# Patient Record
Sex: FEMALE | Race: WHITE | NOT HISPANIC OR LATINO | Employment: OTHER | URBAN - NONMETROPOLITAN AREA
[De-identification: names, ages, dates, MRNs, and addresses within clinical notes are randomized per-mention and may not be internally consistent; named-entity substitution may affect disease eponyms.]

---

## 2017-01-03 ENCOUNTER — TELEPHONE (OUTPATIENT)
Dept: FAMILY MEDICINE | Facility: OTHER | Age: 72
End: 2017-01-03

## 2017-01-03 NOTE — TELEPHONE ENCOUNTER
See message below, would we be able to send in prescription for her to give self injections or is it ok for her to wait until she gets back ton continue these?

## 2017-01-03 NOTE — TELEPHONE ENCOUNTER
She would need to be taught how to do this. We can arrange this if she would like. It is ok also to skip them for a couple of months, 2-4 months

## 2017-01-03 NOTE — TELEPHONE ENCOUNTER
Called and she will think about what she would like to do and get back to us about giving self injections or else waiting the few months.

## 2017-01-03 NOTE — TELEPHONE ENCOUNTER
2:55 PM    Reason for Call: Phone Call    Description: Patient receives monthly B12 injections, but patient will be leaving to [place], [place] for a few months and would like to know how she is to continue these injections while she is away.     Was an appointment offered for this call? No    Preferred method for responding to this message: Telephone Call    If we cannot reach you directly, may we leave a detailed response at the number you provided? Yes    Can this message wait until your PCP/provider returns, if available today? Not applicable,     Yelitza Garg

## 2017-01-18 ENCOUNTER — ALLIED HEALTH/NURSE VISIT (OUTPATIENT)
Dept: ALLERGY | Facility: OTHER | Age: 72
End: 2017-01-18
Attending: FAMILY MEDICINE
Payer: MEDICARE

## 2017-01-18 DIAGNOSIS — E53.9 B-COMPLEX DEFICIENCY: Primary | ICD-10-CM

## 2017-01-18 PROCEDURE — 96372 THER/PROPH/DIAG INJ SC/IM: CPT

## 2017-04-25 ENCOUNTER — TELEPHONE (OUTPATIENT)
Dept: FAMILY MEDICINE | Facility: OTHER | Age: 72
End: 2017-04-25

## 2017-04-25 DIAGNOSIS — E53.9 B-COMPLEX DEFICIENCY: Primary | ICD-10-CM

## 2017-04-25 RX ORDER — CYANOCOBALAMIN 1000 UG/ML
INJECTION, SOLUTION INTRAMUSCULAR; SUBCUTANEOUS
Qty: 1 ML | Refills: 3 | OUTPATIENT
Start: 2017-04-25 | End: 2017-09-29

## 2017-04-25 NOTE — TELEPHONE ENCOUNTER
Patient is wondering if she can continue with her B-12 shots now. Patient does have an annual exam scheduled on 05/23/2017. Please call patient and discuss this with her.

## 2017-04-25 NOTE — TELEPHONE ENCOUNTER
B12  Last visit: 11.16.15- Patient is here for monthly B12 shot but has no order.  PCP is Kaylyn Portillo. No follow up appointment is scheduled. Please advise. Thank you

## 2017-04-25 NOTE — TELEPHONE ENCOUNTER
Jose signed for B12 but patient left before the order was signed and did not receive shot.  Told patient that she is due for annual visit.  Please advise if patient can receive shot or if she needs to come in first. Thank you

## 2017-04-26 ENCOUNTER — ALLIED HEALTH/NURSE VISIT (OUTPATIENT)
Dept: ALLERGY | Facility: OTHER | Age: 72
End: 2017-04-26
Attending: PHYSICIAN ASSISTANT
Payer: MEDICARE

## 2017-04-26 DIAGNOSIS — E53.8 VITAMIN B12 DEFICIENCY (NON ANEMIC): Primary | ICD-10-CM

## 2017-04-26 PROCEDURE — 96372 THER/PROPH/DIAG INJ SC/IM: CPT

## 2017-04-26 NOTE — MR AVS SNAPSHOT
After Visit Summary   4/26/2017    Maria Teresa Aburto    MRN: [id number]           Patient Information     Date Of Birth          1945        Visit Information        Provider Department      4/26/2017 2:15 PM HC SHOT ROOM [facility] [place]        Today's Diagnoses     Vitamin B12 deficiency (non anemic)    -  1       Follow-ups after your visit        Your next 10 appointments already scheduled     Apr 26, 2017  2:15 PM CDT   injection with HC SHOT ROOM   [facility] [place] ([facility] [place] [facility])    [address]  [place] [address]   [phone]            May 23, 2017 10:00 AM CDT   (Arrive by 9:45 AM)   PHYSICAL with Kenna Portillo MD   [facility] ([facility] [place] [facility])    [address]  [place] [address]   [phone]              Who to contact     If you have questions or need follow up information about today's clinic visit or your schedule please contact [facility] directly at [phone].  Normal or non-critical lab and imaging results will be communicated to you by Phloronolhart, letter or phone within 4 business days after the clinic has received the results. If you do not hear from us within 7 days, please contact the clinic through [email or web address] or phone. If you have a critical or abnormal lab result, we will notify you by phone as soon as possible.  Submit refill requests through [employer] or call your pharmacy and they will forward the refill request to us. Please allow 3 business days for your refill to be completed.          Additional Information About Your Visit        Phloronolhart Information     [employer] gives you secure access to your electronic health record. If you see a primary care provider, you can also send messages to your care team and make appointments. If you have questions, please call your primary care clinic.  If you do not have a primary care provider, please call [phone] and they will assist you.        Care EveryWhere ID     This  is your Care EveryWhere ID. This could be used by other organizations to access your [place] medical records  [id number]         Blood Pressure from Last 3 Encounters:   11/16/15 122/74   07/30/15 112/60   10/23/14 126/68    Weight from Last 3 Encounters:   11/16/15 122 lb (55.3 kg)   07/30/15 120 lb (54.4 kg)   10/23/14 121 lb (54.9 kg)              We Performed the Following     INJECTION INTRAMUSCULAR OR SUB-Q     VITAMIN B12 INJ /1000MCG        Primary Care Provider Office Phone # Fax #    Kenna Portillo [phone]       [facility] [place] [address]  [place] [address]        Thank you!     Thank you for choosing [facility] [place]  for your care. Our goal is always to provide you with excellent care. Hearing back from our patients is one way we can continue to improve our services. Please take a few minutes to complete the written survey that you may receive in the mail after your visit with us. Thank you!             Your Updated Medication List - Protect others around you: Learn how to safely use, store and throw away your medicines at www.disposemymeds.org.          This list is accurate as of: 4/26/17  1:49 PM.  Always use your most recent med list.                   Brand Name Dispense Instructions for use    ASPIRIN CAPS 81 MG OR     100    one capsule by mouth daily       CALCIUM + D PO          cyanocobalamin 1000 MCG/ML injection    VITAMIN B12    1 mL    Inject 1 mL (1,000 mcg) into the muscle every 30 days       IRON SUPPLEMENT PO      Take 65 mg by mouth       Multi-vitamin Tabs tablet      Take 1 tablet by mouth daily       raloxifene 60 MG tablet    Evista    90 tablet    TAKE ONE TABLET BY MOUTH DA RACHELLE       scopolamine 1.5 MG patch 72 hr    TRANSDERM-SCOP    7 patch    Apply to hairless area behind one ear at least 4 hours before travel.       VITAMIN C PO      Take 500 mg by mouth

## 2017-04-26 NOTE — TELEPHONE ENCOUNTER
She can continue them. We will see her on May 23. I will renew them if needed, usually the shot room will place orders and notify me

## 2017-04-26 NOTE — PROGRESS NOTES
The following medication was given:     Prior to injection verified patient identity using patient's name and date of birth.    MEDICATION: Vitamin B12  1000mcg  ROUTE: IM  SITE: Deltoid - Left  DOSE: 1000 mcg / 1 mL  LOT #: [id number]  :  [employer]  EXPIRATION DATE:  07/2018  NDC#: 4773-9667-56    Patient instructed to remain in clinic for 20 minutes afterwards, and to report any adverse reaction to me immediately.    Amy Wooten RN

## 2017-05-23 ENCOUNTER — OFFICE VISIT (OUTPATIENT)
Dept: FAMILY MEDICINE | Facility: OTHER | Age: 72
End: 2017-05-23
Attending: FAMILY MEDICINE
Payer: COMMERCIAL

## 2017-05-23 VITALS
HEIGHT: 63 IN | DIASTOLIC BLOOD PRESSURE: 72 MMHG | SYSTOLIC BLOOD PRESSURE: 124 MMHG | WEIGHT: 120 LBS | BODY MASS INDEX: 21.26 KG/M2 | HEART RATE: 77 BPM | RESPIRATION RATE: 20 BRPM

## 2017-05-23 DIAGNOSIS — G25.81 RESTLESS LEGS SYNDROME (RLS): ICD-10-CM

## 2017-05-23 DIAGNOSIS — Z00.00 ROUTINE GENERAL MEDICAL EXAMINATION AT A HEALTH CARE FACILITY: Primary | ICD-10-CM

## 2017-05-23 PROCEDURE — 99397 PER PM REEVAL EST PAT 65+ YR: CPT | Performed by: FAMILY MEDICINE

## 2017-05-23 PROCEDURE — 90471 IMMUNIZATION ADMIN: CPT | Performed by: FAMILY MEDICINE

## 2017-05-23 PROCEDURE — 90670 PCV13 VACCINE IM: CPT | Performed by: FAMILY MEDICINE

## 2017-05-23 PROCEDURE — G0009 ADMIN PNEUMOCOCCAL VACCINE: HCPCS | Performed by: FAMILY MEDICINE

## 2017-05-23 ASSESSMENT — ANXIETY QUESTIONNAIRES
3. WORRYING TOO MUCH ABOUT DIFFERENT THINGS: NOT AT ALL
7. FEELING AFRAID AS IF SOMETHING AWFUL MIGHT HAPPEN: NOT AT ALL
6. BECOMING EASILY ANNOYED OR IRRITABLE: NOT AT ALL
IF YOU CHECKED OFF ANY PROBLEMS ON THIS QUESTIONNAIRE, HOW DIFFICULT HAVE THESE PROBLEMS MADE IT FOR YOU TO DO YOUR WORK, TAKE CARE OF THINGS AT HOME, OR GET ALONG WITH OTHER PEOPLE: NOT DIFFICULT AT ALL
5. BEING SO RESTLESS THAT IT IS HARD TO SIT STILL: SEVERAL DAYS
GAD7 TOTAL SCORE: 1
2. NOT BEING ABLE TO STOP OR CONTROL WORRYING: NOT AT ALL
1. FEELING NERVOUS, ANXIOUS, OR ON EDGE: NOT AT ALL

## 2017-05-23 ASSESSMENT — PAIN SCALES - GENERAL: PAINLEVEL: NO PAIN (0)

## 2017-05-23 ASSESSMENT — PATIENT HEALTH QUESTIONNAIRE - PHQ9: 5. POOR APPETITE OR OVEREATING: NOT AT ALL

## 2017-05-23 NOTE — PROGRESS NOTES
SUBJECTIVE:                                                            Maria Teresa Aburto is a 71 year old female who presents for Preventive Visit.      Are you in the first 12 months of your Medicare Part B coverage?  No    Healthy Habits:    Do you get at least three servings of calcium containing foods daily (dairy, green leafy vegetables, etc.)? yes    Amount of exercise or daily activities, outside of work: walking    Problems taking medications regularly No    Medication side effects: No    Have you had an eye exam in the past two years? yes    Do you see a dentist twice per year? yes    Do you have sleep apnea, excessive snoring or daytime drowsiness?no            Chief Complaint   Patient presents with     Physical     mammogram done previously        Reviewed and updated as needed this visit by clinical staff  Tobacco  Allergies  Meds  Med Hx  Surg Hx  Fam Hx  Soc Hx        Reviewed and updated as needed this visit by Provider        Social History   Substance Use Topics     Smoking status: Former Smoker     Types: Cigarettes     Smokeless tobacco: Not on file      Comment: Tried to Quit (YES)     Alcohol use Yes       The patient does not drink >3 drinks per day nor >7 drinks per week.    Today's PHQ-2 Score:   PHQ-2 ( 1999 [employer]) 10/10/2013   Q1: Little interest or pleasure in doing things 0   Q2: Feeling down, depressed or hopeless 0   PHQ-2 Score 0       Do you feel safe in your environment - Yes    Do you have a Health Care Directive?: Yes: Advance Directive has been received and scanned.    Current providers sharing in care for this patient include:   Patient Care Team:  Kenna Portillo MD as PCP - General      Hearing impairment: No    Ability to successfully perform activities of daily living: Yes, no assistance needed     Fall risk:       Home safety:  none identified      The following health maintenance items are reviewed in Epic and correct as of today:  Health Maintenance   Topic Date  Due     PNEUMOCOCCAL (2 of 2 - PCV13) 10/10/2014     INFLUENZA VACCINE (SYSTEM ASSIGNED)  09/01/2017     FALL RISK ASSESSMENT  11/15/2017     RAKAN QUESTIONNAIRE 1 YEAR  05/23/2018     PHQ-9 Q1YR (NO INBASKET)  05/23/2018     MAMMO SCREEN Q2 YR (SYSTEM ASSIGNED)  11/15/2018     COLONOSCOPY Q10 YR INBASKET MESSAGE  08/07/2019     ADVANCE DIRECTIVE PLANNING Q5 YRS (NO INBASKET)  10/23/2019     LIPID SCREEN Q5 YR FEMALE (SYSTEM ASSIGNED)  11/16/2020     TETANUS IMMUNIZATION (SYSTEM ASSIGNED)  10/11/2021     DEXA SCAN SCREENING (SYSTEM ASSIGNED)  Completed         Pneumonia Vaccine: due for PCV13  Mammogram Screening: Patient over age 50, mutual decision to screen reflected in health maintenance.  History of abnormal Pap smear: NO - age 65 - see link Cervical Cytology Screening Guidelines     ROS:  C: NEGATIVE for fever, chills, change in weight  I: NEGATIVE for worrisome rashes, moles or lesions  E: NEGATIVE for vision changes or irritation  E/M: NEGATIVE for ear, mouth and throat problems  R: NEGATIVE for significant cough or SOB  B: NEGATIVE for masses, tenderness or discharge  CV: NEGATIVE for chest pain, palpitations or peripheral edema  GI: NEGATIVE for nausea, abdominal pain, heartburn, or change in bowel habits  : NEGATIVE for frequency, dysuria, or hematuria  M: NEGATIVE for significant arthralgias or myalgia  N: NEGATIVE for weakness, dizziness or paresthesias  E: NEGATIVE for temperature intolerance, skin/hair changes  H: NEGATIVE for bleeding problems  P: NEGATIVE for changes in mood or affect    BP Readings from Last 3 Encounters:   05/23/17 124/72   11/16/15 122/74   07/30/15 112/60    Wt Readings from Last 3 Encounters:   05/23/17 120 lb (54.4 kg)   11/16/15 122 lb (55.3 kg)   07/30/15 120 lb (54.4 kg)                  Patient Active Problem List   Diagnosis     Restless legs syndrome (RLS)     Bowden's esophagus     B-complex deficiency     Disorder of bone and cartilage     Squamous Cell Carcinoma      "GERD (gastroesophageal reflux disease)[530.81]     Actinic keratosis     History of malignant neoplasm of skin     Past Surgical History:   Procedure Laterality Date     Breast Biospy  11/11/2000    LEFT > Fibrocystic Disease > Outcome: Fibroadenoma     COLONOSCOPY  2000     COLONOSCOPY  8-7-2009    Normal, Repeat 2015     DEXA Scan  2009     EGD       EGD  2008     EGD  2003     Knee Replacement  2012     Oophorectomy      Ectopic Pregnancy     TONSILLECTOMY         Social History   Substance Use Topics     Smoking status: Former Smoker     Types: Cigarettes     Smokeless tobacco: Not on file      Comment: Tried to Quit (YES)     Alcohol use Yes     Family History   Problem Relation Age of Onset     Endometrial Cancer Other      Family Hx     OSTEOPOROSIS Other      Family Hx     Parkinsonism Other      Family Hx     Unknown/Adopted No family hx of          Current Outpatient Prescriptions   Medication Sig Dispense Refill     cyanocobalamin (VITAMIN B12) 1000 MCG/ML injection Inject 1 mL (1,000 mcg) into the muscle every 30 days 1 mL 3     Ferrous Sulfate (IRON SUPPLEMENT PO) Take 65 mg by mouth       Ascorbic Acid (VITAMIN C PO) Take 500 mg by mouth       multivitamin, therapeutic with minerals (MULTI-VITAMIN) TABS Take 1 tablet by mouth daily       scopolamine (TRANSDERM-SCOP) 1.5 MG patch 72 hr Apply to hairless area behind one ear at least 4 hours before travel. 7 patch 0     Calcium Carbonate-Vitamin D (CALCIUM + D PO)        ASPIRIN CAPS 81 MG OR one capsule by mouth daily 100 3     Allergies   Allergen Reactions     Nitrofurantoin Other (See Comments) and Nausea     Macrobid  \"Chills and Fevers\"     Nitrofurantoin Macrocrystal Other (See Comments) and Nausea     Macrobid  \"Chills and Fevers\"       OBJECTIVE:                                                            /72  Pulse 77  Resp 20  Ht 5' 3\" (1.6 m)  Wt 120 lb (54.4 kg)  Breastfeeding? No  BMI 21.26 kg/m2 Estimated body mass index is 21.26 " "kg/(m^2) as calculated from the following:    Height as of this encounter: 5' 3\" (1.6 m).    Weight as of this encounter: 120 lb (54.4 kg).  EXAM:   GENERAL APPEARANCE: healthy, alert and no distress  EYES: Eyes grossly normal to inspection, PERRL and conjunctivae and sclerae normal  HENT: ear canals and TM's normal, nose and mouth without ulcers or lesions, oropharynx clear and oral mucous membranes moist  NECK: no adenopathy, no asymmetry, masses, or scars and thyroid normal to palpation  RESP: lungs clear to auscultation - no rales, rhonchi or wheezes  BREAST: normal without masses, tenderness or nipple discharge and no palpable axillary masses or adenopathy  CV: regular rate and rhythm, normal S1 S2, no S3 or S4, no murmur, click or rub, no peripheral edema and peripheral pulses strong  ABDOMEN: soft, nontender, no hepatosplenomegaly, no masses and bowel sounds normal  MS: no musculoskeletal defects are noted and gait is age appropriate without ataxia  SKIN: no suspicious lesions or rashes  NEURO: Normal strength and tone, sensory exam grossly normal, mentation intact and speech normal  PSYCH: mentation appears normal and affect normal/bright    ASSESSMENT / PLAN:                                                            1. Routine general medical examination at a health care facility  Doing well, pneumovax given  - PNEUMOCOCCAL CONJ VACCINE 13 VALENT IM  - ADMIN 1st VACCINE    2. Restless legs syndrome (RLS)  Using iron which causes constipation but helps intermittently. She prefers not to use prescription medications            COUNSELING:  Reviewed preventive health counseling, as reflected in patient instructions       Regular exercise       Healthy diet/nutrition        Estimated body mass index is 21.26 kg/(m^2) as calculated from the following:    Height as of this encounter: 5' 3\" (1.6 m).    Weight as of this encounter: 120 lb (54.4 kg).  Weight management plan noted, stable and monitoring   reports " that she has quit smoking. Her smoking use included Cigarettes. She does not have any smokeless tobacco history on file.      Appropriate preventive services were discussed with this patient, including applicable screening as appropriate for cardiovascular disease, diabetes, osteopenia/osteoporosis, and glaucoma.  As appropriate for age/gender, discussed screening for colorectal cancer, prostate cancer, breast cancer, and cervical cancer. Checklist reviewing preventive services available has been given to the patient.    Reviewed patients plan of care and provided an AVS. The Basic Care Plan (routine screening as documented in Health Maintenance) for Maria Teresa meets the Care Plan requirement. This Care Plan has been established and reviewed with the Patient.    Counseling Resources:  ATP IV Guidelines  Pooled Cohorts Equation Calculator  Breast Cancer Risk Calculator  FRAX Risk Assessment  ICSI Preventive Guidelines  Dietary Guidelines for Americans, 2010  USDA's MyPlate  ASA Prophylaxis  Lung CA Screening    Kenna Portillo MD  [facility]

## 2017-05-23 NOTE — MR AVS SNAPSHOT
After Visit Summary   5/23/2017    Maria Teresa Aburto    MRN: [id number]           Patient Information     Date Of Birth          1945        Visit Information        Provider Department      5/23/2017 10:00 AM Kenna Portillo MD [facility]        Today's Diagnoses     Routine general medical examination at a health care facility    -  1      Care Instructions      Preventive Health Recommendations  Female Ages 65 +    Yearly exam:     See your health care provider every year in order to  o Review health changes.   o Discuss preventive care.    o Review your medicines if your doctor has prescribed any.      You no longer need a yearly Pap test unless you've had an abnormal Pap test in the past 10 years. If you have vaginal symptoms, such as bleeding or discharge, be sure to talk with your provider about a Pap test.      Every 1 to 2 years, have a mammogram.  If you are over 69, talk with your health care provider about whether or not you want to continue having screening mammograms.      Every 10 years, have a colonoscopy. Or, have a yearly FIT test (stool test). These exams will check for colon cancer.       Have a cholesterol test every 5 years, or more often if your doctor advises it.       Have a diabetes test (fasting glucose) every three years. If you are at risk for diabetes, you should have this test more often.       At age 65, have a bone density scan (DEXA) to check for osteoporosis (brittle bone disease).    Shots:    Get a flu shot each year.    Get a tetanus shot every 10 years.    Talk to your doctor about your pneumonia vaccines. There are now two you should receive - Pneumovax (PPSV 23) and Prevnar (PCV 13).    Talk to your doctor about the shingles vaccine.    Talk to your doctor about the hepatitis B vaccine.    Nutrition:     Eat at least 5 servings of fruits and vegetables each day.      Eat whole-grain bread, whole-wheat pasta and brown rice instead of white grains  "and rice.      Talk to your provider about Calcium and Vitamin D.     Lifestyle    Exercise at least 150 minutes a week (30 minutes a day, 5 days a week). This will help you control your weight and prevent disease.      Limit alcohol to one drink per day.      No smoking.       Wear sunscreen to prevent skin cancer.       See your dentist twice a year for an exam and cleaning.      See your eye doctor every 1 to 2 years to screen for conditions such as glaucoma, macular degeneration, cataracts, etc         Follow-ups after your visit        Who to contact     If you have questions or need follow up information about today's clinic visit or your schedule please contact [facility] directly at [phone].  Normal or non-critical lab and imaging results will be communicated to you by MyChart, letter or phone within 4 business days after the clinic has received the results. If you do not hear from us within 7 days, please contact the clinic through [employer] or phone. If you have a critical or abnormal lab result, we will notify you by phone as soon as possible.  Submit refill requests through [employer] or call your pharmacy and they will forward the refill request to us. Please allow 3 business days for your refill to be completed.          Additional Information About Your Visit        MyChart Information     [employer] gives you secure access to your electronic health record. If you see a primary care provider, you can also send messages to your care team and make appointments. If you have questions, please call your primary care clinic.  If you do not have a primary care provider, please call [phone] and they will assist you.        Care EveryWhere ID     This is your Care EveryWhere ID. This could be used by other organizations to access your [place] medical records  [id number]        Your Vitals Were     Pulse Respirations Height Breastfeeding? BMI (Body Mass Index)       77 20 5' 3\" (1.6 m) No " 21.26 kg/m2        Blood Pressure from Last 3 Encounters:   05/23/17 124/72   11/16/15 122/74   07/30/15 112/60    Weight from Last 3 Encounters:   05/23/17 120 lb (54.4 kg)   11/16/15 122 lb (55.3 kg)   07/30/15 120 lb (54.4 kg)              We Performed the Following     ADMIN 1st VACCINE     PNEUMOCOCCAL CONJ VACCINE 13 VALENT IM        Primary Care Provider Office Phone # Fax #    Kenna Portillo [phone]       [facility] [place] [address]  [place] [address]        Thank you!     Thank you for choosing [facility] [place]  for your care. Our goal is always to provide you with excellent care. Hearing back from our patients is one way we can continue to improve our services. Please take a few minutes to complete the written survey that you may receive in the mail after your visit with us. Thank you!             Your Updated Medication List - Protect others around you: Learn how to safely use, store and throw away your medicines at www.disposemymeds.org.          This list is accurate as of: 5/23/17 10:44 AM.  Always use your most recent med list.                   Brand Name Dispense Instructions for use    ASPIRIN CAPS 81 MG OR     100    one capsule by mouth daily       CALCIUM + D PO          cyanocobalamin 1000 MCG/ML injection    VITAMIN B12    1 mL    Inject 1 mL (1,000 mcg) into the muscle every 30 days       IRON SUPPLEMENT PO      Take 65 mg by mouth       Multi-vitamin Tabs tablet      Take 1 tablet by mouth daily       scopolamine 1.5 MG patch 72 hr    TRANSDERM-SCOP    7 patch    Apply to hairless area behind one ear at least 4 hours before travel.       VITAMIN C PO      Take 500 mg by mouth

## 2017-05-23 NOTE — NURSING NOTE
"Chief Complaint   Patient presents with     Physical       Initial /72  Pulse 77  Resp 20  Ht 5' 3\" (1.6 m)  Wt 120 lb (54.4 kg)  Breastfeeding? No  BMI 21.26 kg/m2 Estimated body mass index is 21.26 kg/(m^2) as calculated from the following:    Height as of this encounter: 5' 3\" (1.6 m).    Weight as of this encounter: 120 lb (54.4 kg).  Medication Reconciliation: complete   Kelly Knox      "

## 2017-05-24 ENCOUNTER — TELEPHONE (OUTPATIENT)
Dept: FAMILY MEDICINE | Facility: OTHER | Age: 72
End: 2017-05-24

## 2017-05-24 DIAGNOSIS — Z85.828 HISTORY OF MALIGNANT NEOPLASM OF SKIN: Primary | ICD-10-CM

## 2017-05-24 ASSESSMENT — PATIENT HEALTH QUESTIONNAIRE - PHQ9: SUM OF ALL RESPONSES TO PHQ QUESTIONS 1-9: 1

## 2017-05-24 ASSESSMENT — ANXIETY QUESTIONNAIRES: GAD7 TOTAL SCORE: 1

## 2017-05-24 NOTE — TELEPHONE ENCOUNTER
Reason for call:  REFERRAL   1. Concern: Skin issues  2. Have you seen a provider for this concern? Yes  3. Who? [facility]  4. When? Couple years ago  5. What services are you requesting? Dermatology referral for [facility]  Description: Pt stated she needs to have a skin check done. Hasn't had one in a couple years and usually goes to [facility]. She would like to see Gracia Huerta  Was an appointment offered for this a call? No  Preferred method for responding to this message: Telephone Call  If we cannot reach you directly, may we leave a detailed response at the number you provided? Yes  Can this message wait until your PCP/Provider returns if not available today? YES, pt aware provider out today

## 2017-06-28 ENCOUNTER — ALLIED HEALTH/NURSE VISIT (OUTPATIENT)
Dept: ALLERGY | Facility: OTHER | Age: 72
End: 2017-06-28
Attending: FAMILY MEDICINE
Payer: MEDICARE

## 2017-06-28 DIAGNOSIS — E53.8 VITAMIN B12 DEFICIENCY (NON ANEMIC): Primary | ICD-10-CM

## 2017-06-28 PROCEDURE — 96372 THER/PROPH/DIAG INJ SC/IM: CPT

## 2017-06-28 NOTE — PROGRESS NOTES
The following medication was given:     MEDICATION: Vitamin B12  1000mcg  ROUTE: IM  SITE: Deltoid - Right  DOSE: 1ml  LOT #: [id number]  :  [employer]  EXPIRATION DATE:  02/2018  NDC#: [id number]    Renee Evans LPN

## 2017-06-28 NOTE — NURSING NOTE
Prior to injection verified patient identity using patient's name and date of birth.  Per orders of Dr. TONI Portillo, injection of B-12 given by Renee Evans. Patient instructed to remain in clinic for 20 minutes afterwards, and to report any adverse reaction to me immediately. Patient declined to wait signed out AMA.    Renee Evans LPN

## 2017-06-28 NOTE — MR AVS SNAPSHOT
After Visit Summary   6/28/2017    Maria Teresa Aburto    MRN: [id number]           Patient Information     Date Of Birth          1945        Visit Information        Provider Department      6/28/2017 10:30 AM HC SHOT ROOM [place] Olman Pereira        Today's Diagnoses     Vitamin B12 deficiency (non anemic)    -  1       Follow-ups after your visit        Your next 10 appointments already scheduled     Sep 19, 2017 11:15 AM CDT   (Arrive by 11:00 AM)   New Visit with SHELBY Oseguera MD   [facility] ([facility] [place] ([facility] [place] )    [address]  [place] [id number] [phone]              Who to contact     If you have questions or need follow up information about today's clinic visit or your schedule please contact [facility] directly at [phone].  Normal or non-critical lab and imaging results will be communicated to you by MyChart, letter or phone within 4 business days after the clinic has received the results. If you do not hear from us within 7 days, please contact the clinic through MyChart or phone. If you have a critical or abnormal lab result, we will notify you by phone as soon as possible.  Submit refill requests through [employer] or call your pharmacy and they will forward the refill request to us. Please allow 3 business days for your refill to be completed.          Additional Information About Your Visit        MyChart Information     [employer] gives you secure access to your electronic health record. If you see a primary care provider, you can also send messages to your care team and make appointments. If you have questions, please call your primary care clinic.  If you do not have a primary care provider, please call [phone] and they will assist you.        Care EveryWhere ID     This is your Care EveryWhere ID. This could be used by other organizations to access your [place] medical records  [id number]         Blood  Pressure from Last 3 Encounters:   05/23/17 124/72   11/16/15 122/74   07/30/15 112/60    Weight from Last 3 Encounters:   05/23/17 120 lb (54.4 kg)   11/16/15 122 lb (55.3 kg)   07/30/15 120 lb (54.4 kg)              We Performed the Following     INJECTION INTRAMUSCULAR OR SUB-Q     VITAMIN B12 INJ /1000MCG        Primary Care Provider Office Phone # Fax #    Kenna Portillo [phone]       [facility] [place] [address]  [place] [address]        Equal Access to Services     [facility] : Hadii aad ku hadasho Sotoya, waaxda luqadaha, qaybta kaalmada adehenriyagavin, ernie quiñonez . So [facility] [phone].    ATENCIÓN: Si habla español, tiene a martin disposición servicios gratuitos de asistencia lingüística. Llame al [phone].    We comply with applicable federal civil rights laws and [place] laws. We do not discriminate on the basis of race, color, national origin, age, disability sex, sexual orientation or gender identity.            Thank you!     Thank you for choosing [facility]  for your care. Our goal is always to provide you with excellent care. Hearing back from our patients is one way we can continue to improve our services. Please take a few minutes to complete the written survey that you may receive in the mail after your visit with us. Thank you!             Your Updated Medication List - Protect others around you: Learn how to safely use, store and throw away your medicines at www.disposemymeds.org.          This list is accurate as of: 6/28/17 10:42 AM.  Always use your most recent med list.                   Brand Name Dispense Instructions for use Diagnosis    ASPIRIN CAPS 81 MG OR     100    one capsule by mouth daily        CALCIUM + D PO           cyanocobalamin 1000 MCG/ML injection    VITAMIN B12    1 mL    Inject 1 mL (1,000 mcg) into the muscle every 30 days    B-complex deficiency       IRON SUPPLEMENT PO      Take 65 mg by  mouth        Multi-vitamin Tabs tablet      Take 1 tablet by mouth daily        scopolamine 1.5 MG patch 72 hr    TRANSDERM-SCOP    7 patch    Apply to hairless area behind one ear at least 4 hours before travel.    Motion sickness       VITAMIN C PO      Take 500 mg by mouth

## 2017-07-31 ENCOUNTER — ALLIED HEALTH/NURSE VISIT (OUTPATIENT)
Dept: ALLERGY | Facility: OTHER | Age: 72
End: 2017-07-31
Attending: FAMILY MEDICINE
Payer: MEDICARE

## 2017-07-31 DIAGNOSIS — E53.9 B-COMPLEX DEFICIENCY: Primary | ICD-10-CM

## 2017-07-31 PROCEDURE — 96372 THER/PROPH/DIAG INJ SC/IM: CPT

## 2017-07-31 NOTE — PROGRESS NOTES
Prior to injection verified patient identity using patient's name and date of birth.  Per orders of Dr. TONI Portillo, injection of B-12 given by Sheila Toney. Patient instructed to remain in clinic for 20 minutes afterwards, and to report any adverse reaction to me immediately. Patient declined to wait signed out AMA. Sheila Toney LPN

## 2017-07-31 NOTE — MR AVS SNAPSHOT
After Visit Summary   7/31/2017    Maria Teresa Aburto    MRN: [id number]           Patient Information     Date Of Birth          1945        Visit Information        Provider Department      7/31/2017 9:15 AM HC SHOT ROOM [place] Olman Pereira        Today's Diagnoses     B-complex deficiency    -  1       Follow-ups after your visit        Your next 10 appointments already scheduled     Sep 19, 2017 11:15 AM CDT   (Arrive by 11:00 AM)   New Visit with SHELBY Oseguera MD   [facility] [place] ([facility] [place] )    [address]  [place] [address] [phone]              Future tests that were ordered for you today     Open Standing Orders        Priority Remaining Interval Expires Ordered    VITAMIN B12 100 mcg (standing order count of 12) Today 12/12 7/31/2018 7/31/2017            Who to contact     If you have questions or need follow up information about today's clinic visit or your schedule please contact [facility] directly at [phone].  Normal or non-critical lab and imaging results will be communicated to you by [employer], letter or phone within 4 business days after the clinic has received the results. If you do not hear from us within 7 days, please contact the clinic through [employer] or phone. If you have a critical or abnormal lab result, we will notify you by phone as soon as possible.  Submit refill requests through [employer] or call your pharmacy and they will forward the refill request to us. Please allow 3 business days for your refill to be completed.          Additional Information About Your Visit        [employer] Information     [employer] gives you secure access to your electronic health record. If you see a primary care provider, you can also send messages to your care team and make appointments. If you have questions, please call your primary care clinic.  If you do not have a primary care provider, please call [phone] and  they will assist you.        Care EveryWhere ID     This is your Care EveryWhere ID. This could be used by other organizations to access your [facility] medical records  [id number]         Blood Pressure from Last 3 Encounters:   05/23/17 124/72   11/16/15 122/74   07/30/15 112/60    Weight from Last 3 Encounters:   05/23/17 120 lb (54.4 kg)   11/16/15 122 lb (55.3 kg)   07/30/15 120 lb (54.4 kg)               Primary Care Provider Office Phone # Fax #    Kenna Portillo [phone]       [facility] [place] [address]\[facility]\""[place] [address]        Equal Access to Services     JUDY BRO : Hadii aad ku hadasho Soomaali, waaxda luqadaha, qaybta kaalmada adeegyada, waxay isain haybeatricen fidel quiñonez . So [facility] [phone].    ATENCIÓN: Si habla español, tiene a martin disposición servicios gratuitos de asistencia lingüística. Llame al [phone].    We comply with applicable federal civil rights laws and [place] laws. We do not discriminate on the basis of race, color, national origin, age, disability sex, sexual orientation or gender identity.            Thank you!     Thank you for choosing [facility] [place]  for your care. Our goal is always to provide you with excellent care. Hearing back from our patients is one way we can continue to improve our services. Please take a few minutes to complete the written survey that you may receive in the mail after your visit with us. Thank you!             Your Updated Medication List - Protect others around you: Learn how to safely use, store and throw away your medicines at www.disposemymeds.org.          This list is accurate as of: 7/31/17  9:17 AM.  Always use your most recent med list.                   Brand Name Dispense Instructions for use Diagnosis    ASPIRIN CAPS 81 MG OR     100    one capsule by mouth daily        CALCIUM + D PO           cyanocobalamin 1000 MCG/ML injection    VITAMIN B12    1 mL    Inject 1 mL (1,000 mcg)  into the muscle every 30 days    B-complex deficiency       IRON SUPPLEMENT PO      Take 65 mg by mouth        Multi-vitamin Tabs tablet      Take 1 tablet by mouth daily        scopolamine 1.5 MG patch 72 hr    TRANSDERM-SCOP    7 patch    Apply to hairless area behind one ear at least 4 hours before travel.    Motion sickness       VITAMIN C PO      Take 500 mg by mouth

## 2017-08-02 ENCOUNTER — HOSPITAL ENCOUNTER (EMERGENCY)
Facility: HOSPITAL | Age: 72
Discharge: HOME | End: 2017-08-02
Attending: NURSE PRACTITIONER | Admitting: NURSE PRACTITIONER
Payer: MEDICARE

## 2017-08-02 VITALS
DIASTOLIC BLOOD PRESSURE: 69 MMHG | TEMPERATURE: 98.2 F | OXYGEN SATURATION: 100 % | RESPIRATION RATE: 16 BRPM | SYSTOLIC BLOOD PRESSURE: 135 MMHG

## 2017-08-02 DIAGNOSIS — R21 RASH: ICD-10-CM

## 2017-08-02 DIAGNOSIS — L08.9 LOCAL INFECTION OF SKIN AND SUBCUTANEOUS TISSUE: ICD-10-CM

## 2017-08-02 PROCEDURE — 99213 OFFICE O/P EST LOW 20 MIN: CPT | Performed by: NURSE PRACTITIONER

## 2017-08-02 PROCEDURE — 99213 OFFICE O/P EST LOW 20 MIN: CPT

## 2017-08-02 RX ORDER — DOXYCYCLINE 100 MG/1
100 CAPSULE ORAL 2 TIMES DAILY
Qty: 20 CAPSULE | Refills: 0 | Status: SHIPPED | OUTPATIENT
Start: 2017-08-02 | End: 2017-09-19

## 2017-08-02 ASSESSMENT — ENCOUNTER SYMPTOMS
RESPIRATORY NEGATIVE: 1
EYES NEGATIVE: 1
GASTROINTESTINAL NEGATIVE: 1
FEVER: 0
MUSCULOSKELETAL NEGATIVE: 1
DIARRHEA: 0
EYE REDNESS: 0
COUGH: 0
MYALGIAS: 0
CARDIOVASCULAR NEGATIVE: 1
VOMITING: 0
HEMATOLOGIC/LYMPHATIC NEGATIVE: 1
CONSTITUTIONAL NEGATIVE: 1
ACTIVITY CHANGE: 0

## 2017-08-02 NOTE — ED AVS SNAPSHOT
[facility]    [address]    [place] [address]    Phone:  [phone]                                       Maria Teresa Aburto   MRN: [id number]    Department:  [facility]   Date of Visit:  8/2/2017           Patient Information     Date Of Birth          1945        Your diagnoses for this visit were:     Local infection of skin and subcutaneous tissue     Rash        You were seen by Briana Roberto NP.      Follow-up Information     Follow up with Kenna Portillo MD.    Specialty:  Family Practice    Why:  As needed    Contact information:    [facility] [place]  [address]  [place] [address] [phone]          Follow up with [facility].    Specialty:  EMERGENCY MEDICINE    Why:  As needed, If symptoms worsen    Contact information:    [address]  [place] [address] [phone]    Additional information:    From [place] Area: Take [address]. Turn left at [address]. Turn left at the first stoplight on [address]. At the first stop sign, take a right onto [address]. Take a left into the parking lot and continue through until you reach the North enterance of the building.       From [place]: Take [address]. Take the [address] ramp towards [place]. Turn left onto [address]. Take a slight right onto [address]. Turn left at the first stoplight on [address]. At the first stop sign, take a right onto [address]. Take a left into the parking lot and continue through until you reach the North enterance of the building.       From [place]: Take [address] South. Take a right at [address]. At the first stop sign, take a right onto [address]. Take a left into the parking lot and continue through until you reach the North enterance of the building.       Discharge References/Attachments     BITES, TICK (ENGLISH)      Future Appointments        Provider  Department Dept Phone Center    9/19/2017 11:15 AM H Giovanni Oseguera MD [facility] [phone] [facility]         Review of your medicines      START taking        Dose / Directions Last dose taken    doxycycline 100 MG capsule   Commonly known as:  VIBRAMYCIN   Dose:  100 mg   Quantity:  20 capsule        Take 1 capsule (100 mg) by mouth 2 times daily   Refills:  0          Our records show that you are taking the medicines listed below. If these are incorrect, please call your family doctor or clinic.        Dose / Directions Last dose taken    ASPIRIN CAPS 81 MG OR   Quantity:  100        one capsule by mouth daily   Refills:  3        CALCIUM + D PO        Refills:  0        cyanocobalamin 1000 MCG/ML injection   Commonly known as:  VITAMIN B12   Quantity:  1 mL        Inject 1 mL (1,000 mcg) into the muscle every 30 days   Refills:  3        IRON SUPPLEMENT PO   Dose:  65 mg        Take 65 mg by mouth   Refills:  0        Multi-vitamin Tabs tablet   Dose:  1 tablet        Take 1 tablet by mouth daily   Refills:  0        scopolamine 1.5 MG patch 72 hr   Commonly known as:  TRANSDERM-SCOP   Quantity:  7 patch        Apply to hairless area behind one ear at least 4 hours before travel.   Refills:  0        VITAMIN C PO   Dose:  500 mg        Take 500 mg by mouth   Refills:  0        VITAMIN D (CHOLECALCIFEROL) PO   Dose:  400 Units        Take 400 Units by mouth daily   Refills:  0                Prescriptions were sent or printed at these locations (1 Prescription)                   [id number] - [place] - [address] [address] [address] [facility] [place] [address]    Telephone:  [phone]   Fax:  [phone]   Hours:                  E-Prescribed (1 of 1)         doxycycline (VIBRAMYCIN) 100 MG capsule                Orders Needing Specimen Collection     None      Pending Results     No orders found from 7/31/2017 to 8/3/2017.            Pending Culture Results     No orders  found from 7/31/2017 to 8/3/2017.            Thank you for choosing [facility]       Thank you for choosing [facility] for your care. Our goal is always to provide you with excellent care. Hearing back from our patients is one way we can continue to improve our services. Please take a few minutes to complete the written survey that you may receive in the mail after you visit with us. Thank you!        [employer] Information     [employer] gives you secure access to your electronic health record. If you see a primary care provider, you can also send messages to your care team and make appointments. If you have questions, please call your primary care clinic.  If you do not have a primary care provider, please call [phone] and they will assist you.        Care EveryWhere ID     This is your Care EveryWhere ID. This could be used by other organizations to access your [facility] medical records  [id number]        Equal Access to Services     JUDY BRO : Gerardo Kramer, kady moffett, ernie allen. So [facility] [phone].    ATENCIÓN: Si habla español, tiene a martin disposición servicios gratuitos de asistencia lingüística. Llame al [phone].    We comply with applicable federal civil rights laws and [place] laws. We do not discriminate on the basis of race, color, national origin, age, disability sex, sexual orientation or gender identity.            After Visit Summary       This is your record. Keep this with you and show to your community pharmacist(s) and doctor(s) at your next visit.

## 2017-08-02 NOTE — ED AVS SNAPSHOT
[facility]    [address] [address]    Phone:  [phone]                                       Maria Teresa Aburto   MRN: [id number]    Department:  [facility]   Date of Visit:  8/2/2017           After Visit Summary Signature Page     I have received my discharge instructions, and my questions have been answered. I have discussed any challenges I see with this plan with the nurse or doctor.    ..........................................................................................................................................  Patient/Patient Representative Signature      ..........................................................................................................................................  Patient Representative Print Name and Relationship to Patient    ..................................................               ................................................  Date                                            Time    ..........................................................................................................................................  Reviewed by Signature/Title    ...................................................              ..............................................  Date                                                            Time

## 2017-08-02 NOTE — ED NOTES
"Pt presents today alone for c/o a bulls eye looking bug bite to her left axilla area, that she noticed yesterday for the first time, area does itch and has I white center \"head\" with dark pink surrounding skin and then a lighter ring of pink surrounding that.   "

## 2017-08-02 NOTE — ED PROVIDER NOTES
History     Chief Complaint   Patient presents with     Insect Bite     c/o bug bite     The history is provided by the patient. No  was used.     Maria Teresa Aburto is a 72 year old female who has a skin inflammation  on her left upper arm inside aspect for one day.  She has been out in the woods everyday picking berries.  She noted this rash on her arm starting one day ago. She has not done anything for it other than try not to itch.  She had had no signs of systemic illness    I have reviewed the Medications, Allergies, Past Medical and Surgical History, and Social History in the Epic system.        Review of Systems   Constitutional: Negative.  Negative for activity change and fever.   HENT: Negative.    Eyes: Negative.  Negative for redness.   Respiratory: Negative.  Negative for cough.    Cardiovascular: Negative.    Gastrointestinal: Negative.  Negative for diarrhea and vomiting.   Genitourinary: Negative.    Musculoskeletal: Negative.  Negative for myalgias.   Skin: Positive for rash.   Hematological: Negative.        Physical Exam   BP: 135/69  Heart Rate: 86  Temp: 98.2  F (36.8  C)  Resp: 16  SpO2: 100 %  Physical Exam   Constitutional: She is oriented to person, place, and time. She appears well-developed and well-nourished.   HENT:   Head: Normocephalic and atraumatic.   Eyes: Conjunctivae are normal. Right eye exhibits no discharge. Left eye exhibits no discharge.   Neck: Normal range of motion. Neck supple.   Cardiovascular: Normal rate.    Pulmonary/Chest: Effort normal.   Abdominal: Soft. Bowel sounds are normal.   Musculoskeletal: Normal range of motion.   Lymphadenopathy:     She has no cervical adenopathy.   Neurological: She is alert and oriented to person, place, and time.   Skin: Skin is warm and dry. Rash noted.   Left upper arm inner aspect has about 7x 9 cm erythematous rash that is pink , minimally inflamed, raised just minimally there is a less pink/white ring around  that that and in the center there is 3-4 mm of more reddened area with a tiny pin point pustule in the center.   Nursing note and vitals reviewed.      ED Course     ED Course     Procedures               Labs Ordered and Resulted from Time of ED Arrival Up to the Time of Departure from the ED - No data to display    Assessments & Plan (with Medical Decision Making)     I have reviewed the nursing notes.  I am going to cover her for lymes disease in the event that this is aN EM .  She did not have a known tick bite but this has the appearance of potentially EM.  I could also be a bug bite with a minor superficial; infection starting. The doxycycline will cover for either possibility. She should not take her MVT and calcium at the same time as the doxy.  WMP the rash. Ibuprofen or antihistamine as desired.     Pathophysiology, possible etiology and treatment with potential outcomes, risks, benefits, and alternatives discussed to the best of my ability      Pt verbalizes understanding and agreement with plan.  Follow up for worsening symptoms.  This should slowly improve an if not f/u for new or worsening sx.     I have reviewed the findings, diagnosis, plan and need for follow up with the patient.      Discharge Medication List as of 8/2/2017  4:41 PM      START taking these medications    Details   doxycycline (VIBRAMYCIN) 100 MG capsule Take 1 capsule (100 mg) by mouth 2 times daily, Disp-20 capsule, R-0, E-Prescribe             Final diagnoses:   Local infection of skin and subcutaneous tissue   Rash       8/2/2017   [facility]     Briana Roberto NP  08/02/17 [id number]

## 2017-08-11 ENCOUNTER — TELEPHONE (OUTPATIENT)
Dept: FAMILY MEDICINE | Facility: OTHER | Age: 72
End: 2017-08-11

## 2017-08-11 NOTE — TELEPHONE ENCOUNTER
Called and notified patient that she got a 10 day supply and directions are for 2 times daily so this would be equivalent to the 20 day dosing. Pt verbalized understanding.

## 2017-08-11 NOTE — TELEPHONE ENCOUNTER
9:57 AM    Reason for Call: Phone Call    Description: patient received medication (doxycycline) from urgent care and was told it would be a 20 day dose and she only got a 10 day dose, also she is concerned that it might be lyme's disease but they did not do any tesing    Was an appointment offered for this call? No    Preferred method for responding to this message: Telephone Call    If we cannot reach you directly, may we leave a detailed response at the number you provided? Yes    Can this message wait until your PCP/provider returns, if available today? NO, patient would like a return phone call as soon as possible.  Thank you    Renea Márquez

## 2017-08-30 ENCOUNTER — ALLIED HEALTH/NURSE VISIT (OUTPATIENT)
Dept: ALLERGY | Facility: OTHER | Age: 72
End: 2017-08-30
Attending: FAMILY MEDICINE
Payer: MEDICARE

## 2017-08-30 DIAGNOSIS — E53.9 B-COMPLEX DEFICIENCY: Primary | ICD-10-CM

## 2017-08-30 PROCEDURE — 96372 THER/PROPH/DIAG INJ SC/IM: CPT

## 2017-08-30 NOTE — MR AVS SNAPSHOT
After Visit Summary   8/30/2017    Maria Teresa Aburto    MRN: [id number]           Patient Information     Date Of Birth          1945        Visit Information        Provider Department      8/30/2017 3:45 PM HC SHOT ROOM [facility]        Today's Diagnoses     B-complex deficiency    -  1       Follow-ups after your visit        Your next 10 appointments already scheduled     Sep 19, 2017 11:15 AM CDT   (Arrive by 11:00 AM)   New Visit with SHELBY Oseguera MD   [facility] [place] ([facility] [place] )    [address]  [place] [address] [phone]            Oct 02, 2017 10:00 AM CDT   injection with HC SHOT ROOM   [facility] [place] ([facility] [place] )    [address]  [place] [address]   [phone]              Who to contact     If you have questions or need follow up information about today's clinic visit or your schedule please contact [facility] directly at [phone].  Normal or non-critical lab and imaging results will be communicated to you by [employer], letter or phone within 4 business days after the clinic has received the results. If you do not hear from us within 7 days, please contact the clinic through Abacastt or phone. If you have a critical or abnormal lab result, we will notify you by phone as soon as possible.  Submit refill requests through [employer] or call your pharmacy and they will forward the refill request to us. Please allow 3 business days for your refill to be completed.          Additional Information About Your Visit        [employer] Information     [employer] gives you secure access to your electronic health record. If you see a primary care provider, you can also send messages to your care team and make appointments. If you have questions, please call your primary care clinic.  If you do not have a primary care provider, please call [phone] and they will assist you.        Care  EveryWhere ID     This is your Care EveryWhere ID. This could be used by other organizations to access your [facility] medical records  [id number]         Blood Pressure from Last 3 Encounters:   08/02/17 135/69   05/23/17 124/72   11/16/15 122/74    Weight from Last 3 Encounters:   05/23/17 120 lb (54.4 kg)   11/16/15 122 lb (55.3 kg)   07/30/15 120 lb (54.4 kg)              We Performed the Following     INJECTION INTRAMUSCULAR OR SUB-Q     VITAMIN B12 INJ /1000MCG        Primary Care Provider Office Phone # Fax #    Kenna Portillo [phone]       [facility] [place] [address]  [place] [address]        Equal Access to Services     JUDY BRO : Hadii belinda shukla hadasho Soomaali, waaxda luqadaha, qaybta kaalmada adeegyada, ernie quiñonez . So [facility] [phone].    ATENCIÓN: Si habla español, tiene a martin disposición servicios gratuitos de asistencia lingüística. Llame al [phone].    We comply with applicable federal civil rights laws and [place] laws. We do not discriminate on the basis of race, color, national origin, age, disability sex, sexual orientation or gender identity.            Thank you!     Thank you for choosing [facility] [place]  for your care. Our goal is always to provide you with excellent care. Hearing back from our patients is one way we can continue to improve our services. Please take a few minutes to complete the written survey that you may receive in the mail after your visit with us. Thank you!             Your Updated Medication List - Protect others around you: Learn how to safely use, store and throw away your medicines at www.disposemymeds.org.          This list is accurate as of: 8/30/17  4:01 PM.  Always use your most recent med list.                   Brand Name Dispense Instructions for use Diagnosis    ASPIRIN CAPS 81 MG OR     100    one capsule by mouth daily        CALCIUM + D PO           cyanocobalamin 1000  MCG/ML injection    VITAMIN B12    1 mL    Inject 1 mL (1,000 mcg) into the muscle every 30 days    B-complex deficiency       doxycycline 100 MG capsule    VIBRAMYCIN    20 capsule    Take 1 capsule (100 mg) by mouth 2 times daily        IRON SUPPLEMENT PO      Take 65 mg by mouth        Multi-vitamin Tabs tablet      Take 1 tablet by mouth daily        scopolamine 1.5 MG patch 72 hr    TRANSDERM-SCOP    7 patch    Apply to hairless area behind one ear at least 4 hours before travel.    Motion sickness       VITAMIN C PO      Take 500 mg by mouth        VITAMIN D (CHOLECALCIFEROL) PO      Take 400 Units by mouth daily

## 2017-09-19 ENCOUNTER — OFFICE VISIT (OUTPATIENT)
Dept: DERMATOLOGY | Facility: OTHER | Age: 72
End: 2017-09-19
Attending: DERMATOLOGY
Payer: COMMERCIAL

## 2017-09-19 VITALS
WEIGHT: 119 LBS | DIASTOLIC BLOOD PRESSURE: 64 MMHG | HEART RATE: 84 BPM | HEIGHT: 63 IN | SYSTOLIC BLOOD PRESSURE: 120 MMHG | OXYGEN SATURATION: 98 % | RESPIRATION RATE: 16 BRPM | BODY MASS INDEX: 21.09 KG/M2 | TEMPERATURE: 97.7 F

## 2017-09-19 DIAGNOSIS — Z85.828 HISTORY OF MALIGNANT NEOPLASM OF SKIN: ICD-10-CM

## 2017-09-19 DIAGNOSIS — L57.0 AK (ACTINIC KERATOSIS): Primary | ICD-10-CM

## 2017-09-19 DIAGNOSIS — B00.1 HERPES SIMPLEX LABIALIS: ICD-10-CM

## 2017-09-19 PROCEDURE — 99202 OFFICE O/P NEW SF 15 MIN: CPT | Performed by: DERMATOLOGY

## 2017-09-19 PROCEDURE — 99212 OFFICE O/P EST SF 10 MIN: CPT

## 2017-09-19 RX ORDER — FLUOROURACIL 50 MG/G
CREAM TOPICAL
Qty: 40 G | Refills: 1 | Status: SHIPPED | OUTPATIENT
Start: 2017-09-19 | End: 2018-06-18

## 2017-09-19 RX ORDER — ACYCLOVIR 400 MG/1
TABLET ORAL
Qty: 30 TABLET | Refills: 3 | Status: SHIPPED | OUTPATIENT
Start: 2017-09-19 | End: 2018-06-18

## 2017-09-19 ASSESSMENT — PAIN SCALES - GENERAL: PAINLEVEL: MILD PAIN (3)

## 2017-09-19 NOTE — MR AVS SNAPSHOT
After Visit Summary   9/19/2017    Maria Teresa Aburto    MRN: [id number]           Patient Information     Date Of Birth          1945        Visit Information        Provider Department      9/19/2017 11:15 AM SHELBY Oseguera MD [facility]        Today's Diagnoses     AK (actinic keratosis)    -  1    History of malignant neoplasm of skin        Herpes simplex labialis           Follow-ups after your visit        Your next 10 appointments already scheduled     Oct 02, 2017 10:00 AM CDT   injection with HC SHOT ROOM   [facility] [place] ([facility] [place] )    [address]  [place] [address]   [phone]              Who to contact     If you have questions or need follow up information about today's clinic visit or your schedule please contact [facility] directly at [phone].  Normal or non-critical lab and imaging results will be communicated to you by MyChart, letter or phone within 4 business days after the clinic has received the results. If you do not hear from us within 7 days, please contact the clinic through MyChart or phone. If you have a critical or abnormal lab result, we will notify you by phone as soon as possible.  Submit refill requests through [employer] or call your pharmacy and they will forward the refill request to us. Please allow 3 business days for your refill to be completed.          Additional Information About Your Visit        MyChart Information     [employer] gives you secure access to your electronic health record. If you see a primary care provider, you can also send messages to your care team and make appointments. If you have questions, please call your primary care clinic.  If you do not have a primary care provider, please call [phone] and they will assist you.        Care EveryWhere ID     This is your Care EveryWhere ID. This could be used by other organizations to access your [facility]  "records  [id number]        Your Vitals Were     Pulse Temperature Respirations Height Pulse Oximetry BMI (Body Mass Index)    84 97.7  F (36.5  C) (Tympanic) 16 1.6 m (5' 3\") 98% 21.08 kg/m2       Blood Pressure from Last 3 Encounters:   09/19/17 120/64   08/02/17 135/69   05/23/17 124/72    Weight from Last 3 Encounters:   09/19/17 54 kg (119 lb)   05/23/17 54.4 kg (120 lb)   11/16/15 55.3 kg (122 lb)              Today, you had the following     No orders found for display         Today's Medication Changes          These changes are accurate as of: 9/19/17 12:31 PM.  If you have any questions, ask your nurse or doctor.               Start taking these medicines.        Dose/Directions    acyclovir 400 MG tablet   Commonly known as:  ZOVIRAX   Used for:  Herpes simplex labialis   Started by:  SHELBY Oseguera MD        One tablet tid for 5 days for cold sores - fill when patient calls   Quantity:  30 tablet   Refills:  3       fluorouracil 5 % cream   Commonly known as:  EFUDEX   Used for:  AK (actinic keratosis), Herpes simplex labialis   Started by:  SHELBY Oseguera MD        Apply once a day to lesion on nose - for 2 weeks. Expect severe irritation   Quantity:  40 g   Refills:  1         Stop taking these medicines if you haven't already. Please contact your care team if you have questions.     doxycycline 100 MG capsule   Commonly known as:  VIBRAMYCIN   Stopped by:  SHELBY Oseguera MD                Where to get your medicines      These medications were sent to [id number] - [place], [place] - [address]  [address], [place] [address]     Phone:  [phone]     acyclovir 400 MG tablet    fluorouracil 5 % cream                Primary Care Provider Office Phone # Fax #    Kenna Portillo [phone]       [facility] [place] [address] [place] [address]        Equal Access to Services     FABIAN BRO AH: Hadii kady Chen, " ernie allen ah. So [facility] [phone].    ATENCIÓN: Si chelo hernandez, tiene a martin disposición servicios gratuitos de asistencia lingüística. Manan al [phone].    We comply with applicable federal civil rights laws and [place] laws. We do not discriminate on the basis of race, color, national origin, age, disability sex, sexual orientation or gender identity.            Thank you!     [facility] you [facility]  for your [facility]. Our goal is always to provide you with excellent care. Hearing back from our patients is one way we can continue to improve our services. Please take a few minutes to complete the written survey that you may receive in the mail after your visit with us. Thank you!             Your Updated Medication List - Protect others around you: Learn how to safely use, store and throw away your medicines at www.disposemymeds.org.          This list is accurate as of: 9/19/17 12:31 PM.  Always use your most recent med list.                   Brand Name Dispense Instructions for use Diagnosis    acyclovir 400 MG tablet    ZOVIRAX    30 tablet    One tablet tid for 5 days for cold sores - fill when patient calls    Herpes simplex labialis       ASPIRIN CAPS 81 MG OR     100    one capsule by mouth daily        CALCIUM + D PO           cyanocobalamin 1000 MCG/ML injection    VITAMIN B12    1 mL    Inject 1 mL (1,000 mcg) into the muscle every 30 days    B-complex deficiency       fluorouracil 5 % cream    EFUDEX    40 g    Apply once a day to lesion on nose - for 2 weeks. Expect severe irritation    AK (actinic keratosis), Herpes simplex labialis       IRON SUPPLEMENT PO      Take 65 mg by mouth        Multi-vitamin Tabs tablet      Take 1 tablet by mouth daily        scopolamine 1.5 MG patch 72 hr    TRANSDERM-SCOP    7 patch    Apply to hairless area behind one ear at least 4 hours before travel.    Motion sickness       VITAMIN  C PO      Take 500 mg by mouth        VITAMIN D (CHOLECALCIFEROL) PO      Take 400 Units by mouth daily

## 2017-09-19 NOTE — NURSING NOTE
"Chief Complaint   Patient presents with     Consult     History of skin cancer has seen Dr Lynn for this.        Initial /64  Pulse 84  Temp 97.7  F (36.5  C) (Tympanic)  Resp 16  Ht 1.6 m (5' 3\")  Wt 54 kg (119 lb)  SpO2 98%  BMI 21.08 kg/m2 Estimated body mass index is 21.08 kg/(m^2) as calculated from the following:    Height as of this encounter: 1.6 m (5' 3\").    Weight as of this encounter: 54 kg (119 lb).  Medication Reconciliation: complete   Erin Day      "

## 2017-09-19 NOTE — CONSULTS
DATE OF SERVICE:  09/19/2017       SUBJECTIVE:  Maria Teresa Aburto comes in for a general skin check.  She has a history of having had skin cancers on her lower legs and being treated by Dr. Lynn and other physicians for these lesions.   She had an actinic keratosis treated by Dr. Lynn in 2009 but does not recall exactly where it was.      Comes in today because she has a lesion on her left medial cheek and lateral nasal area  that has been scaling and peeling and never quite healing.  In addition, she gets lesions on her lower lip from time to time when she is sun exposed without the use of sunscreen.  She had no other specific concerns but would like her legs looked at and the facial lesions addressed.      OBJECTIVE:  Shows a healthy lady in no distress.  She is  freckled and was a redhead in her childhood.  On the left cheek and the lower eyelid area is a patch, quite ill defined, pink and slightly scaly and very suggestive of actinic damage.  No tumor is present and the nonhealing nature of this suggests that it is an actinically damaged region.  There is a small crust on the lower lip that she thinks is a cold sore and it would appear that she has recurrent herpes simplex labialis and has not used anything other than Abreva. .  We checked her back and found only freckles.  We checked her legs and saw some hyperpigmentation but no suggestion of active actinic damage or skin cancers there.  She had no other concerns.      ASSESSMENT:  Actinic keratosis cheek , face and herpes simplex recurrent lip, a history of skin cancer.      PLAN:  For the facial lesion , I recommend 5% 5-FU ( Efudex) as the best way to remove it  without leaving any depigmentation or scarring.  Advised that she should apply this product once daily for 10-14 days and expect severe irritation at the site  and be careful not to get it close to her eye.      For the herpes I suggested she have available acyclovir 400 mg t.i.d. x5 days but that  she should start it at the time she senses the herpes  returning.      She was reassured.  We would be to see her again for a general skin check or if she has any difficulties further with the face.  She had 2 other small macules  that were red on her face that are likely very early actinic and I advised that she use the 5FU there during the 10-day treatment course.  I reiterated that this is a very irritating product but it is best choice in my opinion for facial actinic keratoses of her type.      Medications and allergies reviewed.         H JULITO SKELTON MD             D: 2017 11:43   T: 2017 12:11   MT: [place]      Name:     VINICIUS SORIANO   MRN:      [id number]        Account:       [id number]   :      1945           Consult Date:  2017      Document: [id number]       cc: Kenna Portillo MD

## 2017-09-29 DIAGNOSIS — E53.9 B-COMPLEX DEFICIENCY: ICD-10-CM

## 2017-09-29 RX ORDER — CYANOCOBALAMIN 1000 UG/ML
INJECTION, SOLUTION INTRAMUSCULAR; SUBCUTANEOUS
Qty: 1 ML | Refills: 5 | OUTPATIENT
Start: 2017-09-29 | End: 2017-12-05

## 2017-09-29 NOTE — TELEPHONE ENCOUNTER
Last office visit was 5/23/17.  Need a new order for B12 injections.  Patient is scheduled for an injection on Monday, October 2nd.  PCP is Dr. Kaylyn Portillo.  Medication pended.  Thank you.

## 2017-10-06 ENCOUNTER — ALLIED HEALTH/NURSE VISIT (OUTPATIENT)
Dept: ALLERGY | Facility: OTHER | Age: 72
End: 2017-10-06
Attending: FAMILY MEDICINE
Payer: MEDICARE

## 2017-10-06 DIAGNOSIS — E53.9 B-COMPLEX DEFICIENCY: Primary | ICD-10-CM

## 2017-10-06 DIAGNOSIS — Z23 NEED FOR PROPHYLACTIC VACCINATION AND INOCULATION AGAINST INFLUENZA: ICD-10-CM

## 2017-10-06 PROCEDURE — 90662 IIV NO PRSV INCREASED AG IM: CPT

## 2017-10-06 PROCEDURE — G0008 ADMIN INFLUENZA VIRUS VAC: HCPCS

## 2017-10-06 PROCEDURE — 96372 THER/PROPH/DIAG INJ SC/IM: CPT

## 2017-10-06 NOTE — MR AVS SNAPSHOT
After Visit Summary   10/6/2017    Maria Teresa Aburto    MRN: [id number]           Patient Information     Date Of Birth          1945        Visit Information        Provider Department      10/6/2017 9:15 AM HC SHOT ROOM [facility]        Today's Diagnoses     B-complex deficiency    -  1    Need for prophylactic vaccination and inoculation against influenza           Follow-ups after your visit        Who to contact     If you have questions or need follow up information about today's clinic visit or your schedule please contact [facility] [place] directly at [phone].  Normal or non-critical lab and imaging results will be communicated to you by Taren[employer]art, letter or phone within 4 business days after the clinic has received the results. If you do not hear from us within 7 days, please contact the clinic through [employer] or phone. If you have a critical or abnormal lab result, we will notify you by phone as soon as possible.  Submit refill requests through [employer] or call your pharmacy and they will forward the refill request to us. Please allow 3 business days for your refill to be completed.          Additional Information About Your Visit        MyChart Information     [employer] gives you secure access to your electronic health record. If you see a primary care provider, you can also send messages to your care team and make appointments. If you have questions, please call your primary care clinic.  If you do not have a primary care provider, please call [phone] and they will assist you.        Care EveryWhere ID     This is your Care EveryWhere ID. This could be used by other organizations to access your [facility] medical records  [id number]         Blood Pressure from Last 3 Encounters:   09/19/17 120/64   08/02/17 135/69   05/23/17 124/72    Weight from Last 3 Encounters:   09/19/17 119 lb (54 kg)   05/23/17 120 lb (54.4 kg)   11/16/15 122 lb (55.3 kg)              We  Performed the Following     FLU VACCINE, INCREASED ANTIGEN, PRESV FREE, AGE 65+ [69232]     INJECTION INTRAMUSCULAR OR SUB-Q     Vaccine Administration, Initial [21040]     VITAMIN B12 INJ /1000MCG        Primary Care Provider Office Phone # Fax #    Kenna Portillo [phone]       [facility] [place] [address]  [place] [address]        Equal Access to Services     FABIAN BRO : Hadii aad ku hadasho Soomaali, waaxda luqadaha, qaybta kaalmada adeegyada, waxay idiin hayaan adeeg kharash la'aan ah. So [facility] [phone].    ATENCIÓN: Si chelo hernandez, tiene a martin disposición servicios gratuitos de asistencia lingüística. Llame al [phone].    We comply with applicable federal civil rights laws and [place] laws. We do not discriminate on the basis of race, color, national origin, age, disability, sex, sexual orientation, or gender identity.            Thank you!     Thank you for choosing [facility]  for your care. Our goal is always to provide you with excellent care. Hearing back from our patients is one way we can continue to improve our services. Please take a few minutes to complete the written survey that you may receive in the mail after your visit with us. Thank you!             Your Updated Medication List - Protect others around you: Learn how to safely use, store and throw away your medicines at www.disposemymeds.org.          This list is accurate as of: 10/6/17 11:59 PM.  Always use your most recent med list.                   Brand Name Dispense Instructions for use Diagnosis    acyclovir 400 MG tablet    ZOVIRAX    30 tablet    One tablet tid for 5 days for cold sores - fill when patient calls    Herpes simplex labialis       ASPIRIN CAPS 81 MG OR     100    one capsule by mouth daily        CALCIUM + D PO           cyanocobalamin 1000 MCG/ML injection    VITAMIN B12    1 mL    Inject 1 mL (1,000 mcg) into the muscle every 30 days    B-complex deficiency        fluorouracil 5 % cream    EFUDEX    40 g    Apply once a day to lesion on nose - for 2 weeks. Expect severe irritation    AK (actinic keratosis), Herpes simplex labialis       IRON SUPPLEMENT PO      Take 65 mg by mouth        Multi-vitamin Tabs tablet      Take 1 tablet by mouth daily        scopolamine 1.5 MG patch 72 hr    TRANSDERM-SCOP    7 patch    Apply to hairless area behind one ear at least 4 hours before travel.    Motion sickness       VITAMIN C PO      Take 500 mg by mouth        VITAMIN D (CHOLECALCIFEROL) PO      Take 400 Units by mouth daily

## 2017-10-06 NOTE — PROGRESS NOTES
Prior to injection verified patient identity using patient's name and date of birth.  Per orders of Dr. Kenna Portillo, injection of B-12 given by Xin Hubbard. Patient instructed to remain in clinic for 15 minutes afterwards, and to report any adverse reaction to me immediately.    The following medication was given:     MEDICATION: Vitamin B12  1000 mcg  ROUTE: IM  SITE: Deltoid - Left  DOSE: 1000 mcg  LOT #: [id number]  :  [employer]  EXPIRATION DATE:    NDC#: 0804-9596-18   Xin Hubbard  Injectable Influenza Immunization Documentation    1.  Is the person to be vaccinated sick today?   No    2. Does the person to be vaccinated have an allergy to a component   of the vaccine?   No    3. Has the person to be vaccinated ever had a serious reaction   to influenza vaccine in the past?   No    4. Has the person to be vaccinated ever had Guillain-Barré syndrome?   No    Form completed by Xin Hubbard

## 2017-10-13 ENCOUNTER — TRANSFERRED RECORDS (OUTPATIENT)
Dept: HEALTH INFORMATION MANAGEMENT | Facility: CLINIC | Age: 72
End: 2017-10-13

## 2017-10-18 ENCOUNTER — TRANSFERRED RECORDS (OUTPATIENT)
Dept: HEALTH INFORMATION MANAGEMENT | Facility: CLINIC | Age: 72
End: 2017-10-18

## 2017-10-27 ENCOUNTER — HOSPITAL ENCOUNTER (OUTPATIENT)
Dept: PHYSICAL THERAPY | Facility: HOSPITAL | Age: 72
Setting detail: THERAPIES SERIES
End: 2017-10-27
Attending: FAMILY MEDICINE
Payer: MEDICARE

## 2017-10-27 PROCEDURE — G8984 CARRY CURRENT STATUS: HCPCS | Mod: GP,CK | Performed by: PHYSICAL THERAPIST

## 2017-10-27 PROCEDURE — 97161 PT EVAL LOW COMPLEX 20 MIN: CPT | Mod: GP | Performed by: PHYSICAL THERAPIST

## 2017-10-27 PROCEDURE — 97110 THERAPEUTIC EXERCISES: CPT | Mod: GP | Performed by: PHYSICAL THERAPIST

## 2017-10-27 PROCEDURE — 97140 MANUAL THERAPY 1/> REGIONS: CPT | Mod: GP | Performed by: PHYSICAL THERAPIST

## 2017-10-27 PROCEDURE — G8985 CARRY GOAL STATUS: HCPCS | Mod: GP,CI | Performed by: PHYSICAL THERAPIST
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
## 2017-11-02 ENCOUNTER — HOSPITAL ENCOUNTER (OUTPATIENT)
Dept: PHYSICAL THERAPY | Facility: HOSPITAL | Age: 72
Setting detail: THERAPIES SERIES
End: 2017-11-02
Attending: FAMILY MEDICINE
Payer: MEDICARE

## 2017-11-02 PROCEDURE — 97110 THERAPEUTIC EXERCISES: CPT | Mod: GP

## 2017-11-02 PROCEDURE — 40000718 ZZHC STATISTIC PT DEPARTMENT ORTHO VISIT

## 2017-11-02 PROCEDURE — 97140 MANUAL THERAPY 1/> REGIONS: CPT | Mod: GP

## 2017-11-15 ENCOUNTER — HOSPITAL ENCOUNTER (OUTPATIENT)
Dept: PHYSICAL THERAPY | Facility: HOSPITAL | Age: 72
Setting detail: THERAPIES SERIES
End: 2017-11-15
Attending: FAMILY MEDICINE
Payer: MEDICARE

## 2017-11-15 PROCEDURE — 97110 THERAPEUTIC EXERCISES: CPT | Mod: GP

## 2017-11-15 PROCEDURE — 40000718 ZZHC STATISTIC PT DEPARTMENT ORTHO VISIT

## 2017-11-20 ENCOUNTER — HOSPITAL ENCOUNTER (OUTPATIENT)
Dept: PHYSICAL THERAPY | Facility: HOSPITAL | Age: 72
Setting detail: THERAPIES SERIES
End: 2017-11-20
Attending: FAMILY MEDICINE
Payer: MEDICARE

## 2017-11-20 DIAGNOSIS — Z12.39 SCREENING BREAST EXAMINATION: ICD-10-CM

## 2017-11-20 PROCEDURE — 97110 THERAPEUTIC EXERCISES: CPT | Mod: GP

## 2017-11-20 PROCEDURE — G0202 SCR MAMMO BI INCL CAD: HCPCS | Mod: TC

## 2017-11-20 PROCEDURE — 40000718 ZZHC STATISTIC PT DEPARTMENT ORTHO VISIT

## 2017-11-27 ENCOUNTER — HOSPITAL ENCOUNTER (OUTPATIENT)
Dept: PHYSICAL THERAPY | Facility: HOSPITAL | Age: 72
Setting detail: THERAPIES SERIES
End: 2017-11-27
Attending: FAMILY MEDICINE
Payer: MEDICARE

## 2017-11-27 ENCOUNTER — ALLIED HEALTH/NURSE VISIT (OUTPATIENT)
Dept: ALLERGY | Facility: OTHER | Age: 72
End: 2017-11-27
Attending: FAMILY MEDICINE
Payer: MEDICARE

## 2017-11-27 DIAGNOSIS — E53.9 B-COMPLEX DEFICIENCY: ICD-10-CM

## 2017-11-27 PROCEDURE — 40000718 ZZHC STATISTIC PT DEPARTMENT ORTHO VISIT

## 2017-11-27 PROCEDURE — 97110 THERAPEUTIC EXERCISES: CPT | Mod: GP

## 2017-11-27 PROCEDURE — 96372 THER/PROPH/DIAG INJ SC/IM: CPT

## 2017-11-27 NOTE — PROGRESS NOTES
The following medication was given:     MEDICATION: Vitamin B12  1000mcg  ROUTE: IM  SITE: Deltoid - Right  DOSE: 1 mL  LOT #: [id number]  :  [employer] [place]  EXPIRATION DATE:  4/30/2019  NDC#: 9640-0270-49  Patient will return in 30 days for another injection.    Amy Corado

## 2017-11-27 NOTE — MR AVS SNAPSHOT
After Visit Summary   11/27/2017    Maria Teresa Aburto    MRN: [id number]           Patient Information     Date Of Birth          1945        Visit Information        Provider Department      11/27/2017 11:15 AM HC SHOT ROOM [facility]        Today's Diagnoses     B-complex deficiency           Follow-ups after your visit        Your next 10 appointments already scheduled     Nov 27, 2017 11:15 AM CST   injection with HC [facility]OT ROOM   [facility]    [address]  [place] [address]   [phone]            Dec 04, 2017 10:30 AM CST   Treatment with [facility] ([facility] )    [address]  [place] [address]   [phone]            Dec 27, 2017 10:15 AM CST   injection with HC SHOT ROOM   [facility] ([facility] )    [address] [facility] [address]   [phone]              Who to contact     If you have questions or need follow up information about today's clinic visit or your schedule please contact [facility] directly at [phone].  Normal or non-critical lab and imaging results will be communicated to you by WineDemonhart, letter or phone within 4 business days after the clinic has received the results. If you do not hear from us within 7 days, please contact the clinic through WineDemonhart or phone. If you have a critical or abnormal lab result, we will notify you by phone as soon as possible.  Submit refill requests through [employer] or call your pharmacy and they will forward the refill request to us. Please allow 3 business days for your refill to be completed.          Additional Information About Your Visit        MyChart Information     [employer] gives you secure access to your electronic health record. If you see a primary care provider, you can also send messages to your care team and make appointments. If you have questions,  please call your primary care clinic.  If you do not have a primary care provider, please call [phone] and they will assist you.        Care EveryWhere ID     This is your Care EveryWhere ID. This could be used by other organizations to access your [place] medical records  [id number]         Blood Pressure from Last 3 Encounters:   09/19/17 120/64   08/02/17 135/69   05/23/17 124/72    Weight from Last 3 Encounters:   09/19/17 119 lb (54 kg)   05/23/17 120 lb (54.4 kg)   11/16/15 122 lb (55.3 kg)              We Performed the Following     INJECTION INTRAMUSCULAR OR SUB-Q     VITAMIN B12 INJ /1000MCG        Primary Care Provider Office Phone # Fax #    Kenna Portillo [phone]       [facility] [place] [address]  [place] [address]        Equal Access to Services     [facility]: Hadii aad ku hadasho Soomaali, waaxda luqadaha, qaybta kaalmada adeegyada, waxay idiin hayaan adehenri quiñonez . So [facility] [phone].    ATENCIÓN: Si habla español, tiene a martin disposición servicios gratuitos de asistencia lingüística. Manan al [phone].    We comply with applicable federal civil rights laws and [place] laws. We do not discriminate on the basis of race, color, national origin, age, disability, sex, sexual orientation, or gender identity.            Thank you!     Thank you for choosing [facility] [place]  for your care. Our goal is always to provide you with excellent care. Hearing back from our patients is one way we can continue to improve our services. Please take a few minutes to complete the written survey that you may receive in the mail after your visit with us. Thank you!             Your Updated Medication List - Protect others around you: Learn how to safely use, store and throw away your medicines at www.disposemymeds.org.          This list is accurate as of: 11/27/17 11:08 AM.  Always use your most recent med list.                   Brand Name Dispense  Instructions for use Diagnosis    acyclovir 400 MG tablet    ZOVIRAX    30 tablet    One tablet tid for 5 days for cold sores - fill when patient calls    Herpes simplex labialis       ASPIRIN CAPS 81 MG OR     100    one capsule by mouth daily        CALCIUM + D PO           cyanocobalamin 1000 MCG/ML injection    VITAMIN B12    1 mL    Inject 1 mL (1,000 mcg) into the muscle every 30 days    B-complex deficiency       fluorouracil 5 % cream    EFUDEX    40 g    Apply once a day to lesion on nose - for 2 weeks. Expect severe irritation    AK (actinic keratosis), Herpes simplex labialis       IRON SUPPLEMENT PO      Take 65 mg by mouth        Multi-vitamin Tabs tablet      Take 1 tablet by mouth daily        scopolamine 1.5 MG patch 72 hr    TRANSDERM-SCOP    7 patch    Apply to hairless area behind one ear at least 4 hours before travel.    Motion sickness       VITAMIN C PO      Take 500 mg by mouth        VITAMIN D (CHOLECALCIFEROL) PO      Take 400 Units by mouth daily

## 2017-12-04 ENCOUNTER — HOSPITAL ENCOUNTER (OUTPATIENT)
Dept: PHYSICAL THERAPY | Facility: HOSPITAL | Age: 72
Setting detail: THERAPIES SERIES
End: 2017-12-04
Attending: FAMILY MEDICINE
Payer: MEDICARE

## 2017-12-04 PROCEDURE — 97110 THERAPEUTIC EXERCISES: CPT | Mod: GP

## 2017-12-04 PROCEDURE — 40000718 ZZHC STATISTIC PT DEPARTMENT ORTHO VISIT

## 2017-12-05 ENCOUNTER — TELEPHONE (OUTPATIENT)
Dept: FAMILY MEDICINE | Facility: OTHER | Age: 72
End: 2017-12-05

## 2017-12-05 DIAGNOSIS — E53.9 B-COMPLEX DEFICIENCY: ICD-10-CM

## 2017-12-05 RX ORDER — CYANOCOBALAMIN 1000 UG/ML
INJECTION, SOLUTION INTRAMUSCULAR; SUBCUTANEOUS
Qty: 1 ML | Refills: 5 | Status: SHIPPED | OUTPATIENT
Start: 2017-12-05 | End: 2018-12-19

## 2017-12-05 NOTE — TELEPHONE ENCOUNTER
Order is pending for you to sign.  If you could print this so she can pick it up and take it with her.  Thank you.

## 2017-12-05 NOTE — TELEPHONE ENCOUNTER
9:20 AM    Reason for Call: Phone Call    Description: Patient is getting ready to leave for a few months and would like a Rx for her B12 injection.     Was an appointment offered for this call? No  If yes : Appointment type              Date    Preferred method for responding to this message: Telephone Call  What is your phone number ? [phone]    If we cannot reach you directly, may we leave a detailed response at the number you provided? Yes    Can this message wait until your PCP/provider returns, if available today? YES    Vanesa Moses

## 2017-12-18 ENCOUNTER — HOSPITAL ENCOUNTER (OUTPATIENT)
Dept: PHYSICAL THERAPY | Facility: HOSPITAL | Age: 72
Setting detail: THERAPIES SERIES
End: 2017-12-18
Attending: FAMILY MEDICINE
Payer: MEDICARE

## 2017-12-18 PROCEDURE — G8985 CARRY GOAL STATUS: HCPCS | Mod: GP,CI

## 2017-12-18 PROCEDURE — 97110 THERAPEUTIC EXERCISES: CPT | Mod: GP

## 2017-12-18 PROCEDURE — 40000718 ZZHC STATISTIC PT DEPARTMENT ORTHO VISIT

## 2017-12-18 PROCEDURE — G8986 CARRY D/C STATUS: HCPCS | Mod: GP,CI

## 2017-12-18 NOTE — PROGRESS NOTES
12/18/17 1500   Pain scale   1. At its worst? 2   2. When lying on the involved side? 0   3. Reaching for something on a high shelf? 2   4. Touching the back of your neck? 0   5. Pushing with the involved arm? 0   Disability scale   1. Washing your hair? 0   2. Washing your back? 2   3. Putting on an undershirt or pullover sweater? 2   4. Putting on a shirt that buttons down the front? 0   5. Putting on your pants? 0   6. Placing an object on a high shelf? 2   7. Carrying a heavy object of 10 pounds? 0   8. Removing something from your back pocket? 0   Score:    Sum 10   Count 13   SPADI Score 7.69        12/18/17 1500   Pain scale   1. At its worst? 2   2. When lying on the involved side? 0   3. Reaching for something on a high shelf? 2   4. Touching the back of your neck? 0   5. Pushing with the involved arm? 0   Disability scale   1. Washing your hair? 0   2. Washing your back? 2   3. Putting on an undershirt or pullover sweater? 2   4. Putting on a shirt that buttons down the front? 0   5. Putting on your pants? 0   6. Placing an object on a high shelf? 2   7. Carrying a heavy object of 10 pounds? 0   8. Removing something from your back pocket? 0   Score:    Sum 10   Count 13   SPADI Score 7.69

## 2017-12-27 ENCOUNTER — ALLIED HEALTH/NURSE VISIT (OUTPATIENT)
Dept: ALLERGY | Facility: OTHER | Age: 72
End: 2017-12-27
Attending: FAMILY MEDICINE
Payer: MEDICARE

## 2017-12-27 DIAGNOSIS — E53.9 B-COMPLEX DEFICIENCY: Primary | ICD-10-CM

## 2017-12-27 PROCEDURE — 99207 ZZC NO CHARGE NURSE ONLY: CPT

## 2017-12-27 PROCEDURE — 96372 THER/PROPH/DIAG INJ SC/IM: CPT

## 2017-12-27 NOTE — MR AVS SNAPSHOT
After Visit Summary   12/27/2017    Maria Teresa Aburto    MRN: [id number]           Patient Information     Date Of Birth          1945        Visit Information        Provider Department      12/27/2017 10:15 AM HC SHOT ROOM [place] [facility] [place]        Today's Diagnoses     B-complex deficiency    -  1       Follow-ups after your visit        Who to contact     If you have questions or need follow up information about today's clinic visit or your schedule please contact [facility] directly at [phone].  Normal or non-critical lab and imaging results will be communicated to you by MyChart, letter or phone within 4 business days after the clinic has received the results. If you do not hear from us within 7 days, please contact the clinic through FirmPlayhart or phone. If you have a critical or abnormal lab result, we will notify you by phone as soon as possible.  Submit refill requests through [employer] or call your pharmacy and they will forward the refill request to us. Please allow 3 business days for your refill to be completed.          Additional Information About Your Visit        MyChart Information     [employer] gives you secure access to your electronic health record. If you see a primary care provider, you can also send messages to your care team and make appointments. If you have questions, please call your primary care clinic.  If you do not have a primary care provider, please call [phone] and they will assist you.        Care EveryWhere ID     This is your Care EveryWhere ID. This could be used by other organizations to access your [place] medical records  [id number]         Blood Pressure from Last 3 Encounters:   09/19/17 120/64   08/02/17 135/69   05/23/17 124/72    Weight from Last 3 Encounters:   09/19/17 119 lb (54 kg)   05/23/17 120 lb (54.4 kg)   11/16/15 122 lb (55.3 kg)              We Performed the Following     B12 - 1000 MCG     THER/PROPH/DIAG INJ,  SC/IM        Primary Care Provider Office Phone # Fax #    Kenna Portillo [phone]       [facility] [place] [address]  [place] [address]        Equal Access to Services     JUDY BRO : Hadmichelle belinda ku dhirajo Sobalaali, waaxda luqadaha, qaybta kaalmada adeegyada, ernie phelpsn fidel rodriguez laDeborajuliette luong. So [facility] [phone].    ATENCIÓN: Si habla español, tiene a martin disposición servicios gratuitos de asistencia lingüística. Llame al [phone].    We comply with applicable federal civil rights laws and [place] laws. We do not discriminate on the basis of race, color, national origin, age, disability, sex, sexual orientation, or gender identity.            Thank you!     Thank you for choosing [facility]  for your care. Our goal is always to provide you with excellent care. Hearing back from our patients is one way we can continue to improve our services. Please take a few minutes to complete the written survey that you may receive in the mail after your visit with us. Thank you!             Your Updated Medication List - Protect others around you: Learn how to safely use, store and throw away your medicines at www.disposemymeds.org.          This list is accurate as of: 12/27/17 10:21 AM.  Always use your most recent med list.                   Brand Name Dispense Instructions for use Diagnosis    acyclovir 400 MG tablet    ZOVIRAX    30 tablet    One tablet tid for 5 days for cold sores - fill when patient calls    Herpes simplex labialis       ASPIRIN CAPS 81 MG OR     100    one capsule by mouth daily        CALCIUM + D PO           cyanocobalamin 1000 MCG/ML injection    VITAMIN B12    1 mL    Inject 1 mL (1,000 mcg) into the muscle every 30 days    B-complex deficiency       fluorouracil 5 % cream    EFUDEX    40 g    Apply once a day to lesion on nose - for 2 weeks. Expect severe irritation    AK (actinic keratosis), Herpes simplex labialis       IRON  SUPPLEMENT PO      Take 65 mg by mouth        Multi-vitamin Tabs tablet      Take 1 tablet by mouth daily        scopolamine 1.5 MG patch 72 hr    TRANSDERM-SCOP    7 patch    Apply to hairless area behind one ear at least 4 hours before travel.    Motion sickness       VITAMIN C PO      Take 500 mg by mouth        VITAMIN D (CHOLECALCIFEROL) PO      Take 400 Units by mouth daily

## 2017-12-27 NOTE — PROGRESS NOTES
The following medication was given:     MEDICATION: Vitamin B12  1000 mcg  ROUTE: IM  SITE: Deltoid - Left  DOSE: 1000 mcg  LOT #: [id number]  :  [employer] [place]  EXPIRATION DATE:  04-19  NDC#: 0517/0031-25  Patient tolerated well. Advised to return in 30 days.  Xin Hubbard

## 2018-03-09 ENCOUNTER — TELEPHONE (OUTPATIENT)
Dept: FAMILY MEDICINE | Facility: OTHER | Age: 73
End: 2018-03-09

## 2018-03-09 DIAGNOSIS — Z85.828 HISTORY OF SKIN CANCER: Primary | ICD-10-CM

## 2018-03-09 NOTE — TELEPHONE ENCOUNTER
2:51 PM    Reason for Call: Phone Call    Description: Pt called and states that she would like a call back regarding a referral she would like to get for dermatology in [place]?    Was an appointment offered for this call? No  If yes : Appointment type              Date    Preferred method for responding to this message: Telephone Call  What is your phone number ?[phone]    If we cannot reach you directly, may we leave a detailed response at the number you provided? Yes    Can this message wait until your PCP/provider returns, if available today? No     Mila Erickson

## 2018-04-18 ENCOUNTER — ALLIED HEALTH/NURSE VISIT (OUTPATIENT)
Dept: ALLERGY | Facility: OTHER | Age: 73
End: 2018-04-18
Attending: FAMILY MEDICINE
Payer: MEDICARE

## 2018-04-18 DIAGNOSIS — E53.9 B-COMPLEX DEFICIENCY: ICD-10-CM

## 2018-04-18 PROCEDURE — 96372 THER/PROPH/DIAG INJ SC/IM: CPT

## 2018-04-18 NOTE — MR AVS SNAPSHOT
After Visit Summary   4/18/2018    Maria Teresa Aburto    MRN: [id number]           Patient Information     Date Of Birth          1945        Visit Information        Provider Department      4/18/2018 11:30 AM HC SHOT ROOM [facility]        Today's Diagnoses     B-complex deficiency           Follow-ups after your visit        Your next 10 appointments already scheduled     May 18, 2018 11:30 AM CDT   injection with HC SHOT ROOM   [facility] ([facility] )    [place] [address]   [phone]              Who to contact     If you have questions or need follow up information about today's clinic visit or your schedule please contact [facility] directly at [phone].  Normal or non-critical lab and imaging results will be communicated to you by NumberPicturehart, letter or phone within 4 business days after the clinic has received the results. If you do not hear from us within 7 days, please contact the clinic through NumberPicturehart or phone. If you have a critical or abnormal lab result, we will notify you by phone as soon as possible.  Submit refill requests through [employer] or call your pharmacy and they will forward the refill request to us. Please allow 3 business days for your refill to be completed.          Additional Information About Your Visit        MyChart Information     [employer] gives you secure access to your electronic health record. If you see a primary care provider, you can also send messages to your care team and make appointments. If you have questions, please call your primary care clinic.  If you do not have a primary care provider, please call [phone] and they will assist you.        Care EveryWhere ID     This is your Care EveryWhere ID. This could be used by other organizations to access your [place] medical records  [id number]         Blood Pressure from Last 3 Encounters:   09/19/17 120/64   08/02/17  135/69   05/23/17 124/72    Weight from Last 3 Encounters:   09/19/17 119 lb (54 kg)   05/23/17 120 lb (54.4 kg)   11/16/15 122 lb (55.3 kg)              We Performed the Following     INJECTION INTRAMUSCULAR OR SUB-Q     VITAMIN B12 INJ /1000MCG        Primary Care Provider Office Phone # Fax #    Kenna Portillo [phone] [address] [address]        Equal Access to Services     FABIAN BRO : Hadii aad ku hadasho Soomaali, waaxda luqadaha, qaybta kaalmada adeegyada, waxay isain haybeatricen fidel quiñonez . So [facility] [phone].    ATENCIÓN: Si habla español, tiene a martin disposición servicios gratuitos de asistencia lingüística. Ll[facility] [phone].    We comply with applicable federal civil rights laws and [place] laws. We do not discriminate on the basis of race, color, national origin, age, disability, sex, sexual orientation, or gender identity.            Thank you!     Thank you for choosing [facility]  for your care. Our goal is always to provide you with excellent care. Hearing back from our patients is one way we can continue to improve our services. Please take a few minutes to complete the written survey that you may receive in the mail after your visit with us. Thank you!             Your Updated Medication List - Protect others around you: Learn how to safely use, store and throw away your medicines at www.disposemymeds.org.          This list is accurate as of 4/18/18 11:34 AM.  Always use your most recent med list.                   Brand Name Dispense Instructions for use Diagnosis    acyclovir 400 MG tablet    ZOVIRAX    30 tablet    One tablet tid for 5 days for cold sores - fill when patient calls    Herpes simplex labialis       ASPIRIN CAPS 81 MG OR     100    one capsule by mouth daily        CALCIUM + D PO           cyanocobalamin 1000 MCG/ML injection    VITAMIN B12    1 mL    Inject 1 mL (1,000 mcg) into the muscle every 30 days     B-complex deficiency       fluorouracil 5 % cream    EFUDEX    40 g    Apply once a day to lesion on nose - for 2 weeks. Expect severe irritation    AK (actinic keratosis), Herpes simplex labialis       IRON SUPPLEMENT PO      Take 65 mg by mouth        Multi-vitamin Tabs tablet      Take 1 tablet by mouth daily        scopolamine 1.5 MG patch 72 hr    TRANSDERM-SCOP    7 patch    Apply to hairless area behind one ear at least 4 hours before travel.    Motion sickness       VITAMIN C PO      Take 500 mg by mouth        VITAMIN D (CHOLECALCIFEROL) PO      Take 400 Units by mouth daily

## 2018-04-18 NOTE — PROGRESS NOTES
The following medication was given:     MEDICATION: Vitamin B12  1000mcg  ROUTE: IM  SITE: Deltoid - Right  DOSE: 1 mL  LOT #: [id number]  :  [employer]   EXPIRATION DATE:  8/31/2019  NDC#: 4800-2021-44  Patient will return in one month for another injection.    Amy Corado

## 2018-05-17 ENCOUNTER — TELEPHONE (OUTPATIENT)
Dept: FAMILY MEDICINE | Facility: OTHER | Age: 73
End: 2018-05-17

## 2018-05-17 DIAGNOSIS — M25.511 RIGHT SHOULDER PAIN, UNSPECIFIED CHRONICITY: Primary | ICD-10-CM

## 2018-05-17 NOTE — TELEPHONE ENCOUNTER
Called patient back to see what the referral is for so we can order PT, no answer went to voicemail. Left message to call back.

## 2018-05-17 NOTE — TELEPHONE ENCOUNTER
Pt returned call. Tried to reach nurse but unavailable at this time. Please try her again. Thank you!

## 2018-05-17 NOTE — TELEPHONE ENCOUNTER
Called back and states it's her right shoulder causing her issues along with her arm and neck. Willing to do referral to PT here with Qi Ch.

## 2018-05-17 NOTE — TELEPHONE ENCOUNTER
9:28 AM    Reason for Call: Phone Call    Description: Pt requests referral to Qi Ch for PT. Asks that nurse calls back when complete. Thank you!    Was an appointment offered for this call?   If yes : Appointment type              Date    Preferred method for responding to this message: Telephone Call  What is your phone number ? [phone]     If we cannot reach you directly, may we leave a detailed response at the number you provided? Yes    Can this message wait until your PCP/provider returns, if available today? Not applicable, provider in     Minh Sylvester

## 2018-05-18 ENCOUNTER — ALLIED HEALTH/NURSE VISIT (OUTPATIENT)
Dept: ALLERGY | Facility: OTHER | Age: 73
End: 2018-05-18
Attending: FAMILY MEDICINE
Payer: MEDICARE

## 2018-05-18 DIAGNOSIS — E53.9 B-COMPLEX DEFICIENCY: Primary | ICD-10-CM

## 2018-05-18 PROCEDURE — 96372 THER/PROPH/DIAG INJ SC/IM: CPT

## 2018-05-18 NOTE — MR AVS SNAPSHOT
After Visit Summary   5/18/2018    Maria Teresa Aburto    MRN: [id number]           Patient Information     Date Of Birth          1945        Visit Information        Provider Department      5/18/2018 11:30 AM HC SHOT ROOM [facility]        Today's Diagnoses     B-complex deficiency    -  1       Follow-ups after your visit        Your next 10 appointments already scheduled     May 23, 2018  1:00 PM CDT   (Arrive by 12:45 PM)   Evaluation with Melanie Ch PT   [facility] ([facility] )    [address] [address]   [phone]            Jun 18, 2018  9:00 AM CDT   (Arrive by 8:45 AM)   PHYSICAL with Kenna Portillo MD   [facility] ([facility] )    [address] [address]   [phone]            Jun 18, 2018  9:30 AM CDT   injection with HC SHOT ROOM   [facility] ([facility] )    [address] [address]   [phone]              Who to contact     If you have questions or need follow up information about today's clinic visit or your schedule please contact [facility] directly at [phone].  Normal or non-critical lab and imaging results will be communicated to you by MyChart, letter or phone within 4 business days after the clinic has received the results. If you do not hear from us within 7 days, please contact the clinic through MyChart or phone. If you have a critical or abnormal lab result, we will notify you by phone as soon as possible.  Submit refill requests through [employer] or call your pharmacy and they will forward the refill request to us. Please allow 3 business days for your refill to be completed.          Additional Information About Your Visit        MyChart Information     [employer] gives you secure access to your electronic health record. If you see a primary care provider, you can also send messages to your care team  and make appointments. If you have questions, please call your primary care clinic.  If you do not have a primary care provider, please call [phone] and they will assist you.        Care EveryWhere ID     This is your Care EveryWhere ID. This could be used by other organizations to access your [place] medical records  [id number]         Blood Pressure from Last 3 Encounters:   09/19/17 120/64   08/02/17 135/69   05/23/17 124/72    Weight from Last 3 Encounters:   09/19/17 119 lb (54 kg)   05/23/17 120 lb (54.4 kg)   11/16/15 122 lb (55.3 kg)              We Performed the Following     INJECTION INTRAMUSCULAR OR SUB-Q     VITAMIN B12 INJ /1000MCG        Primary Care Provider Office Phone # Fax #    Kenna Portillo [phone] [address] [address]        Equal Access to Services     JUDY BRO : Hadii aad ku hadasho Soomaali, waaxda luqadaha, qaybta kaalmada adeegyada, waxay isain hayjuliette quiñonez . So [facility] [phone].    ATENCIÓN: Si habla español, tiene a martin disposición servicios gratuitos de asistencia lingüística. Manan al [phone].    We comply with applicable federal civil rights laws and [place] laws. We do not discriminate on the basis of race, color, national origin, age, disability, sex, sexual orientation, or gender identity.            Thank you!     Thank you for choosing [facility]  for your care. Our goal is always to provide you with excellent care. Hearing back from our patients is one way we can continue to improve our services. Please take a few minutes to complete the written survey that you may receive in the mail after your visit with us. Thank you!             Your Updated Medication List - Protect others around you: Learn how to safely use, store and throw away your medicines at www.disposemymeds.org.          This list is accurate as of 5/18/18 11:38 AM.  Always use your most recent med list.                    Brand Name Dispense Instructions for use Diagnosis    acyclovir 400 MG tablet    ZOVIRAX    30 tablet    One tablet tid for 5 days for cold sores - fill when patient calls    Herpes simplex labialis       ASPIRIN CAPS 81 MG OR     100    one capsule by mouth daily        CALCIUM + D PO           cyanocobalamin 1000 MCG/ML injection    VITAMIN B12    1 mL    Inject 1 mL (1,000 mcg) into the muscle every 30 days    B-complex deficiency       fluorouracil 5 % cream    EFUDEX    40 g    Apply once a day to lesion on nose - for 2 weeks. Expect severe irritation    AK (actinic keratosis), Herpes simplex labialis       IRON SUPPLEMENT PO      Take 65 mg by mouth        Multi-vitamin Tabs tablet      Take 1 tablet by mouth daily        scopolamine 1.5 MG patch 72 hr    TRANSDERM-SCOP    7 patch    Apply to hairless area behind one ear at least 4 hours before travel.    Motion sickness       VITAMIN C PO      Take 500 mg by mouth        VITAMIN D (CHOLECALCIFEROL) PO      Take 400 Units by mouth daily

## 2018-05-18 NOTE — PROGRESS NOTES
Prior to injection verified patient identity using patient's name and date of birth.    The following medication was given:     MEDICATION: Vitamin B12  1000mcg  ROUTE: IM  SITE: Deltoid - Left  DOSE: 1mL  LOT #: [id number]  :  [employer]  EXPIRATION DATE:  9/19  NDC#: 2394-0838-98  The patient will return in 30 days for next injection.     Cat Martinez

## 2018-05-23 ENCOUNTER — HOSPITAL ENCOUNTER (OUTPATIENT)
Dept: PHYSICAL THERAPY | Facility: HOSPITAL | Age: 73
Setting detail: THERAPIES SERIES
End: 2018-05-23
Attending: FAMILY MEDICINE
Payer: MEDICARE

## 2018-05-23 PROCEDURE — G8984 CARRY CURRENT STATUS: HCPCS | Mod: GP,CJ

## 2018-05-23 PROCEDURE — 40000718 ZZHC STATISTIC PT DEPARTMENT ORTHO VISIT

## 2018-05-23 PROCEDURE — 97140 MANUAL THERAPY 1/> REGIONS: CPT | Mod: GP

## 2018-05-23 PROCEDURE — G8985 CARRY GOAL STATUS: HCPCS | Mod: GP,CI

## 2018-05-23 PROCEDURE — 97162 PT EVAL MOD COMPLEX 30 MIN: CPT | Mod: GP

## 2018-05-23 NOTE — PROGRESS NOTES
05/23/18 1259   General Information   Type of Visit Initial OP Ortho PT Evaluation   Start of Care Date 05/23/18   Referring Physician Kenna Portillo MD   Orders Evaluate and Treat   Date of Order 05/17/18   Insurance Type Medicare   Medical Diagnosis Right shoulder pain, unspecified chronicity   Surgical/Medical history reviewed Yes   Precautions/Limitations no known precautions/limitations   Presentation and Etiology   Pertinent history of current problem (include personal factors and/or comorbidities that impact the POC) C/O right shoulder, arm, and neck difficulty. Onset last summer while moving [place]. After she worked with [employer], she left to go to [place] for the winter. She continued with the exercises and eventually it gave her some pain from the shoulder and up into the neck. She cannot do the things she would like to do. She leaves in June for a salmon fishing trip [place] on the [place]. 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 She also has difficulty gardening (hoeing, shoveling, lifting). The clinic in [place] last year showed she had some mild arthritic changes in the shoulder.    Impairments D. Decreased ROM;F. Decreased strength and endurance;N. Headaches   Functional Limitations perform activities of daily living   How/Where did it occur While lifting   Onset date of current episode/exacerbation 08/19/17   Chronicity Chronic   Pain rating (0-10 point scale) Best (/10);Worst (/10);Other   Best (/10) 0   Worst (/10) 5   Pain rating comment maybe 1-2/10 right now, a dull mild feeling   Pain quality B. Dull;C. Aching   Frequency of pain/symptoms C. With activity   Pain/symptoms exacerbated by C. Lifting;D. Carrying   Pain/symptoms eased by A. Sitting;B. Walking;C. Rest;D. Nothing;I. OTC medication(s)   Current Level of Function   Current Community Support Family/friend caregiver   Patient role/employment history F. Retired   Fall Risk Screen   Fall screen completed by PT   Have  you fallen 2 or more times in the past year? No   Have you fallen and had an injury in the past year? No   Is patient a fall risk? No   Functional Scales   Functional Scales Other   Other Scales  SPADI, score 19.23   Shoulder Objective Findings   Side (if bilateral, select both right and left) Right   Shoulder Special Tests Comments Patient demonstrated full active painfree ROM in overhead flexion, ER and IR   Palpation Right clavicle posteriorly rotated and elevated laterally. Other segments of upper quarter WNL of mobility and alignment. Leg lengths equal, pelvis level, and SI jts equally mobile. Sacral, lumbar, thoracic, cervical, and cranial segments WNL of mobility and alignment.    Planned Therapy Interventions   Planned Therapy Interventions manual therapy;strengthening;stretching   Clinical Impression   Criteria for Skilled Therapeutic Interventions Met yes, treatment indicated   PT Diagnosis Right shoulder pain with activity mediated by mechanical dysfunction at right clavicle   Clinical Presentation Evolving/Changing   Clinical Presentation Rationale clinical judgement   Clinical Decision Making (Complexity) Moderate complexity   Therapy Frequency 2 times/Week   Predicted Duration of Therapy Intervention (days/wks) 4 weeks   Risk & Benefits of therapy have been explained Yes   Patient, Family & other staff in agreement with plan of care Yes   Clinical Impression Comments Findings consistent with mechanical dysfunction at clavicle   Education Assessment   Barriers to Learning No barriers   ORTHO GOALS   PT Ortho Eval Goals 1;2   Ortho Goal 1   Goal Identifier 1 Functional   Goal Description Ability to reach overhead without pain in all settings   Target Date 06/20/18   Ortho Goal 2   Goal Identifier 2 Outcome   Goal Description Resolution of mechanical dysfunction   Target Date 06/20/18   Total Evaluation Time   Total Evaluation Time 15'     Melanie Ch DPT    I certify the need for these services  furnished under this plan of treatment and while under my care. (Physician co-signature of this document indicates review and certification of the therapy plan).

## 2018-05-29 ENCOUNTER — HOSPITAL ENCOUNTER (OUTPATIENT)
Dept: PHYSICAL THERAPY | Facility: HOSPITAL | Age: 73
Setting detail: THERAPIES SERIES
End: 2018-05-29
Attending: FAMILY MEDICINE
Payer: MEDICARE

## 2018-05-29 PROCEDURE — 40000718 ZZHC STATISTIC PT DEPARTMENT ORTHO VISIT

## 2018-05-29 PROCEDURE — 97140 MANUAL THERAPY 1/> REGIONS: CPT | Mod: GP

## 2018-05-31 ENCOUNTER — HOSPITAL ENCOUNTER (OUTPATIENT)
Dept: PHYSICAL THERAPY | Facility: HOSPITAL | Age: 73
Setting detail: THERAPIES SERIES
End: 2018-05-31
Attending: FAMILY MEDICINE
Payer: MEDICARE

## 2018-05-31 PROCEDURE — 40000718 ZZHC STATISTIC PT DEPARTMENT ORTHO VISIT

## 2018-05-31 PROCEDURE — 97140 MANUAL THERAPY 1/> REGIONS: CPT | Mod: GP

## 2018-06-12 ENCOUNTER — HOSPITAL ENCOUNTER (OUTPATIENT)
Dept: PHYSICAL THERAPY | Facility: HOSPITAL | Age: 73
Setting detail: THERAPIES SERIES
End: 2018-06-12
Attending: FAMILY MEDICINE
Payer: MEDICARE

## 2018-06-12 PROCEDURE — 97530 THERAPEUTIC ACTIVITIES: CPT | Mod: GP,59

## 2018-06-12 PROCEDURE — 97140 MANUAL THERAPY 1/> REGIONS: CPT | Mod: GP

## 2018-06-12 PROCEDURE — 40000718 ZZHC STATISTIC PT DEPARTMENT ORTHO VISIT

## 2018-06-14 ENCOUNTER — HOSPITAL ENCOUNTER (OUTPATIENT)
Dept: PHYSICAL THERAPY | Facility: HOSPITAL | Age: 73
Setting detail: THERAPIES SERIES
End: 2018-06-14
Attending: FAMILY MEDICINE
Payer: MEDICARE

## 2018-06-14 PROCEDURE — 97530 THERAPEUTIC ACTIVITIES: CPT | Mod: GP

## 2018-06-14 PROCEDURE — 40000718 ZZHC STATISTIC PT DEPARTMENT ORTHO VISIT

## 2018-06-18 ENCOUNTER — OFFICE VISIT (OUTPATIENT)
Dept: FAMILY MEDICINE | Facility: OTHER | Age: 73
End: 2018-06-18
Attending: FAMILY MEDICINE
Payer: MEDICARE

## 2018-06-18 VITALS
DIASTOLIC BLOOD PRESSURE: 64 MMHG | WEIGHT: 117 LBS | OXYGEN SATURATION: 98 % | HEART RATE: 83 BPM | BODY MASS INDEX: 20.73 KG/M2 | SYSTOLIC BLOOD PRESSURE: 122 MMHG | RESPIRATION RATE: 20 BRPM | HEIGHT: 63 IN

## 2018-06-18 DIAGNOSIS — R09.81 NASAL CONGESTION: ICD-10-CM

## 2018-06-18 DIAGNOSIS — K22.70 BARRETT'S ESOPHAGUS WITHOUT DYSPLASIA: ICD-10-CM

## 2018-06-18 DIAGNOSIS — M62.81 MUSCLE WEAKNESS (GENERALIZED): ICD-10-CM

## 2018-06-18 DIAGNOSIS — M25.50 MULTIPLE JOINT PAIN: ICD-10-CM

## 2018-06-18 DIAGNOSIS — Z00.00 ROUTINE GENERAL MEDICAL EXAMINATION AT A HEALTH CARE FACILITY: Primary | ICD-10-CM

## 2018-06-18 DIAGNOSIS — G25.81 RESTLESS LEG SYNDROME: ICD-10-CM

## 2018-06-18 DIAGNOSIS — E53.9 B-COMPLEX DEFICIENCY: ICD-10-CM

## 2018-06-18 DIAGNOSIS — R19.5 DARK STOOLS: ICD-10-CM

## 2018-06-18 DIAGNOSIS — G47.62 SLEEP RELATED LEG CRAMPS: ICD-10-CM

## 2018-06-18 LAB
ALBUMIN SERPL-MCNC: 4 G/DL (ref 3.4–5)
ALP SERPL-CCNC: 69 U/L (ref 40–150)
ALT SERPL W P-5'-P-CCNC: 31 U/L (ref 0–50)
ANION GAP SERPL CALCULATED.3IONS-SCNC: 6 MMOL/L (ref 3–14)
AST SERPL W P-5'-P-CCNC: 24 U/L (ref 0–45)
BASOPHILS # BLD AUTO: 0.1 10E9/L (ref 0–0.2)
BASOPHILS NFR BLD AUTO: 0.8 %
BILIRUB SERPL-MCNC: 0.4 MG/DL (ref 0.2–1.3)
BUN SERPL-MCNC: 17 MG/DL (ref 7–30)
CALCIUM SERPL-MCNC: 8.6 MG/DL (ref 8.5–10.1)
CHLORIDE SERPL-SCNC: 103 MMOL/L (ref 94–109)
CHOLEST SERPL-MCNC: 205 MG/DL
CO2 SERPL-SCNC: 27 MMOL/L (ref 20–32)
CREAT SERPL-MCNC: 0.74 MG/DL (ref 0.52–1.04)
DIFFERENTIAL METHOD BLD: NORMAL
EOSINOPHIL # BLD AUTO: 0.3 10E9/L (ref 0–0.7)
EOSINOPHIL NFR BLD AUTO: 5.7 %
ERYTHROCYTE [DISTWIDTH] IN BLOOD BY AUTOMATED COUNT: 11.6 % (ref 10–15)
ERYTHROCYTE [SEDIMENTATION RATE] IN BLOOD BY WESTERGREN METHOD: 10 MM/H (ref 0–30)
GFR SERPL CREATININE-BSD FRML MDRD: 77 ML/MIN/1.7M2
GLUCOSE SERPL-MCNC: 88 MG/DL (ref 70–99)
HCT VFR BLD AUTO: 38.7 % (ref 35–47)
HDLC SERPL-MCNC: 65 MG/DL
HGB BLD-MCNC: 14 G/DL (ref 11.7–15.7)
IMM GRANULOCYTES # BLD: 0 10E9/L (ref 0–0.4)
IMM GRANULOCYTES NFR BLD: 0.2 %
LDLC SERPL CALC-MCNC: 121 MG/DL
LYMPHOCYTES # BLD AUTO: 1.2 10E9/L (ref 0.8–5.3)
LYMPHOCYTES NFR BLD AUTO: 20.4 %
MCH RBC QN AUTO: 32.5 PG (ref 26.5–33)
MCHC RBC AUTO-ENTMCNC: 36.2 G/DL (ref 31.5–36.5)
MCV RBC AUTO: 90 FL (ref 78–100)
MONOCYTES # BLD AUTO: 0.7 10E9/L (ref 0–1.3)
MONOCYTES NFR BLD AUTO: 11.5 %
NEUTROPHILS # BLD AUTO: 3.6 10E9/L (ref 1.6–8.3)
NEUTROPHILS NFR BLD AUTO: 61.4 %
NONHDLC SERPL-MCNC: 140 MG/DL
NRBC # BLD AUTO: 0 10*3/UL
NRBC BLD AUTO-RTO: 0 /100
PLATELET # BLD AUTO: 236 10E9/L (ref 150–450)
POTASSIUM SERPL-SCNC: 4 MMOL/L (ref 3.4–5.3)
PROT SERPL-MCNC: 7.5 G/DL (ref 6.8–8.8)
RBC # BLD AUTO: 4.31 10E12/L (ref 3.8–5.2)
SODIUM SERPL-SCNC: 136 MMOL/L (ref 133–144)
TRIGL SERPL-MCNC: 96 MG/DL
TSH SERPL DL<=0.005 MIU/L-ACNC: 1.72 MU/L (ref 0.4–4)
VIT B12 SERPL-MCNC: 606 PG/ML (ref 193–986)
WBC # BLD AUTO: 5.9 10E9/L (ref 4–11)

## 2018-06-18 PROCEDURE — 86431 RHEUMATOID FACTOR QUANT: CPT | Mod: ZL | Performed by: FAMILY MEDICINE

## 2018-06-18 PROCEDURE — 84443 ASSAY THYROID STIM HORMONE: CPT | Mod: ZL | Performed by: FAMILY MEDICINE

## 2018-06-18 PROCEDURE — 99000 SPECIMEN HANDLING OFFICE-LAB: CPT | Performed by: FAMILY MEDICINE

## 2018-06-18 PROCEDURE — 85025 COMPLETE CBC W/AUTO DIFF WBC: CPT | Mod: ZL | Performed by: FAMILY MEDICINE

## 2018-06-18 PROCEDURE — 96372 THER/PROPH/DIAG INJ SC/IM: CPT | Performed by: FAMILY MEDICINE

## 2018-06-18 PROCEDURE — 99397 PER PM REEVAL EST PAT 65+ YR: CPT | Performed by: FAMILY MEDICINE

## 2018-06-18 PROCEDURE — 86618 LYME DISEASE ANTIBODY: CPT | Mod: ZL | Performed by: FAMILY MEDICINE

## 2018-06-18 PROCEDURE — 80053 COMPREHEN METABOLIC PANEL: CPT | Mod: ZL | Performed by: FAMILY MEDICINE

## 2018-06-18 PROCEDURE — 36415 COLL VENOUS BLD VENIPUNCTURE: CPT | Mod: ZL | Performed by: FAMILY MEDICINE

## 2018-06-18 PROCEDURE — 82607 VITAMIN B-12: CPT | Mod: ZL | Performed by: FAMILY MEDICINE

## 2018-06-18 PROCEDURE — 80061 LIPID PANEL: CPT | Mod: ZL | Performed by: FAMILY MEDICINE

## 2018-06-18 PROCEDURE — 86666 EHRLICHIA ANTIBODY: CPT | Mod: ZL | Performed by: FAMILY MEDICINE

## 2018-06-18 PROCEDURE — 85652 RBC SED RATE AUTOMATED: CPT | Mod: ZL | Performed by: FAMILY MEDICINE

## 2018-06-18 ASSESSMENT — PAIN SCALES - GENERAL: PAINLEVEL: NO PAIN (0)

## 2018-06-18 NOTE — MR AVS SNAPSHOT
After Visit Summary   6/18/2018    Maria Teresa Aburto    MRN: [id number]           Patient Information     Date Of Birth          1945        Visit Information        Provider Department      6/18/2018 9:00 AM Kenna Portillo MD [facility]        Today's Diagnoses     Routine general medical examination at a health care facility    -  1    Dark stools        Bowden's esophagus without dysplasia        Multiple joint pain        Nasal congestion        Sleep related leg cramps           Care Instructions      Preventive Health Recommendations  Female Ages 65 +    Yearly exam:     See your health care provider every year in order to  o Review health changes.   o Discuss preventive care.    o Review your medicines if your doctor has prescribed any.      You no longer need a yearly Pap test unless you've had an abnormal Pap test in the past 10 years. If you have vaginal symptoms, such as bleeding or discharge, be sure to talk with your provider about a Pap test.      Every 1 to 2 years, have a mammogram.  If you are over 69, talk with your health care provider about whether or not you want to continue having screening mammograms.      Every 10 years, have a colonoscopy. Or, have a yearly FIT test (stool test). These exams will check for colon cancer.       Have a cholesterol test every 5 years, or more often if your doctor advises it.       Have a diabetes test (fasting glucose) every three years. If you are at risk for diabetes, you should have this test more often.       At age 65, have a bone density scan (DEXA) to check for osteoporosis (brittle bone disease).    Shots:    Get a flu shot each year.    Get a tetanus shot every 10 years.    Talk to your doctor about your pneumonia vaccines. There are now two you should receive - Pneumovax (PPSV 23) and Prevnar (PCV 13).    Talk to your doctor about the shingles vaccine.    Talk to your doctor about the hepatitis B vaccine.    Nutrition:      Eat at least 5 servings of fruits and vegetables each day.      Eat whole-grain bread, whole-wheat pasta and brown rice instead of white grains and rice.      Talk to your provider about Calcium and Vitamin D.     Lifestyle    Exercise at least 150 minutes a week (30 minutes a day, 5 days a week). This will help you control your weight and prevent disease.      Limit alcohol to one drink per day.      No smoking.       Wear sunscreen to prevent skin cancer.       See your dentist twice a year for an exam and cleaning.      See your eye doctor every 1 to 2 years to screen for conditions such as glaucoma, macular degeneration, cataracts, etc           Follow-ups after your visit        Additional Services     GENERAL SURG ADULT REFERRAL       Your provider has referred you to: Dr Interiano for colonoscopy and EGD    Please be aware that coverage of these services is subject to the terms and limitations of your health insurance plan.  Call member services at your health plan with any benefit or coverage questions.      Please bring the following with you to your appointment:    (1) Any X-Rays, CTs or MRIs which have been performed.  Contact the facility where they were done to arrange for  prior to your scheduled appointment.   (2) List of current medications   (3) This referral request   (4) Any documents/labs given to you for this referral                  Your next 10 appointments already scheduled     Jun 19, 2018  9:30 AM CDT   Ortho Treatment with [facility] ([facility] )    [address] [address]   [phone]            Jun 21, 2018  9:00 AM CDT   Ortho Treatment with [facility] ([facility] )    [address] [address]   [phone]              Who to contact     If you have questions or need follow up information about today's clinic visit or your schedule please contact [facility] directly at [phone].  Normal or non-critical lab and imaging results will be communicated to you by MyChart, letter or phone within 4 business days after the clinic has received the results. If you do not hear from us within 7 days, please contact the clinic through [employer] or phone. If you have a critical or abnormal lab result, we will notify you by phone as soon as possible.  Submit refill requests through [employer] or call your pharmacy and they will forward the refill request to us. Please allow 3 business days for your refill to be completed.          Additional Information About Your Visit        [employer] Information     [employer] gives you secure access to your electronic health record. If you see a primary care provider, you can also send messages to your care team and make appointments. If you have questions, please call your primary care clinic.  If you do not have a primary care provider, please call [phone] and they will assist you.        Care EveryWhere ID     This is your Care EveryWhere ID. This could be used by other organizations to access your [facility] medical records  [id number]        Your Vitals Were     Pulse Respirations Height Pulse Oximetry Breastfeeding? BMI (Body Mass Index)    83 20 5' 3\" (1.6 m) 98% No 20.73 kg/m2       Blood Pressure from Last 3 Encounters:   06/18/18 122/64   09/19/17 120/64   08/02/17 135/69    Weight from Last 3 Encounters:   06/18/18 117 lb (53.1 kg)   09/19/17 119 lb (54 kg)   05/23/17 120 lb (54.4 kg)              We Performed the Following     Anaplasma phagocytoph antibody IgG IgM     CBC with platelets and differential     CCP Antibodies IgG IgA ([facility])     Comprehensive metabolic panel     ESR: Erythrocyte sedimentation rate     GENERAL SURG ADULT REFERRAL     Lipid Profile     Lyme Disease Hyun with reflex to WB Serum     Rheumatoid factor     TSH with free T4 reflex     Vitamin B12        Primary Care Provider Office Phone # Fax # "    Kenna Portillo [phone]       [facility] [address]        Equal Access to Services     JUDY BRO : Hadii aad ku hadmikanh Kramer, twylagavin lesliejohnnieha, ankitakriss pantojaanoop juancarloscalin, ernie isain hayaan juancarloshenri rodriguez laDeborajuliette luong. So [facility] [phone].    ATENCIÓN: Si habla español, tiene a martin disposición servicios gratuitos de asistencia lingüística. Llame al [phone].    We comply with applicable federal civil rights laws and [place] laws. We do not discriminate on the basis of race, color, national origin, age, disability, sex, sexual orientation, or gender identity.            Thank you!     Thank you for choosing [facility]  for your care. Our goal is always to provide you with excellent care. Hearing back from our patients is one way we can continue to improve our services. Please take a few minutes to complete the written survey that you may receive in the mail after your visit with us. Thank you!             Your Updated Medication List - Protect others around you: Learn how to safely use, store and throw away your medicines at www.disposemymeds.org.          This list is accurate as of 6/18/18  9:48 AM.  Always use your most recent med list.                   Brand Name Dispense Instructions for use Diagnosis    ASPIRIN CAPS 81 MG OR     100    one capsule by mouth daily        CALCIUM + D PO           cyanocobalamin 1000 MCG/ML injection    VITAMIN B12    1 mL    Inject 1 mL (1,000 mcg) into the muscle every 30 days    B-complex deficiency       Multi-vitamin Tabs tablet      Take 1 tablet by mouth daily

## 2018-06-18 NOTE — PROGRESS NOTES
SUBJECTIVE:   Maria Teresa Aburto is a 72 year old female who presents for Preventive Visit.      Are you in the first 12 months of your Medicare Part B coverage?  No    Healthy Habits:    Do you get at least three servings of calcium containing foods daily (dairy, green leafy vegetables, etc.)? yes    Amount of exercise or daily activities, outside of work: 5 day(s) per week    Problems taking medications regularly No    Medication side effects: No    Have you had an eye exam in the past two years? yes    Do you see a dentist twice per year? yes    Do you have sleep apnea, excessive snoring or daytime drowsiness?no      Ability to successfully perform activities of daily living: Yes, no assistance needed    Home safety:  none identified     Hearing impairment: No    Fall risk:  Fallen 2 or more times in the past year?: No  Any fall with injury in the past year?: No      Chief Complaint   Patient presents with     Physical     mammogram done previously     History of Present Illness     In April she awakened not feeling well, and this has continued with weakness, congestion, joint pain and aching, weakness of the upper extremities. She has had ongoing right shoulder pain with a rotator cuff tear; her surgeon didn't want to do surgery. She just doesn't feel good.      Shoulder Pain     right rotator cuff tear with MRI done, results as above.      restless leg     ongoing for years     Tick Bite     this occurred last year of the left shoulder and she had a rash over the shoulder. She was treated in the ER but she is concerned that she may have Lyme disease                   Reviewed and updated as needed this visit by clinical staff  Tobacco  Allergies  Meds  Med Hx  Surg Hx  Fam Hx  Soc Hx        Reviewed and updated as needed this visit by Provider        Social History   Substance Use Topics     Smoking status: Former Smoker     Types: Cigarettes     Smokeless tobacco: Never Used      Comment: Tried to Quit  (YES)     Alcohol use Yes       If you drink alcohol do you typically have >3 drinks per day or >7 drinks per week? No                        Today's PHQ-2 Score:   PHQ-2 ( 1999 [employer]) 10/10/2013   Q1: Little interest or pleasure in doing things 0   Q2: Feeling down, depressed or hopeless 0   PHQ-2 Score 0       Do you feel safe in your environment - Yes    Do you have a Health Care Directive?: No: Advance care planning was reviewed with patient; patient declined at this time.    Current providers sharing in care for this patient include:   Patient Care Team:  Kenna Portillo MD as PCP - General    The following health maintenance items are reviewed in Epic and correct as of today:  Health Maintenance   Topic Date Due     RAKAN QUESTIONNAIRE 1 YEAR  05/23/2018     PHQ-9 Q1YR  05/23/2018     FALL RISK ASSESSMENT  04/18/2019     COLONOSCOPY Q10 YR  08/07/2019     ADVANCE DIRECTIVE PLANNING Q5 YRS  10/23/2019     MAMMO SCREEN Q2 YR (SYSTEM ASSIGNED)  11/20/2019     LIPID SCREEN Q5 YR FEMALE (SYSTEM ASSIGNED)  11/16/2020     TETANUS IMMUNIZATION (SYSTEM ASSIGNED)  10/11/2021     DEXA SCAN SCREENING (SYSTEM ASSIGNED)  Completed     PNEUMOCOCCAL  Completed     INFLUENZA VACCINE  Completed     BP Readings from Last 3 Encounters:   06/18/18 122/64   09/19/17 120/64   08/02/17 135/69    Wt Readings from Last 3 Encounters:   06/18/18 117 lb (53.1 kg)   09/19/17 119 lb (54 kg)   05/23/17 120 lb (54.4 kg)                  Patient Active Problem List   Diagnosis     Restless legs syndrome (RLS)     Bowden's esophagus     B-complex deficiency     Disorder of bone and cartilage     Squamous Cell Carcinoma     GERD (gastroesophageal reflux disease)[530.81]     Actinic keratosis     History of malignant neoplasm of skin     Atypical nevus     Medial meniscus tear     Past Surgical History:   Procedure Laterality Date     Breast Biospy  11/11/2000    LEFT > Fibrocystic Disease > Outcome: Fibroadenoma     COLONOSCOPY  2000      "COLONOSCOPY  8-7-2009    Normal, Repeat 2015     DEXA Scan  2009     EGD       EGD  2008     EGD  2003     Knee Replacement  2012     Oophorectomy      Ectopic Pregnancy     TONSILLECTOMY         Social History   Substance Use Topics     Smoking status: Former Smoker     Types: Cigarettes     Smokeless tobacco: Never Used      Comment: Tried to Quit (YES)     Alcohol use Yes     Family History   Problem Relation Age of Onset     Endometrial Cancer Other      Family Hx     OSTEOPOROSIS Other      Family Hx     Parkinsonism Other      Family Hx     Unknown/Adopted No family hx of          Current Outpatient Prescriptions   Medication Sig Dispense Refill     ASPIRIN CAPS 81 MG OR one capsule by mouth daily 100 3     Calcium Carbonate-Vitamin D (CALCIUM + D PO)        cyanocobalamin (VITAMIN B12) 1000 MCG/ML injection Inject 1 mL (1,000 mcg) into the muscle every 30 days 1 mL 5     multivitamin, therapeutic with minerals (MULTI-VITAMIN) TABS Take 1 tablet by mouth daily       Allergies   Allergen Reactions     Nitrofurantoin Other (See Comments) and Nausea     Macrobid  \"Chills and Fevers\"     Nitrofurantoin Macrocrystal Other (See Comments) and Nausea     Macrobid  \"Chills and Fevers\"             ROS:  CONSTITUTIONAL: NEGATIVE for fever, chills, change in weight  INTEGUMENTARY/SKIN: NEGATIVE for worrisome rashes, moles or lesions  EYES: NEGATIVE for vision changes or irritation  ENT/MOUTH: NEGATIVE for ear, mouth and throat problems  RESP: NEGATIVE for significant cough or SOB  BREAST: NEGATIVE for masses, tenderness or discharge  CV: NEGATIVE for chest pain, palpitations or peripheral edema  GI: NEGATIVE for nausea, abdominal pain, heartburn, or change in bowel habits  : NEGATIVE for frequency, dysuria, or hematuria  MUSCULOSKELETAL: NEGATIVE for significant arthralgias or myalgia  NEURO: NEGATIVE for weakness, dizziness or paresthesias  ENDOCRINE: NEGATIVE for temperature intolerance, skin/hair changes  HEME: " "NEGATIVE for bleeding problems  PSYCHIATRIC: NEGATIVE for changes in mood or affect    OBJECTIVE:   /64  Pulse 83  Resp 20  Ht 5' 3\" (1.6 m)  Wt 117 lb (53.1 kg)  SpO2 98%  Breastfeeding? No  BMI 20.73 kg/m2 Estimated body mass index is 20.73 kg/(m^2) as calculated from the following:    Height as of this encounter: 5' 3\" (1.6 m).    Weight as of this encounter: 117 lb (53.1 kg).  EXAM:   GENERAL APPEARANCE: healthy, alert and no distress  EYES: Eyes grossly normal to inspection, PERRL and conjunctivae and sclerae normal  HENT: ear canals and TM's normal, nose and mouth without ulcers or lesions, oropharynx clear and oral mucous membranes moist  NECK: no adenopathy, no asymmetry, masses, or scars and thyroid normal to palpation  RESP: lungs clear to auscultation - no rales, rhonchi or wheezes  BREAST: normal without masses, tenderness or nipple discharge and no palpable axillary masses or adenopathy  CV: regular rate and rhythm, normal S1 S2, no S3 or S4, no murmur, click or rub, no peripheral edema and peripheral pulses strong  ABDOMEN: soft, nontender, no hepatosplenomegaly, no masses and bowel sounds normal  MS: no musculoskeletal defects are noted and gait is age appropriate without ataxia  SKIN: no suspicious lesions or rashes  NEURO: Normal strength and tone, sensory exam grossly normal, mentation intact and speech normal  PSYCH: mentation appears normal and affect normal/bright    ASSESSMENT / PLAN:   1. Routine general medical examination at a health care facility  Check fasting labs today  - Lipid Profile    2. Dark stools  Due for colonoscopy, will check this for her  - GENERAL SURG ADULT REFERRAL  - CBC with platelets and differential    3. Bowden's esophagus without dysplasia  Due for EGD  - GENERAL SURG ADULT REFERRAL    4. Muscle weakness (generalized)  Check labs below    5. Multiple joint pain  Rule out Lyme diease, thyroid disease, RA  - Comprehensive metabolic panel  - TSH with free " "T4 reflex  - ESR: Erythrocyte sedimentation rate  - CCP Antibodies IgG IgA ([facility])  - Rheumatoid factor  - Vitamin B12  - Lyme Disease Hyun with reflex to WB Serum  - Anaplasma phagocytoph antibody IgG IgM    6. Nasal congestion  Allergies, advised OTC antihistamines    7. B-complex deficiency  Given today  - INJECTION INTRAMUSCULAR OR SUB-Q  - VITAMIN B12 100 mcg (standing order count of 12)    8. Restless leg syndrome  She declines medications    9. Sleep related leg cramps    - TSH with free T4 reflex      COUNSELING:  Reviewed preventive health counseling, as reflected in patient instructions       Regular exercise       Healthy diet/nutrition        Estimated body mass index is 20.73 kg/(m^2) as calculated from the following:    Height as of this encounter: 5' 3\" (1.6 m).    Weight as of this encounter: 117 lb (53.1 kg).       reports that she has quit smoking. Her smoking use included Cigarettes. She has never used smokeless tobacco.      Appropriate preventive services were discussed with this patient, including applicable screening as appropriate for cardiovascular disease, diabetes, osteopenia/osteoporosis, and glaucoma.  As appropriate for age/gender, discussed screening for colorectal cancer, prostate cancer, breast cancer, and cervical cancer. Checklist reviewing preventive services available has been given to the patient.    Reviewed patients plan of care and provided an AVS. The Complex Care Plan (for patients with higher acuity and needing more deliberate coordination of services) for Maria Teresa meets the Care Plan requirement. This Care Plan has been established and reviewed with the Patient.    Counseling Resources:  ATP IV Guidelines  Pooled Cohorts Equation Calculator  Breast Cancer Risk Calculator  FRAX Risk Assessment  ICSI Preventive Guidelines  Dietary Guidelines for Americans, 2010  USDA's MyPlate  ASA Prophylaxis  Lung CA Screening    Kenna Portillo MD  [facility] [place]  "

## 2018-06-18 NOTE — NURSING NOTE
"Chief Complaint   Patient presents with     Physical       Initial /64  Pulse 83  Resp 20  Ht 5' 3\" (1.6 m)  Wt 117 lb (53.1 kg)  SpO2 98%  Breastfeeding? No  BMI 20.73 kg/m2 Estimated body mass index is 20.73 kg/(m^2) as calculated from the following:    Height as of this encounter: 5' 3\" (1.6 m).    Weight as of this encounter: 117 lb (53.1 kg).  Medication Reconciliation: complete    Kelly Knox LPN    "

## 2018-06-18 NOTE — NURSING NOTE
The following medication was given:     MEDICATION: Vitamin B12  1000mcg  ROUTE: IM  SITE: Deltoid - Left  DOSE: 1000mcg  LOT #: [id number]  :  [employer]  EXPIRATION DATE:  03/19  NDC#: 78582-585-26   Naheed Espinosa LPN

## 2018-06-19 ENCOUNTER — HOSPITAL ENCOUNTER (OUTPATIENT)
Dept: PHYSICAL THERAPY | Facility: HOSPITAL | Age: 73
Setting detail: THERAPIES SERIES
End: 2018-06-19
Attending: FAMILY MEDICINE
Payer: MEDICARE

## 2018-06-19 LAB
B BURGDOR IGG+IGM SER QL: 0.12 (ref 0–0.89)
RHEUMATOID FACT SER NEPH-ACNC: <20 IU/ML (ref 0–20)

## 2018-06-19 PROCEDURE — 97140 MANUAL THERAPY 1/> REGIONS: CPT | Mod: GP

## 2018-06-19 PROCEDURE — 40000718 ZZHC STATISTIC PT DEPARTMENT ORTHO VISIT

## 2018-06-20 LAB
A PHAGOCYTOPH IGG TITR SER IF: NORMAL {TITER}
A PHAGOCYTOPH IGM TITR SER IF: NORMAL {TITER}

## 2018-06-21 ENCOUNTER — HOSPITAL ENCOUNTER (OUTPATIENT)
Dept: PHYSICAL THERAPY | Facility: HOSPITAL | Age: 73
Setting detail: THERAPIES SERIES
End: 2018-06-21
Attending: FAMILY MEDICINE
Payer: MEDICARE

## 2018-06-21 PROCEDURE — 97140 MANUAL THERAPY 1/> REGIONS: CPT | Mod: GP

## 2018-06-21 PROCEDURE — 40000718 ZZHC STATISTIC PT DEPARTMENT ORTHO VISIT

## 2018-07-10 ENCOUNTER — OFFICE VISIT (OUTPATIENT)
Dept: SURGERY | Facility: OTHER | Age: 73
End: 2018-07-10
Attending: FAMILY MEDICINE
Payer: COMMERCIAL

## 2018-07-10 ENCOUNTER — HOSPITAL ENCOUNTER (OUTPATIENT)
Dept: PHYSICAL THERAPY | Facility: HOSPITAL | Age: 73
Setting detail: THERAPIES SERIES
End: 2018-07-10
Attending: FAMILY MEDICINE
Payer: MEDICARE

## 2018-07-10 VITALS
HEART RATE: 76 BPM | BODY MASS INDEX: 20.73 KG/M2 | DIASTOLIC BLOOD PRESSURE: 70 MMHG | OXYGEN SATURATION: 100 % | TEMPERATURE: 97.6 F | HEIGHT: 63 IN | SYSTOLIC BLOOD PRESSURE: 124 MMHG | WEIGHT: 117 LBS

## 2018-07-10 DIAGNOSIS — K62.5 HEMORRHAGE OF RECTUM AND ANUS: ICD-10-CM

## 2018-07-10 DIAGNOSIS — R19.5 DARK STOOLS: ICD-10-CM

## 2018-07-10 DIAGNOSIS — R19.4 CHANGE IN BOWEL HABITS: Primary | ICD-10-CM

## 2018-07-10 DIAGNOSIS — K22.70 BARRETT'S ESOPHAGUS WITHOUT DYSPLASIA: ICD-10-CM

## 2018-07-10 PROCEDURE — G8986 CARRY D/C STATUS: HCPCS | Mod: GP,CI

## 2018-07-10 PROCEDURE — 97110 THERAPEUTIC EXERCISES: CPT | Mod: GP

## 2018-07-10 PROCEDURE — G0463 HOSPITAL OUTPT CLINIC VISIT: HCPCS

## 2018-07-10 PROCEDURE — G0463 HOSPITAL OUTPT CLINIC VISIT: HCPCS | Mod: 25

## 2018-07-10 PROCEDURE — G8984 CARRY CURRENT STATUS: HCPCS | Mod: GP,CI

## 2018-07-10 PROCEDURE — 40000718 ZZHC STATISTIC PT DEPARTMENT ORTHO VISIT

## 2018-07-10 PROCEDURE — 99213 OFFICE O/P EST LOW 20 MIN: CPT | Performed by: SURGERY

## 2018-07-10 PROCEDURE — G8985 CARRY GOAL STATUS: HCPCS | Mod: GP,CI

## 2018-07-10 RX ORDER — SODIUM, POTASSIUM,MAG SULFATES 17.5-3.13G
2 SOLUTION, RECONSTITUTED, ORAL ORAL SEE ADMIN INSTRUCTIONS
Qty: 2 BOTTLE | Refills: 0 | Status: SHIPPED | OUTPATIENT
Start: 2018-07-10 | End: 2019-08-09

## 2018-07-10 ASSESSMENT — PAIN SCALES - GENERAL: PAINLEVEL: NO PAIN (0)

## 2018-07-10 NOTE — PROGRESS NOTES
Surgery Consult Clinic Note      RE: Maria Teresa Aburto  : 1945    Chief Complaint:  Change in bowel habits   Fritz's esophagus    History of Present Illness:  Mrs. Aburto is a very pleasant 73 year old female who I am seeing at the request of Dr. Portillo for evaluation of change in bowel habits and history of fritz's esophagus and for consideration for EGD/colonoscopy.  I saw her in  for screening colonoscopy and recommended that she wait another 4 years given that there is no family history.  She denies family history of colon or rectal cancer, weight loss, abdominal pain.  She has on occasion seen bright red blood on the toilet paper and now complaints of alternating constipation and diarrhea.  She's had a remote history of ectopic pregnancy. She denies heart burn, acid reflux, change in voice, cough.  She specifically denies fever, chills, nausea, vomiting, chest pain, shortness of breath or palpitations.      Medical history:  Past Medical History:   Diagnosis Date     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     Fritz's esophagus 10/11/2011     Constipation 10/09/2012     GERD (gastroesophageal reflux disease)[530.81] 2003     Osteopenia 10/11/2011    dexa scans odd years starting in      Precordial pain 2003    negative stress test, negative pulmonary evaluatio     Restless legs syndrome (RLS) 10/11/2011     Squamous Cell Carcinoma 10/11/2011    left leg x2     Urge incontinence 10/09/2012       Surgical history:  Past Surgical History:   Procedure Laterality Date     Breast Biospy  2000    LEFT > Fibrocystic Disease > Outcome: Fibroadenoma     COLONOSCOPY       COLONOSCOPY  2009    Normal, Repeat      DEXA Scan       EGD       EGD       EGD       Knee Replacement       Oophorectomy      Ectopic Pregnancy     TONSILLECTOMY         Family history:  Family History   Problem Relation Age of Onset     Endometrial Cancer Other      Family Hx     Osteoperosis  "Other      Family Hx     Parkinsonism Other      Family Hx     Unknown/Adopted No family hx of        Medications:  Prior to Admission medications    Medication Sig Start Date End Date Taking? Authorizing Provider   ASPIRIN CAPS 81 MG OR one capsule by mouth daily 6/18/03  Yes    Calcium Carbonate-Vitamin D (CALCIUM + D PO)    Yes Reported, Patient   cyanocobalamin (VITAMIN B12) 1000 MCG/ML injection Inject 1 mL (1,000 mcg) into the muscle every 30 days 12/5/17  Yes Kenna Portillo MD   multivitamin, therapeutic with minerals (MULTI-VITAMIN) TABS Take 1 tablet by mouth daily   Yes Reported, Patient       Allergies:  The patientis allergic to nitrofurantoin and nitrofurantoin macrocrystal.  .  Social history:  Social History   Substance Use Topics     Smoking status: Former Smoker     Types: Cigarettes     Smokeless tobacco: Never Used      Comment: Tried to Quit (YES)     Alcohol use Yes     Marital status: .  Review of Systems:    Constitutional: Negative for fever, chills and weight loss.   HENT: Negative for ear pain, nosebleeds, congestion, sore throat, tinnitus and ear discharge.    Eyes: Negative for blurred vision, double vision, photophobia and pain.   Respiratory: Negative for cough, hemoptysis, shortness of breath, wheezing and stridor.    Cardiovascular: Negative for chest pain, palpitations and orthopnea.   Gastrointestinal: Per HPI  Genitourinary: Negative for urgency, frequency and hematuria.   Musculoskeletal: Negative for myalgias, back pain and joint pain.   Neurological: Negative for tingling, speech change and headaches.   Endo/Heme/Allergies: Does not bruise/bleed easily.   Psychiatric/Behavioral: Negative for depression, suicidal ideas and hallucinations. The patient is not nervous/anxious.    Physical Examination:  /70  Pulse 76  Temp 97.6  F (36.4  C) (Tympanic)  Ht 5' 3\" (1.6 m)  Wt 117 lb (53.1 kg)  SpO2 100%  BMI 20.73 kg/m2  General: AAOx4, NAD, WN/WD, ambulating without " assistance  HEENT:NCAT, EOMI, PERRL Sclerae anicteric; Trachea mideline, no JVD  Chest:   Clear to auscultation bilaterally.  Cardiac: S1S2 , regular rate and rhythm without additional sounds  Abdomen: Soft, ND/NT no rebound, no guarding  Extremities: Cursory exam unremarkable.  Skin: Warm, dry, < 2 sec cap refill  Neuro: CN 2-12 grossly intact, no focal deficit, GCS 15  Psych: happy, calm, asks appropriate questions    # Pain Assessment:  Current Pain Score 8/2/2017   Pain score (0-10) 0   Dhaval pain level was assessed and she currently denies pain.        Assessment/Plan:  #1 BRBPR  #2 Change in bowel habits  #3 History of fritz's esophagus    Thank you for the consult.  Mrs. Aburto and JENNIFER had a long and juliette discussion about colonoscopies.  The indications, risks, benefits, althernatives and technical aspects of whole colon colonoscopy were outlined with risks including, but not limited to, perforation, bleeding and inability to visualize entire colon.  Management of each was reviewed including the risk for life saving surgery and possible admittance to the ICU.  The need of mechanical preparation of the colon was reviewed along with the use of monitored anesthetic care which is needed to ensure proper visualization and safety concerns should biopsy be needed.  The patient's questions were asked and answered.  Scheduled first available date.     The indications, risks, benefits and technical aspects of esophagogastroduodenoscopy were reviewed with her questions asked and answered.  Antral biopsy for histologic examination will be performed and the place of H. pylori in gastritis was discussed.  Preoperative preparation, npo after midnight preceding the date was discussed and a request made to hold aspirin containing agents one week prior to ameliorate antiplatelet effect.  Questions asked and answered - will proceed based on scheduling availability.           [facility]  [address], [address]  [place]    [address]    Referring Provider:  Kenna Portillo MD  [address] [place] [id number]     Primary Care Provider:  Kenna Portillo

## 2018-07-10 NOTE — MR AVS SNAPSHOT
After Visit Summary   7/10/2018    Maria Teresa Aburto    MRN: [id number]           Patient Information     Date Of Birth          1945        Visit Information        Provider Department      7/10/2018 2:30 PM Minh Interiano, DO [facility]        Today's Diagnoses     Change in bowel habits    -  1    Dark stools        Bowden's esophagus without dysplasia        Hemorrhage of rectum and anus          Care Instructions          Thank you for allowing Dr. Interiano and our surgical team to participate in your care.  If you have a scheduling or an appointment question please contact [facility] Unit Coordinator at her direct line [phone].   ALL nursing questions or concerns can be directed to Isabella at: [phone]     You are scheduled for a: colonoscopy/egd  Your procedure date is: 8/1/18    You need a friend or family member available to drive you home AND stay with you for 24 hours after you leave the hospital. You will not be allowed to drive yourself. IF you need to take a taxi or the bus you MUST have a responsible person to ride with you. YOUR PROCEDURE WILL BE CANCELLED IF YOU DO NOT HAVE A RESPONSIBLE ADULT TO DRIVE YOU HOME.       You CANNOT have anything to eat or drink after midnight the night before your surgery, ncluding water and coffee. Your stomach needs to be completely empty. Do NOT chew gum, suck on hard candy, or smoke. You can brush your teeth the morning of surgery.       You need to call our Surgery Education Nurses 1-2 weeks prior to your surgery date at  [phone] or toll free [phone]. Please have you medication and allergy lists ready.      Stop your aspirin or other NSAIDs(Ibuprofen, Motrin, Aleve, Celebrex, Naproxen, etc...) 7 days before your surgery.      Hospital admitting will call you the day before your surgery with your arrival time. If you are scheduled on a Monday admitting will call you the Friday before.      Please call  "your primary care physician if you should become ill within 24 hours of scheduled surgery. (ex.vomiting, diarrhea, fever)    Surgery Education will contact you the day before your procedure between the hours of noon and 5 pm with the time you need to register in admitting at the hospital. Call Isabella with any questions [phone]    Day of surgery hold all medications until you arrive home at which time you may resume them all like normal.                Follow-ups after your visit        Who to contact     If you have questions or need follow up information about today's clinic visit or your schedule please contact [facility] directly at [phone].  Normal or non-critical lab and imaging results will be communicated to you by [employer], letter or phone within 4 business days after the clinic has received the results. If you do not hear from us within 7 days, please contact the clinic through [employer] or phone. If you have a critical or abnormal lab result, we will notify you by phone as soon as possible.  Submit refill requests through [employer] or call your pharmacy and they will forward the refill request to us. Please allow 3 business days for your refill to be completed.          Additional Information About Your Visit        [employer] Information     [employer] gives you secure access to your electronic health record. If you see a primary care provider, you can also send messages to your care team and make appointments. If you have questions, please call your primary care clinic.  If you do not have a primary care provider, please call [phone] and they will assist you.        Care EveryWhere ID     This is your Care EveryWhere ID. This could be used by other organizations to access your [place] medical records  [id number]        Your Vitals Were     Pulse Temperature Height Pulse Oximetry BMI (Body Mass Index)       76 97.6  F (36.4  C) (Tympanic) 5' 3\" (1.6 m) 100% 20.73 kg/m2        Blood " Pressure from Last 3 Encounters:   07/10/18 124/70   06/18/18 122/64   09/19/17 120/64    Weight from Last 3 Encounters:   07/10/18 117 lb (53.1 kg)   06/18/18 117 lb (53.1 kg)   09/19/17 119 lb (54 kg)              Today, you had the following     No orders found for display         Today's Medication Changes          These changes are accurate as of 7/10/18  3:33 PM.  If you have any questions, ask your nurse or doctor.               Start taking these medicines.        Dose/Directions    Na Sulfate-K Sulfate-Mg Sulf solution   Commonly known as:  SUPREP BOWEL PREP   Used for:  Change in bowel habits, Bowden's esophagus without dysplasia        Dose:  2 Bottle   Take 354 mLs (2 Bottles) by mouth See Admin Instructions   Quantity:  2 Bottle   Refills:  0            Where to get your medicines      These medications were sent to [id number] - [place], [place] - [address]  [address] [place] [address]     Phone:  [phone]     Na Sulfate-K Sulfate-Mg Sulf solution                Primary Care Provider Office Phone # Fax #    Kenna Portillo [phone]       [address] [address]        Equal Access to Services     JUDY BRO : Gerardo ladd Sotoya, waaxda luqadaha, qaybta kaalmada adehenriyada, ernie luong. So [facility] [phone].    ATENCIÓN: Si habla español, tiene a martin disposición servicios gratuitos de asistencia lingüística. Llame al [phone].    We comply with applicable federal civil rights laws and [place] laws. We do not discriminate on the basis of race, color, national origin, age, disability, sex, sexual orientation, or gender identity.            Thank you!     Thank you for choosing [facility]  for your care. Our goal is always to provide you with excellent care. Hearing back from our patients is one way we can continue to improve our services. Please take a few minutes to complete the  written survey that you may receive in the mail after your visit with us. Thank you!             Your Updated Medication List - Protect others around you: Learn how to safely use, store and throw away your medicines at www.disposemymeds.org.          This list is accurate as of 7/10/18  3:33 PM.  Always use your most recent med list.                   Brand Name Dispense Instructions for use Diagnosis    ASPIRIN CAPS 81 MG OR     100    one capsule by mouth daily        CALCIUM + D PO           cyanocobalamin 1000 MCG/ML injection    VITAMIN B12    1 mL    Inject 1 mL (1,000 mcg) into the muscle every 30 days    B-complex deficiency       Multi-vitamin Tabs tablet      Take 1 tablet by mouth daily        Na Sulfate-K Sulfate-Mg Sulf solution    SUPREP BOWEL PREP    2 Bottle    Take 354 mLs (2 Bottles) by mouth See Admin Instructions    Change in bowel habits, Bowden's esophagus without dysplasia

## 2018-07-10 NOTE — PATIENT INSTRUCTIONS
Thank you for allowing Dr. Interiano and our surgical team to participate in your care.  If you have a scheduling or an appointment question please contact [facility] Coordinator at her direct line [phone].   ALL nursing questions or concerns can be directed to Isabella at: [phone]     You are scheduled for a: colonoscopy/egd  Your procedure date is: 8/1/18    You need a friend or family member available to drive you home AND stay with you for 24 hours after you leave the hospital. You will not be allowed to drive yourself. IF you need to take a taxi or the bus you MUST have a responsible person to ride with you. YOUR PROCEDURE WILL BE CANCELLED IF YOU DO NOT HAVE A RESPONSIBLE ADULT TO DRIVE YOU HOME.       You CANNOT have anything to eat or drink after midnight the night before your surgery, ncluding water and coffee. Your stomach needs to be completely empty. Do NOT chew gum, suck on hard candy, or smoke. You can brush your teeth the morning of surgery.       You need to call our Surgery Education Nurses 1-2 weeks prior to your surgery date at  [phone] or toll free [phone]. Please have you medication and allergy lists ready.      Stop your aspirin or other NSAIDs(Ibuprofen, Motrin, Aleve, Celebrex, Naproxen, etc...) 7 days before your surgery.      Hospital admitting will call you the day before your surgery with your arrival time. If you are scheduled on a Monday admitting will call you the Friday before.      Please call your primary care physician if you should become ill within 24 hours of scheduled surgery. (ex.vomiting, diarrhea, fever)    Surgery Education will contact you the day before your procedure between the hours of noon and 5 pm with the time you need to register in admitting at the hospital. Call Isabella with any questions [phone]    Day of surgery hold all medications until you arrive home at which time you may resume them all like normal.

## 2018-07-10 NOTE — NURSING NOTE
"Chief Complaint   Patient presents with     Consult     egd/colon, referred by Dr. Kenna Portillo       Initial /70  Pulse 76  Temp 97.6  F (36.4  C) (Tympanic)  Ht 5' 3\" (1.6 m)  Wt 117 lb (53.1 kg)  SpO2 100%  BMI 20.73 kg/m2 Estimated body mass index is 20.73 kg/(m^2) as calculated from the following:    Height as of this encounter: 5' 3\" (1.6 m).    Weight as of this encounter: 117 lb (53.1 kg).  Medication Reconciliation: complete    Isabella Calderón LPN    "

## 2018-07-10 NOTE — PROGRESS NOTES
Outpatient Physical Therapy Discharge Note     Patient: Maria Teresa Aburto  : 1945    Beginning/End Dates of Reporting Period:  2018 to 7/10/2018    Referring Provider: Kenna Portillo MD    Therapy Diagnosis: Right shoulder pain mediated by mechanical dysfunction at upper quarter, head and neck     Client Self Report: Patient seen [id number] for C/O right shoulder, arm, and neck pain with certain reaching activities. Has returned from the fishing trip to [place]. She was hesitant to cast out at first with that arm/shoulder. And it is all good. No problems with arm, shoulder or neck.     Objective Measurements:  Objective Measure: Somatic dysfunction  Details: Occiput cranially flexed/sheared anteriorly on C1. Leg lengths equal, pelvis level, and SI jts equally mobile. Sacral, lumbar, thoracic, cervical, and cranial segments WNL of mobility and alignment.     Objective Measure: MRI [facility] date 10/18/2017  Details: Mild right A-C jt arthrosis. Tendinopathy of infraspinatus. DJD gleno-humeral jt.       Outcome Measures (most recent score):  Shoulder Pain and Disability Index (SPADI) score today 6.15    Goals:  Goal Identifier 1 Functional   Goal Description Ability to reach overhead without pain in all settings   Target Date 18   Date Met  07/10/18   Progress:     Goal Identifier 2 Outcome   Goal Description Resolution of mechanical dysfunction   Target Date 18   Date Met  07/10/18   Progress:       Progress Toward Goals:   Progress this reporting period: yes, goals met    Plan:  Discharge from therapy.    Discharge:    Reason for Discharge: Patient has met all goals.    Equipment Issued: n/a    Discharge Plan: Patient to continue home program.    Melanie Ch DPT

## 2018-07-11 ENCOUNTER — TELEPHONE (OUTPATIENT)
Dept: FAMILY MEDICINE | Facility: OTHER | Age: 73
End: 2018-07-11

## 2018-07-11 ENCOUNTER — HOSPITAL ENCOUNTER (OUTPATIENT)
Facility: HOSPITAL | Age: 73
End: 2018-07-11
Attending: SURGERY | Admitting: SURGERY
Payer: MEDICARE

## 2018-07-11 NOTE — TELEPHONE ENCOUNTER
Left message for the patient to return phone call back to clinic at earliest convenience.  Carmelina Montalvo, CMA

## 2018-07-17 NOTE — H&P (VIEW-ONLY)
Surgery Consult Clinic Note      RE: Maria Teresa Aburto  : 1945    Chief Complaint:  Change in bowel habits   Fritz's esophagus    History of Present Illness:  Mrs. Aburto is a very pleasant 73 year old female who I am seeing at the request of Dr. Portillo for evaluation of change in bowel habits and history of fritz's esophagus and for consideration for EGD/colonoscopy.  I saw her in  for screening colonoscopy and recommended that she wait another 4 years given that there is no family history.  She denies family history of colon or rectal cancer, weight loss, abdominal pain.  She has on occasion seen bright red blood on the toilet paper and now complaints of alternating constipation and diarrhea.  She's had a remote history of ectopic pregnancy. She denies heart burn, acid reflux, change in voice, cough.  She specifically denies fever, chills, nausea, vomiting, chest pain, shortness of breath or palpitations.      Medical history:  Past Medical History:   Diagnosis Date     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     Fritz's esophagus 10/11/2011     Constipation 10/09/2012     GERD (gastroesophageal reflux disease)[530.81] 2003     Osteopenia 10/11/2011    dexa scans odd years starting in      Precordial pain 2003    negative stress test, negative pulmonary evaluatio     Restless legs syndrome (RLS) 10/11/2011     Squamous Cell Carcinoma 10/11/2011    left leg x2     Urge incontinence 10/09/2012       Surgical history:  Past Surgical History:   Procedure Laterality Date     Breast Biospy  2000    LEFT > Fibrocystic Disease > Outcome: Fibroadenoma     COLONOSCOPY       COLONOSCOPY  2009    Normal, Repeat      DEXA Scan       EGD       EGD       EGD       Knee Replacement       Oophorectomy      Ectopic Pregnancy     TONSILLECTOMY         Family history:  Family History   Problem Relation Age of Onset     Endometrial Cancer Other      Family Hx     Osteoperosis  "Other      Family Hx     Parkinsonism Other      Family Hx     Unknown/Adopted No family hx of        Medications:  Prior to Admission medications    Medication Sig Start Date End Date Taking? Authorizing Provider   ASPIRIN CAPS 81 MG OR one capsule by mouth daily 6/18/03  Yes    Calcium Carbonate-Vitamin D (CALCIUM + D PO)    Yes Reported, Patient   cyanocobalamin (VITAMIN B12) 1000 MCG/ML injection Inject 1 mL (1,000 mcg) into the muscle every 30 days 12/5/17  Yes Kenna Portillo MD   multivitamin, therapeutic with minerals (MULTI-VITAMIN) TABS Take 1 tablet by mouth daily   Yes Reported, Patient       Allergies:  The patientis allergic to nitrofurantoin and nitrofurantoin macrocrystal.  .  Social history:  Social History   Substance Use Topics     Smoking status: Former Smoker     Types: Cigarettes     Smokeless tobacco: Never Used      Comment: Tried to Quit (YES)     Alcohol use Yes     Marital status: .  Review of Systems:    Constitutional: Negative for fever, chills and weight loss.   HENT: Negative for ear pain, nosebleeds, congestion, sore throat, tinnitus and ear discharge.    Eyes: Negative for blurred vision, double vision, photophobia and pain.   Respiratory: Negative for cough, hemoptysis, shortness of breath, wheezing and stridor.    Cardiovascular: Negative for chest pain, palpitations and orthopnea.   Gastrointestinal: Per HPI  Genitourinary: Negative for urgency, frequency and hematuria.   Musculoskeletal: Negative for myalgias, back pain and joint pain.   Neurological: Negative for tingling, speech change and headaches.   Endo/Heme/Allergies: Does not bruise/bleed easily.   Psychiatric/Behavioral: Negative for depression, suicidal ideas and hallucinations. The patient is not nervous/anxious.    Physical Examination:  /70  Pulse 76  Temp 97.6  F (36.4  C) (Tympanic)  Ht 5' 3\" (1.6 m)  Wt 117 lb (53.1 kg)  SpO2 100%  BMI 20.73 kg/m2  General: AAOx4, NAD, WN/WD, ambulating without " assistance  HEENT:NCAT, EOMI, PERRL Sclerae anicteric; Trachea mideline, no JVD  Chest:   Clear to auscultation bilaterally.  Cardiac: S1S2 , regular rate and rhythm without additional sounds  Abdomen: Soft, ND/NT no rebound, no guarding  Extremities: Cursory exam unremarkable.  Skin: Warm, dry, < 2 sec cap refill  Neuro: CN 2-12 grossly intact, no focal deficit, GCS 15  Psych: happy, calm, asks appropriate questions    # Pain Assessment:  Current Pain Score 8/2/2017   Pain score (0-10) 0   Dhaval pain level was assessed and she currently denies pain.        Assessment/Plan:  #1 BRBPR  #2 Change in bowel habits  #3 History of fritz's esophagus    Thank you for the consult.  Mrs. Aburto and JENNIFER had a long and juliette discussion about colonoscopies.  The indications, risks, benefits, althernatives and technical aspects of whole colon colonoscopy were outlined with risks including, but not limited to, perforation, bleeding and inability to visualize entire colon.  Management of each was reviewed including the risk for life saving surgery and possible admittance to the ICU.  The need of mechanical preparation of the colon was reviewed along with the use of monitored anesthetic care which is needed to ensure proper visualization and safety concerns should biopsy be needed.  The patient's questions were asked and answered.  Scheduled first available date.     The indications, risks, benefits and technical aspects of esophagogastroduodenoscopy were reviewed with her questions asked and answered.  Antral biopsy for histologic examination will be performed and the place of H. pylori in gastritis was discussed.  Preoperative preparation, npo after midnight preceding the date was discussed and a request made to hold aspirin containing agents one week prior to ameliorate antiplatelet effect.  Questions asked and answered - will proceed based on scheduling availability.           [facility]  [address], [address]  [place]    [address]    Referring Provider:  Kenna Portillo MD  [address] [place] [id number]     Primary Care Provider:  Kenna Portillo

## 2018-08-10 ENCOUNTER — ALLIED HEALTH/NURSE VISIT (OUTPATIENT)
Dept: ALLERGY | Facility: OTHER | Age: 73
End: 2018-08-10
Attending: FAMILY MEDICINE
Payer: MEDICARE

## 2018-08-10 DIAGNOSIS — E53.9 B-COMPLEX DEFICIENCY: Primary | ICD-10-CM

## 2018-08-10 PROCEDURE — 96372 THER/PROPH/DIAG INJ SC/IM: CPT

## 2018-08-10 NOTE — PROGRESS NOTES
The following medication was given:     MEDICATION: Vitamin B12  1000mcg  ROUTE: IM  SITE: Deltoid - Right  DOSE: 1000mcg/mL  LOT #: [id number]  :  [employer]  EXPIRATION DATE:  10/2019  NDC#: 7855-6605-68  Henrietta Martinez

## 2018-08-10 NOTE — MR AVS SNAPSHOT
After Visit Summary   8/10/2018    Maria Teresa Aburto    MRN: [id number]           Patient Information     Date Of Birth          1945        Visit Information        Provider Department      8/10/2018 9:00 AM HC SHOT ROOM [place] [facility]        Today's Diagnoses     B-complex deficiency    -  1       Follow-ups after your visit        Who to contact     If you have questions or need follow up information about today's clinic visit or your schedule please contact [facility] directly at [phone].  Normal or non-critical lab and imaging results will be communicated to you by MyChart, letter or phone within 4 business days after the clinic has received the results. If you do not hear from us within 7 days, please contact the clinic through [employer] or phone. If you have a critical or abnormal lab result, we will notify you by phone as soon as possible.  Submit refill requests through [employer] or call your pharmacy and they will forward the refill request to us. Please allow 3 business days for your refill to be completed.          Additional Information About Your Visit        MyChart Information     [employer] gives you secure access to your electronic health record. If you see a primary care provider, you can also send messages to your care team and make appointments. If you have questions, please call your primary care clinic.  If you do not have a primary care provider, please call [phone] and they will assist you.        Care EveryWhere ID     This is your Care EveryWhere ID. This could be used by other organizations to access your [place] medical records  [id number]         Blood Pressure from Last 3 Encounters:   07/10/18 124/70   06/18/18 122/64   09/19/17 120/64    Weight from Last 3 Encounters:   07/10/18 117 lb (53.1 kg)   06/18/18 117 lb (53.1 kg)   09/19/17 119 lb (54 kg)              We Performed the Following     INJECTION INTRAMUSCULAR OR SUB-Q     VITAMIN  B12 INJ /1000G        Primary Care Provider Office Phone # Fax #    Kenna Portillo [phone] [address] [address]        Equal Access to Services     JUDY BRO : Gerardo shukla dhirajo Sotoya, waaxda luqadaha, qaybta kaalmada cj, ernie carlos juancarloshenri rodriguez khang luong. So [facility] [phone].    ATENCIÓN: Si habla español, tiene a martin disposición servicios gratuitos de asistencia lingüística. LlThe [facility] [phone].    We comply with applicable federal civil rights laws and [place] laws. We do not discriminate on the basis of race, color, national origin, age, disability, sex, sexual orientation, or gender identity.            Thank you!     Thank you for choosing [facility]  for your care. Our goal is always to provide you with excellent care. Hearing back from our patients is one way we can continue to improve our services. Please take a few minutes to complete the written survey that you may receive in the mail after your visit with us. Thank you!             Your Updated Medication List - Protect others around you: Learn how to safely use, store and throw away your medicines at www.disposemymeds.org.          This list is accurate as of 8/10/18  9:01 AM.  Always use your most recent med list.                   Brand Name Dispense Instructions for use Diagnosis    ASPIRIN CAPS 81 MG OR     100    one capsule by mouth daily        CALCIUM + D PO           cyanocobalamin 1000 MCG/ML injection    VITAMIN B12    1 mL    Inject 1 mL (1,000 mcg) into the muscle every 30 days    B-complex deficiency       Multi-vitamin Tabs tablet      Take 1 tablet by mouth daily        Na Sulfate-K Sulfate-Mg Sulf solution    SUPREP BOWEL PREP    2 Bottle    Take 354 mLs (2 Bottles) by mouth See Admin Instructions    Change in bowel habits, Bowden's esophagus without dysplasia

## 2018-08-17 ENCOUNTER — TELEPHONE (OUTPATIENT)
Dept: SURGERY | Facility: OTHER | Age: 73
End: 2018-08-17

## 2018-08-17 DIAGNOSIS — R19.4 CHANGE IN BOWEL HABITS: Primary | ICD-10-CM

## 2018-08-17 DIAGNOSIS — K22.70 BARRETT'S ESOPHAGUS: ICD-10-CM

## 2018-08-17 NOTE — TELEPHONE ENCOUNTER
Patient is calling back to reschedule her EGD and colonoscopy from 8/1 that was postponed due to an illness. Rescheduled to 9/6/18. Patient states that she still has her bowel prep and all instructions. Advised patient that she is welcome to call with any questions.

## 2018-09-05 ENCOUNTER — ANESTHESIA EVENT (OUTPATIENT)
Dept: SURGERY | Facility: HOSPITAL | Age: 73
End: 2018-09-05
Payer: MEDICARE

## 2018-09-05 NOTE — ANESTHESIA PREPROCEDURE EVALUATION
Anesthesia Evaluation     . Pt has had prior anesthetic.     No history of anesthetic complications          ROS/MED HX    ENT/Pulmonary:     (+)tobacco use, Past use , . .    Neurologic:     (+)other neuro RLS, concern for Lyme disease s/p tick bite    Cardiovascular:  - neg cardiovascular ROS       METS/Exercise Tolerance:     Hematologic:     (+) Anemia, -      Musculoskeletal:   (+) , , other musculoskeletal- hx medial meniscus tear, knee replacement, osteopenia      GI/Hepatic:     (+) GERD bowel prep, Other GI/Hepatic Bowden's esophagus, constipation, hx dark stools      Renal/Genitourinary:     (+) Other Renal/ Genitourinary, Urge incontinence      Endo:  - neg endo ROS       Psychiatric:  - neg psychiatric ROS       Infectious Disease:  - neg infectious disease ROS       Malignancy:   (+) Malignancy (SCC) History of Skin          Other:    - neg other ROS                 Physical Exam  Normal systems: dental    Airway   Mallampati: III  TM distance: >3 FB  Neck ROM: full    Dental     Cardiovascular   Rhythm and rate: regular and normal      Pulmonary    breath sounds clear to auscultation                    Anesthesia Plan      History & Physical Review  History and physical reviewed and following examination; no interval change.    ASA Status:  2 .        Plan for MAC with Intravenous and Propofol induction. Maintenance will be TIVA.  Reason for MAC:  Deep or markedly invasive procedure (G8) and Procedure to face, neck, head or breast  PONV prophylaxis:  Ondansetron (or other 5HT-3)  Surgeon requests deep sedation. Patient has advanced age >69 and will have prolonged procedure as planned combined upper endoscopy and colonoscopy. Will provide MAC.       Postoperative Care  Postoperative pain management:  IV analgesics.      Consents  Anesthetic plan, risks, benefits and alternatives discussed with:  Patient..                          .

## 2018-09-06 ENCOUNTER — HOSPITAL ENCOUNTER (OUTPATIENT)
Facility: HOSPITAL | Age: 73
Discharge: HOME | End: 2018-09-06
Attending: SURGERY | Admitting: SURGERY
Payer: MEDICARE

## 2018-09-06 ENCOUNTER — SURGERY (OUTPATIENT)
Age: 73
End: 2018-09-06

## 2018-09-06 ENCOUNTER — ANESTHESIA (OUTPATIENT)
Dept: SURGERY | Facility: HOSPITAL | Age: 73
End: 2018-09-06
Payer: MEDICARE

## 2018-09-06 VITALS
BODY MASS INDEX: 20.32 KG/M2 | RESPIRATION RATE: 18 BRPM | WEIGHT: 119 LBS | DIASTOLIC BLOOD PRESSURE: 58 MMHG | SYSTOLIC BLOOD PRESSURE: 151 MMHG | OXYGEN SATURATION: 100 % | TEMPERATURE: 97 F | HEART RATE: 89 BPM | HEIGHT: 64 IN

## 2018-09-06 PROCEDURE — 37000008 ZZH ANESTHESIA TECHNICAL FEE, 1ST 30 MIN: Performed by: SURGERY

## 2018-09-06 PROCEDURE — 25000128 H RX IP 250 OP 636: Performed by: NURSE ANESTHETIST, CERTIFIED REGISTERED

## 2018-09-06 PROCEDURE — 37000009 ZZH ANESTHESIA TECHNICAL FEE, EACH ADDTL 15 MIN: Performed by: SURGERY

## 2018-09-06 PROCEDURE — 36000052 ZZH SURGERY LEVEL 2 EA 15 ADDTL MIN: Performed by: SURGERY

## 2018-09-06 PROCEDURE — 43239 EGD BIOPSY SINGLE/MULTIPLE: CPT | Performed by: SURGERY

## 2018-09-06 PROCEDURE — 71000027 ZZH RECOVERY PHASE 2 EACH 15 MINS: Performed by: SURGERY

## 2018-09-06 PROCEDURE — 27210794 ZZH OR GENERAL SUPPLY STERILE: Performed by: SURGERY

## 2018-09-06 PROCEDURE — 40000306 ZZH STATISTIC PRE PROC ASSESS II: Performed by: SURGERY

## 2018-09-06 PROCEDURE — 99100 ANES PT EXTEME AGE<1 YR&>70: CPT | Performed by: NURSE ANESTHETIST, CERTIFIED REGISTERED

## 2018-09-06 PROCEDURE — 43239 EGD BIOPSY SINGLE/MULTIPLE: CPT | Performed by: ANESTHESIOLOGY

## 2018-09-06 PROCEDURE — 45380 COLONOSCOPY AND BIOPSY: CPT | Performed by: SURGERY

## 2018-09-06 PROCEDURE — 25000128 H RX IP 250 OP 636: Performed by: ANESTHESIOLOGY

## 2018-09-06 PROCEDURE — 88305 TISSUE EXAM BY PATHOLOGIST: CPT | Mod: TC | Performed by: SURGERY

## 2018-09-06 PROCEDURE — 36000050 ZZH SURGERY LEVEL 2 1ST 30 MIN: Performed by: SURGERY

## 2018-09-06 PROCEDURE — 43239 EGD BIOPSY SINGLE/MULTIPLE: CPT | Performed by: NURSE ANESTHETIST, CERTIFIED REGISTERED

## 2018-09-06 RX ORDER — FENTANYL CITRATE 50 UG/ML
25-50 INJECTION, SOLUTION INTRAMUSCULAR; INTRAVENOUS
Status: DISCONTINUED | OUTPATIENT
Start: 2018-09-06 | End: 2018-09-06 | Stop reason: HOSPADM

## 2018-09-06 RX ORDER — ONDANSETRON 4 MG/1
4 TABLET, ORALLY DISINTEGRATING ORAL EVERY 30 MIN PRN
Status: DISCONTINUED | OUTPATIENT
Start: 2018-09-06 | End: 2018-09-06 | Stop reason: HOSPADM

## 2018-09-06 RX ORDER — SODIUM CHLORIDE, SODIUM LACTATE, POTASSIUM CHLORIDE, CALCIUM CHLORIDE 600; 310; 30; 20 MG/100ML; MG/100ML; MG/100ML; MG/100ML
INJECTION, SOLUTION INTRAVENOUS CONTINUOUS
Status: DISCONTINUED | OUTPATIENT
Start: 2018-09-06 | End: 2018-09-06 | Stop reason: HOSPADM

## 2018-09-06 RX ORDER — LIDOCAINE 40 MG/G
CREAM TOPICAL
Status: DISCONTINUED | OUTPATIENT
Start: 2018-09-06 | End: 2018-09-06 | Stop reason: HOSPADM

## 2018-09-06 RX ORDER — HYDRALAZINE HYDROCHLORIDE 20 MG/ML
2.5-5 INJECTION INTRAMUSCULAR; INTRAVENOUS EVERY 10 MIN PRN
Status: DISCONTINUED | OUTPATIENT
Start: 2018-09-06 | End: 2018-09-06 | Stop reason: HOSPADM

## 2018-09-06 RX ORDER — ALBUTEROL SULFATE 0.83 MG/ML
2.5 SOLUTION RESPIRATORY (INHALATION) EVERY 4 HOURS PRN
Status: DISCONTINUED | OUTPATIENT
Start: 2018-09-06 | End: 2018-09-06 | Stop reason: HOSPADM

## 2018-09-06 RX ORDER — NALOXONE HYDROCHLORIDE 0.4 MG/ML
.1-.4 INJECTION, SOLUTION INTRAMUSCULAR; INTRAVENOUS; SUBCUTANEOUS
Status: DISCONTINUED | OUTPATIENT
Start: 2018-09-06 | End: 2018-09-06 | Stop reason: HOSPADM

## 2018-09-06 RX ORDER — PROPOFOL 10 MG/ML
INJECTION, EMULSION INTRAVENOUS PRN
Status: DISCONTINUED | OUTPATIENT
Start: 2018-09-06 | End: 2018-09-06

## 2018-09-06 RX ORDER — DEXAMETHASONE SODIUM PHOSPHATE 4 MG/ML
4 INJECTION, SOLUTION INTRA-ARTICULAR; INTRALESIONAL; INTRAMUSCULAR; INTRAVENOUS; SOFT TISSUE EVERY 10 MIN PRN
Status: DISCONTINUED | OUTPATIENT
Start: 2018-09-06 | End: 2018-09-06 | Stop reason: HOSPADM

## 2018-09-06 RX ORDER — MEPERIDINE HYDROCHLORIDE 50 MG/ML
12.5 INJECTION INTRAMUSCULAR; INTRAVENOUS; SUBCUTANEOUS
Status: DISCONTINUED | OUTPATIENT
Start: 2018-09-06 | End: 2018-09-06 | Stop reason: HOSPADM

## 2018-09-06 RX ORDER — FENTANYL CITRATE 50 UG/ML
INJECTION, SOLUTION INTRAMUSCULAR; INTRAVENOUS PRN
Status: DISCONTINUED | OUTPATIENT
Start: 2018-09-06 | End: 2018-09-06

## 2018-09-06 RX ORDER — ONDANSETRON 2 MG/ML
4 INJECTION INTRAMUSCULAR; INTRAVENOUS EVERY 30 MIN PRN
Status: DISCONTINUED | OUTPATIENT
Start: 2018-09-06 | End: 2018-09-06 | Stop reason: HOSPADM

## 2018-09-06 RX ADMIN — PROPOFOL 20 MG: 10 INJECTION, EMULSION INTRAVENOUS at 10:39

## 2018-09-06 RX ADMIN — PROPOFOL 20 MG: 10 INJECTION, EMULSION INTRAVENOUS at 10:56

## 2018-09-06 RX ADMIN — PROPOFOL 20 MG: 10 INJECTION, EMULSION INTRAVENOUS at 10:33

## 2018-09-06 RX ADMIN — PROPOFOL 10 MG: 10 INJECTION, EMULSION INTRAVENOUS at 11:05

## 2018-09-06 RX ADMIN — PROPOFOL 10 MG: 10 INJECTION, EMULSION INTRAVENOUS at 11:01

## 2018-09-06 RX ADMIN — PROPOFOL 20 MG: 10 INJECTION, EMULSION INTRAVENOUS at 10:51

## 2018-09-06 RX ADMIN — PROPOFOL 20 MG: 10 INJECTION, EMULSION INTRAVENOUS at 10:43

## 2018-09-06 RX ADMIN — PROPOFOL 20 MG: 10 INJECTION, EMULSION INTRAVENOUS at 10:34

## 2018-09-06 RX ADMIN — PROPOFOL 20 MG: 10 INJECTION, EMULSION INTRAVENOUS at 10:49

## 2018-09-06 RX ADMIN — MIDAZOLAM 1 MG: 1 INJECTION INTRAMUSCULAR; INTRAVENOUS at 10:30

## 2018-09-06 RX ADMIN — PROPOFOL 20 MG: 10 INJECTION, EMULSION INTRAVENOUS at 10:41

## 2018-09-06 RX ADMIN — PROPOFOL 20 MG: 10 INJECTION, EMULSION INTRAVENOUS at 10:53

## 2018-09-06 RX ADMIN — SODIUM CHLORIDE, POTASSIUM CHLORIDE, SODIUM LACTATE AND CALCIUM CHLORIDE: 600; 310; 30; 20 INJECTION, SOLUTION INTRAVENOUS at 08:40

## 2018-09-06 RX ADMIN — PROPOFOL 20 MG: 10 INJECTION, EMULSION INTRAVENOUS at 10:58

## 2018-09-06 RX ADMIN — PROPOFOL 20 MG: 10 INJECTION, EMULSION INTRAVENOUS at 10:45

## 2018-09-06 RX ADMIN — PROPOFOL 20 MG: 10 INJECTION, EMULSION INTRAVENOUS at 10:36

## 2018-09-06 RX ADMIN — FENTANYL CITRATE 25 MCG: 50 INJECTION, SOLUTION INTRAMUSCULAR; INTRAVENOUS at 10:35

## 2018-09-06 RX ADMIN — PROPOFOL 20 MG: 10 INJECTION, EMULSION INTRAVENOUS at 10:47

## 2018-09-06 RX ADMIN — PROPOFOL 20 MG: 10 INJECTION, EMULSION INTRAVENOUS at 10:35

## 2018-09-06 ASSESSMENT — LIFESTYLE VARIABLES: TOBACCO_USE: 1

## 2018-09-06 NOTE — ANESTHESIA CARE TRANSFER NOTE
Patient: Maria Teresa Aburto    Procedure(s):  UPPER ENDOSCOPY WITH BIOPSIES AND COLONOSCOPY WITH BIOPSIES - Wound Class: II-Clean Contaminated    Diagnosis: CHANGE IN BOWEL HABITS, CATALAN'S ESOPHAGUS  Diagnosis Additional Information: No value filed.    Anesthesia Type:   MAC     Note:  Airway :Nasal Cannula  Patient transferred to:Phase II  Handoff Report: Identifed the Patient, Identified the Reponsible Provider, Reviewed the pertinent medical history, Discussed the surgical course, Reviewed Intra-OP anesthesia mangement and issues during anesthesia, Set expectations for post-procedure period and Allowed opportunity for questions and acknowledgement of understanding      Vitals: (Last set prior to Anesthesia Care Transfer)    CRNA VITALS  9/6/2018 1037 - 9/6/2018 1110      9/6/2018             NIBP: 120/57    Pulse: 72    NIBP Mean: 81    Ht Rate: 71    SpO2: 100 %    Resp Rate (set): 8                Electronically Signed By: ROXANN Thornton CRNA  September 6, 2018  11:10 AM

## 2018-09-06 NOTE — OR NURSING
in and spoke with pt and spouse.Up w/o vertigo.Patient and responsible adult given discharge instructions with no questions regarding instructions. Benjamin score 20. Pain level 0/10.  Discharged from unit via walking. Patient discharged to home.

## 2018-09-06 NOTE — DISCHARGE INSTRUCTIONS
UPPER ENDOSCOPY    PURPOSE:  An Upper Endoscopy is a procedure in which the doctor passes a flexible, lighted tube called a gastroscope through your mouth into your stomach in order to:    See the lining of the esophagus, stomach, and duodenum (first part of the small intestine).    Look for bleeding, inflammation (swelling), abnormal tumors or tissue, or ulcers.    Take biopsy specimens.  (Biopsies do not hurt.)    TELL YOUR DOCTOR IF:     You have a heart condition, heart murmur, or have had heart valve surgery.    You have had angioplasty with stents put in within the last year.    You are taking blood thinners - Aspirin, Anti-inflammatory pain pills (like Motrin, ibuprofen, Naproxen, Feldene, Advil, etc.), Coumadin, Plavix.    You have diabetes - contact your regular doctor for management of your insulin.    YOU NEED TO:    Have someone drive you home.  You are not allowed to drive until the next day.  (If you take a taxi, the person staying with you after surgery must ride with you in the taxi.  The  is not responsible for you).    Have someone stay with you for four (4) hours after you leave the hospital.    Know the time to be at admitting:  A nurse will call you with the time the afternoon before your procedure.  If your procedure is on Monday, you will be called on Friday.  If you have not been contacted with the time, call the Admitting Department at [phone] or [phone], ext. [phone] after 5 pm (Admitting is open 24 hours daily).    Talk with the Surgery Patient Education Nurses.  Please call them @ [phone] or toll free [phone] after 8:00 a.m. Monday through Friday.    TO PREPARE FOR THE PROCEDURE:      ONE WEEK BEFORE:    Stop Aspirin, anti-inflammatory pain pills (Motrin, ibuprofen, Naproxen, Feldene, Advil, etc.).  It is OK to take Tylenol (acetaminophen).    Your doctor will tell you what to do if you are on Coumadin or Plavix.     NIGHT BEFORE:    Do not eat or drink  anything after midnight.  Your stomach needs to be empty.     DAY OF YOUR PROCEDURE:    Take your pills you were told to take.  Do not take diabetic pills.  If you are on Insulin, take the dose your doctor told you to take.    Wear loose, comfortable clothing and shoes.    Remove ALL jewelry including wedding rings.  Leave valuables at home.    Come to the hospital Admitting Department located at the [facility] on the lower level.    In Same Day surgery, you will be asked to change into an exam gown.    You will be asked your name, birth date, and what you are having done by every person who is involved with your care.    Your health history, medications, and allergies will be reviewed and verified.    An IV (intravenous line) will be started in your hand.  DURING THE UPPER ENDOSCOPY:    Your doctor and a registered nurse will be with you throughout the procedure which takes about 30 minutes.    You will either lie on your left side or on your back.    Medications will be given to you to help you relax and reduce the gag reflex when swallowing the scope.    Your doctor may need to insert air into your stomach to see better.  This may cause fullness or a cramping sensation.  The air will be removed at the end of the exam.    AFTER THE PROCEDURE    You will return to Same Day Surgery to rest for about an hour before you go home.    The doctor will talk with you and your family.    A family member/friend may visit with you.    You may burp up any air remaining in your stomach.    You may feel dizzy or light-headed from the medicine.    Your nurse will go over the discharge instructions with you and your caregiver and answer any of your questions.      You will be contacted the next day to check on how you are doing.    If biopsies were taken, you will be contacted with the results usually within 3 days.  BACK AT HOME    Rest for an hour or two after you get home.    When your throat is no longer numb and you have a  gag reflex, take a few sips of cool water.  If you can swallow comfortably, you may start eating again.    You may have a mild sore throat for the rest of the day.  WHAT TO WATCH FOR:  Problems rarely occur after the exam, but it is important to be aware of the early signs of a complication.  Call your doctor immediately if you have:    Difficulty swallowing or breathing    Unusual pain in your stomach or chest    Vomiting blood or dark material that looks like coffee grounds    Black or tarry stools    Temperature over 100.6 degrees    MORE QUESTIONS?  Please ask your doctor or nurse before the exam begins  or call your doctor at the clinic.    IF YOU MUST CANCEL YOUR PROCEDURE THE EVENING/NIGHT BEFORE, PLEASE CALL HOSPITAL ADMITTING -[phone] OR TOLL FREE [phone], [phone].    Phone Numbers:  Hospital - [phone] [phone]  [facility] - [phone]  Surgery Patient Education - [phone] or toll free [phone]    INSTRUCTIONS AFTER COLONOSCOPY    WHEN YOU ARE BACK HOME:    Plan to rest for an hour or two after you get home.    You may have some cramping or pressure until you pass gas.    You may resume your regular medications.    Eat a small, light meal at first, and then gradually return to normal meal sizes.  If you had a polyp removed:    Slight bleeding may occur.  You may have a slight blood stain on the toilet paper after a bowel movement.    To lessen the chance of bleeding, avoid heavy exercise for ONE WEEK.  This includes heavy lifting, vigorous sport activities, and heavy physical labor.  You may resume your normal sexual activity.      Avoid aspirin or aspirin products if instructed by your doctor.    WHAT TO WATCH FOR:  Problems rarely occur after the exam; however, it is important for you to watch for early signs of possible problems.  If you have     Unusual pain in your abdomen    Nausea and vomiting that persists    Excessive bleeding    Black or bloody bowel  movements    Fever or temperature above 100.6 F  Please call your doctor ([facility] [phone]) or go to the nearest hospital emergency room.    Post-Anesthesia Patient Instructions    IMMEDIATELY FOLLOWING SURGERY:  Do not drive or operate machinery for the first twenty four hours after surgery.  Do not make any important decisions for twenty four hours after surgery or while taking narcotic pain medications or sedatives.  If you develop intractable nausea and vomiting or a severe headache please notify your doctor immediately.    FOLLOW-UP:  Please make an appointment with your surgeon as instructed. You do not need to follow up with anesthesia unless specifically instructed to do so.    WOUND CARE INSTRUCTIONS (if applicable):  Keep a dry clean dressing on the anesthesia/puncture wound site if there is drainage.  Once the wound has quit draining you may leave it open to air.  Generally you should leave the bandage intact for twenty four hours unless there is drainage.  If the epidural site drains for more than 36-48 hours please call the anesthesia department.    QUESTIONS?:  Please feel free to call your physician or the hospital  if you have any questions, and they will be happy to assist you.

## 2018-09-06 NOTE — BRIEF OP NOTE
[facility] - Brief Operative Note    Pre-operative diagnosis: Change in bowel habits, fritz's esophgus   Post-operative diagnosis Fritz's esophagus, tortuous colon   Procedure: EGD/colonoscopy   Surgeon: Minh Interiaon DO   Anesthesia: Monitor Anesthesia Care    Estimated blood loss: 0   Blood transfusion: No transfusion was given during surgery   Drains: 0   Specimens: Duodenum, gastric antrum, distal esophagus, esophagus 35 cm, random colon   Findings: Fritz's esophagus, GE junction 38 cm   Complications: None   Condition: Stable   Comments: Details included in dictated operative note.

## 2018-09-06 NOTE — H&P
Surgery Consult Clinic Note      RE: Maria Teresa Aburto  : 1945        Chief Complaint:  Change in bowel habits  Bowden's esophagus    History of Present Illness:  Dr. Interiano originally saw Ms. Aburto on 7/10/18 for evaluation regarding consideration for esophagogastroduodenoscopy and colonoscopy.  Please refer to that note for further detail.  She is here today to update her H&P.  Maria Teresa is scheduled for EGD and colonoscopy on 18, which is outside the 30 day anesthesia clearance guidelines.  This was done because of scheduling availability.  She has no questions regarding  bowel prep.  Reports passing clear liquid stools today.  She specifically denies fevers, chills, nausea, vomiting, chest pain, shortness of breath, palpitations, sore throat, cough, or generalized feeling ill.      Medical history:  Past Medical History:   Diagnosis Date     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     Bowden's esophagus 10/11/2011     Constipation 10/09/2012     GERD (gastroesophageal reflux disease)[530.81] 2003     Osteopenia 10/11/2011    dexa scans odd years starting in      Precordial pain 2003    negative stress test, negative pulmonary evaluatio     Restless legs syndrome (RLS) 10/11/2011     Squamous Cell Carcinoma 10/11/2011    left leg x2     Urge incontinence 10/09/2012       Surgical history:  Past Surgical History:   Procedure Laterality Date     Breast Biospy  2000    LEFT > Fibrocystic Disease > Outcome: Fibroadenoma     COLONOSCOPY       COLONOSCOPY  2009    Normal, Repeat      DEXA Scan       EGD       EGD       EGD       Knee Replacement       Oophorectomy      Ectopic Pregnancy     TONSILLECTOMY         Family history:  Family History   Problem Relation Age of Onset     Endometrial Cancer Other      Family Hx     Osteoporosis Other      Family Hx     Parkinsonism Other      Family Hx     Unknown/Adopted No family hx of        Medications:  Prior to Admission  "medications    Medication Sig Start Date End Date Taking? Authorizing Provider   Na Sulfate-K Sulfate-Mg Sulf (SUPREP BOWEL PREP) solution Take 354 mLs (2 Bottles) by mouth See Admin Instructions 7/10/18  Yes Minh Interiano, DO   ASPIRIN CAPS 81 MG OR one capsule by mouth daily 6/18/03      Calcium Carbonate-Vitamin D (CALCIUM + D PO)     Reported, Patient   cyanocobalamin (VITAMIN B12) 1000 MCG/ML injection Inject 1 mL (1,000 mcg) into the muscle every 30 days 12/5/17   Kenna Portillo MD   multivitamin, therapeutic with minerals (MULTI-VITAMIN) TABS Take 1 tablet by mouth daily    Reported, Patient     Allergies:  The patient is allergic to nitrofurantoin and nitrofurantoin macrocrystal.  .  Social history:  Social History   Substance Use Topics     Smoking status: Former Smoker     Types: Cigarettes     Smokeless tobacco: Never Used      Comment: Tried to Quit (YES)     Alcohol use Yes     Marital status: .      Review of Systems:    Constitutional: Negative for fever, chills and weight loss.   HENT: Negative for ear pain, nosebleeds, congestion, sore throat, tinnitus and ear discharge.    Eyes: Negative for blurred vision, double vision, photophobia and pain.   Respiratory: Negative for cough, hemoptysis, shortness of breath, wheezing and stridor.    Cardiovascular: Negative for chest pain, palpitations and orthopnea.   Gastrointestinal: Negative for heartburn, nausea, vomiting, abdominal pain and blood in stool.   Genitourinary: Negative for urgency, frequency and hematuria.   Musculoskeletal: Negative for myalgias, back pain and joint pain.   Neurological: Negative for tingling, speech change and headaches.   Endo/Heme/Allergies: Does not bruise/bleed easily.   Psychiatric/Behavioral: Negative for depression, suicidal ideas and hallucinations. The patient is not nervous/anxious.    Physical Examination:  /61  Pulse 89  Temp 98  F (36.7  C) (Temporal)  Resp 16  Ht 1.626 m (5' 4\")  Wt 54 " kg (119 lb)  BMI 20.43 kg/m2  General: Alert and orientedx4, no acute distress, well-developed/well-nourished, ambulating without assistance  HEENT: normocephalic atraumatic, extraocular movements intact, PERRL Sclerae anicteric; Trachea mideline, no JVD  Chest:   Clear to auscultation bilaterally.  Cardiac: S1S2 , regular rate and rhythm without additional sounds  Abdomen: Soft, non-tender, non-distended  Extremities: Cursory exam unremarkable.  No peripheral edema noted.  Skin: Warm, dry, < 2 sec cap refill  Neuro: CN 2-12 grossly intact, no focal deficit, GCS 15  Psych: Pleasant, calm, asks appropriate questions      Assessment/Plan:  #1 Bright red blood per rectum  #2 Change in bowel  #3 History of Bowden's esophagus    The indications, risks, benefits and technical aspects of esophagogastroduodenoscopy were reviewed with her, her questions were asked and answered. Antral biopsy for histologic examination will be performed and the place of H. pylori in gastritis was discussed. Preoperative preparation, npo after midnight preceding the date was discussed and a request made to hold aspirin containing agents one week prior to ameliorate antiplatelet effect.     Maria Teresa Aburto and I had a discussion about colonoscopies.  The indications, risks, benefits, althernatives and technical aspects of whole colon colonoscopy were outlined with risks including, but not limited to, perforation, bleeding and inability to visualize entire colon.  Management of each was reviewed including the risk for life saving surgery and possible admittance to the hospital.  Her questions were asked and answered.  We will proceed with exam as scheduled.  [facility]  [address] [place] [address]    [id number]    Referring Provider:  No referring provider defined for this encounter.     Primary Care Provider:  Kenna Portillo

## 2018-09-06 NOTE — ANESTHESIA POSTPROCEDURE EVALUATION
Patient: Maria Teresa Aburto    Procedure(s):  UPPER ENDOSCOPY WITH BIOPSIES AND COLONOSCOPY WITH BIOPSIES - Wound Class: II-Clean Contaminated    Diagnosis:CHANGE IN BOWEL HABITS, CATALAN'S ESOPHAGUS  Diagnosis Additional Information: No value filed.    Anesthesia Type:  MAC    Note:  Anesthesia Post Evaluation    Patient location during evaluation: Phase 2 and Bedside  Patient participation: Able to fully participate in evaluation  Level of consciousness: awake and alert  Pain management: adequate  Airway patency: patent  Cardiovascular status: acceptable  Respiratory status: acceptable  Hydration status: stable  PONV: none     Anesthetic complications: None          Last vitals:  Vitals:    09/06/18 1150 09/06/18 1155 09/06/18 1159   BP: (!) 153/47 155/58    Pulse:      Resp: 16 16    Temp:      SpO2: 99% 100% 100%         Electronically Signed By: Jose Armando Chatman MD  September 6, 2018  12:02 PM

## 2018-09-06 NOTE — OP NOTE
Procedure Date: 09/06/2018      DATE OF PROCEDURE:  09/06/2018      PREOPERATIVE DIAGNOSIS:  Change in bowel habits, Bowden's esophagus.      POSTOPERATIVE DIAGNOSES:  Bowden's esophagus, tortuous colon.      PROCEDURE PERFORMED:  Esophagogastroduodenoscopy, colonoscopy.      INDICATION:  A 73-year-old female previously biopsy-proven Bowden's esophagus as well as complaints of alternating diarrhea and constipation, previous colonoscopy over 10 years ago.      SURGEON:  Minh Interiano DO      DESCRIPTION OF PROCEDURE:  The patient was brought into the endoscopy suite and placed in the left lateral decubitus position.  After preprocedural pause and attended monitored anesthesia was administered, the endoscope was advanced through the oral bite block through the oropharynx, past the cricopharyngeus and easily intubated the esophagus.  Under direct visualization, the endoscope was advanced down the esophagus, through the stomach, past the pylorus on to the distal portions of the duodenum.  Mucosa of the duodenum was unremarkable.  The fourth, third, second and first portions of the duodenum were thoroughly evaluated upon slow withdrawal of the endoscope while random biopsies were obtained.  The endoscope then was withdrawn to the antrum of the stomach, which was unremarkable and random biopsies were obtained.  Retroflexion of the cardia and fundus was unremarkable.  No evidence of inflammation, ulceration, masses or hiatal hernia.  The endoscope then was withdrawn to the GE junction at 38 cm from the incisors.  This was irregular without active inflammation.  Biopsies were obtained proximal to the GE junction.  The extra air was removed from the stomach and the endoscope was slowly withdrawn evaluating the entire length of the esophagus, which was unremarkable except for a 1 cm patch at 35 cm from the incisors, which was biopsied.  The endoscope then was completely withdrawn.  The patient tolerated that portion  of the procedure well.  The equipment was exchanged and the patient was repositioned.        Next, my attention is turned toward colonoscopy.  Inspection of the external anus was positive for nonthrombosed external hemorrhoids.  Digital rectal exam was normal.  The colonoscope was inserted and advanced under direct visualization to the level of the cecum which was identified by the appendiceal orifice and ileocecal valve.  This was somewhat difficult given the tortuous nature of the colon.  The terminal ileum was intubated, but before biopsies were obtained the colonoscope fell out of the terminal ileum.  Subsequent attempts were unsuccessful secondary to looping.  The mucosa of the terminal ileum was unremarkable.  I then gave up trying to take biopsies of the terminal ileum.  The cecum, ascending, transverse, descending and sigmoid colon were all unremarkable.  The prep was good.  Approximately 90% of the colon mucosa was directly visualized.  There was no evidence of polyps, diverticula, inflammation, ulceration, bleeding or AVMs.  Random biopsies were obtained throughout the colon.  The rectum was unremarkable.  Retroflexion was positive for external hemorrhoids.  The extra air was removed from the colon, the colonoscope withdrawn.  The patient tolerated the procedure well and taken to postanesthesia care unit.  Timing for interval colonoscopy will depend upon the histologic evaluation of the biopsies.         YOHANNES NATION DO             D: 2018   T: 2018   MT: FELICIA      Name:     VINICIUS SORIANO   MRN:      [id number]        Account:        [id number]   :      1945           Procedure Date: 2018      Document: [id number]

## 2018-09-06 NOTE — IP AVS SNAPSHOT
MRN:[id number]                      After Visit Summary   9/6/2018    Maria Teresa Aburot    MRN: [id number]           Thank you!     Thank you for choosing [facility] for your care. Our goal is always to provide you with excellent care. Hearing back from our patients is one way we can continue to improve our services. Please take a few minutes to complete the written survey that you may receive in the mail after you visit with us. Thank you!        Patient Information     Date Of Birth          1945        About your hospital stay     You were admitted on:  September 6, 2018 You last received care in the:  [facility] Main Operating Room    You were discharged on:  September 6, 2018       Who to Call     For medical emergencies, please call 911.  For non-urgent questions about your medical care, please call your primary care provider or clinic, [phone]  For questions related to your surgery, please call your surgery clinic        Attending Provider     Provider Specialty    Minh Interiano, DO Surgery       Primary Care Provider Office Phone # Fax #    Kenna Portillo [phone]      Further instructions from your care team           UPPER ENDOSCOPY    PURPOSE:  An Upper Endoscopy is a procedure in which the doctor passes a flexible, lighted tube called a gastroscope through your mouth into your stomach in order to:    See the lining of the esophagus, stomach, and duodenum (first part of the small intestine).    Look for bleeding, inflammation (swelling), abnormal tumors or tissue, or ulcers.    Take biopsy specimens.  (Biopsies do not hurt.)    TELL YOUR DOCTOR IF:     You have a heart condition, heart murmur, or have had heart valve surgery.    You have had angioplasty with stents put in within the last year.    You are taking blood thinners - Aspirin, Anti-inflammatory pain pills (like Motrin, ibuprofen, Naproxen, Feldene, Advil, etc.), Coumadin, Plavix.    You have diabetes -  contact your regular doctor for management of your insulin.    YOU NEED TO:    Have someone drive you home.  You are not allowed to drive until the next day.  (If you take a taxi, the person staying with you after surgery must ride with you in the taxi.  The  is not responsible for you).    Have someone stay with you for four (4) hours after you leave the hospital.    Know the time to be at admitting:  A nurse will call you with the time the afternoon before your procedure.  If your procedure is on Monday, you will be called on Friday.  If you have not been contacted with the time, call the Admitting Department at [phone] or [phone], ext. [phone] after 5 pm (Admitting is open 24 hours daily).    Talk with the Surgery Patient Education Nurses.  Please call them @ [phone] or toll free [phone] after 8:00 a.m. Monday through Friday.    TO PREPARE FOR THE PROCEDURE:      ONE WEEK BEFORE:    Stop Aspirin, anti-inflammatory pain pills (Motrin, ibuprofen, Naproxen, Feldene, Advil, etc.).  It is OK to take Tylenol (acetaminophen).    Your doctor will tell you what to do if you are on Coumadin or Plavix.     NIGHT BEFORE:    Do not eat or drink anything after midnight.  Your stomach needs to be empty.     DAY OF YOUR PROCEDURE:    Take your pills you were told to take.  Do not take diabetic pills.  If you are on Insulin, take the dose your doctor told you to take.    Wear loose, comfortable clothing and shoes.    Remove ALL jewelry including wedding rings.  Leave valuables at home.    Come to the hospital Admitting Department located at the [facility] on the lower level.    In Same Day surgery, you will be asked to change into an exam gown.    You will be asked your name, birth date, and what you are having done by every person who is involved with your care.    Your health history, medications, and allergies will be reviewed and verified.    An IV (intravenous line) will be started in your  hand.  DURING THE UPPER ENDOSCOPY:    Your doctor and a registered nurse will be with you throughout the procedure which takes about 30 minutes.    You will either lie on your left side or on your back.    Medications will be given to you to help you relax and reduce the gag reflex when swallowing the scope.    Your doctor may need to insert air into your stomach to see better.  This may cause fullness or a cramping sensation.  The air will be removed at the end of the exam.    AFTER THE PROCEDURE    You will return to Same Day Surgery to rest for about an hour before you go home.    The doctor will talk with you and your family.    A family member/friend may visit with you.    You may burp up any air remaining in your stomach.    You may feel dizzy or light-headed from the medicine.    Your nurse will go over the discharge instructions with you and your caregiver and answer any of your questions.      You will be contacted the next day to check on how you are doing.    If biopsies were taken, you will be contacted with the results usually within 3 days.  BACK AT HOME    Rest for an hour or two after you get home.    When your throat is no longer numb and you have a gag reflex, take a few sips of cool water.  If you can swallow comfortably, you may start eating again.    You may have a mild sore throat for the rest of the day.  WHAT TO WATCH FOR:  Problems rarely occur after the exam, but it is important to be aware of the early signs of a complication.  Call your doctor immediately if you have:    Difficulty swallowing or breathing    Unusual pain in your stomach or chest    Vomiting blood or dark material that looks like coffee grounds    Black or tarry stools    Temperature over 100.6 degrees    MORE QUESTIONS?  Please ask your doctor or nurse before the exam begins  or call your doctor at the clinic.    IF YOU MUST CANCEL YOUR PROCEDURE THE EVENING/NIGHT BEFORE, PLEASE CALL HOSPITAL ADMITTING -[phone] OR  TOLL FREE [phone], [phone]. [phone].    Phone Numbers:  [facility] - [phone] [phone]  [facility] - [phone]  Surgery Patient Education - [phone] or toll free [phone]    INSTRUCTIONS AFTER COLONOSCOPY    WHEN YOU ARE BACK HOME:    Plan to rest for an hour or two after you get home.    You may have some cramping or pressure until you pass gas.    You may resume your regular medications.    Eat a small, light meal at first, and then gradually return to normal meal sizes.  If you had a polyp removed:    Slight bleeding may occur.  You may have a slight blood stain on the toilet paper after a bowel movement.    To lessen the chance of bleeding, avoid heavy exercise for ONE WEEK.  This includes heavy lifting, vigorous sport activities, and heavy physical labor.  You may resume your normal sexual activity.      Avoid aspirin or aspirin products if instructed by your doctor.    WHAT TO WATCH FOR:  Problems rarely occur after the exam; however, it is important for you to watch for early signs of possible problems.  If you have     Unusual pain in your abdomen    Nausea and vomiting that persists    Excessive bleeding    Black or bloody bowel movements    Fever or temperature above 100.6 F  Please call your doctor ([facility] [phone]) or go to the nearest hospital emergency room.    Post-Anesthesia Patient Instructions    IMMEDIATELY FOLLOWING SURGERY:  Do not drive or operate machinery for the first twenty four hours after surgery.  Do not make any important decisions for twenty four hours after surgery or while taking narcotic pain medications or sedatives.  If you develop intractable nausea and vomiting or a severe headache please notify your doctor immediately.    FOLLOW-UP:  Please make an appointment with your surgeon as instructed. You do not need to follow up with anesthesia unless specifically instructed to do so.    WOUND CARE INSTRUCTIONS (if applicable):  Keep a dry clean  "dressing on the anesthesia/puncture wound site if there is drainage.  Once the wound has quit draining you may leave it open to air.  Generally you should leave the bandage intact for twenty four hours unless there is drainage.  If the epidural site drains for more than 36-48 hours please call the anesthesia department.    QUESTIONS?:  Please feel free to call your physician or the hospital  if you have any questions, and they will be happy to assist you.       Pending Results     No orders found from 9/4/2018 to 9/7/2018.            Admission Information     Date & Time Provider Department Dept. Phone    9/6/2018 Minh Interiano DO [facility] Main Operating Room [phone]      Your Vitals Were     Blood Pressure Pulse Temperature Respirations Height Weight    153/74 89 98  F (36.7  C) (Temporal) 16 1.626 m (5' 4\") 54 kg (119 lb)    Pulse Oximetry BMI (Body Mass Index)                100% 20.43 kg/m2          MyChart Information     [employer] gives you secure access to your electronic health record. If you see a primary care provider, you can also send messages to your care team and make appointments. If you have questions, please call your primary care clinic.  If you do not have a primary care provider, please call [phone] and they will assist you.        Care EveryWhere ID     This is your Care EveryWhere ID. This could be used by other organizations to access your [place] medical records  [id number]        Equal Access to Services     JUDY BRO : Gerardo Kramer, waaxda lujavonadaha, qaybta kaalernie garcia. So [facility] [phone].    ATENCIÓN: Si habla español, tiene a martin disposición servicios gratuitos de asistencia lingüística. Maann al [phone].    We comply with applicable federal civil rights laws and [place] laws. We do not discriminate on the basis of race, color, national origin, age, disability, sex, sexual orientation, or " gender identity.               Review of your medicines      CONTINUE these medicines which have NOT CHANGED        Dose / Directions    ASPIRIN CAPS 81 MG OR        one capsule by mouth daily   Quantity:  100   Refills:  3       CALCIUM + D PO        Refills:  0       cyanocobalamin 1000 MCG/ML injection   Commonly known as:  VITAMIN B12   Used for:  B-complex deficiency        Inject 1 mL (1,000 mcg) into the muscle every 30 days   Quantity:  1 mL   Refills:  5       Multi-vitamin Tabs tablet        Dose:  1 tablet   Take 1 tablet by mouth daily   Refills:  0       Na Sulfate-K Sulfate-Mg Sulf solution   Commonly known as:  SUPREP BOWEL PREP   Used for:  Change in bowel habits, Bowden's esophagus without dysplasia        Dose:  2 Bottle   Take 354 mLs (2 Bottles) by mouth See Admin Instructions   Quantity:  2 Bottle   Refills:  0                Protect others around you: Learn how to safely use, store and throw away your medicines at www.disposemymeds.org.             Medication List: This is a list of all your medications and when to take them. Check marks below indicate your daily home schedule. Keep this list as a reference.      Medications           Morning Afternoon Evening Bedtime As Needed    ASPIRIN CAPS 81 MG OR   one capsule by mouth daily                                CALCIUM + D PO                                cyanocobalamin 1000 MCG/ML injection   Commonly known as:  VITAMIN B12   Inject 1 mL (1,000 mcg) into the muscle every 30 days                                Multi-vitamin Tabs tablet   Take 1 tablet by mouth daily                                Na Sulfate-K Sulfate-Mg Sulf solution   Commonly known as:  SUPREP BOWEL PREP   Take 354 mLs (2 Bottles) by mouth See Admin Instructions

## 2018-09-08 LAB — COPATH REPORT: NORMAL

## 2018-09-10 ENCOUNTER — ALLIED HEALTH/NURSE VISIT (OUTPATIENT)
Dept: ALLERGY | Facility: OTHER | Age: 73
End: 2018-09-10
Attending: FAMILY MEDICINE
Payer: MEDICARE

## 2018-09-10 DIAGNOSIS — E53.9 B-COMPLEX DEFICIENCY: Primary | ICD-10-CM

## 2018-09-10 PROCEDURE — 96372 THER/PROPH/DIAG INJ SC/IM: CPT

## 2018-09-10 NOTE — MR AVS SNAPSHOT
After Visit Summary   9/10/2018    Maria Teresa Aburto    MRN: [id number]           Patient Information     Date Of Birth          1945        Visit Information        Provider Department      9/10/2018 9:30 AM [facility] SHOT ROOM [place] Olman Pereira        Today's Diagnoses     B-complex deficiency    -  1       Follow-ups after your visit        Who to contact     If you have questions or need follow up information about today's clinic visit or your schedule please contact [facility] M[facility] directly at [phone].  Normal or non-critical lab and imaging results will be communicated to you by MyChart, letter or phone within 4 business days after the clinic has received the results. If you do not hear from us within 7 days, please contact the clinic through [employer] or phone. If you have a critical or abnormal lab result, we will notify you by phone as soon as possible.  Submit refill requests through [employer] or call your pharmacy and they will forward the refill request to us. Please allow 3 business days for your refill to be completed.          Additional Information About Your Visit        MyChart Information     [employer] gives you secure access to your electronic health record. If you see a primary care provider, you can also send messages to your care team and make appointments. If you have questions, please call your primary care clinic.  If you do not have a primary care provider, please call [phone] and they will assist you.        Care EveryWhere ID     This is your Care EveryWhere ID. This could be used by other organizations to access your [place] medical records  [id number]         Blood Pressure from Last 3 Encounters:   09/06/18 151/58   07/10/18 124/70   06/18/18 122/64    Weight from Last 3 Encounters:   09/06/18 119 lb (54 kg)   07/10/18 117 lb (53.1 kg)   06/18/18 117 lb (53.1 kg)              We Performed the Following     INJECTION INTRAMUSCULAR OR SUB-Q     VITAMIN  B12 INJ /1000G        Primary Care Provider Office Phone # Fax #    Kenna Portillo [phone] [address] [facility] [address]        Equal Access to Services     JUDY BRO : Gerardo shukla dhirajo Sotoya, waaxda luqadaha, qaybta kaalmada cj, ernie carlos juancarloshenri rodriguez khang luong. So [facility] [phone].    ATENCIÓN: Si habla español, tiene a martin disposición servicios gratuitos de asistencia lingüística. [facility] [phone].    We comply with applicable federal civil rights laws and [place] laws. We do not discriminate on the basis of race, color, national origin, age, disability, sex, sexual orientation, or gender identity.            Thank you!     Thank you for choosing [facility]  for your care. Our goal is always to provide you with excellent care. Hearing back from our patients is one way we can continue to improve our services. Please take a few minutes to complete the written survey that you may receive in the mail after your visit with us. Thank you!             Your Updated Medication List - Protect others around you: Learn how to safely use, store and throw away your medicines at www.disposemymeds.org.          This list is accurate as of 9/10/18  9:51 AM.  Always use your most recent med list.                   Brand Name Dispense Instructions for use Diagnosis    ASPIRIN CAPS 81 MG OR     100    one capsule by mouth daily        CALCIUM + D PO           cyanocobalamin 1000 MCG/ML injection    VITAMIN B12    1 mL    Inject 1 mL (1,000 mcg) into the muscle every 30 days    B-complex deficiency       Multi-vitamin Tabs tablet      Take 1 tablet by mouth daily        Na Sulfate-K Sulfate-Mg Sulf solution    SUPREP BOWEL PREP    2 Bottle    Take 354 mLs (2 Bottles) by mouth See Admin Instructions    Change in bowel habits, Bowden's esophagus without dysplasia

## 2018-09-10 NOTE — PROGRESS NOTES
The following medication was given:     MEDICATION: Vitamin B12  1000 mcg  ROUTE: IM  SITE: Deltoid - Left  DOSE: 1000 mcg  LOT #: [id number]  :  [employer]  EXPIRATION DATE:  03/20  NDC#: 91718-320-16  CHRISTOPHER INFANTE

## 2018-10-22 ENCOUNTER — ALLIED HEALTH/NURSE VISIT (OUTPATIENT)
Dept: ALLERGY | Facility: OTHER | Age: 73
End: 2018-10-22
Attending: FAMILY MEDICINE
Payer: MEDICARE

## 2018-10-22 DIAGNOSIS — E53.9 B-COMPLEX DEFICIENCY: ICD-10-CM

## 2018-10-22 DIAGNOSIS — Z23 NEED FOR PROPHYLACTIC VACCINATION AND INOCULATION AGAINST INFLUENZA: Primary | ICD-10-CM

## 2018-10-22 PROCEDURE — G0008 ADMIN INFLUENZA VIRUS VAC: HCPCS

## 2018-10-22 PROCEDURE — 96372 THER/PROPH/DIAG INJ SC/IM: CPT

## 2018-10-22 PROCEDURE — 90662 IIV NO PRSV INCREASED AG IM: CPT

## 2018-10-22 NOTE — MR AVS SNAPSHOT
After Visit Summary   10/22/2018    Maria Teresa Aburto    MRN: [id number]           Patient Information     Date Of Birth          1945        Visit Information        Provider Department      10/22/2018 11:30 AM [facility] SHOT ROOM [place]        Today's Diagnoses     Need for prophylactic vaccination and inoculation against influenza    -  1    B-complex deficiency           Follow-ups after your visit        Who to contact     If you have questions or need follow up information about today's clinic visit or your schedule please contact [facility] [place] [facility] Virgil FERGUSON directly at [phone].  Normal or non-critical lab and imaging results will be communicated to you by [employer], letter or phone within 4 business days after the clinic has received the results. If you do not hear from us within 7 days, please contact the clinic through [employer] or phone. If you have a critical or abnormal lab result, we will notify you by phone as soon as possible.  Submit refill requests through [employer] or call your pharmacy and they will forward the refill request to us. Please allow 3 business days for your refill to be completed.          Additional Information About Your Visit        MyChart Information     [employer] gives you secure access to your electronic health record. If you see a primary care provider, you can also send messages to your care team and make appointments. If you have questions, please call your primary care clinic.  If you do not have a primary care provider, please call [phone] and they will assist you.        Care EveryWhere ID     This is your Care EveryWhere ID. This could be used by other organizations to access your [place] medical records  [id number]         Blood Pressure from Last 3 Encounters:   09/06/18 151/58   07/10/18 124/70   06/18/18 122/64    Weight from Last 3 Encounters:   09/06/18 119 lb (54 kg)   07/10/18 117 lb (53.1 kg)   06/18/18 117 lb  (53.1 kg)              We Performed the Following     HC FLU VACCINE, INCREASED ANTIGEN, PRESV FREE     INJECTION INTRAMUSCULAR OR SUB-Q     VITAMIN B12 INJ /1000MCG        Primary Care Provider Office Phone # Fax #    Kenna Portillo [phone] [address] [facility] [address]        Equal Access to Services     [facility] ADALI : Hadii aad ku hadasho Soomaali, waaxda luqadaha, qaybta kaalmada adeegyada, waxay isain hayaan adehenri patsysherri lajacki . So [facility] [phone].    ATENCIÓN: Si habla español, tiene a martin disposición servicios gratuitos de asistencia lingüística. Llame al [phone].    We comply with applicable federal civil rights laws and [place] laws. We do not discriminate on the basis of race, color, national origin, age, disability, sex, sexual orientation, or gender identity.            Thank you!     Thank you for choosing [facility]  for your care. Our goal is always to provide you with excellent care. Hearing back from our patients is one way we can continue to improve our services. Please take a few minutes to complete the written survey that you may receive in the mail after your visit with us. Thank you!             Your Updated Medication List - Protect others around you: Learn how to safely use, store and throw away your medicines at www.disposemymeds.org.          This list is accurate as of 10/22/18 11:32 AM.  Always use your most recent med list.                   Brand Name Dispense Instructions for use Diagnosis    ASPIRIN CAPS 81 MG OR     100    one capsule by mouth daily        CALCIUM + D PO           cyanocobalamin 1000 MCG/ML injection    VITAMIN B12    1 mL    Inject 1 mL (1,000 mcg) into the muscle every 30 days    B-complex deficiency       Multi-vitamin Tabs tablet      Take 1 tablet by mouth daily        Na Sulfate-K Sulfate-Mg Sulf solution    SUPREP BOWEL PREP    2 Bottle    Take 354 mLs (2 Bottles) by mouth See Admin  Instructions    Change in bowel habits, Bowden's esophagus without dysplasia

## 2018-10-22 NOTE — PROGRESS NOTES
Injectable Influenza Immunization Documentation    1.  Is the person to be vaccinated sick today?   No    2. Does the person to be vaccinated have an allergy to a component   of the vaccine?   No  Egg Allergy Algorithm Link    3. Has the person to be vaccinated ever had a serious reaction   to influenza vaccine in the past?   No    4. Has the person to be vaccinated ever had Guillain-Barré syndrome?   No    Form completed by Amy Corado         The following medication was given:     MEDICATION: Vitamin B12  1000 mcg  ROUTE: IM  SITE: Deltoid - Right  DOSE: 1 mL  LOT #: [id number]  :  [employer]  EXPIRATION DATE:  11/30/2019  NDC#: 6126-0723-70  Patient will return in 30 days for another injection.    Amy Corado

## 2018-12-17 DIAGNOSIS — E53.8 VITAMIN B12 DEFICIENCY (NON ANEMIC): Primary | ICD-10-CM

## 2018-12-17 RX ORDER — CYANOCOBALAMIN 1000 UG/ML
1000 INJECTION, SOLUTION INTRAMUSCULAR; SUBCUTANEOUS
Status: COMPLETED | OUTPATIENT
Start: 2018-12-19 | End: 2018-12-19

## 2018-12-19 ENCOUNTER — ALLIED HEALTH/NURSE VISIT (OUTPATIENT)
Dept: ALLERGY | Facility: OTHER | Age: 73
End: 2018-12-19
Attending: FAMILY MEDICINE
Payer: MEDICARE

## 2018-12-19 DIAGNOSIS — E53.9 B-COMPLEX DEFICIENCY: Primary | ICD-10-CM

## 2018-12-19 PROCEDURE — 96372 THER/PROPH/DIAG INJ SC/IM: CPT

## 2018-12-19 RX ADMIN — CYANOCOBALAMIN 1000 MCG: 1000 INJECTION, SOLUTION INTRAMUSCULAR; SUBCUTANEOUS at 13:32

## 2018-12-19 NOTE — PROGRESS NOTES
Prior to injection, verified patient identity using patient's name and date of birth.      B12    Drug Amount Wasted:  None.  Vial/Syringe: Single dose vial     Amy Corado

## 2019-04-08 ENCOUNTER — TELEPHONE (OUTPATIENT)
Dept: FAMILY MEDICINE | Facility: OTHER | Age: 74
End: 2019-04-08

## 2019-04-08 DIAGNOSIS — N30.00 ACUTE CYSTITIS WITHOUT HEMATURIA: Primary | ICD-10-CM

## 2019-04-08 RX ORDER — SULFAMETHOXAZOLE/TRIMETHOPRIM 800-160 MG
1 TABLET ORAL 2 TIMES DAILY
Qty: 10 TABLET | Refills: 0 | Status: SHIPPED | OUTPATIENT
Start: 2019-04-08 | End: 2019-08-09

## 2019-04-08 NOTE — TELEPHONE ENCOUNTER
Notified patient that prescription for Bactrim was sent, she may start and finish antibiotic and advised once she is home and if there is no improvement to follow up with us. Pt verbalized understanding,

## 2019-04-08 NOTE — TELEPHONE ENCOUNTER
She would need to be here to do an Evisit to give a urine sample. I know her well and will send in a prescription. Because I am not licensed in [place] I may not be able to send the prescription there. Please call them and see if they will accept it. If not we can send it to the [facility] here and it can be transferred. Bactrim is pended, depending on the pharmacy used. Please let me know

## 2019-04-16 DIAGNOSIS — E53.9 B-COMPLEX DEFICIENCY: Primary | ICD-10-CM

## 2019-04-16 RX ORDER — CYANOCOBALAMIN 1000 UG/ML
1000 INJECTION, SOLUTION INTRAMUSCULAR; SUBCUTANEOUS
Status: COMPLETED | OUTPATIENT
Start: 2019-04-17 | End: 2019-07-10

## 2019-04-16 NOTE — PROGRESS NOTES
Patient is on the schedule for a B12 injection tomorrow, but order has .  Last office visit was 18.  Last B12 injection was 18.  I have pended an order if you approve.  I noted 3 injections, but please adjust the number as needed.  Thank you.    Ana Augustin RN

## 2019-04-17 ENCOUNTER — ALLIED HEALTH/NURSE VISIT (OUTPATIENT)
Dept: ALLERGY | Facility: OTHER | Age: 74
End: 2019-04-17
Attending: FAMILY MEDICINE
Payer: MEDICARE

## 2019-04-17 DIAGNOSIS — E53.9 B-COMPLEX DEFICIENCY: Primary | ICD-10-CM

## 2019-04-17 PROCEDURE — 96372 THER/PROPH/DIAG INJ SC/IM: CPT

## 2019-04-17 RX ADMIN — CYANOCOBALAMIN 1000 MCG: 1000 INJECTION, SOLUTION INTRAMUSCULAR at 09:26

## 2019-04-17 NOTE — PROGRESS NOTES
Prior to injection, verified patient identity using patient's name and date of birth.  Due to injection administration, patient instructed to remain in clinic for 15 minutes  afterwards, and to report any adverse reaction to me immediately.    Vitamin B-12    Drug Amount Wasted:  None.  Vial/Syringe: Single dose vial  Expiration Date:  10/2020  EFREN REICH

## 2019-05-20 ENCOUNTER — ALLIED HEALTH/NURSE VISIT (OUTPATIENT)
Dept: ALLERGY | Facility: OTHER | Age: 74
End: 2019-05-20
Attending: FAMILY MEDICINE
Payer: MEDICARE

## 2019-05-20 DIAGNOSIS — E53.9 B-COMPLEX DEFICIENCY: Primary | ICD-10-CM

## 2019-05-20 PROCEDURE — 96372 THER/PROPH/DIAG INJ SC/IM: CPT

## 2019-05-20 RX ADMIN — CYANOCOBALAMIN 1000 MCG: 1000 INJECTION, SOLUTION INTRAMUSCULAR at 10:34

## 2019-05-20 NOTE — PROGRESS NOTES
Prior to injection, verified patient identity using patient's name and date of birth.      VIt B12    Drug Amount Wasted:  None.  Vial/Syringe: Single dose vial  Expiration Date:  10/31/2020    Left manjula Corado

## 2019-07-10 ENCOUNTER — ALLIED HEALTH/NURSE VISIT (OUTPATIENT)
Dept: ALLERGY | Facility: OTHER | Age: 74
End: 2019-07-10
Attending: FAMILY MEDICINE
Payer: MEDICARE

## 2019-07-10 DIAGNOSIS — E53.8 VITAMIN B12 DEFICIENCY (NON ANEMIC): Primary | ICD-10-CM

## 2019-07-10 PROCEDURE — 96372 THER/PROPH/DIAG INJ SC/IM: CPT

## 2019-07-10 RX ADMIN — CYANOCOBALAMIN 1000 MCG: 1000 INJECTION, SOLUTION INTRAMUSCULAR at 14:35

## 2019-07-10 NOTE — PROGRESS NOTES
Clinic Administered Medication Documentation      Injectable Medication Documentation    Patient was given Cyanocobalamin (B-12). Prior to medication administration, verified patients identity using patient s name and date of birth. Please see MAR and medication order for additional information. .      Was entire vial of medication used? Yes  Vial/Syringe: Single dose vial  Expiration Date:  10/20  Was this medication supplied by the patient? No     CHRISTOPHER INFANTE

## 2019-07-31 ENCOUNTER — TELEPHONE (OUTPATIENT)
Dept: FAMILY MEDICINE | Facility: OTHER | Age: 74
End: 2019-07-31

## 2019-07-31 NOTE — TELEPHONE ENCOUNTER
8:48 AM    Reason for Call: OVERBOOK    Patient is having the following symptoms: Pre Op: 08/14/19, , [facility], Rotator cuff repaire  for 1 weeks.    The patient is requesting an appointment for sometime before surgery 08/14/19 with Anyone .    Was an appointment offered for this call? No  If yes : Appointment type              Date    Preferred method for responding to this message: Telephone Call  What is your phone number ?[phone]    If we cannot reach you directly, may we leave a detailed response at the number you provided? Yes    Can this message wait until your PCP/provider returns, if unavailable today? No, pcp is out     Mila Erickson

## 2019-08-07 NOTE — PROGRESS NOTES
[facility] [place]  [address]  [place] [address]  [phone]  Dept: [phone]    PRE-OP EVALUATION:  Today's date: 2019    Maria Teresa Aburto (: 1945) presents for pre-operative evaluation assessment as requested by Dr. Cherry.  She requires evaluation and anesthesia risk assessment prior to undergoing surgery/procedure for treatment of right rotator cuff .    Proposed Surgery/ Procedure: right rotator cuff repair  Date of Surgery/ Procedure: 19  Time of Surgery/ Procedure: to be determined  Hospital/Surgical Facility: [facility]  Fax number for surgical facility:   Primary Physician: Kenna Portillo  Type of Anesthesia Anticipated: to be determined    Patient has a Health Care Directive or Living Will:  NO    1. NO - Do you have a history of heart attack, stroke, stent, bypass or surgery on an artery in the head, neck, heart or legs?  2. NO - Do you ever have any pain or discomfort in your chest?  3. NO - Do you have a history of  Heart Failure?  4. NO - Are you troubled by shortness of breath when: walking on the level, up a slight hill or at night?  5. NO - Do you currently have a cold, bronchitis or other respiratory infection?  6. NO - Do you have a cough, shortness of breath or wheezing?  7. NO - Do you sometimes get pains in the calves of your legs when you walk?  8. NO - Do you or anyone in your family have previous history of blood clots?  9. NO - Do you or does anyone in your family have a serious bleeding problem such as prolonged bleeding following surgeries or cuts?  10. NO - Have you ever had problems with anemia or been told to take iron pills?  11. NO - Have you had any abnormal blood loss such as black, tarry or bloody stools, or abnormal vaginal bleeding?  12. NO - Have you ever had a blood transfusion?  13. NO - Have you or any of your relatives ever had problems with anesthesia?  14. NO - Do you have sleep apnea, excessive snoring or daytime  drowsiness?  15. NO - Do you have any prosthetic heart valves?  16. NO - Do you have prosthetic joints?  17. NO - Is there any chance that you may be pregnant?      HPI:     HPI related to upcoming procedure: Neelima has a history of right shoulder pain with rotator cuff tear and has failed conservative management.  She was seen by Orthopedics where it was felt the patient would benefit from surgical management.  I was asked to see her for preoperative medical clearance.        See problem list for active medical problems.  Problems all longstanding and stable, except as noted/documented.  See ROS for pertinent symptoms related to these conditions.      MEDICAL HISTORY:     Patient Active Problem List    Diagnosis Date Noted     Actinic keratosis 07/17/2012     Priority: Medium     History of malignant neoplasm of skin 07/17/2012     Priority: Medium     Atypical nevus 07/17/2012     Priority: Medium     Medial meniscus tear 02/13/2012     Priority: Medium     Overview:   IMO Update 10/11       Restless legs syndrome (RLS) 10/11/2011     Priority: Medium     Bowden's esophagus 10/11/2011     Priority: Medium     B-complex deficiency 10/11/2011     Priority: Medium     Problem list name updated by automated process. Provider to review       Disorder of bone and cartilage 10/11/2011     Priority: Medium     dexa scans odd years starting in 2003  Problem list name updated by automated process. Provider to review       Squamous Cell Carcinoma 10/11/2011     Priority: Medium     left leg x2       GERD (gastroesophageal reflux disease)[530.81] 05/11/2003     Priority: Medium      Past Medical History:   Diagnosis Date     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     Bowden's esophagus 10/11/2011     Constipation 10/09/2012     GERD (gastroesophageal reflux disease)[530.81] 05/11/2003     Osteopenia 10/11/2011    dexa scans odd years starting in 2003     Precordial pain 04/28/2003    negative stress test, negative pulmonary evaluatio      "Restless legs syndrome (RLS) 10/11/2011     Squamous Cell Carcinoma 10/11/2011    left leg x2     Urge incontinence 10/09/2012     Past Surgical History:   Procedure Laterality Date     Breast Biospy  11/11/2000    LEFT > Fibrocystic Disease > Outcome: Fibroadenoma     COLONOSCOPY  2000     COLONOSCOPY  8-7-2009    Normal, Repeat 2015     DEXA Scan  2009     EGD       EGD  2008     EGD  2003     ENDOSCOPY UPPER, COLONOSCOPY, COMBINED N/A 9/6/2018    Procedure: COMBINED ENDOSCOPY UPPER, COLONOSCOPY;  UPPER ENDOSCOPY WITH BIOPSIES AND COLONOSCOPY WITH BIOPSIES;  Surgeon: Minh Interiano DO;  Location: [facility] OR     Knee Replacement  2012     Oophorectomy      Ectopic Pregnancy     TONSILLECTOMY       Current Outpatient Medications   Medication Sig Dispense Refill     ASPIRIN CAPS 81 MG OR one capsule by mouth daily 100 3     Calcium Carbonate-Vitamin D (CALCIUM + D PO)        multivitamin, therapeutic with minerals (MULTI-VITAMIN) TABS Take 1 tablet by mouth daily       Na Sulfate-K Sulfate-Mg Sulf (SUPREP BOWEL PREP) solution Take 354 mLs (2 Bottles) by mouth See Admin Instructions 2 Bottle 0     OTC products: no recent use of OTC ASA, NSAIDS or Steroids    Allergies   Allergen Reactions     Nitrofurantoin Other (See Comments) and Nausea     Macrobid  \"Chills and Fevers\"     Nitrofurantoin Macrocrystal Other (See Comments) and Nausea     Macrobid  \"Chills and Fevers\"        Latex Allergy: NO    Social History     Tobacco Use     Smoking status: Former Smoker     Types: Cigarettes     Smokeless tobacco: Never Used   Substance Use Topics     Alcohol use: Yes     History   Drug Use Not on file       REVIEW OF SYSTEMS:   Constitutional, neuro, ENT, endocrine, pulmonary, cardiac, gastrointestinal, genitourinary, musculoskeletal, integument and psychiatric systems are negative, except as otherwise noted.    EXAM:   There were no vitals taken for this visit.    GENERAL APPEARANCE: healthy, alert and no distress     EYES: " EOMI, PERRL     HENT: ear canals and TM's normal and nose and mouth without ulcers or lesions     NECK: no adenopathy, no asymmetry, masses, or scars and thyroid normal to palpation     RESP: lungs clear to auscultation - no rales, rhonchi or wheezes     CV: regular rates and rhythm, normal S1 S2, no S3 or S4 and no murmur, click or rub     ABDOMEN:  soft, nontender, no HSM or masses and bowel sounds normal     MS: extremities normal- no gross deformities noted, no evidence of inflammation in joints, FROM in all extremities.     SKIN: no suspicious lesions or rashes     NEURO: Normal strength and tone, sensory exam grossly normal, mentation intact and speech normal     PSYCH: mentation appears normal. and affect normal/bright     LYMPHATICS: No cervical adenopathy    DIAGNOSTICS:     EKG: appears normal, NSR, normal axis, normal intervals, no acute ST/T changes c/w ischemia, no LVH by voltage criteria, unchanged from previous tracings  Labs Resulted Today:   Results for orders placed or performed in visit on 08/09/19   CBC with platelets differential   Result Value Ref Range    WBC 6.7 4.0 - 11.0 10e9/L    RBC Count 4.21 3.8 - 5.2 10e12/L    Hemoglobin 13.1 11.7 - 15.7 g/dL    Hematocrit 37.8 35.0 - 47.0 %    MCV 90 78 - 100 fl    MCH 31.1 26.5 - 33.0 pg    MCHC 34.7 31.5 - 36.5 g/dL    RDW 11.9 10.0 - 15.0 %    Platelet Count 248 150 - 450 10e9/L    Diff Method Automated Method     % Neutrophils 64.9 %    % Lymphocytes 19.2 %    % Monocytes 12.5 %    % Eosinophils 2.2 %    % Basophils 0.9 %    % Immature Granulocytes 0.3 %    Nucleated RBCs 0 0 /100    Absolute Neutrophil 4.4 1.6 - 8.3 10e9/L    Absolute Lymphocytes 1.3 0.8 - 5.3 10e9/L    Absolute Monocytes 0.8 0.0 - 1.3 10e9/L    Absolute Eosinophils 0.2 0.0 - 0.7 10e9/L    Absolute Basophils 0.1 0.0 - 0.2 10e9/L    Abs Immature Granulocytes 0.0 0 - 0.4 10e9/L    Absolute Nucleated RBC 0.0        Recent Labs   Lab Test 06/18/18  0943 11/16/15  1055   HGB 14.0  14.2    225    135   POTASSIUM 4.0 3.9   CR 0.74 0.79        IMPRESSION:   Reason for surgery/procedure: Right rotator cuff tear    The proposed surgical procedure is considered INTERMEDIATE risk.    REVISED CARDIAC RISK INDEX  The patient has the following serious cardiovascular risks for perioperative complications such as (MI, PE, VFib and 3  AV Block):  No serious cardiac risks  INTERPRETATION: 0 risks: Class I (very low risk - 0.4% complication rate)    The patient has the following additional risks for perioperative complications:  No identified additional risks      ICD-10-CM    1. Preop general physical exam Z01.818        RECOMMENDATIONS:     APPROVAL GIVEN to proceed with proposed procedure, without further diagnostic evaluation       Signed Electronically by: Memo Garcia MD    Copy of this evaluation report is provided to requesting physician.    [facility] Preop Guidelines    Revised Cardiac Risk Index

## 2019-08-09 ENCOUNTER — OFFICE VISIT (OUTPATIENT)
Dept: FAMILY MEDICINE | Facility: OTHER | Age: 74
End: 2019-08-09
Attending: FAMILY MEDICINE
Payer: COMMERCIAL

## 2019-08-09 VITALS
HEIGHT: 61 IN | WEIGHT: 119 LBS | OXYGEN SATURATION: 99 % | SYSTOLIC BLOOD PRESSURE: 130 MMHG | BODY MASS INDEX: 22.47 KG/M2 | HEART RATE: 88 BPM | DIASTOLIC BLOOD PRESSURE: 56 MMHG | TEMPERATURE: 99 F

## 2019-08-09 DIAGNOSIS — Z01.818 PREOP GENERAL PHYSICAL EXAM: Primary | ICD-10-CM

## 2019-08-09 LAB
BASOPHILS # BLD AUTO: 0.1 10E9/L (ref 0–0.2)
BASOPHILS NFR BLD AUTO: 0.9 %
DIFFERENTIAL METHOD BLD: NORMAL
EOSINOPHIL # BLD AUTO: 0.2 10E9/L (ref 0–0.7)
EOSINOPHIL NFR BLD AUTO: 2.2 %
ERYTHROCYTE [DISTWIDTH] IN BLOOD BY AUTOMATED COUNT: 11.9 % (ref 10–15)
HCT VFR BLD AUTO: 37.8 % (ref 35–47)
HGB BLD-MCNC: 13.1 G/DL (ref 11.7–15.7)
IMM GRANULOCYTES # BLD: 0 10E9/L (ref 0–0.4)
IMM GRANULOCYTES NFR BLD: 0.3 %
LYMPHOCYTES # BLD AUTO: 1.3 10E9/L (ref 0.8–5.3)
LYMPHOCYTES NFR BLD AUTO: 19.2 %
MCH RBC QN AUTO: 31.1 PG (ref 26.5–33)
MCHC RBC AUTO-ENTMCNC: 34.7 G/DL (ref 31.5–36.5)
MCV RBC AUTO: 90 FL (ref 78–100)
MONOCYTES # BLD AUTO: 0.8 10E9/L (ref 0–1.3)
MONOCYTES NFR BLD AUTO: 12.5 %
NEUTROPHILS # BLD AUTO: 4.4 10E9/L (ref 1.6–8.3)
NEUTROPHILS NFR BLD AUTO: 64.9 %
NRBC # BLD AUTO: 0 10*3/UL
NRBC BLD AUTO-RTO: 0 /100
PLATELET # BLD AUTO: 248 10E9/L (ref 150–450)
RBC # BLD AUTO: 4.21 10E12/L (ref 3.8–5.2)
WBC # BLD AUTO: 6.7 10E9/L (ref 4–11)

## 2019-08-09 PROCEDURE — 93010 ELECTROCARDIOGRAM REPORT: CPT | Performed by: INTERNAL MEDICINE

## 2019-08-09 PROCEDURE — 93005 ELECTROCARDIOGRAM TRACING: CPT

## 2019-08-09 PROCEDURE — 99214 OFFICE O/P EST MOD 30 MIN: CPT | Mod: 25 | Performed by: FAMILY MEDICINE

## 2019-08-09 PROCEDURE — 85025 COMPLETE CBC W/AUTO DIFF WBC: CPT | Mod: ZL | Performed by: FAMILY MEDICINE

## 2019-08-09 PROCEDURE — 36415 COLL VENOUS BLD VENIPUNCTURE: CPT | Mod: ZL | Performed by: FAMILY MEDICINE

## 2019-08-09 PROCEDURE — G0463 HOSPITAL OUTPT CLINIC VISIT: HCPCS

## 2019-08-09 ASSESSMENT — ANXIETY QUESTIONNAIRES
2. NOT BEING ABLE TO STOP OR CONTROL WORRYING: NOT AT ALL
IF YOU CHECKED OFF ANY PROBLEMS ON THIS QUESTIONNAIRE, HOW DIFFICULT HAVE THESE PROBLEMS MADE IT FOR YOU TO DO YOUR WORK, TAKE CARE OF THINGS AT HOME, OR GET ALONG WITH OTHER PEOPLE: NOT DIFFICULT AT ALL
4. TROUBLE RELAXING: NOT AT ALL
3. WORRYING TOO MUCH ABOUT DIFFERENT THINGS: NOT AT ALL
5. BEING SO RESTLESS THAT IT IS HARD TO SIT STILL: SEVERAL DAYS
6. BECOMING EASILY ANNOYED OR IRRITABLE: NOT AT ALL
1. FEELING NERVOUS, ANXIOUS, OR ON EDGE: SEVERAL DAYS
7. FEELING AFRAID AS IF SOMETHING AWFUL MIGHT HAPPEN: NOT AT ALL
GAD7 TOTAL SCORE: 2

## 2019-08-09 ASSESSMENT — PATIENT HEALTH QUESTIONNAIRE - PHQ9: SUM OF ALL RESPONSES TO PHQ QUESTIONS 1-9: 2

## 2019-08-09 ASSESSMENT — MIFFLIN-ST. JEOR: SCORE: 976.12

## 2019-08-09 ASSESSMENT — PAIN SCALES - GENERAL: PAINLEVEL: NO PAIN (0)

## 2019-08-09 NOTE — NURSING NOTE
"Chief Complaint   Patient presents with     Pre-Op Exam       Initial /56 (BP Location: Left arm, Patient Position: Sitting, Cuff Size: Adult Regular)   Pulse 88   Temp 99  F (37.2  C) (Tympanic)   Ht 1.556 m (5' 1.25\")   Wt 54 kg (119 lb)   SpO2 99%   BMI 22.30 kg/m   Estimated body mass index is 22.3 kg/m  as calculated from the following:    Height as of this encounter: 1.556 m (5' 1.25\").    Weight as of this encounter: 54 kg (119 lb).  Medication Reconciliation: complete   Denisa Burks LPN    "

## 2019-08-10 ASSESSMENT — ANXIETY QUESTIONNAIRES: GAD7 TOTAL SCORE: 2

## 2019-08-12 ENCOUNTER — TELEPHONE (OUTPATIENT)
Dept: FAMILY MEDICINE | Facility: OTHER | Age: 74
End: 2019-08-12

## 2019-08-12 ENCOUNTER — ALLIED HEALTH/NURSE VISIT (OUTPATIENT)
Dept: ALLERGY | Facility: OTHER | Age: 74
End: 2019-08-12
Attending: FAMILY MEDICINE
Payer: MEDICARE

## 2019-08-12 DIAGNOSIS — E53.8 VITAMIN B12 DEFICIENCY (NON ANEMIC): Primary | ICD-10-CM

## 2019-08-12 DIAGNOSIS — E53.9 B-COMPLEX DEFICIENCY: Primary | ICD-10-CM

## 2019-08-12 PROCEDURE — 96372 THER/PROPH/DIAG INJ SC/IM: CPT

## 2019-08-12 RX ORDER — CYANOCOBALAMIN 1000 UG/ML
1000 INJECTION, SOLUTION INTRAMUSCULAR; SUBCUTANEOUS
Status: DISCONTINUED | OUTPATIENT
Start: 2019-08-12 | End: 2019-09-16

## 2019-08-12 RX ADMIN — CYANOCOBALAMIN 1000 MCG: 1000 INJECTION, SOLUTION INTRAMUSCULAR; SUBCUTANEOUS at 09:39

## 2019-08-12 NOTE — PROGRESS NOTES
Clinic Administered Medication Documentation      Injectable Medication Documentation    Patient was given Cyanocobalamin (B-12). Prior to medication administration, verified patients identity using patient s name and date of birth. Please see MAR and medication order for additional information. Patient instructed to report any adverse reaction to staff immediately .      Was entire vial of medication used? Yes  Vial/Syringe: Single dose vial  Expiration Date:  01/2021  Was this medication supplied by the patient? No     Left Deltoid.    Katelin Willoughby

## 2019-08-12 NOTE — PROGRESS NOTES
Patient was just added to the shot room schedule today for a B12 injection, but order has .  PCP is Dr. Kaylyn Portillo.  Last office visit was 19.  Order is pended if you approve.  Thank you.    Ana Augustin RN

## 2019-08-12 NOTE — TELEPHONE ENCOUNTER
Faxed H&P, EKG, labs, med list, demographic to [facility] ASAP fax for patient's procedure 08/14/19 with Dr. Cherry. [phone]

## 2019-08-14 ENCOUNTER — TRANSFERRED RECORDS (OUTPATIENT)
Dept: HEALTH INFORMATION MANAGEMENT | Facility: CLINIC | Age: 74
End: 2019-08-14

## 2019-09-09 ENCOUNTER — TELEPHONE (OUTPATIENT)
Dept: FAMILY MEDICINE | Facility: OTHER | Age: 74
End: 2019-09-09

## 2019-09-09 NOTE — TELEPHONE ENCOUNTER
Please schedule patient for date/time: unable to work in sooner for physical, may wait until December and get on cancellation list.     Have patient go to ER/Urgent Care Center. Urgent Care hours are 9:30 am to 8 pm, open 7 days a week. No.    Provider will call patient.No.    Other:

## 2019-09-09 NOTE — TELEPHONE ENCOUNTER
2:01 PM    Reason for Call: OVERBOOK    Patient is having the following symptoms: Annual Physical for 0 days.    The patient is requesting an appointment for overbook in Early November with Dr. TONI Portillo.    Was an appointment offered for this call? Yes  If yes : Appointment type              Date  12-24-19 first available, pt declined due to she leaves the area around then and would like to have this done earlier    Preferred method for responding to this message: Telephone Call  What is your phone number ? [phone]    If we cannot reach you directly, may we leave a detailed response at the number you provided? Yes    Can this message wait until your PCP/provider returns, if unavailable today? Not applicable, provider is in today    Erica Lopez

## 2019-09-12 PROBLEM — M75.121 NONTRAUMATIC COMPLETE TEAR OF RIGHT ROTATOR CUFF: Status: ACTIVE | Noted: 2019-07-31

## 2019-09-12 PROBLEM — M75.111 NONTRAUMATIC INCOMPLETE TEAR OF RIGHT ROTATOR CUFF: Status: ACTIVE | Noted: 2019-07-31

## 2019-09-16 ENCOUNTER — OFFICE VISIT (OUTPATIENT)
Dept: FAMILY MEDICINE | Facility: OTHER | Age: 74
End: 2019-09-16
Attending: FAMILY MEDICINE
Payer: COMMERCIAL

## 2019-09-16 VITALS
RESPIRATION RATE: 19 BRPM | WEIGHT: 119 LBS | BODY MASS INDEX: 22.47 KG/M2 | HEART RATE: 84 BPM | OXYGEN SATURATION: 98 % | SYSTOLIC BLOOD PRESSURE: 126 MMHG | HEIGHT: 61 IN | DIASTOLIC BLOOD PRESSURE: 74 MMHG

## 2019-09-16 DIAGNOSIS — E53.8 VITAMIN B12 DEFICIENCY (NON ANEMIC): ICD-10-CM

## 2019-09-16 DIAGNOSIS — G25.81 RESTLESS LEGS SYNDROME (RLS): ICD-10-CM

## 2019-09-16 DIAGNOSIS — Z00.00 ROUTINE GENERAL MEDICAL EXAMINATION AT A HEALTH CARE FACILITY: Primary | ICD-10-CM

## 2019-09-16 DIAGNOSIS — Z98.890 S/P RIGHT ROTATOR CUFF REPAIR: ICD-10-CM

## 2019-09-16 DIAGNOSIS — K21.9 GASTROESOPHAGEAL REFLUX DISEASE WITHOUT ESOPHAGITIS: ICD-10-CM

## 2019-09-16 LAB
ALT SERPL W P-5'-P-CCNC: 28 U/L (ref 0–50)
ANION GAP SERPL CALCULATED.3IONS-SCNC: 7 MMOL/L (ref 3–14)
AST SERPL W P-5'-P-CCNC: 17 U/L (ref 0–45)
BUN SERPL-MCNC: 17 MG/DL (ref 7–30)
CALCIUM SERPL-MCNC: 9.2 MG/DL (ref 8.5–10.1)
CHLORIDE SERPL-SCNC: 97 MMOL/L (ref 94–109)
CO2 SERPL-SCNC: 27 MMOL/L (ref 20–32)
CREAT SERPL-MCNC: 0.63 MG/DL (ref 0.52–1.04)
GFR SERPL CREATININE-BSD FRML MDRD: 88 ML/MIN/{1.73_M2}
GLUCOSE SERPL-MCNC: 82 MG/DL (ref 70–99)
POTASSIUM SERPL-SCNC: 4.1 MMOL/L (ref 3.4–5.3)
SODIUM SERPL-SCNC: 131 MMOL/L (ref 133–144)

## 2019-09-16 PROCEDURE — 84460 ALANINE AMINO (ALT) (SGPT): CPT | Mod: ZL | Performed by: FAMILY MEDICINE

## 2019-09-16 PROCEDURE — 36415 COLL VENOUS BLD VENIPUNCTURE: CPT | Mod: ZL | Performed by: FAMILY MEDICINE

## 2019-09-16 PROCEDURE — 99397 PER PM REEVAL EST PAT 65+ YR: CPT | Performed by: FAMILY MEDICINE

## 2019-09-16 PROCEDURE — 84450 TRANSFERASE (AST) (SGOT): CPT | Mod: ZL | Performed by: FAMILY MEDICINE

## 2019-09-16 PROCEDURE — 80048 BASIC METABOLIC PNL TOTAL CA: CPT | Mod: ZL | Performed by: FAMILY MEDICINE

## 2019-09-16 PROCEDURE — 96372 THER/PROPH/DIAG INJ SC/IM: CPT

## 2019-09-16 RX ADMIN — CYANOCOBALAMIN 1000 MCG: 1000 INJECTION, SOLUTION INTRAMUSCULAR; SUBCUTANEOUS at 15:31

## 2019-09-16 ASSESSMENT — MIFFLIN-ST. JEOR: SCORE: 975.97

## 2019-09-16 ASSESSMENT — PAIN SCALES - GENERAL: PAINLEVEL: NO PAIN (0)

## 2019-09-16 NOTE — NURSING NOTE
"Chief Complaint   Patient presents with     Physical       Initial /74   Pulse 84   Resp 19   Ht 1.555 m (5' 1.24\")   Wt 54 kg (119 lb)   SpO2 98%   BMI 22.31 kg/m   Estimated body mass index is 22.31 kg/m  as calculated from the following:    Height as of this encounter: 1.555 m (5' 1.24\").    Weight as of this encounter: 54 kg (119 lb).  Medication Reconciliation: complete  Kelly Knox LPN    "

## 2019-09-16 NOTE — PROGRESS NOTES
"SUBJECTIVE:   Maria Teresa Aburto is a 74 year old female who presents for Preventive Visit.      Are you in the first 12 months of your Medicare Part B coverage?  No    Physical Health:    In general, how would you rate your overall physical health? good    Outside of work, how many days during the week do you exercise? 6-7 days/week    Outside of work, approximately how many minutes a day do you exercise?30-45 minutes    If you drink alcohol do you typically have >3 drinks per day or >7 drinks per week? No    Do you usually eat at least 4 servings of fruit and vegetables a day, include whole grains & fiber and avoid regularly eating high fat or \"junk\" foods? Yes    Do you have any problems taking medications regularly?  No    Do you have any side effects from medications? none    Needs assistance for the following daily activities: no assistance needed    Which of the following safety concerns are present in your home?  none identified     Hearing impairment: No    In the past 6 months, have you been bothered by leaking of urine? no    Mental Health:    In general, how would you rate your overall mental or emotional health? good  PHQ-2 Score:      Do you feel safe in your environment? Yes    Do you have a Health Care Directive? Yes: Advance Directive has been received and scanned.    Additional concerns to address?  No    Fall risk:       Do you have sleep apnea, excessive snoring or daytime drowsiness?: no    She just had her right rotator cuff repaired and has had increased pain the last two days after working with ROM. She has PT starting in 2 days. Dr Cherry was her surgeon.     Restless legs  This continues to be an issue and she prefers not to try prescription medications. She is wondering about trying CBD oil.     Reviewed and updated as needed this visit by clinical staff  Tobacco  Allergies  Med Hx  Surg Hx  Fam Hx  Soc Hx        Reviewed and updated as needed this visit by Provider        Social " History     Tobacco Use     Smoking status: Former Smoker     Types: Cigarettes     Smokeless tobacco: Never Used   Substance Use Topics     Alcohol use: Yes                           Current providers sharing in care for this patient include:   Patient Care Team:  Kenna Portillo MD as PCP - General  Memo Garcia MD as Assigned PCP    The following health maintenance items are reviewed in Epic and correct as of today:  Health Maintenance   Topic Date Due     ZOSTER IMMUNIZATION (2 of 3) 02/06/2014     MEDICARE ANNUAL WELLNESS VISIT  06/18/2019     INFLUENZA VACCINE (1) 09/01/2019     ADVANCE CARE PLANNING  10/23/2019     MAMMO SCREENING  11/20/2019     RAKAN ASSESSMENT  08/09/2020     PHQ-9  08/09/2020     FALL RISK ASSESSMENT  08/12/2020     DTAP/TDAP/TD IMMUNIZATION (3 - Td) 10/11/2021     LIPID  06/18/2023     COLONOSCOPY  09/06/2028     DEXA  Completed     PNEUMOCOCCAL IMMUNIZATION 65+ LOW/MEDIUM RISK  Completed     IPV IMMUNIZATION  Aged Out     MENINGITIS IMMUNIZATION  Aged Out     BP Readings from Last 3 Encounters:   09/16/19 126/74   08/09/19 130/56   09/06/18 151/58    Wt Readings from Last 3 Encounters:   09/16/19 54 kg (119 lb)   08/09/19 54 kg (119 lb)   09/06/18 54 kg (119 lb)                  Patient Active Problem List   Diagnosis     Restless legs syndrome (RLS)     Bowden's esophagus     B-complex deficiency     Disorder of bone and cartilage     Squamous Cell Carcinoma     GERD (gastroesophageal reflux disease)[530.81]     Actinic keratosis     History of malignant neoplasm of skin     Atypical nevus     Medial meniscus tear     History of nonmelanoma skin cancer     Nontraumatic incomplete tear of right rotator cuff     Nontraumatic complete tear of right rotator cuff     S/P right rotator cuff repair     Vitamin B12 deficiency (non anemic)     Past Surgical History:   Procedure Laterality Date     Breast Biospy  11/11/2000    LEFT > Fibrocystic Disease > Outcome: Fibroadenoma      "COLONOSCOPY  2000     COLONOSCOPY  8-7-2009    Normal, Repeat 2015     DEXA Scan  2009     EGD       EGD  2008     EGD  2003     ENDOSCOPY UPPER, COLONOSCOPY, COMBINED N/A 9/6/2018    Procedure: COMBINED ENDOSCOPY UPPER, COLONOSCOPY;  UPPER ENDOSCOPY WITH BIOPSIES AND COLONOSCOPY WITH BIOPSIES;  Surgeon: Minh Interiano DO;  Location: [facility] OR     Knee Replacement  2012     Oophorectomy      Ectopic Pregnancy     ROTATOR CUFF REPAIR RT/LT Right     with acromioplasty for complete rotator cuff tear, Dr Cherry     TONSILLECTOMY         Social History     Tobacco Use     Smoking status: Former Smoker     Types: Cigarettes     Smokeless tobacco: Never Used   Substance Use Topics     Alcohol use: Yes     Family History   Problem Relation Age of Onset     Endometrial Cancer Other         Family Hx     Osteoporosis Other         Family Hx     Parkinsonism Other         Family Hx     Unknown/Adopted No family hx of          Current Outpatient Medications   Medication Sig Dispense Refill     ASPIRIN CAPS 81 MG OR one capsule by mouth daily 100 3     Calcium Carbonate-Vitamin D (CALCIUM + D PO)        Cyanocobalamin (VITAMIN B 12 PO)        multivitamin, therapeutic with minerals (MULTI-VITAMIN) TABS Take 1 tablet by mouth daily       Allergies   Allergen Reactions     Nitrofurantoin Other (See Comments) and Nausea     Macrobid  \"Chills and Fevers\"     Nitrofurantoin Macrocrystal Other (See Comments) and Nausea     Macrobid  \"Chills and Fevers\"       Mammogram Screening: due but not able to schedule yet due to recent right rotator cuff repair  Last 3 Pap and HPV Results:   PAP / HPV 10/10/2013   PAP NIL       ROS:  CONSTITUTIONAL: NEGATIVE for fever, chills, change in weight  INTEGUMENTARY/SKIN: NEGATIVE for worrisome rashes, moles or lesions  EYES: NEGATIVE for vision changes or irritation  ENT/MOUTH: NEGATIVE for ear, mouth and throat problems  RESP: NEGATIVE for significant cough or SOB  BREAST: NEGATIVE for masses, " "tenderness or discharge  CV: NEGATIVE for chest pain, palpitations or peripheral edema  GI: NEGATIVE for nausea, abdominal pain, heartburn, or change in bowel habits  : NEGATIVE for frequency, dysuria, or hematuria  MUSCULOSKELETAL: NEGATIVE for significant arthralgias or myalgia  NEURO: NEGATIVE for weakness, dizziness or paresthesias  ENDOCRINE: NEGATIVE for temperature intolerance, skin/hair changes  HEME: NEGATIVE for bleeding problems  PSYCHIATRIC: NEGATIVE for changes in mood or affect    OBJECTIVE:   /74   Pulse 84   Resp 19   Ht 1.555 m (5' 1.24\")   Wt 54 kg (119 lb)   SpO2 98%   BMI 22.31 kg/m   Estimated body mass index is 22.31 kg/m  as calculated from the following:    Height as of this encounter: 1.555 m (5' 1.24\").    Weight as of this encounter: 54 kg (119 lb).  EXAM:   GENERAL APPEARANCE: healthy, alert and no distress  EYES: Eyes grossly normal to inspection, PERRL and conjunctivae and sclerae normal  HENT: ear canals and TM's normal, nose and mouth without ulcers or lesions, oropharynx clear and oral mucous membranes moist  NECK: no adenopathy, no asymmetry, masses, or scars and thyroid normal to palpation  RESP: lungs clear to auscultation - no rales, rhonchi or wheezes  BREAST: normal without masses, tenderness or nipple discharge and no palpable axillary masses or adenopathy  CV: regular rate and rhythm, normal S1 S2, no S3 or S4, no murmur, click or rub, no peripheral edema and peripheral pulses strong  ABDOMEN: soft, nontender, no hepatosplenomegaly, no masses and bowel sounds normal  MS: no musculoskeletal defects are noted and gait is age appropriate without ataxia  SKIN: no suspicious lesions or rashes, resolving bruising of the right axilla, 1 cm, from surgery with incision sites healing well with no sign of infection  NEURO: Normal strength and tone, sensory exam grossly normal, mentation intact and speech normal  PSYCH: mentation appears normal and fatigued " "        ASSESSMENT / PLAN:   1. Routine general medical examination at a health care facility  Doing well. Will check renal labs and LFTs post op and with use of ibuprofen for her shoulder pain    2. Gastroesophageal reflux disease without esophagitis  Without symptoms    3. S/P right rotator cuff repair  Improving, advised to contact Dr Cherry if pain isn't improving. She is using ibuprofen for this which is helpful  - Basic metabolic panel  - ALT  - AST    4. Restless legs syndrome (RLS)  She will try CBD oil. Advised this isn't regulated by the FDA    5. Vitamin B12 deficiency (non anemic)  Given injection today      End of Life Planning:  Patient currently has an advanced directive: Yes.  Practitioner is supportive of decision.    COUNSELING:  Reviewed preventive health counseling, as reflected in patient instructions       Regular exercise       Healthy diet/nutrition    Estimated body mass index is 22.31 kg/m  as calculated from the following:    Height as of this encounter: 1.555 m (5' 1.24\").    Weight as of this encounter: 54 kg (119 lb).         reports that she has quit smoking. Her smoking use included cigarettes. She has never used smokeless tobacco.      Appropriate preventive services were discussed with this patient, including applicable screening as appropriate for cardiovascular disease, diabetes, osteopenia/osteoporosis, and glaucoma.  As appropriate for age/gender, discussed screening for colorectal cancer, prostate cancer, breast cancer, and cervical cancer. Checklist reviewing preventive services available has been given to the patient.    Reviewed patients plan of care and provided an AVS. The Basic Care Plan (routine screening as documented in Health Maintenance) for Maria Teresa meets the Care Plan requirement. This Care Plan has been established and reviewed with the Patient.    Counseling Resources:  ATP IV Guidelines  Pooled Cohorts Equation Calculator  Breast Cancer Risk Calculator  FRAX Risk " Assessment  ICSI Preventive Guidelines  Dietary Guidelines for Americans, 2010  USDA's MyPlate  ASA Prophylaxis  Lung CA Screening    Kenna Portillo MD  [facility]

## 2019-09-16 NOTE — NURSING NOTE
Clinic Administered Medication Documentation    MEDICATION LIST:   Injectable Medication Documentation    Patient was given Cyanocobalamin (B-12). Prior to medication administration, verified patients identity using patient s name and date of birth. Please see MAR and medication order for additional information. Patient instructed to report any adverse reaction to staff immediately .      Was entire vial of medication used? Yes  Vial/Syringe: Single dose vial  Expiration Date:  06/2020  Was this medication supplied by the patient? No   Kelly Knox LPN

## 2019-09-16 NOTE — PATIENT INSTRUCTIONS
Preventive Health Recommendations    See your health care provider every year to    Review health changes.     Discuss preventive care.      Review your medicines if your doctor has prescribed any.      You no longer need a yearly Pap test unless you've had an abnormal Pap test in the past 10 years. If you have vaginal symptoms, such as bleeding or discharge, be sure to talk with your provider about a Pap test.      Every 1 to 2 years, have a mammogram.  If you are over 69, talk with your health care provider about whether or not you want to continue having screening mammograms.      Every 10 years, have a colonoscopy. Or, have a yearly FIT test (stool test). These exams will check for colon cancer.       Have a cholesterol test every 5 years, or more often if your doctor advises it.       Have a diabetes test (fasting glucose) every three years. If you are at risk for diabetes, you should have this test more often.       At age 65, have a bone density scan (DEXA) to check for osteoporosis (brittle bone disease).    Shots:    Get a flu shot each year.    Get a tetanus shot every 10 years.    Talk to your doctor about your pneumonia vaccines. There are now two you should receive - Pneumovax (PPSV 23) and Prevnar (PCV 13).    Talk to your pharmacist about the shingles vaccine.    Talk to your doctor about the hepatitis B vaccine.    Nutrition:     Eat at least 5 servings of fruits and vegetables each day.      Eat whole-grain bread, whole-wheat pasta and brown rice instead of white grains and rice.      Get adequate about Calcium and Vitamin D.     Lifestyle    Exercise at least 150 minutes a week (30 minutes a day, 5 days a week). This will help you control your weight and prevent disease.      Limit alcohol to one drink per day.      No smoking.       Wear sunscreen to prevent skin cancer.       See your dentist twice a year for an exam and cleaning.      See your eye doctor every 1 to 2 years to screen for  conditions such as glaucoma, macular degeneration, cataracts, etc.    Personalized Prevention Plan  You are due for the preventive services outlined below.  Your care team is available to assist you in scheduling these services.  If you have already completed any of these items, please share that information with your care team to update in your medical record.    Health Maintenance Due   Topic Date Due     Zoster (Shingles) Vaccine (2 of 3) 02/06/2014     Annual Wellness Visit  06/18/2019     Flu Vaccine (1) 09/01/2019

## 2019-09-18 ENCOUNTER — HOSPITAL ENCOUNTER (OUTPATIENT)
Dept: PHYSICAL THERAPY | Facility: HOSPITAL | Age: 74
Setting detail: THERAPIES SERIES
End: 2019-09-18
Attending: PHYSICIAN ASSISTANT
Payer: MEDICARE

## 2019-09-18 PROCEDURE — 97140 MANUAL THERAPY 1/> REGIONS: CPT | Mod: GP | Performed by: PHYSICAL THERAPIST

## 2019-09-18 PROCEDURE — 97161 PT EVAL LOW COMPLEX 20 MIN: CPT | Mod: GP | Performed by: PHYSICAL THERAPIST

## 2019-09-18 PROCEDURE — 97110 THERAPEUTIC EXERCISES: CPT | Mod: GP | Performed by: PHYSICAL THERAPIST

## 2019-09-18 NOTE — PROGRESS NOTES
09/18/19 0700   General Information   Type of Visit Initial OP Ortho PT Evaluation   Start of Care Date 09/18/19   Referring Physician Dr Cherry/ Tejas BECK   Patient/Family Goals Statement return to normal function   Orders Evaluate and Treat   Orders Comment Per [facility] Post-op protocol   Date of Order 08/27/19   Certification Required? Yes   Medical Diagnosis s/p R RCR   Surgical/Medical history reviewed Yes   Precautions/Limitations   (per protocol)   Body Part(s)   Body Part(s) Shoulder   Presentation and Etiology   Pertinent history of current problem (include personal factors and/or comorbidities that impact the POC) Patient presents to PT following RCR and acromioplasty on 8/14/2019. She has been in the sling with ABD pillow then progressed to sling without pillow and will remain in sling for 1 more week. She is 5 weeks post op and will start formal rehab now. per protocol, She saw Tejas yesterday for some soreness after starting the tabletop exercise and he assured her that is normal.   Impairments A. Pain;D. Decreased ROM;E. Decreased flexibility;F. Decreased strength and endurance;J. Burning   Functional Limitations perform activities of daily living;perform desired leisure / sports activities   Symptom Location R shoulder, anterior   Onset date of current episode/exacerbation 08/14/19   Pain rating (0-10 point scale) Best (/10);Worst (/10)   Best (/10) 0   Worst (/10) 7   Pain quality B. Dull;C. Aching;D. Burning   Frequency of pain/symptoms B. Intermittent   Pain/symptoms are: Worse during the day   Pain/symptoms exacerbated by G. Certain positions   Pain/symptoms eased by I. OTC medication(s)   Progression of symptoms since onset: Improved   Current Level of Function   Current Community Support Family/friend caregiver   Patient role/employment history F. Retired   Fall Risk Screen   Fall screen completed by PT   Have you fallen 2 or more times in the past year? No   Have you fallen and had an  injury in the past year? No   Is patient a fall risk? No   Shoulder Objective Findings   Side (if bilateral, select both right and left) Right   Observation no acute distress   Integumentary  healed well, no concerns   Posture Rounded shoulders   Cervical Screen (ROM, quadrant) WNL   Thoracic Mobility Screen mild hypo into EXT, due to sling position   Pec Minor (supine) Flexibility hypo due to sling position   Shoulder Special Tests Comments NT due to post op state   Palpation TTP R bicep, UT, levator, posterior shoulder complex   Right Shoulder Flexion AROM NT   Right Shoulder Flexion PROM 100   Right Shoulder Abduction AROM NT   Right Shoulder Abduction PROM 90   Right Shoulder ER AROM NT   Right Shoulder ER PROM 24   Right Shoulder IR AROM NT   Right Shoulder IR PROM NT   Right Shoulder Flexion Strength NT   Right Shoulder Abduction Strength NT   Right Shoulder ER Strength NT   Right Shoulder IR Strength NT   Right Shoulder Extension Strength NT   Planned Therapy Interventions   Planned Therapy Interventions joint mobilization;manual therapy;ROM;strengthening;stretching   Planned Modality Interventions   Planned Modality Interventions Comments as needed   Clinical Impression   Criteria for Skilled Therapeutic Interventions Met yes, treatment indicated   PT Diagnosis R shoulder pain, s/p RCR with acromioplasty   Influenced by the following impairments pain and weakness   Functional limitations due to impairments difficulty performing home and recreational tasks   Clinical Presentation Stable/Uncomplicated   Clinical Presentation Rationale due to lack of additional comorbidities that may influence anticipated PT progress   Clinical Decision Making (Complexity) Low complexity   Therapy Frequency 2 times/Week   Predicted Duration of Therapy Intervention (days/wks) up to 10 weeks, per protocol   Risk & Benefits of therapy have been explained Yes   Patient, Family & other staff in agreement with plan of care Yes    Clinical Impression Comments Presentation is consistent with s/p R RCR with acromioplasty that is expected to improve with skilled PT intervention   Education Assessment   Preferred Learning Style Demonstration   Barriers to Learning No barriers   ORTHO GOALS   PT Ortho Eval Goals 1;2;3;4   Ortho Goal 1   Goal Identifier LTG 3   Goal Description Patient will be compliant with HEP in order to make progress while at home   Target Date 10/09/19   Ortho Goal 2   Goal Identifier LTG 1   Goal Description Patient will demonstrate full PROM FLEX, ABD and ER in order to progress to next phase of rehab   Target Date 10/30/19   Ortho Goal 3   Goal Identifier LTG 2   Goal Description Patient will demonstrate full AROM all planes without compensation in order to perform daily activities at home   Target Date 11/13/19   Ortho Goal 4   Goal Identifier LTG 3   Goal Description Patient will demonstrate 4+/5 strength in shoulder flexion and abduction in order to perform all home and recreational activities without difficulty   Target Date 12/11/19   Total Evaluation Time   PT Eval, Low Complexity Minutes (51516) 20   Therapy Certification   Certification date from 09/18/19   Certification date to 12/16/19   Medical Diagnosis s/p R RCR with acromioplasty     I certify the need for these services furnished under this plan of treatment and while under my care. (Physician co-signature of this document indicates review and certification of the therapy plan).      _____________________________     __________________________    ____________  Physician's Signature                 Date               Time

## 2019-09-24 ENCOUNTER — HOSPITAL ENCOUNTER (OUTPATIENT)
Dept: PHYSICAL THERAPY | Facility: HOSPITAL | Age: 74
Setting detail: THERAPIES SERIES
End: 2019-09-24
Attending: PHYSICIAN ASSISTANT
Payer: MEDICARE

## 2019-09-24 PROCEDURE — 97140 MANUAL THERAPY 1/> REGIONS: CPT | Mod: GP | Performed by: PHYSICAL THERAPIST

## 2019-09-24 PROCEDURE — 97110 THERAPEUTIC EXERCISES: CPT | Mod: GP | Performed by: PHYSICAL THERAPIST

## 2019-09-26 ENCOUNTER — HOSPITAL ENCOUNTER (OUTPATIENT)
Dept: PHYSICAL THERAPY | Facility: HOSPITAL | Age: 74
Setting detail: THERAPIES SERIES
End: 2019-09-26
Attending: PHYSICIAN ASSISTANT
Payer: MEDICARE

## 2019-09-26 PROCEDURE — 97110 THERAPEUTIC EXERCISES: CPT | Mod: GP

## 2019-09-26 PROCEDURE — 97140 MANUAL THERAPY 1/> REGIONS: CPT | Mod: GP

## 2019-10-01 ENCOUNTER — HOSPITAL ENCOUNTER (OUTPATIENT)
Dept: PHYSICAL THERAPY | Facility: HOSPITAL | Age: 74
Setting detail: THERAPIES SERIES
End: 2019-10-01
Attending: FAMILY MEDICINE
Payer: MEDICARE

## 2019-10-01 PROCEDURE — 97110 THERAPEUTIC EXERCISES: CPT | Mod: GP | Performed by: PHYSICAL THERAPIST

## 2019-10-01 PROCEDURE — 97140 MANUAL THERAPY 1/> REGIONS: CPT | Mod: GP | Performed by: PHYSICAL THERAPIST

## 2019-10-01 NOTE — PROGRESS NOTES
Outpatient Physical Therapy Progress Note     Patient: Maria Teresa Aburto  : 1945    Beginning/End Dates of Reporting Period:  2019 to 10/1/2019    Referring Provider: Dr Cherry/ [facility]    Therapy Diagnosis: s/p R RCR     Client Self Report: Patient presents today for her 4th skilled PT session. She feels she is making progress despite some soreness with her exercises.  She reports her should is occasionally achey throughout the day, but her shoulder is worse at night -she has a hard time getting comfortable and has trialed sleeping with and without the sling. She has gone 5-6 hours without the sling and she likes not wearing the sling, but she is unsure if her uncomfortableness is from not wearing the sling or the stretching.  Reports no new numbness/tingling or irritation in other areas of her arm/shoulder that would be concerning.      Objective Measurements:  Objective Measure: Pain, Shoulder PROM, HEP  Details: Worst pain: 5/10, best 0/10.  Pain location is anterior and superior shoulder - moreso superior now with arm out of sling.  Shoulder PROM: FLEX 138, , ER 62, IR 50. Patient is performing exercises per protocol IIa into IIb per recommended progression.      Goals:  Goal Identifier LTG 3   Goal Description Patient will be compliant with HEP in order to make progress while at home   Target Date 10/09/19   Date Met  10/01/19   Progress:     Goal Identifier LTG 1   Goal Description Patient will demonstrate full PROM FLEX, ABD and ER in order to progress to next phase of rehab   Target Date 10/30/19   Date Met      Progress:     Goal Identifier LTG 2   Goal Description Patient will demonstrate full AROM all planes without compensation in order to perform daily activities at home   Target Date 19   Date Met      Progress:     Goal Identifier LTG 3   Goal Description Patient will demonstrate 4+/5 strength in shoulder flexion and abduction in order to perform all home and  recreational activities without difficulty   Target Date 12/11/19   Date Met      Progress:       Progress Toward Goals:   Progress this reporting period: Patient is making good progress towards goals    Plan:  Continue therapy per current plan of care.    Discharge:  No

## 2019-10-04 ENCOUNTER — HOSPITAL ENCOUNTER (OUTPATIENT)
Dept: PHYSICAL THERAPY | Facility: HOSPITAL | Age: 74
Setting detail: THERAPIES SERIES
End: 2019-10-04
Attending: FAMILY MEDICINE
Payer: MEDICARE

## 2019-10-04 PROCEDURE — 97110 THERAPEUTIC EXERCISES: CPT | Mod: GP | Performed by: PHYSICAL THERAPIST

## 2019-10-08 ENCOUNTER — HOSPITAL ENCOUNTER (OUTPATIENT)
Dept: PHYSICAL THERAPY | Facility: HOSPITAL | Age: 74
Setting detail: THERAPIES SERIES
End: 2019-10-08
Attending: FAMILY MEDICINE
Payer: MEDICARE

## 2019-10-08 PROCEDURE — 97110 THERAPEUTIC EXERCISES: CPT | Mod: GP

## 2019-10-10 ENCOUNTER — HOSPITAL ENCOUNTER (OUTPATIENT)
Dept: PHYSICAL THERAPY | Facility: HOSPITAL | Age: 74
Setting detail: THERAPIES SERIES
End: 2019-10-10
Attending: FAMILY MEDICINE
Payer: MEDICARE

## 2019-10-10 PROCEDURE — 97110 THERAPEUTIC EXERCISES: CPT | Mod: GP

## 2019-10-15 ENCOUNTER — HOSPITAL ENCOUNTER (OUTPATIENT)
Dept: PHYSICAL THERAPY | Facility: HOSPITAL | Age: 74
Setting detail: THERAPIES SERIES
End: 2019-10-15
Attending: FAMILY MEDICINE
Payer: MEDICARE

## 2019-10-15 PROCEDURE — 97140 MANUAL THERAPY 1/> REGIONS: CPT | Mod: GP | Performed by: PHYSICAL THERAPIST

## 2019-10-15 PROCEDURE — 97110 THERAPEUTIC EXERCISES: CPT | Mod: GP | Performed by: PHYSICAL THERAPIST

## 2019-10-18 ENCOUNTER — HOSPITAL ENCOUNTER (OUTPATIENT)
Dept: PHYSICAL THERAPY | Facility: HOSPITAL | Age: 74
Setting detail: THERAPIES SERIES
End: 2019-10-18
Attending: FAMILY MEDICINE
Payer: MEDICARE

## 2019-10-18 PROCEDURE — 97110 THERAPEUTIC EXERCISES: CPT | Mod: GP | Performed by: PHYSICAL THERAPIST

## 2019-10-18 PROCEDURE — 97140 MANUAL THERAPY 1/> REGIONS: CPT | Mod: GP | Performed by: PHYSICAL THERAPIST

## 2019-10-22 ENCOUNTER — HOSPITAL ENCOUNTER (OUTPATIENT)
Dept: PHYSICAL THERAPY | Facility: HOSPITAL | Age: 74
Setting detail: THERAPIES SERIES
End: 2019-10-22
Attending: FAMILY MEDICINE
Payer: MEDICARE

## 2019-10-22 DIAGNOSIS — E53.8 VITAMIN B12 DEFICIENCY (NON ANEMIC): Primary | ICD-10-CM

## 2019-10-22 PROCEDURE — 97140 MANUAL THERAPY 1/> REGIONS: CPT | Mod: GP

## 2019-10-22 PROCEDURE — 97110 THERAPEUTIC EXERCISES: CPT | Mod: GP

## 2019-10-22 RX ORDER — CYANOCOBALAMIN 1000 UG/ML
1000 INJECTION, SOLUTION INTRAMUSCULAR; SUBCUTANEOUS
Status: DISCONTINUED | OUTPATIENT
Start: 2019-10-23 | End: 2020-10-08

## 2019-10-23 ENCOUNTER — ALLIED HEALTH/NURSE VISIT (OUTPATIENT)
Dept: ALLERGY | Facility: OTHER | Age: 74
End: 2019-10-23
Attending: COUNSELOR
Payer: MEDICARE

## 2019-10-23 DIAGNOSIS — Z23 NEED FOR PROPHYLACTIC VACCINATION AND INOCULATION AGAINST INFLUENZA: Primary | ICD-10-CM

## 2019-10-23 PROCEDURE — 90662 IIV NO PRSV INCREASED AG IM: CPT

## 2019-10-23 PROCEDURE — 96372 THER/PROPH/DIAG INJ SC/IM: CPT

## 2019-10-23 PROCEDURE — G0008 ADMIN INFLUENZA VIRUS VAC: HCPCS

## 2019-10-23 RX ADMIN — CYANOCOBALAMIN 1000 MCG: 1000 INJECTION, SOLUTION INTRAMUSCULAR at 14:54

## 2019-10-23 NOTE — PROGRESS NOTES
Clinic Administered Medication Documentation    MEDICATION LIST:   Injectable Medication Documentation    Patient was given Cyanocobalamin (B-12). Prior to medication administration, verified patients identity using patient s name and date of birth. Please see MAR and medication order for additional information. Patient instructed to remain in clinic for 15 minutes.      Was entire vial of medication used? Yes  Vial/Syringe: Single dose vial  Expiration Date:  4/21  Was this medication supplied by the patient? No     CHRISTOPHER INFANTE LPN

## 2019-10-25 ENCOUNTER — HOSPITAL ENCOUNTER (OUTPATIENT)
Dept: PHYSICAL THERAPY | Facility: HOSPITAL | Age: 74
Setting detail: THERAPIES SERIES
End: 2019-10-25
Attending: FAMILY MEDICINE
Payer: MEDICARE

## 2019-10-25 PROCEDURE — 97110 THERAPEUTIC EXERCISES: CPT | Mod: GP | Performed by: PHYSICAL THERAPIST

## 2019-10-25 PROCEDURE — 97140 MANUAL THERAPY 1/> REGIONS: CPT | Mod: GP | Performed by: PHYSICAL THERAPIST

## 2019-10-25 NOTE — PROGRESS NOTES
Progress Report       10/22/19 0900   Signing Clinician's Name / Credentials   Signing clinician's name / credentials Briana Madrid PTA   Session Number   Session Number 10   Session Tracking, Supervision and Quick Adds   PT Assistant Visit Number 4   Quick Adds Assistant Supervision   Assistant Supervision PTA visit observed, intervention appropriate, plan of care reviewed and remains appropriate   Progress Note/Recertification   Progress Note Completed Date 10/22/19   Adult Goals   PT Ortho Eval Goals 1;2;3;4   Ortho Goal 1   Goal Identifier LTG 3   Goal Description Patient will be compliant with HEP in order to make progress while at home   Target Date 10/09/19   Date Met 10/01/19   Ortho Goal 2   Goal Identifier LTG 1   Goal Description Patient will demonstrate full PROM FLEX, ABD and ER in order to progress to next phase of rehab   Target Date 10/30/19   Ortho Goal 3   Goal Identifier LTG 2   Goal Description Patient will demonstrate full AROM all planes without compensation in order to perform daily activities at home   Target Date 11/13/19   Ortho Goal 4   Goal Identifier LTG 3   Goal Description Patient will demonstrate 4+/5 strength in shoulder flexion and abduction in order to perform all home and recreational activities without difficulty   Target Date 12/11/19   Subjective Report   Subjective Report Pt states arm is feeling better with less pain.   Treatment Interventions   Interventions Therapeutic Procedure/Exercise;Manual Therapy   Therapeutic Procedure/exercise   Therapeutic Procedures: strength, endurance, ROM, flexibillity minutes (24777) 15   Skilled Intervention ther ex   Patient Response good   Treatment Detail wand flexion in painfree range, wall slides in flexion with significant cueing on posture and relaxing upper trap. Added in wall isometrics in IR/ER/ABD without pain with 2-3 sec holds, countertop slides   Manual Therapy   Manual Therapy: Mobilization, MFR, MLD, friction massage minutes  (27360) 15   Skilled Intervention man ther   Patient Response good   Treatment Detail GH oscillations with progressive motion grade II for pain relief as stretch was applied in supine   Assessments Completed   Assessments Completed Patient is improving and is getting over her painful episode   Plan   Home program Continue current   Total Session Time   Timed Code Treatment Minutes 30

## 2019-11-01 ENCOUNTER — HOSPITAL ENCOUNTER (OUTPATIENT)
Dept: PHYSICAL THERAPY | Facility: HOSPITAL | Age: 74
Setting detail: THERAPIES SERIES
End: 2019-11-01
Attending: FAMILY MEDICINE
Payer: MEDICARE

## 2019-11-01 PROCEDURE — 97140 MANUAL THERAPY 1/> REGIONS: CPT | Mod: GP

## 2019-11-01 PROCEDURE — 97110 THERAPEUTIC EXERCISES: CPT | Mod: GP

## 2019-11-05 ENCOUNTER — HEALTH MAINTENANCE LETTER (OUTPATIENT)
Age: 74
End: 2019-11-05

## 2019-11-05 ENCOUNTER — HOSPITAL ENCOUNTER (OUTPATIENT)
Dept: PHYSICAL THERAPY | Facility: HOSPITAL | Age: 74
Setting detail: THERAPIES SERIES
End: 2019-11-05
Attending: SURGERY
Payer: MEDICARE

## 2019-11-05 PROCEDURE — 97110 THERAPEUTIC EXERCISES: CPT | Mod: GP | Performed by: PHYSICAL THERAPIST

## 2019-11-07 ENCOUNTER — HOSPITAL ENCOUNTER (OUTPATIENT)
Dept: PHYSICAL THERAPY | Facility: HOSPITAL | Age: 74
Setting detail: THERAPIES SERIES
End: 2019-11-07
Attending: SURGERY
Payer: MEDICARE

## 2019-11-07 PROCEDURE — 97110 THERAPEUTIC EXERCISES: CPT | Mod: GP

## 2019-11-19 ENCOUNTER — HOSPITAL ENCOUNTER (OUTPATIENT)
Dept: PHYSICAL THERAPY | Facility: HOSPITAL | Age: 74
Setting detail: THERAPIES SERIES
End: 2019-11-19
Attending: SURGERY
Payer: MEDICARE

## 2019-11-19 PROCEDURE — 97110 THERAPEUTIC EXERCISES: CPT | Mod: GP

## 2019-11-25 ENCOUNTER — HOSPITAL ENCOUNTER (OUTPATIENT)
Dept: PHYSICAL THERAPY | Facility: HOSPITAL | Age: 74
Setting detail: THERAPIES SERIES
End: 2019-11-25
Attending: SURGERY
Payer: MEDICARE

## 2019-11-25 PROCEDURE — 97110 THERAPEUTIC EXERCISES: CPT | Mod: GP | Performed by: PHYSICAL THERAPIST

## 2019-11-25 NOTE — PROGRESS NOTES
Outpatient Physical Therapy Progress Note     Patient: Maria Teresa Aburto  : 1945    Beginning/End Dates of Reporting Period:  10/22/2019 to 2019    Referring Provider: Dr Cherry    Therapy Diagnosis: s/p R RCR     Client Self Report: Patient reports that her exercsies have been going well with more of a stretching sensation vs pain. She is sleeping well at night despite getting up because of her legs, but she does not soreness in her shoulder when she is up. She continues to have a 2-3/10 soreness in her R superior/slightly anterior shoulder. She reports no clicking or popping in her shoulder. She has been hanging up her jacket with a  on the lexie  - she was not able to do that before. She continues to have difficulty reaching across her body, putting on a shirt or jacket - feels it is limited due to mobility and she still has discomfort.  We have been progressing her exercises per protocol with some adjustments along the way due to pain. States that certain exercises feel good when she does them, but then it gets sore afterwards. She follows up with Dr Cherry tomorrow to check her progress.    Objective Measurements:     R shoulder AROM: FLEX 138, ABD is full and painfree, ER 40, IR to L1.  Still most noted pain in R anterior shoulder - appears to be LH bicep related. We have gone through exercises to work on posture and opening up SA space, stretch pec minor, etc, however it still seems the LH is irritable.  Supraspinatus, infraspinatus, subscap and teres are all appear intact and functioning well. Able to continue progressing strengthening with theraband in some RC groups, however ER motion seems to only be felt in the anterior shoulder vs activating infraspinatus. We tasia need to consider using more modalities or other interventions to calm bicep down as it seems to be hindering progress in strengthening other muscle groups. Please advise if there is a particular direction you would like us to  take.    Goals:  Goal Identifier LTG 3   Goal Description Patient will be compliant with HEP in order to make progress while at home   Target Date 10/09/19   Date Met  10/01/19   Progress:     Goal Identifier LTG 1   Goal Description Patient will demonstrate full PROM FLEX, ABD and ER in order to progress to next phase of rehab   Target Date 12/18/2019   Date Met      Progress:Improving     Goal Identifier LTG 2   Goal Description Patient will demonstrate full AROM all planes without compensation in order to perform daily activities at home   Target Date 12/29/2019   Date Met      Progress:Improving     Goal Identifier LTG 3   Goal Description Patient will demonstrate 4+/5 strength in shoulder flexion and abduction in order to perform all home and recreational activities without difficulty   Target Date 12/29/2019   Date Met      Progress:Improving       Progress Toward Goals:   Progress this reporting period: Patient is improving, but progress is hindered by persistent LH biceps tendinopathy      Plan:  Continue therapy per current plan of care.    Discharge:  No

## 2019-12-03 ENCOUNTER — ANCILLARY PROCEDURE (OUTPATIENT)
Dept: MAMMOGRAPHY | Facility: OTHER | Age: 74
End: 2019-12-03
Attending: FAMILY MEDICINE
Payer: MEDICARE

## 2019-12-03 ENCOUNTER — HOSPITAL ENCOUNTER (OUTPATIENT)
Dept: PHYSICAL THERAPY | Facility: HOSPITAL | Age: 74
Setting detail: THERAPIES SERIES
End: 2019-12-03
Attending: SURGERY
Payer: MEDICARE

## 2019-12-03 DIAGNOSIS — Z12.31 VISIT FOR SCREENING MAMMOGRAM: ICD-10-CM

## 2019-12-03 PROCEDURE — 77063 BREAST TOMOSYNTHESIS BI: CPT | Mod: TC

## 2019-12-03 PROCEDURE — 97110 THERAPEUTIC EXERCISES: CPT | Mod: GP

## 2019-12-10 ENCOUNTER — HOSPITAL ENCOUNTER (OUTPATIENT)
Dept: PHYSICAL THERAPY | Facility: HOSPITAL | Age: 74
Setting detail: THERAPIES SERIES
End: 2019-12-10
Attending: SURGERY
Payer: MEDICARE

## 2019-12-10 PROCEDURE — 97110 THERAPEUTIC EXERCISES: CPT | Mod: GP | Performed by: PHYSICAL THERAPIST

## 2019-12-10 NOTE — PROGRESS NOTES
Outpatient Physical Therapy Discharge Note     Patient: Maria Teresa Aburto  : 1945    Beginning/End Dates of Reporting Period:  2019 to 12/10/2019    Referring Provider: Dr Cherry    Therapy Diagnosis: s/p RCR     Client Self Report: States she is doing well overall and is ready to discontinue PT with the next stage of strengthening to perform at home as she will be leaving for winter soon to a warmer climate. She was discharged from Dr Cherry's care and instructed to follow the guidelines of PT    Objective Measurements:  Objective Measure: SPADI  Details: Initial: 78.89. Final:29.23      Goals:  Goal Identifier LTG 3   Goal Description Patient will be compliant with HEP in order to make progress while at home   Target Date 10/09/19   Date Met  10/01/19   Progress:     Goal Identifier LTG 1   Goal Description Patient will demonstrate full PROM FLEX, ABD and ER in order to progress to next phase of rehab   Target Date 10/30/19   Date Met  12/10/19   Progress:     Goal Identifier LTG 2   Goal Description Patient will demonstrate full AROM all planes without compensation in order to perform daily activities at home   Target Date 19   Date Met  (t)   Progress:     Goal Identifier LTG 3   Goal Description Patient will demonstrate 4+/5 strength in shoulder flexion and abduction in order to perform all home and recreational activities without difficulty   Target Date 19   Date Met  (Patient will continue progressing on own)   Progress:       Progress Toward Goals:   Progress this reporting period: Patient has met most goals        Plan:  Discharge from therapy.    Discharge:    Reason for Discharge: Patient has met all goals.    Equipment Issued: None    Discharge Plan: Patient to continue home program.

## 2019-12-11 ENCOUNTER — ALLIED HEALTH/NURSE VISIT (OUTPATIENT)
Dept: ALLERGY | Facility: OTHER | Age: 74
End: 2019-12-11
Attending: FAMILY MEDICINE
Payer: MEDICARE

## 2019-12-11 DIAGNOSIS — E53.8 VITAMIN B12 DEFICIENCY (NON ANEMIC): Primary | ICD-10-CM

## 2019-12-11 PROCEDURE — 96372 THER/PROPH/DIAG INJ SC/IM: CPT

## 2019-12-11 RX ADMIN — CYANOCOBALAMIN 1000 MCG: 1000 INJECTION, SOLUTION INTRAMUSCULAR at 09:58

## 2019-12-11 NOTE — PROGRESS NOTES
Clinic Administered Medication Documentation    MEDICATION LIST:   Injectable Medication Documentation    Patient was given Cyanocobalamin (B-12). Prior to medication administration, verified patients identity using patient s name and date of birth. Please see MAR and medication order for additional information. Patient instructed to remain in clinic for 15 minutes.      Was entire vial of medication used? Yes  Vial/Syringe: Single dose vial  Expiration Date:  4/21  Was this medication supplied by the patient? No     Given left deltoid    CHRISTOPHER INFANTE LPN

## 2020-06-02 ENCOUNTER — TELEPHONE (OUTPATIENT)
Dept: FAMILY MEDICINE | Facility: OTHER | Age: 75
End: 2020-06-02

## 2020-06-02 NOTE — TELEPHONE ENCOUNTER
Patient gets B12 injections in the clinic shot room.  Since approval to continue with injections is based on provider discretion/need for medication at this time, please advise if patient should continue with B12 injections.  Last B12 injection was 12/11/19.  Thank you.    Ana Augustin RN

## 2020-06-02 NOTE — TELEPHONE ENCOUNTER
Call from patient requesting an appt for her monthly  B12 shot. Please contact to coordinate.

## 2020-06-05 ENCOUNTER — ALLIED HEALTH/NURSE VISIT (OUTPATIENT)
Dept: ALLERGY | Facility: OTHER | Age: 75
End: 2020-06-05
Attending: FAMILY MEDICINE
Payer: COMMERCIAL

## 2020-06-05 DIAGNOSIS — E53.8 VITAMIN B12 DEFICIENCY (NON ANEMIC): Primary | ICD-10-CM

## 2020-06-05 PROCEDURE — 96372 THER/PROPH/DIAG INJ SC/IM: CPT

## 2020-06-05 RX ADMIN — CYANOCOBALAMIN 1000 MCG: 1000 INJECTION, SOLUTION INTRAMUSCULAR at 10:34

## 2020-06-05 NOTE — PROGRESS NOTES
Clinic Administered Medication Documentation      Injectable Medication Documentation    Patient was given Cyanocobalamin (B-12). Prior to medication administration, verified patients identity using patient s name and date of birth. Please see MAR and medication order for additional information. Patient instructed to stay in clinic after the injection but patient declined.      Was entire vial of medication used? Yes  Vial/Syringe: Single dose vial  Expiration Date:  08/2021  Was this medication supplied by the patient? No     Henrietta Martinez LPN

## 2020-07-31 ENCOUNTER — ALLIED HEALTH/NURSE VISIT (OUTPATIENT)
Dept: ALLERGY | Facility: OTHER | Age: 75
End: 2020-07-31
Attending: FAMILY MEDICINE
Payer: COMMERCIAL

## 2020-07-31 DIAGNOSIS — E53.8 VITAMIN B12 DEFICIENCY (NON ANEMIC): Primary | ICD-10-CM

## 2020-07-31 PROCEDURE — 96372 THER/PROPH/DIAG INJ SC/IM: CPT

## 2020-07-31 RX ORDER — CYANOCOBALAMIN 1000 UG/ML
1000 INJECTION, SOLUTION INTRAMUSCULAR; SUBCUTANEOUS ONCE
Status: DISCONTINUED | OUTPATIENT
Start: 2020-07-31 | End: 2020-07-31

## 2020-07-31 RX ADMIN — CYANOCOBALAMIN 1000 MCG: 1000 INJECTION, SOLUTION INTRAMUSCULAR at 15:16

## 2020-07-31 NOTE — PROGRESS NOTES
Clinic Administered Medication Documentation      Injectable Medication Documentation    Patient was given Cyanocobalamin (B-12). Prior to medication administration, verified patients identity using patient s name and date of birth. Please see MAR and medication order for additional information. Patient instructed to stay in clinic after the injection but patient declined.      Was entire vial of medication used? Yes  Vial/Syringe: Single dose vial  Expiration Date: 08/2021     Was this medication supplied by the patient? No

## 2020-08-31 ENCOUNTER — ALLIED HEALTH/NURSE VISIT (OUTPATIENT)
Dept: ALLERGY | Facility: OTHER | Age: 75
End: 2020-08-31
Attending: FAMILY MEDICINE
Payer: COMMERCIAL

## 2020-08-31 DIAGNOSIS — E53.8 VITAMIN B12 DEFICIENCY (NON ANEMIC): Primary | ICD-10-CM

## 2020-08-31 PROCEDURE — 96372 THER/PROPH/DIAG INJ SC/IM: CPT

## 2020-08-31 RX ADMIN — CYANOCOBALAMIN 1000 MCG: 1000 INJECTION, SOLUTION INTRAMUSCULAR at 10:24

## 2020-08-31 NOTE — PROGRESS NOTES
Clinic Administered Medication Documentation      Injectable Medication Documentation    Patient was given Cyanocobalamin (B-12). Prior to medication administration, verified patients identity using patient s name and date of birth. Please see MAR and medication order for additional information. Patient instructed to report any adverse reaction to staff immediately .      Was entire vial of medication used? Yes  Vial/Syringe: Single dose vial  Expiration Date:  08/2021  Was this medication supplied by the patient? No

## 2020-09-22 ENCOUNTER — APPOINTMENT (OUTPATIENT)
Dept: GENERAL RADIOLOGY | Facility: HOSPITAL | Age: 75
End: 2020-09-22
Attending: EMERGENCY MEDICINE
Payer: COMMERCIAL

## 2020-09-22 ENCOUNTER — HOSPITAL ENCOUNTER (EMERGENCY)
Facility: HOSPITAL | Age: 75
Discharge: ANOTHER HOSPITAL | End: 2020-09-22
Attending: NURSE PRACTITIONER | Admitting: NURSE PRACTITIONER
Payer: COMMERCIAL

## 2020-09-22 ENCOUNTER — TRANSFERRED RECORDS (OUTPATIENT)
Dept: HEALTH INFORMATION MANAGEMENT | Facility: CLINIC | Age: 75
End: 2020-09-22

## 2020-09-22 VITALS
SYSTOLIC BLOOD PRESSURE: 160 MMHG | TEMPERATURE: 99.4 F | HEART RATE: 89 BPM | RESPIRATION RATE: 24 BRPM | DIASTOLIC BLOOD PRESSURE: 78 MMHG | WEIGHT: 121 LBS | OXYGEN SATURATION: 100 % | BODY MASS INDEX: 22.68 KG/M2

## 2020-09-22 DIAGNOSIS — I21.4 NSTEMI (NON-ST ELEVATED MYOCARDIAL INFARCTION) (H): ICD-10-CM

## 2020-09-22 LAB
ALBUMIN SERPL-MCNC: 3.7 G/DL (ref 3.4–5)
ALP SERPL-CCNC: 87 U/L (ref 40–150)
ALT SERPL W P-5'-P-CCNC: 37 U/L (ref 0–50)
ANION GAP SERPL CALCULATED.3IONS-SCNC: 4 MMOL/L (ref 3–14)
AST SERPL W P-5'-P-CCNC: 31 U/L (ref 0–45)
BASOPHILS # BLD AUTO: 0.1 10E9/L (ref 0–0.2)
BASOPHILS NFR BLD AUTO: 0.8 %
BILIRUB SERPL-MCNC: 0.3 MG/DL (ref 0.2–1.3)
BUN SERPL-MCNC: 21 MG/DL (ref 7–30)
CALCIUM SERPL-MCNC: 9 MG/DL (ref 8.5–10.1)
CHLORIDE SERPL-SCNC: 102 MMOL/L (ref 94–109)
CO2 SERPL-SCNC: 28 MMOL/L (ref 20–32)
CREAT SERPL-MCNC: 0.71 MG/DL (ref 0.52–1.04)
DIFFERENTIAL METHOD BLD: NORMAL
EOSINOPHIL # BLD AUTO: 0.2 10E9/L (ref 0–0.7)
EOSINOPHIL NFR BLD AUTO: 3.3 %
ERYTHROCYTE [DISTWIDTH] IN BLOOD BY AUTOMATED COUNT: 11.9 % (ref 10–15)
GFR SERPL CREATININE-BSD FRML MDRD: 82 ML/MIN/{1.73_M2}
GLUCOSE SERPL-MCNC: 132 MG/DL (ref 70–99)
HCT VFR BLD AUTO: 36.3 % (ref 35–47)
HGB BLD-MCNC: 13 G/DL (ref 11.7–15.7)
IMM GRANULOCYTES # BLD: 0 10E9/L (ref 0–0.4)
IMM GRANULOCYTES NFR BLD: 0.3 %
LYMPHOCYTES # BLD AUTO: 1.1 10E9/L (ref 0.8–5.3)
LYMPHOCYTES NFR BLD AUTO: 16.5 %
MCH RBC QN AUTO: 31.5 PG (ref 26.5–33)
MCHC RBC AUTO-ENTMCNC: 35.8 G/DL (ref 31.5–36.5)
MCV RBC AUTO: 88 FL (ref 78–100)
MONOCYTES # BLD AUTO: 0.7 10E9/L (ref 0–1.3)
MONOCYTES NFR BLD AUTO: 11 %
NEUTROPHILS # BLD AUTO: 4.4 10E9/L (ref 1.6–8.3)
NEUTROPHILS NFR BLD AUTO: 68.1 %
NRBC # BLD AUTO: 0 10*3/UL
NRBC BLD AUTO-RTO: 0 /100
NT-PROBNP SERPL-MCNC: 348 PG/ML (ref 0–1800)
PLATELET # BLD AUTO: 240 10E9/L (ref 150–450)
POTASSIUM SERPL-SCNC: 4.1 MMOL/L (ref 3.4–5.3)
PROT SERPL-MCNC: 7.7 G/DL (ref 6.8–8.8)
RBC # BLD AUTO: 4.13 10E12/L (ref 3.8–5.2)
SODIUM SERPL-SCNC: 134 MMOL/L (ref 133–144)
TROPONIN I SERPL-MCNC: 0.36 UG/L (ref 0–0.04)
TROPONIN I SERPL-MCNC: 1.2 UG/L (ref 0–0.04)
TSH SERPL DL<=0.005 MIU/L-ACNC: 2.06 MU/L (ref 0.4–4)
WBC # BLD AUTO: 6.4 10E9/L (ref 4–11)

## 2020-09-22 PROCEDURE — 83880 ASSAY OF NATRIURETIC PEPTIDE: CPT | Performed by: EMERGENCY MEDICINE

## 2020-09-22 PROCEDURE — 71045 X-RAY EXAM CHEST 1 VIEW: CPT | Mod: TC

## 2020-09-22 PROCEDURE — 25000132 ZZH RX MED GY IP 250 OP 250 PS 637: Performed by: EMERGENCY MEDICINE

## 2020-09-22 PROCEDURE — 99285 EMERGENCY DEPT VISIT HI MDM: CPT | Mod: Z6 | Performed by: EMERGENCY MEDICINE

## 2020-09-22 PROCEDURE — 93010 ELECTROCARDIOGRAM REPORT: CPT | Performed by: INTERNAL MEDICINE

## 2020-09-22 PROCEDURE — 84443 ASSAY THYROID STIM HORMONE: CPT | Performed by: NURSE PRACTITIONER

## 2020-09-22 PROCEDURE — 96365 THER/PROPH/DIAG IV INF INIT: CPT

## 2020-09-22 PROCEDURE — 93005 ELECTROCARDIOGRAM TRACING: CPT

## 2020-09-22 PROCEDURE — 36415 COLL VENOUS BLD VENIPUNCTURE: CPT | Performed by: NURSE PRACTITIONER

## 2020-09-22 PROCEDURE — 84484 ASSAY OF TROPONIN QUANT: CPT | Performed by: NURSE PRACTITIONER

## 2020-09-22 PROCEDURE — 80053 COMPREHEN METABOLIC PANEL: CPT | Performed by: NURSE PRACTITIONER

## 2020-09-22 PROCEDURE — 85025 COMPLETE CBC W/AUTO DIFF WBC: CPT | Performed by: NURSE PRACTITIONER

## 2020-09-22 PROCEDURE — 99285 EMERGENCY DEPT VISIT HI MDM: CPT | Mod: 25

## 2020-09-22 PROCEDURE — 25000128 H RX IP 250 OP 636: Performed by: EMERGENCY MEDICINE

## 2020-09-22 PROCEDURE — 84484 ASSAY OF TROPONIN QUANT: CPT | Performed by: EMERGENCY MEDICINE

## 2020-09-22 RX ORDER — HEPARIN SODIUM 10000 [USP'U]/100ML
0-3500 INJECTION, SOLUTION INTRAVENOUS CONTINUOUS
Status: DISCONTINUED | OUTPATIENT
Start: 2020-09-22 | End: 2020-09-22

## 2020-09-22 RX ORDER — HEPARIN SODIUM 10000 [USP'U]/100ML
0-3500 INJECTION, SOLUTION INTRAVENOUS CONTINUOUS
Status: DISCONTINUED | OUTPATIENT
Start: 2020-09-22 | End: 2020-09-22 | Stop reason: HOSPADM

## 2020-09-22 RX ORDER — ASPIRIN 81 MG/1
324 TABLET, CHEWABLE ORAL ONCE
Status: COMPLETED | OUTPATIENT
Start: 2020-09-22 | End: 2020-09-22

## 2020-09-22 RX ADMIN — ASPIRIN 324 MG: 81 TABLET, CHEWABLE ORAL at 13:13

## 2020-09-22 RX ADMIN — HEPARIN SODIUM 650 UNITS/HR: 10000 INJECTION, SOLUTION INTRAVENOUS at 15:18

## 2020-09-22 ASSESSMENT — ENCOUNTER SYMPTOMS
CARDIOVASCULAR NEGATIVE: 1
CONSTITUTIONAL NEGATIVE: 1
FEVER: 0
RESPIRATORY NEGATIVE: 1
NEUROLOGICAL NEGATIVE: 1
PSYCHIATRIC NEGATIVE: 1
SHORTNESS OF BREATH: 0
ENDOCRINE NEGATIVE: 1
ALLERGIC/IMMUNOLOGIC NEGATIVE: 1
MUSCULOSKELETAL NEGATIVE: 1
EYES NEGATIVE: 1
GASTROINTESTINAL NEGATIVE: 1
HEMATOLOGIC/LYMPHATIC NEGATIVE: 1

## 2020-09-22 NOTE — ED NOTES
Attempted to call report to [facility] at this time. RN will call back due to fire drill at this time.

## 2020-09-22 NOTE — ED TRIAGE NOTES
Pt c/o high BP. Pt stated that a provider came from [facility] and her BP was increased. He got there at about 9:15 and it was  197/96 and rechecked it 10 minutes later and it was 201/98. Pt states she is not have any symptoms, no chest pain, no headache. Pt states shes did not take any medications this morning. The provider called Dr. Kenna Portillo and told her to come to ER.

## 2020-09-22 NOTE — ED NOTES
[facility]   Cardiac Unit   [address] [address] [address] 2      Tell ambulance to go [facility]

## 2020-09-22 NOTE — ED PROVIDER NOTES
"  History     Chief Complaint   Patient presents with     Hypertension     HPI  Maria Teresa Aburto is a 75 year old female who presents ambulatory with her  to urgent care for concerns of high blood pressure.  She had a physician through [facility] come to the house earlier this morning around 0830 for a routine visit and he noted her blood pressure to be 196/96 and then 10 minutes later it was 201/98.  Patient states that she did go for a walk around 0615 this morning and had at least 1 cup of coffee prior to the home visit.  She denies chest pain, shortness of breath, nausea, vomiting, changes to her vision, dizziness or any weakness.  No history of hypertension.  She notes occasional lightheadedness with activity which usually goes away.    Her PCP was contacted regarding the blood pressure readings and patient was instructed to be evaluated in the emergency department.      Allergies:  Allergies   Allergen Reactions     Nitrofurantoin Other (See Comments) and Nausea     Macrobid  \"Chills and Fevers\"     Nitrofurantoin Macrocrystal Other (See Comments) and Nausea     Macrobid  \"Chills and Fevers\"         Problem List:    Patient Active Problem List    Diagnosis Date Noted     S/P right rotator cuff repair 09/16/2019     Priority: Medium     Vitamin B12 deficiency (non anemic) 09/16/2019     Priority: Medium     Nontraumatic incomplete tear of right rotator cuff 07/31/2019     Priority: Medium     Added automatically from request for surgery [id number]       Nontraumatic complete tear of right rotator cuff 07/31/2019     Priority: Medium     Added automatically from request for surgery [id number]       Actinic keratosis 07/17/2012     Priority: Medium     History of malignant neoplasm of skin 07/17/2012     Priority: Medium     Atypical nevus 07/17/2012     Priority: Medium     History of nonmelanoma skin cancer 07/17/2012     Priority: Medium     Overview:   SCC right leg 2001       Medial meniscus tear " 02/13/2012     Priority: Medium     Overview:   IMO Update 10/11       Restless legs syndrome (RLS) 10/11/2011     Priority: Medium     Bowden's esophagus 10/11/2011     Priority: Medium     B-complex deficiency 10/11/2011     Priority: Medium     Problem list name updated by automated process. Provider to review       Disorder of bone and cartilage 10/11/2011     Priority: Medium     dexa scans odd years starting in 2003  Problem list name updated by automated process. Provider to review       Squamous Cell Carcinoma 10/11/2011     Priority: Medium     left leg x2       GERD (gastroesophageal reflux disease)[530.81] 05/11/2003     Priority: Medium        Past Medical History:    Past Medical History:   Diagnosis Date     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     Bowden's esophagus 10/11/2011     Constipation 10/09/2012     GERD (gastroesophageal reflux disease)[530.81] 05/11/2003     Osteopenia 10/11/2011     Precordial pain 04/28/2003     Restless legs syndrome (RLS) 10/11/2011     Squamous Cell Carcinoma 10/11/2011     Urge incontinence 10/09/2012       Past Surgical History:    Past Surgical History:   Procedure Laterality Date     Breast Biospy  11/11/2000    LEFT > Fibrocystic Disease > Outcome: Fibroadenoma     COLONOSCOPY  2000     COLONOSCOPY  8-7-2009    Normal, Repeat 2015     DEXA Scan  2009     EGD       EGD  2008     EGD  2003     ENDOSCOPY UPPER, COLONOSCOPY, COMBINED N/A 9/6/2018    Procedure: COMBINED ENDOSCOPY UPPER, COLONOSCOPY;  UPPER ENDOSCOPY WITH BIOPSIES AND COLONOSCOPY WITH BIOPSIES;  Surgeon: Minh Interiano DO;  Location: [facility]     Knee Replacement  2012     Oophorectomy      Ectopic Pregnancy     ROTATOR CUFF REPAIR RT/LT Right     with acromioplasty for complete rotator cuff tear, Dr Cherry     TONSILLECTOMY         Family History:    Family History   Problem Relation Age of Onset     Endometrial Cancer Other         Family Hx     Osteoporosis Other         Family Hx     Parkinsonism Other          Family Hx     Unknown/Adopted No family hx of        Social History:  Marital Status:   [2]  Social History     Tobacco Use     Smoking status: Former Smoker     Types: Cigarettes     Smokeless tobacco: Never Used   Substance Use Topics     Alcohol use: Yes     Drug use: Never        Medications:    ASPIRIN CAPS 81 MG OR  Calcium Carbonate-Vitamin D (CALCIUM + D PO)  Cyanocobalamin (VITAMIN B 12 PO)  multivitamin, therapeutic with minerals (MULTI-VITAMIN) TABS          Review of Systems   Cardiovascular:        Elevated blood pressure reading   All other systems reviewed and are negative.      Physical Exam   BP: 159/76  Pulse: 75  Temp: 97.2  F (36.2  C)  Resp: 16  SpO2: 99 %      Physical Exam  Vitals signs and nursing note reviewed.   Constitutional:       Appearance: Normal appearance. She is not ill-appearing or toxic-appearing.   HENT:      Head: Normocephalic.      Right Ear: Tympanic membrane and ear canal normal.      Left Ear: Tympanic membrane and ear canal normal.      Nose: Nose normal. No congestion or rhinorrhea.      Mouth/Throat:      Mouth: Mucous membranes are moist.      Pharynx: No oropharyngeal exudate or posterior oropharyngeal erythema.   Eyes:      Extraocular Movements: Extraocular movements intact.      Pupils: Pupils are equal, round, and reactive to light.   Neck:      Musculoskeletal: Normal range of motion and neck supple.   Cardiovascular:      Rate and Rhythm: Normal rate and regular rhythm.      Heart sounds: Normal heart sounds. No murmur.   Pulmonary:      Effort: Pulmonary effort is normal. No respiratory distress.      Breath sounds: Normal breath sounds. No wheezing.   Musculoskeletal: Normal range of motion.   Skin:     General: Skin is warm and dry.      Capillary Refill: Capillary refill takes less than 2 seconds.   Neurological:      General: No focal deficit present.      Mental Status: She is alert and oriented to person, place, and time.      Cranial  Nerves: No cranial nerve deficit.      Sensory: No sensory deficit.      Motor: No weakness.      Coordination: Coordination normal.      Gait: Gait normal.   Psychiatric:         Mood and Affect: Mood normal.         ED Course   1150: Discussed this patients HPI and EKG findings with Dr Fong.    1220: Troponin 0.362. Updated Dr Fong on lab results. He will assume care of this patient in the adjacent ED.     1225: Updated patient and spouse on current lab findings. Informed them of transfer to ED. Questions answered.     ED Course as of Sep 26 0913   Tue Sep 22, 2020   1415 Notified of second troponin.  Patient denies any chest pain.  [facility] consulted      1430 Case discussed with Dr. Majano of [facility].  Plan admit.  Plan to start heparin drip.        Procedures               EKG Interpretation:      Interpreted by Betina Baeza CNP  Time reviewed: 1138  Symptoms at time of EKG: hypertension   Rhythm: normal sinus   Rate: Normal  Axis: Normal  Ectopy: none  Conduction: normal  ST Segments/ T Waves: Non-specific ST-T wave changes and T wave inversion II, aVL, V5 and V6  Q Waves: none  Comparison to prior: changed    Clinical Impression: NSR with T wave inversions in lateral leads.                Results for orders placed or performed during the hospital encounter of 09/22/20 (from the past 24 hour(s))   CBC with platelets differential   Result Value Ref Range    WBC 6.4 4.0 - 11.0 10e9/L    RBC Count 4.13 3.8 - 5.2 10e12/L    Hemoglobin 13.0 11.7 - 15.7 g/dL    Hematocrit 36.3 35.0 - 47.0 %    MCV 88 78 - 100 fl    MCH 31.5 26.5 - 33.0 pg    MCHC 35.8 31.5 - 36.5 g/dL    RDW 11.9 10.0 - 15.0 %    Platelet Count 240 150 - 450 10e9/L    Diff Method Automated Method     % Neutrophils 68.1 %    % Lymphocytes 16.5 %    % Monocytes 11.0 %    % Eosinophils 3.3 %    % Basophils 0.8 %    % Immature Granulocytes 0.3 %    Nucleated RBCs 0 0 /100    Absolute Neutrophil 4.4 1.6 - 8.3 10e9/L    Absolute Lymphocytes  1.1 0.8 - 5.3 10e9/L    Absolute Monocytes 0.7 0.0 - 1.3 10e9/L    Absolute Eosinophils 0.2 0.0 - 0.7 10e9/L    Absolute Basophils 0.1 0.0 - 0.2 10e9/L    Abs Immature Granulocytes 0.0 0 - 0.4 10e9/L    Absolute Nucleated RBC 0.0    Comprehensive metabolic panel   Result Value Ref Range    Sodium 134 133 - 144 mmol/L    Potassium 4.1 3.4 - 5.3 mmol/L    Chloride 102 94 - 109 mmol/L    Carbon Dioxide 28 20 - 32 mmol/L    Anion Gap 4 3 - 14 mmol/L    Glucose 132 (H) 70 - 99 mg/dL    Urea Nitrogen 21 7 - 30 mg/dL    Creatinine 0.71 0.52 - 1.04 mg/dL    GFR Estimate 82 >60 mL/min/[1.73_m2]    GFR Estimate If Black >90 >60 mL/min/[1.73_m2]    Calcium 9.0 8.5 - 10.1 mg/dL    Bilirubin Total 0.3 0.2 - 1.3 mg/dL    Albumin 3.7 3.4 - 5.0 g/dL    Protein Total 7.7 6.8 - 8.8 g/dL    Alkaline Phosphatase 87 40 - 150 U/L    ALT 37 0 - 50 U/L    AST 31 0 - 45 U/L   Troponin I   Result Value Ref Range    Troponin I ES 0.362 (HH) 0.000 - 0.045 ug/L   TSH   Result Value Ref Range    TSH 2.06 0.40 - 4.00 mU/L       Medications - No data to display    Assessments & Plan (with Medical Decision Making)   75-year-old female that presented to urgent care for concerns of elevated blood pressure during routine home visit with a physician from [facility] this morning.  Patient is asymptomatic.  CBC and BMP unremarkable.    Troponin elevated at 0.362.  Patient remains asymptomatic.  Discussed this patient's HPI and findings with Dr. Fong, in adjacent emergency department. Transferred care of this patient to Dr Fong.    I have reviewed the nursing notes.    I have reviewed the findings, diagnosis, plan and need for follow up with the patient.    9/22/2020   [facility]     Mpofu, Prudence, CNP  09/26/20 0937

## 2020-09-22 NOTE — ED NOTES
No SOB, No chest pain. Pt states no Hx of HTN or cardiac Hx. Cardiac monitor applied. Respirations even and unlabored.

## 2020-09-22 NOTE — ED PROVIDER NOTES
"  History     Chief Complaint   Patient presents with     Hypertension     HPI  Maria Teresa Aburto is a 75 year old female who presents today with complaints of elevated blood pressure.  Patient was seen by a physician who noted her blood pressure was just over 200 systolic.  Patient referred to the ER for further care.  In the ER an EKG was done which showed some inverted T waves noted in lateral leads.  Patient had a troponin level drawn which was elevated at 0.362.  Patient transferred to the ER.  Patient denies any chest pain or shortness of breath.  Patient is otherwise been in her  usual state of health.    Allergies:  Allergies   Allergen Reactions     Nitrofurantoin Other (See Comments) and Nausea     Macrobid  \"Chills and Fevers\"     Nitrofurantoin Macrocrystal Other (See Comments) and Nausea     Macrobid  \"Chills and Fevers\"         Problem List:    Patient Active Problem List    Diagnosis Date Noted     S/P right rotator cuff repair 09/16/2019     Priority: Medium     Vitamin B12 deficiency (non anemic) 09/16/2019     Priority: Medium     Nontraumatic incomplete tear of right rotator cuff 07/31/2019     Priority: Medium     Added automatically from request for surgery [id number]       Nontraumatic complete tear of right rotator cuff 07/31/2019     Priority: Medium     Added automatically from request for surgery [id number]       Actinic keratosis 07/17/2012     Priority: Medium     History of malignant neoplasm of skin 07/17/2012     Priority: Medium     Atypical nevus 07/17/2012     Priority: Medium     History of nonmelanoma skin cancer 07/17/2012     Priority: Medium     Overview:   SCC right leg 2001       Medial meniscus tear 02/13/2012     Priority: Medium     Overview:   IMO Update 10/11       Restless legs syndrome (RLS) 10/11/2011     Priority: Medium     Bowden's esophagus 10/11/2011     Priority: Medium     B-complex deficiency 10/11/2011     Priority: Medium     Problem list name updated by automated " process. Provider to review       Disorder of bone and cartilage 10/11/2011     Priority: Medium     dexa scans odd years starting in 2003  Problem list name updated by automated process. Provider to review       Squamous Cell Carcinoma 10/11/2011     Priority: Medium     left leg x2       GERD (gastroesophageal reflux disease)[530.81] 05/11/2003     Priority: Medium        Past Medical History:    Past Medical History:   Diagnosis Date     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     Bowden's esophagus 10/11/2011     Constipation 10/09/2012     GERD (gastroesophageal reflux disease)[530.81] 05/11/2003     Osteopenia 10/11/2011     Precordial pain 04/28/2003     Restless legs syndrome (RLS) 10/11/2011     Squamous Cell Carcinoma 10/11/2011     Urge incontinence 10/09/2012       Past Surgical History:    Past Surgical History:   Procedure Laterality Date     Breast Biospy  11/11/2000    LEFT > Fibrocystic Disease > Outcome: Fibroadenoma     COLONOSCOPY  2000     COLONOSCOPY  8-7-2009    Normal, Repeat 2015     DEXA Scan  2009     EGD       EGD  2008     EGD  2003     ENDOSCOPY UPPER, COLONOSCOPY, COMBINED N/A 9/6/2018    Procedure: COMBINED ENDOSCOPY UPPER, COLONOSCOPY;  UPPER ENDOSCOPY WITH BIOPSIES AND COLONOSCOPY WITH BIOPSIES;  Surgeon: Minh Interiano DO;  Location: [facility]     Knee Replacement  2012     Oophorectomy      Ectopic Pregnancy     ROTATOR CUFF REPAIR RT/LT Right     with acromioplasty for complete rotator cuff tear, Dr Cherry     TONSILLECTOMY         Family History:    Family History   Problem Relation Age of Onset     Endometrial Cancer Other         Family Hx     Osteoporosis Other         Family Hx     Parkinsonism Other         Family Hx     Unknown/Adopted No family hx of        Social History:  Marital Status:   [2]  Social History     Tobacco Use     Smoking status: Former Smoker     Types: Cigarettes     Smokeless tobacco: Never Used   Substance Use Topics     Alcohol use: Yes     Drug use:  Never        Medications:    ASPIRIN CAPS 81 MG OR  Calcium Carbonate-Vitamin D (CALCIUM + D PO)  Cyanocobalamin (VITAMIN B 12 PO)  multivitamin, therapeutic with minerals (MULTI-VITAMIN) TABS          Review of Systems   Constitutional: Negative.  Negative for fever.   HENT: Negative.    Eyes: Negative.    Respiratory: Negative.  Negative for shortness of breath.    Cardiovascular: Negative.  Negative for chest pain.   Gastrointestinal: Negative.    Endocrine: Negative.    Genitourinary: Negative.    Musculoskeletal: Negative.    Allergic/Immunologic: Negative.    Neurological: Negative.    Hematological: Negative.    Psychiatric/Behavioral: Negative.        Physical Exam   BP: 159/76  Pulse: 75  Temp: 97.2  F (36.2  C)  Resp: 16  SpO2: 99 %      Physical Exam  Constitutional:       General: She is not in acute distress.     Appearance: Normal appearance. She is normal weight.   HENT:      Head: Normocephalic and atraumatic.      Mouth/Throat:      Mouth: Mucous membranes are moist.   Eyes:      Conjunctiva/sclera: Conjunctivae normal.   Neck:      Musculoskeletal: Normal range of motion and neck supple. No neck rigidity.   Cardiovascular:      Rate and Rhythm: Normal rate and regular rhythm.      Pulses: Normal pulses.   Pulmonary:      Effort: Pulmonary effort is normal.      Breath sounds: Normal breath sounds.   Abdominal:      General: Abdomen is flat. There is no distension.      Tenderness: There is no abdominal tenderness.   Musculoskeletal: Normal range of motion.   Skin:     General: Skin is warm.      Capillary Refill: Capillary refill takes less than 2 seconds.   Neurological:      General: No focal deficit present.      Mental Status: She is alert and oriented to person, place, and time.   Psychiatric:         Mood and Affect: Mood normal.         Behavior: Behavior normal.         Thought Content: Thought content normal.         Judgment: Judgment normal.         ED Course     ED Course as of Sep 22  1452   Tue Sep 22, 2020   1415 Notified of second troponin.  Patient denies any chest pain.  [facility] consulted      1430 Case discussed with Dr. Majano of [facility].  Plan admit.  Plan to start heparin drip.        Procedures          EKG - NSR with t wave inversions in lateral leads.          Results for orders placed or performed during the hospital encounter of 09/22/20 (from the past 24 hour(s))   CBC with platelets differential   Result Value Ref Range    WBC 6.4 4.0 - 11.0 10e9/L    RBC Count 4.13 3.8 - 5.2 10e12/L    Hemoglobin 13.0 11.7 - 15.7 g/dL    Hematocrit 36.3 35.0 - 47.0 %    MCV 88 78 - 100 fl    MCH 31.5 26.5 - 33.0 pg    MCHC 35.8 31.5 - 36.5 g/dL    RDW 11.9 10.0 - 15.0 %    Platelet Count 240 150 - 450 10e9/L    Diff Method Automated Method     % Neutrophils 68.1 %    % Lymphocytes 16.5 %    % Monocytes 11.0 %    % Eosinophils 3.3 %    % Basophils 0.8 %    % Immature Granulocytes 0.3 %    Nucleated RBCs 0 0 /100    Absolute Neutrophil 4.4 1.6 - 8.3 10e9/L    Absolute Lymphocytes 1.1 0.8 - 5.3 10e9/L    Absolute Monocytes 0.7 0.0 - 1.3 10e9/L    Absolute Eosinophils 0.2 0.0 - 0.7 10e9/L    Absolute Basophils 0.1 0.0 - 0.2 10e9/L    Abs Immature Granulocytes 0.0 0 - 0.4 10e9/L    Absolute Nucleated RBC 0.0    Comprehensive metabolic panel   Result Value Ref Range    Sodium 134 133 - 144 mmol/L    Potassium 4.1 3.4 - 5.3 mmol/L    Chloride 102 94 - 109 mmol/L    Carbon Dioxide 28 20 - 32 mmol/L    Anion Gap 4 3 - 14 mmol/L    Glucose 132 (H) 70 - 99 mg/dL    Urea Nitrogen 21 7 - 30 mg/dL    Creatinine 0.71 0.52 - 1.04 mg/dL    GFR Estimate 82 >60 mL/min/[1.73_m2]    GFR Estimate If Black >90 >60 mL/min/[1.73_m2]    Calcium 9.0 8.5 - 10.1 mg/dL    Bilirubin Total 0.3 0.2 - 1.3 mg/dL    Albumin 3.7 3.4 - 5.0 g/dL    Protein Total 7.7 6.8 - 8.8 g/dL    Alkaline Phosphatase 87 40 - 150 U/L    ALT 37 0 - 50 U/L    AST 31 0 - 45 U/L   Troponin I   Result Value Ref Range    Troponin I ES 0.362 ()  0.000 - 0.045 ug/L   TSH   Result Value Ref Range    TSH 2.06 0.40 - 4.00 mU/L   Nt probnp inpatient (BNP)   Result Value Ref Range    N-Terminal Pro BNP Inpatient 348 0 - 1,800 pg/mL   Troponin I   Result Value Ref Range    Troponin I ES 1.199 (HH) 0.000 - 0.045 ug/L   XR Chest Port 1 View    Narrative    XR CHEST PORT 1 VW    HISTORY: 75 yearsFemale Patient with complaints of elevated BP and  troponin. R/o enlarged heart.    TECHNIQUE: A single view of the chest was performed    COMPARISON: None    FINDINGS: Heart size and pulmonary vascularity are within normal  limits. Lungs are clear. No consolidating airspace opacities are  present.    Lung volumes are on ice suggesting chronic air trapping      Impression    IMPRESSION: Clear chest. No high lung volumes suggesting chronic air  trapping    IDANIA BOLTON MD       Medications   aspirin (ASA) chewable tablet 324 mg (324 mg Oral Given 9/22/20 1313)       Assessments & Plan (with Medical Decision Making)     75-year-old female who presents today with complaints elevated blood pressure and incidental findings of troponin 0.3662.  Repeated 1 hour later and was 1.199.  Patient denies any chest pain or shortness of breath.  Given marked elevation in troponin, I felt patient needed to be seen by a cardiologist for possible cath.  Case discussed with Dr. Majano of [facility].  He agrees to admit.  Patient to be started on heparin drip.    Due to the nature of this electronic medical record, laboratory results, imaging results, diagnosis, other information and medications reported above may not represent information available to me at the the time of my care and disposition. Medications reported above may have not been ordered by me.     Portions of the record may have been created with voice recognition software. Occasional wrong-word or 'sound-a- like' substitution may have occurred due to the inherent limitations of voice recognition software. Though the chart has  been reviewed, there may be inadvertent transcription errors. Read the chart carefully and recognize, using context, where substitutions have occurred.       New Prescriptions    No medications on file       Final diagnoses:   NSTEMI (non-ST elevated myocardial infarction) (H)       9/22/2020   [facility]     Joann Fong MD  09/22/20 2024

## 2020-09-22 NOTE — ED TRIAGE NOTES
Pt stated that a provider from [facility] came to her house and her BP was increased.  192/76. He had contacted Dr.S Portillo about this and they encouraged pt to go the ED. Pt denies any chest pain or tightness. Denies any headache.  Pt is alert and ornt.

## 2020-09-22 NOTE — ED NOTES
DATE:  9/22/2020   TIME OF RECEIPT FROM LAB:  1218  LAB TEST:  Troponin  LAB VALUE:  0.362  RESULTS GIVEN WITH READ-BACK TO (PROVIDER):  Prudence  TIME LAB VALUE REPORTED TO PROVIDER:   122

## 2020-09-23 ENCOUNTER — TRANSFERRED RECORDS (OUTPATIENT)
Dept: HEALTH INFORMATION MANAGEMENT | Facility: CLINIC | Age: 75
End: 2020-09-23

## 2020-09-23 LAB — EJECTION FRACTION: NORMAL %

## 2020-09-24 ENCOUNTER — TRANSFERRED RECORDS (OUTPATIENT)
Dept: HEALTH INFORMATION MANAGEMENT | Facility: CLINIC | Age: 75
End: 2020-09-24

## 2020-09-28 PROBLEM — I21.4 NSTEMI (NON-ST ELEVATED MYOCARDIAL INFARCTION) (H): Status: ACTIVE | Noted: 2020-09-28

## 2020-09-28 PROBLEM — I51.81 TAKOTSUBO CARDIOMYOPATHY: Status: ACTIVE | Noted: 2020-09-22

## 2020-09-29 NOTE — PROGRESS NOTES
Subjective     Maria Teresa Aburto is a 75 year old female who presents to clinic today for the following health issues:    HPI         Hospital Follow-up Visit:    Hospital/Nursing Home/IP Rehab Facility: [facility]  Date of Admission: 9/22/20  Date of Discharge: 9/24/20  Reason(s) for Admission: NSTEMI, angiogram done.       Was your hospitalization related to COVID-19? No   Problems taking medications regularly:  None  Medication changes since discharge: Lipitor 40 mg daily, Coreg 3.125 mg daily, Plavix 75 mg daily, Lisinopril 2.5 mg daily  Problems adhering to non-medication therapy:  Dry cough    Summary of hospitalization:  See outside records, reviewed and scanned  Diagnostic Tests/Treatments reviewed.  Follow up needed: none  Other Healthcare Providers Involved in Patient s Care:         [facility]  Update since discharge: improved.       Post Discharge Medication Reconciliation: discharge medications reconciled, continue medications without change.  Plan of care communicated with patient              She is doing well, no symptoms of chest pain, shortness of breath  She has started phase 2 of cardiac rehab and will be seeing cardiology back in 2 weeks    Review of Systems   Constitutional, HEENT, cardiovascular, pulmonary, gi and gu systems are negative, except as otherwise noted.      Objective    There were no vitals taken for this visit.  There is no height or weight on file to calculate BMI.  Physical Exam   GENERAL: healthy, alert and no distress  NECK: no adenopathy, no asymmetry, masses, or scars and thyroid normal to palpation  RESP: lungs clear to auscultation - no rales, rhonchi or wheezes  CV: regular rate and rhythm, normal S1 S2, no S3 or S4, no murmur, click or rub, no peripheral edema and peripheral pulses strong  MS: no gross musculoskeletal defects noted, no edema  NEURO: Normal strength and tone, mentation intact and speech normal  PSYCH: mentation appears normal, affect  normal/bright            Assessment & Plan     NSTEMI (non-ST elevated myocardial infarction) (H)  Doing well  Continue medications  Continue cardiac rehab phase 2  Schedule physical next available    Takotsubo cardiomyopathy  stable    ACE-inhibitor cough  Most likely  Due to lisinopril  Discuss at cardiology follow up                  Kenna Portillo MD  [facility] [place]

## 2020-09-30 ENCOUNTER — ALLIED HEALTH/NURSE VISIT (OUTPATIENT)
Dept: ALLERGY | Facility: OTHER | Age: 75
End: 2020-09-30
Attending: FAMILY MEDICINE
Payer: COMMERCIAL

## 2020-09-30 DIAGNOSIS — Z23 NEED FOR PROPHYLACTIC VACCINATION AND INOCULATION AGAINST INFLUENZA: Primary | ICD-10-CM

## 2020-09-30 PROCEDURE — 96372 THER/PROPH/DIAG INJ SC/IM: CPT

## 2020-09-30 PROCEDURE — G0008 ADMIN INFLUENZA VIRUS VAC: HCPCS

## 2020-09-30 PROCEDURE — 90662 IIV NO PRSV INCREASED AG IM: CPT

## 2020-09-30 RX ADMIN — CYANOCOBALAMIN 1000 MCG: 1000 INJECTION, SOLUTION INTRAMUSCULAR at 11:14

## 2020-09-30 NOTE — PROGRESS NOTES
Clinic Administered Medication Documentation      Injectable Medication Documentation    Patient was given Cyanocobalamin (B-12). Prior to medication administration, verified patients identity using patient s name and date of birth. Please see MAR and medication order for additional information. Patient instructed to stay in clinic after the injection but patient declined.      Was entire vial of medication used? Yes  Vial/Syringe: Single dose vial  Expiration Date:  9/21  Was this medication supplied by the patient? No       Given in left deltoid      CHRISTOPHER INFANTE LPN

## 2020-10-06 DIAGNOSIS — I21.4 NSTEMI (NON-ST ELEVATED MYOCARDIAL INFARCTION) (H): Primary | ICD-10-CM

## 2020-10-08 ENCOUNTER — OFFICE VISIT (OUTPATIENT)
Dept: FAMILY MEDICINE | Facility: OTHER | Age: 75
End: 2020-10-08
Attending: FAMILY MEDICINE
Payer: COMMERCIAL

## 2020-10-08 VITALS
WEIGHT: 121 LBS | HEART RATE: 56 BPM | HEIGHT: 62 IN | DIASTOLIC BLOOD PRESSURE: 74 MMHG | RESPIRATION RATE: 19 BRPM | SYSTOLIC BLOOD PRESSURE: 126 MMHG | BODY MASS INDEX: 22.26 KG/M2

## 2020-10-08 DIAGNOSIS — I21.4 NSTEMI (NON-ST ELEVATED MYOCARDIAL INFARCTION) (H): Primary | ICD-10-CM

## 2020-10-08 DIAGNOSIS — R05.8 ACE-INHIBITOR COUGH: ICD-10-CM

## 2020-10-08 DIAGNOSIS — I51.81 TAKOTSUBO CARDIOMYOPATHY: ICD-10-CM

## 2020-10-08 DIAGNOSIS — T46.4X5A ACE-INHIBITOR COUGH: ICD-10-CM

## 2020-10-08 PROCEDURE — G0463 HOSPITAL OUTPT CLINIC VISIT: HCPCS | Mod: 25

## 2020-10-08 PROCEDURE — G0463 HOSPITAL OUTPT CLINIC VISIT: HCPCS

## 2020-10-08 PROCEDURE — 99213 OFFICE O/P EST LOW 20 MIN: CPT | Performed by: FAMILY MEDICINE

## 2020-10-08 RX ORDER — CLOPIDOGREL BISULFATE 75 MG/1
75 TABLET ORAL AT BEDTIME
COMMUNITY
Start: 2020-09-24 | End: 2021-09-28

## 2020-10-08 RX ORDER — OMEGA-3 FATTY ACIDS/FISH OIL 300-1000MG
200 CAPSULE ORAL
COMMUNITY
End: 2021-05-04

## 2020-10-08 RX ORDER — CARVEDILOL 3.12 MG/1
TABLET ORAL
COMMUNITY
Start: 2020-09-24 | End: 2021-09-28

## 2020-10-08 RX ORDER — ATORVASTATIN CALCIUM 40 MG/1
40 TABLET, FILM COATED ORAL AT BEDTIME
COMMUNITY
Start: 2020-09-24 | End: 2021-06-14

## 2020-10-08 RX ORDER — LISINOPRIL 2.5 MG/1
2.5 TABLET ORAL AT BEDTIME
COMMUNITY
Start: 2020-09-24 | End: 2021-05-04

## 2020-10-08 ASSESSMENT — PAIN SCALES - GENERAL: PAINLEVEL: NO PAIN (0)

## 2020-10-08 ASSESSMENT — MIFFLIN-ST. JEOR: SCORE: 984.16

## 2020-10-08 NOTE — NURSING NOTE
"Chief Complaint   Patient presents with     Hospital F/U       Initial /74   Pulse 56   Resp 19   Ht 1.562 m (5' 1.5\")   Wt 54.9 kg (121 lb)   BMI 22.49 kg/m   Estimated body mass index is 22.49 kg/m  as calculated from the following:    Height as of this encounter: 1.562 m (5' 1.5\").    Weight as of this encounter: 54.9 kg (121 lb).  Medication Reconciliation: complete  Kelly Knox LPN    "

## 2020-10-12 ENCOUNTER — HOSPITAL ENCOUNTER (OUTPATIENT)
Dept: CARDIAC REHAB | Facility: HOSPITAL | Age: 75
Setting detail: THERAPIES SERIES
End: 2020-10-12
Attending: FAMILY MEDICINE
Payer: COMMERCIAL

## 2020-10-12 VITALS — HEIGHT: 64 IN | BODY MASS INDEX: 20.49 KG/M2 | WEIGHT: 120 LBS

## 2020-10-12 DIAGNOSIS — I21.4 NSTEMI (NON-ST ELEVATED MYOCARDIAL INFARCTION) (H): ICD-10-CM

## 2020-10-12 PROCEDURE — 999N000109 HC STATISTIC OP CR VISIT

## 2020-10-12 PROCEDURE — 93798 PHYS/QHP OP CAR RHAB W/ECG: CPT

## 2020-10-12 ASSESSMENT — 6 MINUTE WALK TEST (6MWT)
PREDICTED: 1465.4
TOTAL DISTANCE WALKED (FT): 1540
MALE CALC: 1456.51
GENDER SELECTION: FEMALE
FEMALE CALC: 1462.27

## 2020-10-12 ASSESSMENT — MIFFLIN-ST. JEOR: SCORE: 1019.32

## 2020-10-12 NOTE — PROGRESS NOTES
10/12/20 1200   Session   Session Initial Evaluation and Exercise Prescription   Certified through this date 11/10/20   Cardiac Rehab Assessment   Cardiac Rehab Assessment 10/12: Pt has been initiated into Phase II cardiac rehab following discharge from [facility] on Sept 24th, 2020 for NSTEMI. Overall, pt is doing well post cath procedure and has resumed previous home exercise program of walking 2 miles most days of the week with their spouse. Pt denies cardiac symptoms following discharge but upon reflection of their day of admission to hospital, pt states they are aware of ongoing chest discomfort over the proceeding months. Prior to ED admission, pt was seen by a MD for insurance policy and upon blood pressure reading PCP was called for assessment. PCP advised pt to be seen in the ED for blood pressure reading of 200/98 without the knowledge of symptoms. ED evaluated blood pressure, EKG, and lab values prior to sending pt to [facility] for NSTEMI.     Pt is agreeable to attend Phase II rehab 3 d/week and will adjust sessions if they choose at a later date. Overall, pt is prepared to start rehab and will discuss the length of their program in the coming weeks. Staff and pt will discuss their desire to attend an allotted number of sessions as their rehab progresses.     The patient's history and clinical status including hemodynamics and ECG were evaluated. The patient was assessed to be stable and appropriate to begin exercise.   The patient's functional capacity and exercise prescription were determined by the completion of the 6 minute walk test. See results above. The patient was oriented to the program.  Risk factor profile was completed. Goals and objectives were discussed. CV response was WNL. No symptoms, complaints or pain were reported. Good prognosis for reaching goals below. Skilled therapy is necessary in order to monitor CV response to exercise, to provide education on risk factors and behavior  "change counseling needed to achieve patient's goals.  Plan to progress to 30-40 minutes of exercise prior to discharge from cardiac rehab.  Initial THR of 20-30 beats above RHR; Effort rating of 4-6. Initiate muscle conditioning as appropriate. Provide risk factor education and behavior change counseling.    General Information   Treatment Diagnosis NSTEMI   Date of Treatment Diagnosis 09/22/20   Significant Past CV History None   Other Medical History GERD, R rotator cuff, actinic keratosis, Bowden's Esophagus, squamous cell carcinoma, atypical nevus, RLS, medial meniscus tear, disorder of bone and cartilage   Lead up symptoms 10/12: Pt was been seen by a MD for insurance and noted BP was elevated at 200/98. Pt states they did not have symptoms but upon reflection pt states they did have a \"tight chest\" that was common over the course of the previous few months.    Hospital Location [facility] Discharge Date 09/24/20   Signs and Symptoms Post Hospital Discharge None   Outpatient Cardiac Rehab Start Date 10/12/20   Primary Physician Dr. TONI Portillo   Primary Physician Follow Up Completed   Specialist Dr. 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 No   Have you fallen and had an injury in the past year? No   Timed Up and Go score (seconds) NA   Is patient a fall risk? No   Fall screen comments 10/12: Pt is not " "at risk for falls and does not need a referral to PT at this time. Staff will continue monitor and assess.   Abuse Screen (yes response referral indicated)   Feels Unsafe at Home or Work/School no   Feels Threatened by Someone no   Does Anyone Try to Keep You From Having Contact with Others or Doing Things Outside Your Home? no   Physical Signs of Abuse Present no   Pain   Patient Currently in Pain No   Physical Assessments   Incisions WNL   Edema None   Right Lung Sounds not assessed   Left Lung Sounds not assessed   Limitations Orthopedic  (L shoulder pain)   Comments 10/12: Pt has had a R shoulder cuff repair which has returned to normal function but pt has difficulty using their L shoulder. Staff will limit movements with L shoulder.    Individualized Treatment Plan   Monitored Sessions Scheduled 24   Monitored Sessions Attended 1   Oxygen   Supplemental Oxygen needed No   Nutrition Management - Weight Management   Assessment Initial Assessment   Age 75   Weight 54.4 kg (120 lb)   Height 1.626 m (5' 4\")   BMI (Calculated) 20.6   Initial Rate Your Plate Score. Dietary tool to assess eating patterns. Scores range from 24 to 72. The higher the score the healthier the eating pattern. 58   Weight Management Comments 10/12: Pt does not focus on weight loss or gain but is content with their current weight management.    Nutrition Management - Lipids   Lipids Labs Not Available   Prescribed Lipid Medication Yes   Statin Intensity High Intensity   Lipid Comments 10/12: Pt is compliant with all medications prescribed and does not suffer from muscle cramping or fatigue from the prescibed medications.    Nutrition Management - Diabetes   Diabetes No   Nutrition Management Summary   Dietary Recommendations Low Fat;Low Cholesterol;Low Sodium   Stages of Change for Diet Compliance Preparation   Interventions Planned Instruct on Label Reading   Nutrition Summary Comments 10/12: Pt will be provided educational handouts " pertaining to heart healthy diet as they attend Phase II rehab but currently, pt denies the need for resources.    Nutrition Target Outcome BMI < 25   Psychosocial Management   Psychosocial Assessment Initial   Is there history of clinical depression or increased risk of depression? No previous history   Current Level of Stress per Patient Report Moderate    Current Coping Skills Has Positive Support System   Initial Patient Health Questionnaire -9 Score (PHQ-9) for depression. 5-9 Minimal symptoms, 10-14 Minor depression, 15-19 Major depression, moderately severe, > 20 Major depression, severe  1   Initial [facility] Survey score.  Quality of Life:   If total score > 25 review individual areas where patient rated a 4 or 5.  Consider patients current medical condition and what role that plays on the score.   Adjust treatment protocol to improve areas of concern.  Consider the following:  PHQ9 score, DASI, and re-assessment within the next 30 days to assist with developing treatments.  15   Stages of Change Preparation   Interventions Planned Patient to verbalize understanding of negative impact of stress to personal health   Interventions In Progress or Completed Patient verbalizes understanding of negative impact of stress to personal health   Patient Goal No   Psychosocial Comments 10/12: Pt is aware of managing stress to reduce the chance of having another cardiac episode. Since discharge, pt has been trying to relax and focus on things they can manage.   Psychosocial Target Outcome Identify absence or presence of depression using valid screening tool   Other Core Components - Hypertension   History of or Diagnosis of Hypertension No   Currently taking Anti-Hypertensives Yes;Beta blocker;Ace Inhibitor   Hypertension Comments 10/12: Pt to cardiac event, pt denied medication prescription for hypertension.    Other Core Components - Tobacco   History of Tobacco Use Yes   Quit Date or Planned Quit Date 01/01/66    Tobacco Use Status Former (Quit > 6 mo ago)   Tobacco Habit Cigarettes   Tobacco Use per Day (average) 1ppd   Years of Tobacco Use 4   Stages of Change Maintenance   Tobacco Comments 10/12: Pt denies the need to smoke and will not be temoted to start smoking in the future.    Other Core Components Summary   Interventions Planned None   Interventions In Progress or Completed None: Patient remains in maintenance for smoking cessation   Patient Goals No   Other Core Components Comments 10/12: Pt does not need additional assistance with core components at this time.    Other Core Components Target Outcome BP < 140/90 or < 130/80 with DM or CKD   Activity/Exercise History   Activity/Exercise Assessment Initial   Activity/Exercise Status prior to event? Participated in an Exercise Program   Number of Days Currently participating in Moderate Physical Activity? 5-7   Number of Days Currently performing  Aerobic Exercise (including rehab)? 5-7   Number of Minutes per Session Currently of Aerobic Exercise (average)? 40   Current Stage of Change (Physical Activity) Action   Current Stage of Change (Aerobic Exercise) Action   Patient Goals Goal #1   Goal #1 Description 10/12: By attending Phase II rehab 3 d/week pt would like to build strength during their scheduled 60 minute sessions.    Goal #1 Target Date 12/25/20   Goal #1 Progress Towards Goal 10/12: Staff will introduce pt the cardiac gym and discuss options to build their strength to achieve their personal cardiac rehab goal.    Activity/Exercise Comments 10/12: Pt has continued to perform their HEP following discharge from [facility].   Activity/Exercise Target Outcome An Accumulation of 150  Minutes of Aerobic Activity per Week   Exercise Assessment   6 Minute Walk Predicted - Gender Selection Female   6 Minute Walk Predicted (Male) 1456.51   6 Minute Walk Predicted (Female) 1462.27   Initial 6 Minute Walk Distance (Feet) 1540 ft   Resting HR 73 bpm   Exercise HR  106 bpm   Post Exercise HR 79 bpm   Resting /79   Exercise /70   Post Exercise /70   Pre SpO2 100   While Exercising SpO2 100   Post SpO2 100   Effort Rating 3   Current MET Level 3.2   MET Level Goal 4-5   ECG Rhythm Sinus rhythm   Ectopy None   Current Symptoms Denies symptoms   Limitations/Restrictions Orthopedic (see comments)  (L shoulder )   Exercise Prescription   Mode Treadmill;Nustep;Recumbant bike;Arm Ergometer;Weights;Rowing machine;Upright bike   Duration/Time 30-45 min   Frequency 3 daysweek   THR (85% of age predicted max HR) 122.4   OMNI Effort Rating (0-10 Scale) 4-6/10   Progression Total exercise time of 30-45 minutes   Comments 10/12: Pt will be introduced to the cardiac rehab gym upon their next session.    Recommended Home Exercise   Type of Exercise Walking   Frequency (days per week) 4-6   Duration (minutes per session) 30-45 min   Effort Rating Recommended 4-6/10   30 Day Exercise Plan 10/12: Pt has been encouraged to continue walking program of 1-2 miles most days of the week.    Current Home Exercise   Type of Exercise Walking   Frequency (days per week) 5-7   Duration (minutes per session) 40   Follow-up/On-going Support   Provider follow-up needed on the following No follow-up needed   Learning Assessment   Learner Patient   Primary Language English   Preferred Learning Style Listening;Reading;Demonstration;Pictures/Video   Barriers to Learning No barriers noted   Patient Education   Education recommended Anatomy and Physiology of the Heart;Blood Pressure;Exercise Principles;Heart Failure;Medication Overview;Muscle Conditioning;Nutrition;Risk Factors;Stress Management   Education Comments 10/12: Staff will provide pt with weekly Power Points to learn about Heart Risk Factors.    Physician cosignature/electronic signature indicates approval of this ITP document. I have established, reviewed and made necessary changes to the individualized treatment plan and exercise  prescription for this patient.

## 2020-10-14 ENCOUNTER — HOSPITAL ENCOUNTER (OUTPATIENT)
Dept: CARDIAC REHAB | Facility: HOSPITAL | Age: 75
Setting detail: THERAPIES SERIES
End: 2020-10-14
Attending: FAMILY MEDICINE
Payer: COMMERCIAL

## 2020-10-14 PROCEDURE — 999N000109 HC STATISTIC OP CR VISIT

## 2020-10-14 PROCEDURE — 93798 PHYS/QHP OP CAR RHAB W/ECG: CPT

## 2020-10-16 ENCOUNTER — HOSPITAL ENCOUNTER (OUTPATIENT)
Dept: CARDIAC REHAB | Facility: HOSPITAL | Age: 75
Setting detail: THERAPIES SERIES
End: 2020-10-16
Attending: FAMILY MEDICINE
Payer: COMMERCIAL

## 2020-10-16 PROCEDURE — 93798 PHYS/QHP OP CAR RHAB W/ECG: CPT

## 2020-10-16 PROCEDURE — 999N000109 HC STATISTIC OP CR VISIT

## 2020-10-19 ENCOUNTER — HOSPITAL ENCOUNTER (OUTPATIENT)
Dept: CARDIAC REHAB | Facility: HOSPITAL | Age: 75
Setting detail: THERAPIES SERIES
End: 2020-10-19
Attending: FAMILY MEDICINE
Payer: COMMERCIAL

## 2020-10-19 PROCEDURE — 999N000109 HC STATISTIC OP CR VISIT

## 2020-10-19 PROCEDURE — 93798 PHYS/QHP OP CAR RHAB W/ECG: CPT

## 2020-10-19 NOTE — PROGRESS NOTES
"SUBJECTIVE:   Maria Teresa Aburto is a 75 year old female who presents for Preventive Visit.  Patient has been advised of split billing requirements and indicates understanding: No  Are you in the first 12 months of your Medicare Part B coverage?  No    Physical Health:    In general, how would you rate your overall physical health? good    Outside of work, how many days during the week do you exercise? 6-7 days/week    Outside of work, approximately how many minutes a day do you exercise?30-45 minutes    If you drink alcohol do you typically have >3 drinks per day or >7 drinks per week? No    Do you usually eat at least 4 servings of fruit and vegetables a day, include whole grains & fiber and avoid regularly eating high fat or \"junk\" foods? Yes    Do you have any problems taking medications regularly?  YES    Do you have any side effects from medications? none    Needs assistance for the following daily activities: no assistance needed    Which of the following safety concerns are present in your home?  none identified     Hearing impairment: No    In the past 6 months, have you been bothered by leaking of urine? yes    Mental Health:    In general, how would you rate your overall mental or emotional health? good  PHQ-2 Score:      Do you feel safe in your environment? Yes    Have you ever done Advance Care Planning? (For example, a Health Directive, POLST, or a discussion with a medical provider or your loved ones about your wishes): No, advance care planning information given to patient to review.  Patient plans to discuss their wishes with loved ones or provider.      Additional concerns to address?  No    Fall risk:  Fallen 2 or more times in the past year?: No  Any fall with injury in the past year?: No     Cognitive Screenin) Repeat 3 items (Leader, Season, Table)    2) Clock draw: NORMAL  3) 3 item recall: Recalls 3 objects  Results: 3 items recalled: COGNITIVE IMPAIRMENT LESS LIKELY    Mini-CogTM " Copyright SARA Singer. Licensed by the author for use in [facility]; reprinted with permission (amelia@[facility]). All rights reserved.      Do you have sleep apnea, excessive snoring or daytime drowsiness?: no      She had a NSTEMI with Takotsubo cardiomyopathy 2020. She is continuing with cardiac rehab and doing well. She is due for a follow up echo at [facility] on November 15. She had developed an ace inhibitor cough and was changed to losartan. The cough has improved somewhat.       Reviewed and updated as needed this visit by clinical staff                 Reviewed and updated as needed this visit by Provider                Social History     Tobacco Use     Smoking status: Former Smoker     Packs/day: 1.00     Years: 4.00     Pack years: 4.00     Types: Cigarettes     Start date:      Quit date:      Years since quittin.8     Smokeless tobacco: Never Used   Substance Use Topics     Alcohol use: Yes                           Current providers sharing in care for this patient include:   Patient Care Team:  Kenna Portillo MD as PCP - General  Kenna Portillo MD as Assigned PCP    The following health maintenance items are reviewed in Epic and correct as of today:  Health Maintenance   Topic Date Due     ZOSTER IMMUNIZATION (2 of 3) 2014     ADVANCE CARE PLANNING  10/23/2019     PHQ-2  2020     RAKAN ASSESSMENT  2020     PHQ-9  2020     MEDICARE ANNUAL WELLNESS VISIT  2020     FALL RISK ASSESSMENT  2021     DTAP/TDAP/TD IMMUNIZATION (3 - Td) 10/11/2021     LIPID  2023     COLORECTAL CANCER SCREENING  2028     DEXA  Completed     INFLUENZA VACCINE  Completed     Pneumococcal Vaccine: 65+ Years  Completed     Pneumococcal Vaccine: Pediatrics (0 to 5 Years) and At-Risk Patients (6 to 64 Years)  Aged Out     IPV IMMUNIZATION  Aged Out     MENINGITIS IMMUNIZATION  Aged Out     HEPATITIS B IMMUNIZATION  Aged Out     BP Readings from Last 3 Encounters:    20 122/58   10/08/20 126/74   20 160/78    Wt Readings from Last 3 Encounters:   20 54.4 kg (120 lb)   10/12/20 54.4 kg (120 lb)   10/08/20 54.9 kg (121 lb)                  Patient Active Problem List   Diagnosis     Restless legs syndrome (RLS)     Bowden's esophagus     B-complex deficiency     Disorder of bone and cartilage     Squamous Cell Carcinoma     GERD (gastroesophageal reflux disease)[530.81]     Actinic keratosis     History of malignant neoplasm of skin     Atypical nevus     Medial meniscus tear     History of nonmelanoma skin cancer     Nontraumatic incomplete tear of right rotator cuff     Nontraumatic complete tear of right rotator cuff     S/P right rotator cuff repair     Vitamin B12 deficiency (non anemic)     Takotsubo cardiomyopathy     NSTEMI (non-ST elevated myocardial infarction) (H)     ACE-inhibitor cough     Past Surgical History:   Procedure Laterality Date     Breast Biospy  2000    LEFT > Fibrocystic Disease > Outcome: Fibroadenoma     COLONOSCOPY       COLONOSCOPY  2009    Normal, Repeat      DEXA Scan       EGD       EGD       EGD       ENDOSCOPY UPPER, COLONOSCOPY, COMBINED N/A 2018    Procedure: COMBINED ENDOSCOPY UPPER, COLONOSCOPY;  UPPER ENDOSCOPY WITH BIOPSIES AND COLONOSCOPY WITH BIOPSIES;  Surgeon: Minh Interiano DO;  Location: [facility]     Knee Replacement       Oophorectomy      Ectopic Pregnancy     ROTATOR CUFF REPAIR RT/LT Right     with acromioplasty for complete rotator cuff tear, Dr Cherry     TONSILLECTOMY         Social History     Tobacco Use     Smoking status: Former Smoker     Packs/day: 1.00     Years: 4.00     Pack years: 4.00     Types: Cigarettes     Start date:      Quit date:      Years since quittin.8     Smokeless tobacco: Never Used   Substance Use Topics     Alcohol use: Yes     Family History   Problem Relation Age of Onset     Endometrial Cancer Other         Family Hx      "Osteoporosis Other         Family Hx     Parkinsonism Other         Family Hx     Unknown/Adopted No family hx of          Current Outpatient Medications   Medication Sig Dispense Refill     ASPIRIN CAPS 81 MG OR one capsule by mouth daily 100 3     atorvastatin (LIPITOR) 40 MG tablet Take 40 mg by mouth At Bedtime       Calcium Carbonate-Vitamin D (CALCIUM + D PO)        carvedilol (COREG) 3.125 MG tablet TAKE 1 TABLET BY MOUTH TWICE DAILY WITH MEALS       clopidogrel (PLAVIX) 75 MG tablet Take 75 mg by mouth At Bedtime       Cyanocobalamin (VITAMIN B 12 PO) Injection [id number]       ibuprofen (ADVIL/MOTRIN) 200 MG capsule Take 200 mg by mouth       losartan (COZAAR) 25 MG tablet Take 25 mg by mouth daily       multivitamin, therapeutic with minerals (MULTI-VITAMIN) TABS Take 1 tablet by mouth daily       zoster vaccine recombinant adjuvanted (SHINGRIX) injection Inject 0.5 mLs into the muscle once for 1 dose 0.5 mL 0     lisinopril (ZESTRIL) 2.5 MG tablet Take 2.5 mg by mouth At Bedtime       Allergies   Allergen Reactions     Nitrofurantoin Other (See Comments) and Nausea     Macrobid  \"Chills and Fevers\"     Nitrofurantoin Macrocrystal Other (See Comments) and Nausea     Macrobid  \"Chills and Fevers\"       Mammogram Screening: due in December, 2020    ROS:  Constitutional, HEENT, cardiovascular, pulmonary, GI, , musculoskeletal, neuro, skin, endocrine and psych systems are negative, except as otherwise noted.    OBJECTIVE:   There were no vitals taken for this visit. Estimated body mass index is 20.6 kg/m  as calculated from the following:    Height as of 10/12/20: 1.626 m (5' 4\").    Weight as of 10/12/20: 54.4 kg (120 lb).  EXAM:   GENERAL APPEARANCE: healthy, alert and no distress  EYES: Eyes grossly normal to inspection, PERRL and conjunctivae and sclerae normal  HENT: ear canals and TM's normal, nose and mouth without ulcers or lesions, oropharynx clear and oral mucous membranes moist  NECK: no adenopathy, " no asymmetry, masses, or scars and thyroid normal to palpation  RESP: lungs clear to auscultation - no rales, rhonchi or wheezes  BREAST: normal without masses, tenderness or nipple discharge and no palpable axillary masses or adenopathy  CV: regular rate and rhythm, normal S1 S2, no S3 or S4, no murmur, click or rub, no peripheral edema and peripheral pulses strong, no carotid bruits or abdominal bruits noted   ABDOMEN: soft, nontender, no hepatosplenomegaly, no masses and bowel sounds normal  MS: no musculoskeletal defects are noted and gait is age appropriate without ataxia  SKIN: no suspicious lesions or rashes  NEURO: Normal strength and tone, sensory exam grossly normal, mentation intact and speech normal  PSYCH: mentation appears normal and affect normal/bright        ASSESSMENT / PLAN:   1. Routine general medical examination at a health care facility  Doing well     2. NSTEMI (non-ST elevated myocardial infarction) (H)  Recheck lipids in late December or early January 2021. She will need a lab appointment for this   - Lipid Profile; Standing    3. Takotsubo cardiomyopathy  Stable, continued follow up with [facility] cardiology     4. Restless legs syndrome (RLS)  Stable     5. Bowden's esophagus without dysplasia  Stable     6. Gastroesophageal reflux disease without esophagitis  Without symptoms     7. Vitamin B12 deficiency (non anemic)  Continue injections. This is difficult when she is in [place] as she isn't able to get them done there. It is ok to go without during that time     8. Immunization due  Given today  - zoster vaccine recombinant adjuvanted (SHINGRIX) injection; Inject 0.5 mLs into the muscle once for 1 dose  Dispense: 0.5 mL; Refill: 0  - ADMIN 1st VACCINE    9. Encounter for hepatitis C screening test for low risk patient  Discussed, declined     10. Encounter for screening mammogram for breast cancer  Due in December - [facility] SCREENING DIGITAL BILATERAL ([place]); Future    11.  "ACE-inhibitor cough  Changed to Losartan       COUNSELING:  Reviewed preventive health counseling, as reflected in patient instructions       Regular exercise       Healthy diet/nutrition       Immunizations    Vaccinated for: Zoster             Hepatitis C screening    Estimated body mass index is 20.6 kg/m  as calculated from the following:    Height as of 10/12/20: 1.626 m (5' 4\").    Weight as of 10/12/20: 54.4 kg (120 lb).        She reports that she quit smoking about 54 years ago. Her smoking use included cigarettes. She started smoking about 58 years ago. She has a 4.00 pack-year smoking history. She has never used smokeless tobacco.    Appropriate preventive services were discussed with this patient, including applicable screening as appropriate for cardiovascular disease, diabetes, osteopenia/osteoporosis, and glaucoma.  As appropriate for age/gender, discussed screening for colorectal cancer, prostate cancer, breast cancer, and cervical cancer. Checklist reviewing preventive services available has been given to the patient.    Reviewed patients plan of care and provided an AVS. The Basic Care Plan (routine screening as documented in Health Maintenance) for Maria Teresa meets the Care Plan requirement. This Care Plan has been established and reviewed with the Patient.    Counseling Resources:  ATP IV Guidelines  Pooled Cohorts Equation Calculator  Breast Cancer Risk Calculator  BRCA-Related Cancer Risk Assessment: FHS-7 Tool  FRAX Risk Assessment  ICSI Preventive Guidelines  Dietary Guidelines for Americans, 2010  USDA's MyPlate  ASA Prophylaxis  Lung CA Screening    Kenna Portillo MD  [facility] [place]  "

## 2020-10-19 NOTE — PATIENT INSTRUCTIONS
Preventive Health Recommendations    See your health care provider every year to    Review health changes.     Discuss preventive care.      Review your medicines if your doctor has prescribed any.      You no longer need a yearly Pap test unless you've had an abnormal Pap test in the past 10 years. If you have vaginal symptoms, such as bleeding or discharge, be sure to talk with your provider about a Pap test.      Every 1 to 2 years, have a mammogram.  If you are over 69, talk with your health care provider about whether or not you want to continue having screening mammograms.      Every 10 years, have a colonoscopy. Or, have a yearly FIT test (stool test). These exams will check for colon cancer.       Have a cholesterol test every 5 years, or more often if your doctor advises it.       Have a diabetes test (fasting glucose) every three years. If you are at risk for diabetes, you should have this test more often.       At age 65, have a bone density scan (DEXA) to check for osteoporosis (brittle bone disease).    Shots:    Get a flu shot each year.    Get a tetanus shot every 10 years.    Talk to your doctor about your pneumonia vaccines. There are now two you should receive - Pneumovax (PPSV 23) and Prevnar (PCV 13).    Talk to your pharmacist about the shingles vaccine.    Talk to your doctor about the hepatitis B vaccine.    Nutrition:     Eat at least 5 servings of fruits and vegetables each day.      Eat whole-grain bread, whole-wheat pasta and brown rice instead of white grains and rice.      Get adequate about Calcium and Vitamin D.     Lifestyle    Exercise at least 150 minutes a week (30 minutes a day, 5 days a week). This will help you control your weight and prevent disease.      Limit alcohol to one drink per day.      No smoking.       Wear sunscreen to prevent skin cancer.       See your dentist twice a year for an exam and cleaning.      See your eye doctor every 1 to 2 years to screen for  conditions such as glaucoma, macular degeneration, cataracts, etc.    Personalized Prevention Plan  You are due for the preventive services outlined below.  Your care team is available to assist you in scheduling these services.  If you have already completed any of these items, please share that information with your care team to update in your medical record.    Health Maintenance Due   Topic Date Due     Zoster (Shingles) Vaccine (2 of 3) 02/06/2014     Discuss Advance Care Planning  10/23/2019     PHQ-2  01/01/2020     Anxiety Assessment  08/09/2020     Depression Assessment  08/09/2020

## 2020-10-20 ENCOUNTER — TRANSFERRED RECORDS (OUTPATIENT)
Dept: HEALTH INFORMATION MANAGEMENT | Facility: CLINIC | Age: 75
End: 2020-10-20

## 2020-10-21 ENCOUNTER — HOSPITAL ENCOUNTER (OUTPATIENT)
Dept: CARDIAC REHAB | Facility: HOSPITAL | Age: 75
Setting detail: THERAPIES SERIES
End: 2020-10-21
Attending: FAMILY MEDICINE
Payer: COMMERCIAL

## 2020-10-21 PROCEDURE — 93798 PHYS/QHP OP CAR RHAB W/ECG: CPT

## 2020-10-21 PROCEDURE — 999N000109 HC STATISTIC OP CR VISIT

## 2020-10-23 ENCOUNTER — HOSPITAL ENCOUNTER (OUTPATIENT)
Dept: CARDIAC REHAB | Facility: HOSPITAL | Age: 75
Setting detail: THERAPIES SERIES
End: 2020-10-23
Attending: FAMILY MEDICINE
Payer: COMMERCIAL

## 2020-10-23 PROCEDURE — 999N000109 HC STATISTIC OP CR VISIT

## 2020-10-23 PROCEDURE — 93798 PHYS/QHP OP CAR RHAB W/ECG: CPT

## 2020-10-26 ENCOUNTER — HOSPITAL ENCOUNTER (OUTPATIENT)
Dept: CARDIAC REHAB | Facility: HOSPITAL | Age: 75
Setting detail: THERAPIES SERIES
End: 2020-10-26
Attending: FAMILY MEDICINE
Payer: COMMERCIAL

## 2020-10-26 PROCEDURE — 999N000109 HC STATISTIC OP CR VISIT

## 2020-10-26 PROCEDURE — 93798 PHYS/QHP OP CAR RHAB W/ECG: CPT

## 2020-10-28 ENCOUNTER — HOSPITAL ENCOUNTER (OUTPATIENT)
Dept: CARDIAC REHAB | Facility: HOSPITAL | Age: 75
Setting detail: THERAPIES SERIES
End: 2020-10-28
Attending: FAMILY MEDICINE
Payer: COMMERCIAL

## 2020-10-28 PROCEDURE — 999N000109 HC STATISTIC OP CR VISIT

## 2020-10-28 PROCEDURE — 93798 PHYS/QHP OP CAR RHAB W/ECG: CPT

## 2020-10-30 DIAGNOSIS — E53.8 VITAMIN B12 DEFICIENCY (NON ANEMIC): Primary | ICD-10-CM

## 2020-10-30 NOTE — PROGRESS NOTES
Patient is on the schedule for a B12 injection 11/3/20, but order was discontinued, no reason noted.  Last office visit was 10/8/20.  Medication is pended if you approve.  Thank you.    Ana Augustin RN

## 2020-11-01 RX ORDER — CYANOCOBALAMIN 1000 UG/ML
1000 INJECTION, SOLUTION INTRAMUSCULAR; SUBCUTANEOUS
Status: ACTIVE | OUTPATIENT
Start: 2020-11-03 | End: 2021-10-28

## 2020-11-02 ENCOUNTER — HOSPITAL ENCOUNTER (OUTPATIENT)
Dept: CARDIAC REHAB | Facility: HOSPITAL | Age: 75
Setting detail: THERAPIES SERIES
End: 2020-11-02
Attending: FAMILY MEDICINE
Payer: COMMERCIAL

## 2020-11-02 PROCEDURE — 93798 PHYS/QHP OP CAR RHAB W/ECG: CPT

## 2020-11-02 PROCEDURE — 999N000109 HC STATISTIC OP CR VISIT

## 2020-11-03 ENCOUNTER — OFFICE VISIT (OUTPATIENT)
Dept: FAMILY MEDICINE | Facility: OTHER | Age: 75
End: 2020-11-03
Attending: FAMILY MEDICINE
Payer: COMMERCIAL

## 2020-11-03 ENCOUNTER — ALLIED HEALTH/NURSE VISIT (OUTPATIENT)
Dept: ALLERGY | Facility: OTHER | Age: 75
End: 2020-11-03
Attending: FAMILY MEDICINE
Payer: COMMERCIAL

## 2020-11-03 VITALS
OXYGEN SATURATION: 98 % | BODY MASS INDEX: 22.08 KG/M2 | HEART RATE: 73 BPM | WEIGHT: 120 LBS | TEMPERATURE: 96.7 F | DIASTOLIC BLOOD PRESSURE: 58 MMHG | HEIGHT: 62 IN | SYSTOLIC BLOOD PRESSURE: 122 MMHG

## 2020-11-03 DIAGNOSIS — K22.70 BARRETT'S ESOPHAGUS WITHOUT DYSPLASIA: ICD-10-CM

## 2020-11-03 DIAGNOSIS — Z23 IMMUNIZATION DUE: ICD-10-CM

## 2020-11-03 DIAGNOSIS — Z11.59 ENCOUNTER FOR HEPATITIS C SCREENING TEST FOR LOW RISK PATIENT: ICD-10-CM

## 2020-11-03 DIAGNOSIS — E53.8 VITAMIN B12 DEFICIENCY (NON ANEMIC): Primary | ICD-10-CM

## 2020-11-03 DIAGNOSIS — I51.81 TAKOTSUBO CARDIOMYOPATHY: ICD-10-CM

## 2020-11-03 DIAGNOSIS — G25.81 RESTLESS LEGS SYNDROME (RLS): ICD-10-CM

## 2020-11-03 DIAGNOSIS — T46.4X5A ACE-INHIBITOR COUGH: ICD-10-CM

## 2020-11-03 DIAGNOSIS — Z12.31 ENCOUNTER FOR SCREENING MAMMOGRAM FOR BREAST CANCER: ICD-10-CM

## 2020-11-03 DIAGNOSIS — I21.4 NSTEMI (NON-ST ELEVATED MYOCARDIAL INFARCTION) (H): ICD-10-CM

## 2020-11-03 DIAGNOSIS — Z00.00 ROUTINE GENERAL MEDICAL EXAMINATION AT A HEALTH CARE FACILITY: Primary | ICD-10-CM

## 2020-11-03 DIAGNOSIS — R05.8 ACE-INHIBITOR COUGH: ICD-10-CM

## 2020-11-03 DIAGNOSIS — K21.9 GASTROESOPHAGEAL REFLUX DISEASE WITHOUT ESOPHAGITIS: ICD-10-CM

## 2020-11-03 DIAGNOSIS — E53.8 VITAMIN B12 DEFICIENCY (NON ANEMIC): ICD-10-CM

## 2020-11-03 PROCEDURE — G0463 HOSPITAL OUTPT CLINIC VISIT: HCPCS

## 2020-11-03 PROCEDURE — 96372 THER/PROPH/DIAG INJ SC/IM: CPT

## 2020-11-03 PROCEDURE — G0438 PPPS, INITIAL VISIT: HCPCS | Performed by: FAMILY MEDICINE

## 2020-11-03 RX ORDER — LOSARTAN POTASSIUM 25 MG/1
25 TABLET ORAL DAILY
COMMUNITY
Start: 2020-10-20 | End: 2021-06-14

## 2020-11-03 RX ORDER — ZOSTER VACCINE RECOMBINANT, ADJUVANTED 50 MCG/0.5
1 KIT INTRAMUSCULAR ONCE
Qty: 0.5 ML | Refills: 0 | Status: CANCELLED | OUTPATIENT
Start: 2020-11-03 | End: 2020-11-03

## 2020-11-03 RX ADMIN — CYANOCOBALAMIN 1000 MCG: 1000 INJECTION, SOLUTION INTRAMUSCULAR; SUBCUTANEOUS at 13:23

## 2020-11-03 ASSESSMENT — ANXIETY QUESTIONNAIRES
GAD7 TOTAL SCORE: 0
7. FEELING AFRAID AS IF SOMETHING AWFUL MIGHT HAPPEN: NOT AT ALL
3. WORRYING TOO MUCH ABOUT DIFFERENT THINGS: NOT AT ALL
4. TROUBLE RELAXING: NOT AT ALL
6. BECOMING EASILY ANNOYED OR IRRITABLE: NOT AT ALL
5. BEING SO RESTLESS THAT IT IS HARD TO SIT STILL: NOT AT ALL
IF YOU CHECKED OFF ANY PROBLEMS ON THIS QUESTIONNAIRE, HOW DIFFICULT HAVE THESE PROBLEMS MADE IT FOR YOU TO DO YOUR WORK, TAKE CARE OF THINGS AT HOME, OR GET ALONG WITH OTHER PEOPLE: NOT DIFFICULT AT ALL
1. FEELING NERVOUS, ANXIOUS, OR ON EDGE: NOT AT ALL
2. NOT BEING ABLE TO STOP OR CONTROL WORRYING: NOT AT ALL

## 2020-11-03 ASSESSMENT — PAIN SCALES - GENERAL: PAINLEVEL: NO PAIN (0)

## 2020-11-03 ASSESSMENT — PATIENT HEALTH QUESTIONNAIRE - PHQ9: SUM OF ALL RESPONSES TO PHQ QUESTIONS 1-9: 1

## 2020-11-03 ASSESSMENT — MIFFLIN-ST. JEOR: SCORE: 979.63

## 2020-11-03 NOTE — PROGRESS NOTES
Clinic Administered Medication Documentation      Injectable Medication Documentation    Patient was given Cyanocobalamin (B-12). Prior to medication administration, verified patients identity using patient s name and date of birth. Please see MAR and medication order for additional information. Patient instructed to report any adverse reaction to staff immediately .      Was entire vial of medication used? Yes  Vial/Syringe: Single dose vial  Expiration Date:  09/2021  Was this medication supplied by the patient? No     Given right deltoid.    Ana Augustin RN

## 2020-11-03 NOTE — NURSING NOTE
"Chief Complaint   Patient presents with     Physical       Initial /58 (BP Location: Right arm, Patient Position: Sitting, Cuff Size: Adult Regular)   Pulse 73   Temp 96.7  F (35.9  C) (Tympanic)   Ht 1.562 m (5' 1.5\")   Wt 54.4 kg (120 lb)   SpO2 98%   BMI 22.31 kg/m   Estimated body mass index is 22.31 kg/m  as calculated from the following:    Height as of this encounter: 1.562 m (5' 1.5\").    Weight as of this encounter: 54.4 kg (120 lb).  Medication Reconciliation: complete  Valencia Martins LPN  "

## 2020-11-04 ENCOUNTER — HOSPITAL ENCOUNTER (OUTPATIENT)
Dept: CARDIAC REHAB | Facility: HOSPITAL | Age: 75
Setting detail: THERAPIES SERIES
End: 2020-11-04
Attending: FAMILY MEDICINE
Payer: COMMERCIAL

## 2020-11-04 PROCEDURE — 999N000109 HC STATISTIC OP CR VISIT

## 2020-11-04 PROCEDURE — 93798 PHYS/QHP OP CAR RHAB W/ECG: CPT

## 2020-11-04 ASSESSMENT — ANXIETY QUESTIONNAIRES: GAD7 TOTAL SCORE: 0

## 2020-11-05 VITALS — HEIGHT: 62 IN | WEIGHT: 119 LBS | BODY MASS INDEX: 21.9 KG/M2

## 2020-11-05 ASSESSMENT — 6 MINUTE WALK TEST (6MWT)
TOTAL DISTANCE WALKED (FT): 1540
GENDER SELECTION: FEMALE
MALE CALC: 1301.47
PREDICTED: 1309.41
FEMALE CALC: 1421.73

## 2020-11-05 ASSESSMENT — MIFFLIN-ST. JEOR: SCORE: 975.09

## 2020-11-05 NOTE — PROGRESS NOTES
"   11/05/20 1000   Session   Session 30 Day Individualized Treatment Plan   Certified through this date 12/04/20   Cardiac Rehab Assessment   Cardiac Rehab Assessment 11/5: Pt completes 10 sessions of Phase II rehab and achieves MET level of 5.5 without event. Overall pt is progressing well and is able to sustain moderate workloads without event. Staff will continue to increase modalities to achieve pt's personal goal of becoming physically stronger.     Educational material is provided on a weekly basis based on cardiac risk factors. Overall, pt is very receptive to the material and is willing to ask questions pertaining to the material provided. Staff will continue to discuss with pt their questions and concerns.    General Information   Treatment Diagnosis NSTEMI   Date of Treatment Diagnosis 09/22/20   Significant Past CV History None   Other Medical History GERD, R rotator cuff, actinic keratosis, Bowden's Esophagus, squamous cell carcinoma, atypical nevus, RLS, medial meniscus tear, disorder of bone and cartilage   Lead up symptoms 10/12: Pt was been seen by a MD for insurance and noted BP was elevated at 200/98. Pt states they did not have symptoms but upon reflection pt states they did have a \"tight chest\" that was common over the course of the previous few months.    Hospital Location [facility] Discharge Date 09/24/20   Signs and Symptoms Post Hospital Discharge None   Outpatient Cardiac Rehab Start Date 10/12/20   Primary Physician Dr. TONI Portillo   Primary Physician Follow Up Completed   Specialist Dr. 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 No   Have you fallen and had an injury in the past year? No   Timed Up and Go score (seconds) NA   Is patient a fall risk? No   Fall screen comments 11/5: Pt is not at risk for falls and does not need a referral to PT at this time. Staff will continue monitor and assess.   Abuse Screen (yes response referral indicated)   Feels Unsafe at Home or Work/School no   Feels Threatened by Someone no   Does Anyone Try to Keep You From Having Contact with Others or Doing Things Outside Your Home? no   Physical Signs of Abuse Present no   Pain   Patient Currently in Pain No   Physical Assessments   Incisions WNL   Edema None   Right Lung Sounds not assessed   Left Lung Sounds not assessed   Limitations Orthopedic  (L shoulder pain)   Comments 11/5: Pt has had a R shoulder cuff repair which has returned to normal function but pt has difficulty using their L shoulder. Staff will limit movements with L shoulder.    Individualized Treatment Plan   Monitored Sessions Scheduled 24   Monitored Sessions Attended 10   Oxygen   Supplemental Oxygen needed No   Nutrition Management - Weight Management   Assessment Re-assessment   Age 75   Weight 54 kg (119 lb)   Height 1.562 m (5' 1.5\")   BMI (Calculated) 22.12   Initial Rate Your Plate Score. Dietary tool to assess eating patterns. Scores range from 24 to 72. The higher the score the healthier the eating pattern. 58   Weight Management Comments 11/5: Pt does not focus on weight loss or gain but is content with their current weight management.    Nutrition Management - Lipids   Lipids Labs Not Available   Prescribed Lipid Medication Yes   Statin Intensity High Intensity   Lipid Comments 11/5: " Pt is compliant with all medications prescribed and does not suffer from muscle cramping or fatigue from the prescibed medications.    Nutrition Management - Diabetes   Diabetes No   Nutrition Management Summary   Dietary Recommendations Low Fat;Low Cholesterol;Low Sodium   Stages of Change for Diet Compliance Preparation   Interventions Planned Instruct on Label Reading   Nutrition Summary Comments 11/5: Pt has been provided educational material on nutrition and has been encouraged to ask questions following the subsequent exercise sessions.    Nutrition Target Outcome BMI < 25   Psychosocial Management   Psychosocial Assessment Re-assessment   Is there history of clinical depression or increased risk of depression? No previous history   Current Level of Stress per Patient Report Moderate    Current Coping Skills Has Positive Support System   Initial Patient Health Questionnaire -9 Score (PHQ-9) for depression. 5-9 Minimal symptoms, 10-14 Minor depression, 15-19 Major depression, moderately severe, > 20 Major depression, severe  1   Initial [facility] Survey score.  Quality of Life:   If total score > 25 review individual areas where patient rated a 4 or 5.  Consider patients current medical condition and what role that plays on the score.   Adjust treatment protocol to improve areas of concern.  Consider the following:  PHQ9 score, DASI, and re-assessment within the next 30 days to assist with developing treatments.  15   Stages of Change Preparation   Interventions Planned Patient to verbalize understanding of negative impact of stress to personal health   Interventions In Progress or Completed Patient verbalizes understanding of negative impact of stress to personal health   Patient Goal No   Psychosocial Comments 10/12: Pt is aware of managing stress to reduce the chance of having another cardiac episode. Since discharge, pt has been trying to relax and focus on things they can manage.   Psychosocial Target  Outcome Identify absence or presence of depression using valid screening tool   Other Core Components - Hypertension   History of or Diagnosis of Hypertension No   Currently taking Anti-Hypertensives Yes;Beta blocker;Ace Inhibitor   Hypertension Comments 11/5: Prior to cardiac event pt the denied the need for blood pressure medications. Since event, pt has remained compliant with medications.    Other Core Components - Tobacco   History of Tobacco Use Yes   Quit Date or Planned Quit Date 01/01/66   Tobacco Use Status Former (Quit > 6 mo ago)   Tobacco Habit Cigarettes   Tobacco Use per Day (average) 1ppd   Years of Tobacco Use 4   Stages of Change Maintenance   Tobacco Comments 11/5: Pt denies the need to smoke and will not be temoted to start smoking in the future.    Other Core Components Summary   Interventions Planned None   Interventions In Progress or Completed None: Patient remains in maintenance for smoking cessation   Patient Goals No   Other Core Components Comments 11/5: Pt does not need additional assistance with core components at this time.    Other Core Components Target Outcome BP < 140/90 or < 130/80 with DM or CKD   Activity/Exercise History   Activity/Exercise Assessment Re-assessment   Activity/Exercise Status prior to event? Participated in an Exercise Program   Number of Days Currently participating in Moderate Physical Activity? 5-7   Number of Days Currently performing  Aerobic Exercise (including rehab)? 5-7   Number of Minutes per Session Currently of Aerobic Exercise (average)? 40   Current Stage of Change (Physical Activity) Action   Current Stage of Change (Aerobic Exercise) Action   Patient Goals Goal #1   Goal #1 Description 10/12: By attending Phase II rehab 3 d/week pt would like to build strength during their scheduled 60 minute sessions.    Goal #1 Target Date 12/25/20   Goal #1 Progress Towards Goal 11/5: Pt has begun Phase II rehab and achieves 41 minutes of exercise without  event. Staff will monitor and assess their progress of strength training in the coming weeks.   Activity/Exercise Comments 11/5: Pt has continued to perform their HEP following discharge from [facility].   Activity/Exercise Target Outcome An Accumulation of 150  Minutes of Aerobic Activity per Week   Exercise Assessment   6 Minute Walk Predicted - Gender Selection Female   6 Minute Walk Predicted (Male) 1301.47   6 Minute Walk Predicted (Female) 1421.73   Initial 6 Minute Walk Distance (Feet) 1540 ft   Resting HR 96 bpm   Exercise  bpm   Post Exercise  bpm   Resting /56   Exercise /62   Post Exercise /52   Effort Rating 4   Current MET Level 5.5   MET Level Goal 4-5   ECG Rhythm Sinus rhythm;Other (Comment)  (Inverted T-waves)   Ectopy None   Current Symptoms Denies symptoms   Limitations/Restrictions Orthopedic (see comments)  (L shoulder )   Exercise Prescription   Mode Treadmill;Nustep;Recumbant bike;Arm Ergometer;Weights;Rowing machine;Upright bike   Duration/Time 30-45 min   Frequency 3 daysweek   THR (85% of age predicted max HR) 122.4   OMNI Effort Rating (0-10 Scale) 4-6/10   Progression Total exercise time of 30-45 minutes   Comments 11/5: Pt has completed 10 sessions of Phase II rehab and has been scheduled for a total of 24.   Recommended Home Exercise   Type of Exercise Walking   Frequency (days per week) 4-6   Duration (minutes per session) 30-45 min   Effort Rating Recommended 4-6/10   30 Day Exercise Plan 11/5: Pt has been encouraged to continue walking program of 1-2 miles most days of the week.    Current Home Exercise   Type of Exercise Walking   Frequency (days per week) 5-7   Duration (minutes per session) 40   Follow-up/On-going Support   Provider follow-up needed on the following No follow-up needed   Learning Assessment   Learner Patient   Primary Language English   Preferred Learning Style Listening;Reading;Demonstration;Pictures/Video   Barriers to Learning No  barriers noted   Patient Education   Education recommended Anatomy and Physiology of the Heart;Blood Pressure;Exercise Principles;Heart Failure;Medication Overview;Muscle Conditioning;Nutrition;Risk Factors;Stress Management   Education classes attended Diabetes;Heart Failure;Nutrition;Risk Factors;Stress Management   Education Comments 11/5: Staff will continue to provide educational material based on Cardiac risk factors.   Physician cosignature/electronic signature indicates approval of this ITP document. I have established, reviewed and made necessary changes to the individualized treatment plan and exercise prescription for this patient.   Consider having a neurology consult to evaluate the need for anticonvulsant therapy, particularly phenytoin which may be prone to drug interactions. Overall pt does not present with symptoms of delirium, and delirium would be unlikely based on presentation. Also, keep in mind that patient is on Aspirin and Plavix at this time.

## 2020-11-06 ENCOUNTER — HOSPITAL ENCOUNTER (OUTPATIENT)
Dept: CARDIAC REHAB | Facility: HOSPITAL | Age: 75
Setting detail: THERAPIES SERIES
End: 2020-11-06
Attending: FAMILY MEDICINE
Payer: COMMERCIAL

## 2020-11-06 PROCEDURE — 999N000109 HC STATISTIC OP CR VISIT

## 2020-11-06 PROCEDURE — 93798 PHYS/QHP OP CAR RHAB W/ECG: CPT

## 2020-11-09 ENCOUNTER — HOSPITAL ENCOUNTER (OUTPATIENT)
Dept: CARDIAC REHAB | Facility: HOSPITAL | Age: 75
Setting detail: THERAPIES SERIES
End: 2020-11-09
Attending: FAMILY MEDICINE
Payer: COMMERCIAL

## 2020-11-09 PROCEDURE — 999N000109 HC STATISTIC OP CR VISIT

## 2020-11-09 PROCEDURE — 93798 PHYS/QHP OP CAR RHAB W/ECG: CPT

## 2020-11-11 ENCOUNTER — HOSPITAL ENCOUNTER (OUTPATIENT)
Dept: CARDIAC REHAB | Facility: HOSPITAL | Age: 75
Setting detail: THERAPIES SERIES
End: 2020-11-11
Attending: FAMILY MEDICINE
Payer: COMMERCIAL

## 2020-11-11 PROCEDURE — 999N000109 HC STATISTIC OP CR VISIT

## 2020-11-11 PROCEDURE — 93798 PHYS/QHP OP CAR RHAB W/ECG: CPT

## 2020-11-13 ENCOUNTER — HOSPITAL ENCOUNTER (OUTPATIENT)
Dept: CARDIAC REHAB | Facility: HOSPITAL | Age: 75
Setting detail: THERAPIES SERIES
End: 2020-11-13
Attending: FAMILY MEDICINE
Payer: COMMERCIAL

## 2020-11-13 PROCEDURE — 999N000109 HC STATISTIC OP CR VISIT

## 2020-11-13 PROCEDURE — 93798 PHYS/QHP OP CAR RHAB W/ECG: CPT

## 2020-11-16 ENCOUNTER — HOSPITAL ENCOUNTER (OUTPATIENT)
Dept: CARDIAC REHAB | Facility: HOSPITAL | Age: 75
Setting detail: THERAPIES SERIES
End: 2020-11-16
Attending: FAMILY MEDICINE
Payer: COMMERCIAL

## 2020-11-16 PROCEDURE — 93798 PHYS/QHP OP CAR RHAB W/ECG: CPT

## 2020-11-16 PROCEDURE — 999N000109 HC STATISTIC OP CR VISIT

## 2020-11-18 ENCOUNTER — HOSPITAL ENCOUNTER (OUTPATIENT)
Dept: CARDIAC REHAB | Facility: HOSPITAL | Age: 75
Setting detail: THERAPIES SERIES
End: 2020-11-18
Attending: FAMILY MEDICINE
Payer: COMMERCIAL

## 2020-11-18 PROCEDURE — 93798 PHYS/QHP OP CAR RHAB W/ECG: CPT

## 2020-11-18 PROCEDURE — 999N000109 HC STATISTIC OP CR VISIT

## 2020-11-20 ENCOUNTER — HOSPITAL ENCOUNTER (OUTPATIENT)
Dept: CARDIAC REHAB | Facility: HOSPITAL | Age: 75
Setting detail: THERAPIES SERIES
End: 2020-11-20
Attending: FAMILY MEDICINE
Payer: COMMERCIAL

## 2020-11-20 PROCEDURE — 999N000109 HC STATISTIC OP CR VISIT

## 2020-11-20 PROCEDURE — 93798 PHYS/QHP OP CAR RHAB W/ECG: CPT

## 2020-11-23 ENCOUNTER — HOSPITAL ENCOUNTER (OUTPATIENT)
Dept: CARDIAC REHAB | Facility: HOSPITAL | Age: 75
Setting detail: THERAPIES SERIES
End: 2020-11-23
Attending: FAMILY MEDICINE
Payer: COMMERCIAL

## 2020-11-23 PROCEDURE — 999N000109 HC STATISTIC OP CR VISIT

## 2020-11-23 PROCEDURE — 93798 PHYS/QHP OP CAR RHAB W/ECG: CPT

## 2020-11-25 ENCOUNTER — HOSPITAL ENCOUNTER (OUTPATIENT)
Dept: CARDIAC REHAB | Facility: HOSPITAL | Age: 75
Setting detail: THERAPIES SERIES
End: 2020-11-25
Attending: FAMILY MEDICINE
Payer: COMMERCIAL

## 2020-11-25 PROCEDURE — 93798 PHYS/QHP OP CAR RHAB W/ECG: CPT

## 2020-11-25 PROCEDURE — 999N000109 HC STATISTIC OP CR VISIT

## 2020-11-30 ENCOUNTER — HOSPITAL ENCOUNTER (OUTPATIENT)
Dept: CARDIAC REHAB | Facility: HOSPITAL | Age: 75
Setting detail: THERAPIES SERIES
End: 2020-11-30
Attending: FAMILY MEDICINE
Payer: COMMERCIAL

## 2020-11-30 PROCEDURE — 93798 PHYS/QHP OP CAR RHAB W/ECG: CPT

## 2020-11-30 PROCEDURE — 999N000109 HC STATISTIC OP CR VISIT

## 2020-12-01 VITALS — BODY MASS INDEX: 21.9 KG/M2 | WEIGHT: 119 LBS | HEIGHT: 62 IN

## 2020-12-01 ASSESSMENT — 6 MINUTE WALK TEST (6MWT)
PREDICTED: 1309.41
GENDER SELECTION: FEMALE
FEMALE CALC: 1421.73
MALE CALC: 1301.47
TOTAL DISTANCE WALKED (FT): 1540

## 2020-12-01 ASSESSMENT — MIFFLIN-ST. JEOR: SCORE: 975.09

## 2020-12-01 NOTE — PROGRESS NOTES
"   12/01/20 0900   Session   Session 60 Day Individualized Treatment Plan   Certified through this date 12/30/20   Cardiac Rehab Assessment   Cardiac Rehab Assessment 12/1: Patient has attended 20 sessions thus far in Phase II Cardiac Rehab. She is doing very well achieving up tot 7.6 METs intermittently. She is working on strengthening her core by using the ab/back machine as well as doing her cardio exercises.   Continued therapy is necessary to help her achieve higher levels of performance, gain strength by monitoring her CV response to exercise.    General Information   Treatment Diagnosis NSTEMI   Date of Treatment Diagnosis 09/22/20   Significant Past CV History None   Other Medical History GERD, R rotator cuff, actinic keratosis, Bowden's Esophagus, squamous cell carcinoma, atypical nevus, RLS, medial meniscus tear, disorder of bone and cartilage   Lead up symptoms 10/12: Pt was been seen by a MD for insurance and noted BP was elevated at 200/98. Pt states they did not have symptoms but upon reflection pt states they did have a \"tight chest\" that was common over the course of the previous few months.    Hospital Location [facility] Discharge Date 09/24/20   Signs and Symptoms Post Hospital Discharge None   Outpatient Cardiac Rehab Start Date 10/12/20   Primary Physician Dr. TONI Portillo   Primary Physician Follow Up Completed   Specialist Dr. 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 No   Have you fallen and had an injury in the past year? No   Timed Up and Go score (seconds) NA   Is patient a fall risk? No   Fall screen comments 12/1: Patient has not had any recent falls.    Abuse Screen (yes response referral indicated)   Feels Unsafe at Home or Work/School no   Feels Threatened by Someone no   Does Anyone Try to Keep You From Having Contact with Others or Doing Things Outside Your Home? no   Physical Signs of Abuse Present no   Pain   Patient Currently in Pain No   Physical Assessments   Incisions Not assessed   Edema Not assessed   Right Lung Sounds not assessed   Left Lung Sounds not assessed   Limitations Orthopedic  (L shoulder pain)   Comments 12/1: Pt has had a R shoulder cuff repair which has returned to normal function but pt has difficulty using their L shoulder. Staff will limit movements with L shoulder.    Individualized Treatment Plan   Monitored Sessions Scheduled 24   Monitored Sessions Attended 20   Oxygen   Supplemental Oxygen needed No   Nutrition Management - Weight Management   Assessment Re-assessment   Age 75   Weight 54 kg (119 lb)   Height 1.562 m (5' 1.5\")   BMI (Calculated) 22.12   Initial Rate Your Plate Score. Dietary tool to assess eating patterns. Scores range from 24 to 72. The higher the score the healthier the eating pattern. 58   Weight Management Comments 12/1: Pt does not focus on weight loss or gain but is content with their current weight management.    Nutrition Management - Lipids   Lipids Labs Not Available   Prescribed Lipid Medication Yes   Statin Intensity High Intensity   Lipid Comments 12/1: Pt is compliant with all medications prescribed and does not suffer from muscle cramping or fatigue from the prescibed medications.    Nutrition Management - Diabetes   Diabetes No   Nutrition Management Summary   Dietary " Recommendations Low Fat;Low Cholesterol;Low Sodium   Stages of Change for Diet Compliance Preparation   Interventions Planned Instruct on Label Reading   Nutrition Summary Comments 12/1: Pt has been provided educational material on nutrition and has been encouraged to ask questions following the subsequent exercise sessions.    Nutrition Target Outcome BMI < 25   Psychosocial Management   Psychosocial Assessment Re-assessment   Is there history of clinical depression or increased risk of depression? No previous history   Current Level of Stress per Patient Report Moderate    Current Coping Skills Has Positive Support System   Initial [facility] Survey score.  Quality of Life:   If total score > 25 review individual areas where patient rated a 4 or 5.  Consider patients current medical condition and what role that plays on the score.   Adjust treatment protocol to improve areas of concern.  Consider the following:  PHQ9 score, DASI, and re-assessment within the next 30 days to assist with developing treatments.  15   Stages of Change Preparation   Interventions Planned Patient to verbalize understanding of negative impact of stress to personal health   Interventions In Progress or Completed Patient verbalizes understanding of negative impact of stress to personal health   Patient Goal No   Psychosocial Comments 12/1: Pt is aware of managing stress to reduce the chance of having another cardiac episode. Since discharge, pt has been trying to relax and focus on things they can manage.   Psychosocial Target Outcome Identify absence or presence of depression using valid screening tool   Other Core Components - Hypertension   History of or Diagnosis of Hypertension No   Currently taking Anti-Hypertensives Yes;Beta blocker;Ace Inhibitor   Hypertension Comments 12/1: Prior to cardiac event pt the denied the need for blood pressure medications. Since event, pt has remained compliant with medications.    Other Core  Components - Tobacco   History of Tobacco Use Yes   Quit Date or Planned Quit Date 01/01/66   Tobacco Use Status Former (Quit > 6 mo ago)   Tobacco Habit Cigarettes   Tobacco Use per Day (average) 1ppd   Years of Tobacco Use 4   Stages of Change Maintenance   Tobacco Comments 12/1: Pt denies the need to smoke and will not be temoted to start smoking in the future.    Other Core Components Summary   Interventions Planned None   Interventions In Progress or Completed None: Patient remains in maintenance for smoking cessation   Patient Goals No   Other Core Components Comments 12/1: Pt does not need additional assistance with core components at this time.    Other Core Components Target Outcome BP < 140/90 or < 130/80 with DM or CKD   Activity/Exercise History   Activity/Exercise Assessment Re-assessment   Activity/Exercise Status prior to event? Participated in an Exercise Program   Number of Days Currently participating in Moderate Physical Activity? 5-7   Number of Days Currently performing  Aerobic Exercise (including rehab)? 5-7   Number of Minutes per Session Currently of Aerobic Exercise (average)? 40   Current Stage of Change (Physical Activity) Action   Current Stage of Change (Aerobic Exercise) Action   Patient Goals Goal #1   Goal #1 Description 10/12: By attending Phase II rehab 3 d/week pt would like to build strength during their scheduled 60 minute sessions.    Goal #1 Target Date 12/25/20   Goal #1 Progress Towards Goal 12/1: Patient continues to attend Phase II regularly. She is using ab/back machine and increasing weights as able.    Activity/Exercise Comments 12/1: Pt has continued to perform their HEP following discharge from [facility].   Activity/Exercise Target Outcome An Accumulation of 150  Minutes of Aerobic Activity per Week   Exercise Assessment   6 Minute Walk Predicted - Gender Selection Female   6 Minute Walk Predicted (Male) 1301.47   6 Minute Walk Predicted (Female) 1421.73   Initial 6  Minute Walk Distance (Feet) 1540 ft   Resting HR 88 bpm   Exercise  bpm   Post Exercise HR 97 bpm   Resting /64   Exercise /66   Post Exercise /66   Effort Rating 4-5   Current MET Level 7.6   MET Level Goal 5-6   ECG Rhythm Sinus rhythm;Other (Comment)  (Inverted T-waves)   Ectopy None   Current Symptoms Denies symptoms   Limitations/Restrictions Orthopedic (see comments)  (L shoulder )   Exercise Prescription   Mode Treadmill;Nustep;Recumbant bike;Arm Ergometer;Weights;Rowing machine;Upright bike   Duration/Time 30-45 min   Frequency 3 daysweek   THR (85% of age predicted max HR) 122.4   OMNI Effort Rating (0-10 Scale) 4-6/10   Progression Total exercise time of 30-45 minutes   Comments 12/1: Patient has completed 20 sessions in Phase II CR. She is doing well.    Recommended Home Exercise   Type of Exercise Walking   Frequency (days per week) 4-6   Duration (minutes per session) 30-45 min   Effort Rating Recommended 4-6/10   30 Day Exercise Plan 12/1: Patient is encouraged to continue her HEP.    Current Home Exercise   Type of Exercise Walking   Frequency (days per week) 5-7   Duration (minutes per session) 40   Follow-up/On-going Support   Provider follow-up needed on the following No follow-up needed   Learning Assessment   Learner Patient   Primary Language English   Preferred Learning Style Listening;Reading;Demonstration;Pictures/Video   Barriers to Learning No barriers noted   Patient Education   Education recommended Anatomy and Physiology of the Heart;Blood Pressure;Exercise Principles;Heart Failure;Medication Overview;Muscle Conditioning;Nutrition;Risk Factors;Stress Management   Education classes attended Anatomy and Physiology of the Heart;Blood Pressure;Diabetes;Exercise Principles;Heart Failure;Medication Overview;Nutrition;Risk Factors;Stress Management   Education Comments 12/1: Patient has been given all educational materials and is encouraged to ask questions when they  arise.    Physician cosignature/electronic signature indicates approval of this ITP document. I have established, reviewed and made necessary changes to the individualized treatment plan and exercise prescription for this patient.

## 2020-12-02 ENCOUNTER — HOSPITAL ENCOUNTER (OUTPATIENT)
Dept: CARDIAC REHAB | Facility: HOSPITAL | Age: 75
Setting detail: THERAPIES SERIES
End: 2020-12-02
Attending: FAMILY MEDICINE
Payer: COMMERCIAL

## 2020-12-02 PROCEDURE — 999N000109 HC STATISTIC OP CR VISIT

## 2020-12-02 PROCEDURE — 93798 PHYS/QHP OP CAR RHAB W/ECG: CPT

## 2020-12-03 ENCOUNTER — ALLIED HEALTH/NURSE VISIT (OUTPATIENT)
Dept: FAMILY MEDICINE | Facility: OTHER | Age: 75
End: 2020-12-03
Attending: FAMILY MEDICINE
Payer: COMMERCIAL

## 2020-12-03 DIAGNOSIS — E53.8 VITAMIN B12 DEFICIENCY (NON ANEMIC): Primary | ICD-10-CM

## 2020-12-03 PROCEDURE — 96372 THER/PROPH/DIAG INJ SC/IM: CPT

## 2020-12-03 RX ADMIN — CYANOCOBALAMIN 1000 MCG: 1000 INJECTION, SOLUTION INTRAMUSCULAR; SUBCUTANEOUS at 10:09

## 2020-12-03 NOTE — NURSING NOTE
Clinic Administered Medication Documentation      Injectable Medication Documentation    Patient was given Cyanocobalamin (B-12). Prior to medication administration, verified patients identity using patient s name and date of birth. Please see MAR and medication order for additional information. Patient instructed to stay in clinic after the injection but patient declined.      Was entire vial of medication used? Yes  Vial/Syringe: Single dose vial  Expiration Date:  7/2022  Was this medication supplied by the patient? No   Gege Magaña LPN

## 2020-12-04 ENCOUNTER — HOSPITAL ENCOUNTER (OUTPATIENT)
Dept: CARDIAC REHAB | Facility: HOSPITAL | Age: 75
Setting detail: THERAPIES SERIES
End: 2020-12-04
Attending: FAMILY MEDICINE
Payer: COMMERCIAL

## 2020-12-04 PROCEDURE — 999N000109 HC STATISTIC OP CR VISIT

## 2020-12-04 PROCEDURE — 93798 PHYS/QHP OP CAR RHAB W/ECG: CPT

## 2020-12-07 ENCOUNTER — HOSPITAL ENCOUNTER (OUTPATIENT)
Dept: CARDIAC REHAB | Facility: HOSPITAL | Age: 75
Setting detail: THERAPIES SERIES
End: 2020-12-07
Attending: FAMILY MEDICINE
Payer: COMMERCIAL

## 2020-12-07 PROCEDURE — 999N000109 HC STATISTIC OP CR VISIT

## 2020-12-07 PROCEDURE — 93798 PHYS/QHP OP CAR RHAB W/ECG: CPT

## 2020-12-09 ENCOUNTER — HOSPITAL ENCOUNTER (OUTPATIENT)
Dept: CARDIAC REHAB | Facility: HOSPITAL | Age: 75
Setting detail: THERAPIES SERIES
End: 2020-12-09
Attending: FAMILY MEDICINE
Payer: COMMERCIAL

## 2020-12-09 VITALS — WEIGHT: 120 LBS | HEIGHT: 61 IN | BODY MASS INDEX: 22.66 KG/M2

## 2020-12-09 PROCEDURE — 93798 PHYS/QHP OP CAR RHAB W/ECG: CPT

## 2020-12-09 PROCEDURE — 999N000109 HC STATISTIC OP CR VISIT

## 2020-12-09 ASSESSMENT — 6 MINUTE WALK TEST (6MWT)
TOTAL DISTANCE WALKED (FT): 1585
GENDER SELECTION: FEMALE
TOTAL DISTANCE WALKED (FT): 1540
PREDICTED: 1306.51
FEMALE CALC: 1418.25
MALE CALC: 1298.59

## 2020-12-09 ASSESSMENT — MIFFLIN-ST. JEOR: SCORE: 979.57

## 2020-12-09 NOTE — PROGRESS NOTES
"   12/09/20 1000   Session   Session Discharge Note   Certified through this date 12/19/20   Cardiac Rehab Assessment   Cardiac Rehab Assessment 12/9: Pt is discharged after completing 24 exercise sessions of phase II rehab and has been provided all phase II education. Overall, pt did well reaching a maximum MET level of 7.6 with appropriate hemodynamic response. Pt improved on her 6 MWT and [facility] COOP survey. Pt continues to be compliant with prescribed medications as well as abstaining from smoking. She has been educated on how to properly monitor her blood pressure at home and will continue to track it and notify her MD with any changes. Pt will maintain her heart healthy diet and HEP increasing distance as tolerated while following OMNI Effort Scale. Pt's initial exercise goal was met as well. Pt was masked for all sessions. Staff wore proper PPE and cleaned per protocol.      Pt made significant gains in exercise tolerance. Initially patient tolerated 28 minutes at 3.8 METs, now tolerating 42 minutes at 7.6 METs. Patient also increased 6-minute walk test by 3% (an increase of 45 feet.) The PT was given instructions on frequency (5-7 days/wk), intensity (4-6/10 OMNI Effort Scale), and duration (30-45 minutes) for continued exercise as well as muscle conditioning and stretching exercises.  Your PT plans to continue home exercise walking plan.   General Information   Treatment Diagnosis NSTEMI   Date of Treatment Diagnosis 09/22/20   Significant Past CV History None   Other Medical History GERD, R rotator cuff, actinic keratosis, Bowden's Esophagus, squamous cell carcinoma, atypical nevus, RLS, medial meniscus tear, disorder of bone and cartilage   Lead up symptoms 10/12: Pt was been seen by a MD for insurance and noted BP was elevated at 200/98. Pt states they did not have symptoms but upon reflection pt states they did have a \"tight chest\" that was common over the course of the previous few months.  "   Hospital Location [facility] Discharge Date 09/24/20   Signs and Symptoms Post Hospital Discharge None   Outpatient Cardiac Rehab Start Date 10/12/20   Primary Physician Dr. TONI Portillo   Primary Physician Follow Up Completed   Specialist Dr. 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 No   Have you fallen and had an injury in the past year? No   Timed Up and Go score (seconds) NA   Is patient a fall risk? No   Fall screen comments 12/9: Pt is discharged with no falls and not deemed a fall risk.   Abuse Screen (yes response referral indicated)   Feels Unsafe at Home or Work/School no   Feels Threatened by Someone no   Does Anyone Try to Keep You From Having Contact with Others or Doing Things Outside Your Home? no   Physical Signs of Abuse Present no   Pain   Patient Currently in Pain No   Physical Assessments   Incisions Not assessed   Edema Not assessed   Right Lung Sounds not assessed   Left Lung Sounds not assessed   Limitations Orthopedic  (L shoulder pain)   Comments 12/8: Staff and pt limited movements with L shoulder during phase II rehab sessions.   Individualized Treatment Plan   Monitored Sessions Scheduled 24   Monitored Sessions Attended 24   Oxygen   Supplemental Oxygen needed No   Nutrition Management - Weight Management   Assessment Discharge   Age  "75   Weight 54.4 kg (120 lb)   Height 1.562 m (5' 1.5\")   BMI (Calculated) 22.31   Initial Rate Your Plate Score. Dietary tool to assess eating patterns. Scores range from 24 to 72. The higher the score the healthier the eating pattern. 58   Discharge Rate Your Plate Score 57   Weight Management Comments 12/9: Pt does not focus on weight loss or gain but is content with their current weight management.    Nutrition Management - Lipids   Lipids Labs Not Available   Prescribed Lipid Medication Yes   Statin Intensity High Intensity   Lipid Comments 12/9: Pt is compliant with all medications prescribed and does not suffer from muscle cramping or fatigue from the prescibed medications.    Nutrition Management - Diabetes   Diabetes No   Nutrition Management Summary   Dietary Recommendations Low Fat;Low Cholesterol;Low Sodium   Stages of Change for Diet Compliance Preparation   Interventions Planned Instruct on Label Reading   Interventions In Progress or Completed Understands how to accurately read Food Labels   Nutrition Summary Comments 12/1: Pt has been provided educational material on nutrition and is not concerned with her diet.    Nutrition Target Outcome BMI < 25   Psychosocial Management   Psychosocial Assessment Discharge   Is there history of clinical depression or increased risk of depression? No previous history   Current Level of Stress per Patient Report Moderate    Current Coping Skills Has Positive Support System   Initial Patient Health Questionnaire -9 Score (PHQ-9) for depression. 5-9 Minimal symptoms, 10-14 Minor depression, 15-19 Major depression, moderately severe, > 20 Major depression, severe  1   Discharge PHQ-9 Score for Depression 1   Initial [facility] Survey score.  Quality of Life:   If total score > 25 review individual areas where patient rated a 4 or 5.  Consider patients current medical condition and what role that plays on the score.   Adjust treatment protocol to improve areas of " concern.  Consider the following:  PHQ9 score, DASI, and re-assessment within the next 30 days to assist with developing treatments.  15   Discharge [facility] COOP Survey Score 12   Stages of Change Preparation   Interventions Planned Patient to verbalize understanding of negative impact of stress to personal health   Interventions In Progress or Completed Patient verbalizes understanding of negative impact of stress to personal health   Patient Goal No   Psychosocial Comments 12/9: Pt is aware of managing stress to reduce the chance of having another cardiac episode. Since discharge, pt has been trying to relax and focus on things they can manage.   Psychosocial Target Outcome Identify absence or presence of depression using valid screening tool   Other Core Components - Hypertension   History of or Diagnosis of Hypertension No   Currently taking Anti-Hypertensives Yes;Beta blocker;Ace Inhibitor   Hypertension Comments 12/9: Pt will continue to monitor BP at home and continues to be compliant with medications.   Other Core Components - Tobacco   History of Tobacco Use Yes   Quit Date or Planned Quit Date 01/01/66   Tobacco Use Status Former (Quit > 6 mo ago)   Tobacco Habit Cigarettes   Tobacco Use per Day (average) 1ppd   Years of Tobacco Use 4   Stages of Change Maintenance   Tobacco Comments 12/9: Pt denies the need to smoke and will not be temoted to start smoking in the future.    Other Core Components Summary   Interventions Planned None   Interventions In Progress or Completed None: Patient remains in maintenance for smoking cessation   Patient Goals No   Other Core Components Comments 12/9: Pt will continue to montor blood pressure with home machine.   Other Core Components Target Outcome BP < 140/90 or < 130/80 with DM or CKD   Activity/Exercise History   Activity/Exercise Assessment Discharge   Activity/Exercise Status prior to event? Participated in an Exercise Program   Number of Days Currently  participating in Moderate Physical Activity? 5-7   Number of Days Currently performing  Aerobic Exercise (including rehab)? 5-7   Number of Minutes per Session Currently of Aerobic Exercise (average)? 42   Current Stage of Change (Physical Activity) Action   Current Stage of Change (Aerobic Exercise) Action   Patient Goals Goal #1   Goal #1 Description 10/12: By attending Phase II rehab 3 d/week pt would like to build strength during their scheduled 60 minute sessions.    Goal #1 Target Date 12/25/20   Goal #1 Date Met 12/09/20   Goal #1 Progress Towards Goal 12/9: Pt has improved her MET level on all modalities from pre to post showing an incerase in overall strength and endurance.   Activity/Exercise Comments 12/9: Pt completes 24 sessinos of phase II rehab reaching an overall MET level of 7.6.   Activity/Exercise Target Outcome An Accumulation of 150  Minutes of Aerobic Activity per Week   Exercise Assessment   6 Minute Walk Predicted - Gender Selection Female   6 Minute Walk Predicted (Male) 1298.59   6 Minute Walk Predicted (Female) 1418.25   Initial 6 Minute Walk Distance (Feet) 1540 ft   Discharge 6 Minute Walk Distance (Feet) 1585   Resting HR 83 bpm   Exercise  bpm   Post Exercise HR 91 bpm   Resting /58   Exercise /58   Post Exercise /62   Pre SpO2 99   While Exercising SpO2 100   Post SpO2 99   Effort Rating 3-5   Current MET Level 7.6   MET Level Goal 5-6   ECG Rhythm Sinus rhythm   Ectopy None   Current Symptoms Denies symptoms   Limitations/Restrictions Orthopedic (see comments)  (L shoulder )   Exercise Prescription   Mode Treadmill;Nustep;Recumbant bike;Arm Ergometer;Weights;Rowing machine;Upright bike   Duration/Time 30-45 min   Frequency 3 daysweek   THR (85% of age predicted max HR) 122.4   OMNI Effort Rating (0-10 Scale) 4-6/10   Progression Total exercise time of 30-45 minutes   Comments 12/9: Pt is discharged completing 24 exercise sessions tolerating aerobic exercise  fpr 42 minutes and reaching an overall MET level of 7.6.   Recommended Home Exercise   Type of Exercise Walking   Frequency (days per week) 4-6   Duration (minutes per session) 30-45 min   Effort Rating Recommended 4-6/10   30 Day Exercise Plan 12/9: Pt has been encouraged to continue HEP following discharge from phase II rehab; increasing distance as tolerated while keeping in mind OMNI Effort Scale.   Current Home Exercise   Type of Exercise Walking   Frequency (days per week) 5-7   Duration (minutes per session) 40   Follow-up/On-going Support   Provider follow-up needed on the following No follow-up needed   Learning Assessment   Learner Patient   Primary Language English   Preferred Learning Style Listening;Reading;Demonstration;Pictures/Video   Barriers to Learning No barriers noted   Patient Education   Education recommended Anatomy and Physiology of the Heart;Blood Pressure;Exercise Principles;Heart Failure;Medication Overview;Muscle Conditioning;Nutrition;Risk Factors;Stress Management   Education classes attended Anatomy and Physiology of the Heart;Blood Pressure;Diabetes;Exercise Principles;Heart Failure;Medication Overview;Nutrition;Risk Factors;Stress Management;Muscle Conditioning   Education Comments 12/9: Pt was provided material covering all education topics in phase II rehab.   Physician cosignature/electronic signature indicates agreements with the ITP document and approval of discharge.

## 2020-12-14 ENCOUNTER — ANCILLARY PROCEDURE (OUTPATIENT)
Dept: MAMMOGRAPHY | Facility: OTHER | Age: 75
End: 2020-12-14
Attending: FAMILY MEDICINE
Payer: COMMERCIAL

## 2020-12-14 DIAGNOSIS — Z12.31 ENCOUNTER FOR SCREENING MAMMOGRAM FOR BREAST CANCER: ICD-10-CM

## 2020-12-14 PROCEDURE — 77063 BREAST TOMOSYNTHESIS BI: CPT | Mod: TC

## 2020-12-16 ENCOUNTER — TRANSFERRED RECORDS (OUTPATIENT)
Dept: HEALTH INFORMATION MANAGEMENT | Facility: CLINIC | Age: 75
End: 2020-12-16

## 2020-12-30 ENCOUNTER — TELEPHONE (OUTPATIENT)
Dept: FAMILY MEDICINE | Facility: OTHER | Age: 75
End: 2020-12-30

## 2020-12-30 NOTE — LETTER
January 3, 2021      Maria Teresa Aburto  [address]  [place] [address]        To Whom It May Concern,      Maria Teresa Aburto is under my medical care and is able to obtain the Covid vaccine while she is taking Plavix           Sincerely,        Kenna Portillo MD

## 2020-12-30 NOTE — TELEPHONE ENCOUNTER
"Pt called reports she is currently in [place] and scheduled to receive the COVID vaccine on January 14th. Pt reports she needs approval from PCP to get the vaccine \"due to taking plavix\".     Pt requesting if PCP can type letter giving approval to get the vaccine while being on Plavix.     If letter is approved mail letter to:   [address] [place] [address]      Call back number: [phone]  "

## 2021-01-03 NOTE — TELEPHONE ENCOUNTER
Letter done but not able to be printed.   Please print, I will sign, and send to address noted in phone call not home address

## 2021-04-20 NOTE — PROGRESS NOTES
"    Assessment & Plan     NSTEMI (non-ST elevated myocardial infarction) (H)  Doing well  Due for follow up lab, but will return fasting  - AST; Future  - ALT; Future  - Lipid Profile; Future    Takotsubo cardiomyopathy  Stable     Immunization due  Second zoster vaccine given   - zoster vaccine recombinant adjuvanted (SHINGRIX) injection; Inject 0.5 mLs into the muscle once for 1 dose    Screening for diabetes mellitus  She has had a sweet taste in her mouth. Will rule out elevated sugars with her lab  - Glucose; Future    Hyperlipidemia LDL goal <70  As above         20 minutes spent on the date of the encounter doing chart review,  patient visit and documentation            No follow-ups on file.    Kenna Portillo MD  [facility] - MARTY Morel is a 75 year old who presents for the following health issues     HPI     Hyperlipidemia Follow-Up      Are you regularly taking any medication or supplement to lower your cholesterol?   Yes- Lipitor 40 mg    Are you having muscle aches or other side effects that you think could be caused by your cholesterol lowering medication?  No    Hypertension Follow-up      Do you check your blood pressure regularly outside of the clinic? Yes     Are you following a low salt diet? Yes    Are your blood pressures ever more than 140 on the top number (systolic) OR more   than 90 on the bottom number (diastolic), for example 140/90? Yes    CAD  No further chest pain or pressure after NSTEMI 9/28/20 with Takotsubo cardiomyopathy     She received both Covid vaccines in [place] and is due for her second Shingrix vaccine now     Review of Systems   Constitutional, HEENT, cardiovascular, pulmonary, gi and gu systems are negative, except as otherwise noted.      Objective    /74   Pulse 70   Temp 96.2  F (35.7  C) (Tympanic)   Resp 19   Ht 1.562 m (5' 1.5\")   Wt 54.4 kg (120 lb)   SpO2 100%   BMI 22.31 kg/m    Body mass index is 22.31 kg/m .  Physical " Exam   GENERAL: healthy, alert and no distress  NECK: no adenopathy, no asymmetry, masses, or scars and thyroid normal to palpation  RESP: lungs clear to auscultation - no rales, rhonchi or wheezes  CV: regular rate and rhythm, normal S1 S2, no S3 or S4, no murmur, click or rub, no peripheral edema and peripheral pulses strong  MS: no gross musculoskeletal defects noted, no edema  NEURO: Normal strength and tone, mentation intact and speech normal  PSYCH: mentation appears normal, affect normal/bright

## 2021-04-21 ENCOUNTER — ALLIED HEALTH/NURSE VISIT (OUTPATIENT)
Dept: ALLERGY | Facility: OTHER | Age: 76
End: 2021-04-21
Attending: FAMILY MEDICINE
Payer: COMMERCIAL

## 2021-04-21 DIAGNOSIS — E53.8 VITAMIN B12 DEFICIENCY (NON ANEMIC): Primary | ICD-10-CM

## 2021-04-21 PROCEDURE — 96372 THER/PROPH/DIAG INJ SC/IM: CPT

## 2021-04-21 RX ADMIN — CYANOCOBALAMIN 1000 MCG: 1000 INJECTION, SOLUTION INTRAMUSCULAR; SUBCUTANEOUS at 11:30

## 2021-04-21 NOTE — PROGRESS NOTES
Clinic Administered Medication Documentation      Injectable Medication Documentation    Patient was given Cyanocobalamin (B-12). Prior to medication administration, verified patients identity using patient s name and date of birth. Please see MAR and medication order for additional information. Patient instructed to report any adverse reaction to staff immediately .      Was entire vial of medication used? Yes  Vial/Syringe: Single dose vial  Expiration Date:  04/2022  Was this medication supplied by the patient? No      Rt Deltoid    Krzysztof Gomez RN on 4/21/2021 at 11:33 AM

## 2021-05-04 ENCOUNTER — OFFICE VISIT (OUTPATIENT)
Dept: FAMILY MEDICINE | Facility: OTHER | Age: 76
End: 2021-05-04
Attending: FAMILY MEDICINE
Payer: COMMERCIAL

## 2021-05-04 VITALS
WEIGHT: 120 LBS | TEMPERATURE: 96.2 F | SYSTOLIC BLOOD PRESSURE: 138 MMHG | OXYGEN SATURATION: 100 % | HEIGHT: 62 IN | RESPIRATION RATE: 19 BRPM | DIASTOLIC BLOOD PRESSURE: 74 MMHG | BODY MASS INDEX: 22.08 KG/M2 | HEART RATE: 70 BPM

## 2021-05-04 DIAGNOSIS — I21.4 NSTEMI (NON-ST ELEVATED MYOCARDIAL INFARCTION) (H): Primary | ICD-10-CM

## 2021-05-04 DIAGNOSIS — E78.5 HYPERLIPIDEMIA LDL GOAL <70: ICD-10-CM

## 2021-05-04 DIAGNOSIS — Z13.1 SCREENING FOR DIABETES MELLITUS: ICD-10-CM

## 2021-05-04 DIAGNOSIS — Z23 IMMUNIZATION DUE: ICD-10-CM

## 2021-05-04 DIAGNOSIS — I51.81 TAKOTSUBO CARDIOMYOPATHY: ICD-10-CM

## 2021-05-04 PROCEDURE — 99213 OFFICE O/P EST LOW 20 MIN: CPT | Performed by: FAMILY MEDICINE

## 2021-05-04 PROCEDURE — G0463 HOSPITAL OUTPT CLINIC VISIT: HCPCS | Mod: 25

## 2021-05-04 RX ORDER — ZOSTER VACCINE RECOMBINANT, ADJUVANTED 50 MCG/0.5
1 KIT INTRAMUSCULAR ONCE
Qty: 0.5 ML | Refills: 0 | Status: SHIPPED | OUTPATIENT
Start: 2021-05-04 | End: 2021-05-04

## 2021-05-04 RX ORDER — MULTIVIT-MIN/IRON/FOLIC ACID/K 18-600-40
CAPSULE ORAL
COMMUNITY

## 2021-05-04 ASSESSMENT — PAIN SCALES - GENERAL: PAINLEVEL: NO PAIN (0)

## 2021-05-04 ASSESSMENT — MIFFLIN-ST. JEOR: SCORE: 979.63

## 2021-05-04 NOTE — NURSING NOTE
"Chief Complaint   Patient presents with     Lipids       Initial /74   Pulse 70   Temp 96.2  F (35.7  C) (Tympanic)   Resp 19   Ht 1.562 m (5' 1.5\")   Wt 54.4 kg (120 lb)   SpO2 100%   BMI 22.31 kg/m   Estimated body mass index is 22.31 kg/m  as calculated from the following:    Height as of this encounter: 1.562 m (5' 1.5\").    Weight as of this encounter: 54.4 kg (120 lb).  Medication Reconciliation: complete  Kelly Knox LPN    "

## 2021-05-05 DIAGNOSIS — Z13.1 SCREENING FOR DIABETES MELLITUS: ICD-10-CM

## 2021-05-05 DIAGNOSIS — I21.4 NSTEMI (NON-ST ELEVATED MYOCARDIAL INFARCTION) (H): ICD-10-CM

## 2021-05-05 LAB
ALT SERPL W P-5'-P-CCNC: 58 U/L (ref 0–50)
AST SERPL W P-5'-P-CCNC: 36 U/L (ref 0–45)
CHOLEST SERPL-MCNC: 130 MG/DL
GLUCOSE SERPL-MCNC: 97 MG/DL (ref 70–99)
HDLC SERPL-MCNC: 56 MG/DL
LDLC SERPL CALC-MCNC: 60 MG/DL
NONHDLC SERPL-MCNC: 74 MG/DL
TRIGL SERPL-MCNC: 70 MG/DL

## 2021-05-05 PROCEDURE — 84450 TRANSFERASE (AST) (SGOT): CPT | Mod: ZL | Performed by: FAMILY MEDICINE

## 2021-05-05 PROCEDURE — 84460 ALANINE AMINO (ALT) (SGPT): CPT | Mod: ZL | Performed by: FAMILY MEDICINE

## 2021-05-05 PROCEDURE — 80061 LIPID PANEL: CPT | Mod: ZL | Performed by: FAMILY MEDICINE

## 2021-05-05 PROCEDURE — 82947 ASSAY GLUCOSE BLOOD QUANT: CPT | Mod: ZL | Performed by: FAMILY MEDICINE

## 2021-05-05 PROCEDURE — 36415 COLL VENOUS BLD VENIPUNCTURE: CPT | Mod: ZL | Performed by: FAMILY MEDICINE

## 2021-05-07 ENCOUNTER — NURSE TRIAGE (OUTPATIENT)
Dept: FAMILY MEDICINE | Facility: OTHER | Age: 76
End: 2021-05-07

## 2021-05-07 ENCOUNTER — OFFICE VISIT (OUTPATIENT)
Dept: FAMILY MEDICINE | Facility: OTHER | Age: 76
End: 2021-05-07
Attending: FAMILY MEDICINE
Payer: COMMERCIAL

## 2021-05-07 DIAGNOSIS — Z20.822 SUSPECTED 2019 NOVEL CORONAVIRUS INFECTION: ICD-10-CM

## 2021-05-07 DIAGNOSIS — Z20.822 SUSPECTED 2019 NOVEL CORONAVIRUS INFECTION: Primary | ICD-10-CM

## 2021-05-07 PROCEDURE — U0003 INFECTIOUS AGENT DETECTION BY NUCLEIC ACID (DNA OR RNA); SEVERE ACUTE RESPIRATORY SYNDROME CORONAVIRUS 2 (SARS-COV-2) (CORONAVIRUS DISEASE [COVID-19]), AMPLIFIED PROBE TECHNIQUE, MAKING USE OF HIGH THROUGHPUT TECHNOLOGIES AS DESCRIBED BY CMS-2020-01-R: HCPCS | Mod: ZL | Performed by: FAMILY MEDICINE

## 2021-05-07 PROCEDURE — U0005 INFEC AGEN DETEC AMPLI PROBE: HCPCS | Mod: ZL | Performed by: FAMILY MEDICINE

## 2021-05-07 NOTE — TELEPHONE ENCOUNTER
Reason for Disposition    [1] COVID-19 infection suspected by caller or triager AND [2] mild symptoms (cough, fever, or others) AND [3] no complications or SOB    Additional Information    Negative: SEVERE difficulty breathing (e.g., struggling for each breath, speaks in single words)    Negative: Difficult to awaken or acting confused (e.g., disoriented, slurred speech)    Negative: Bluish (or gray) lips or face now    Negative: Shock suspected (e.g., cold/pale/clammy skin, too weak to stand, low BP, rapid pulse)    Negative: Sounds like a life-threatening emergency to the triager    Negative: [1] COVID-19 exposure AND [2] has not completed COVID-19 vaccine series AND [3] no symptoms    Negative: [1] COVID-19 exposure AND [2] completed COVID-19 vaccine series (fully vaccinated) AND [3] no symptoms    Negative: COVID-19 vaccine reaction suspected (e.g., fever, headache, muscle aches) occurring during days 1-3 after getting vaccine    Negative: COVID-19 vaccine, questions about    Negative: [1] COVID-19 vaccine series completed (fully vaccinated) in past 3 months AND [2] new-onset of COVID-19 symptoms BUT [3] no known exposure    Negative: [1] Had lab test confirmed COVID-19 infection within last 3 monthsAND [2] new-onset of COVID-19 symptoms BUT [3] no known exposure    Negative: [1] Lives with someone known to have influenza (flu test positive) AND [2] flu-like symptoms (e.g., cough, runny nose, sore throat, SOB; with or without fever)    Negative: [1] Adult with possible COVID-19 symptoms AND [2] triager concerned about severity of symptoms or other causes    Negative: COVID-19 and breastfeeding, questions about    Negative: SEVERE or constant chest pain or pressure (Exception: mild central chest pain, present only when coughing)    Negative: MODERATE difficulty breathing (e.g., speaks in phrases, SOB even at rest, pulse 100-120)    Negative: [1] Headache AND [2] stiff neck (can't touch chin to chest)     "Negative: MILD difficulty breathing (e.g., minimal/no SOB at rest, SOB with walking, pulse <100)    Negative: Chest pain or pressure    Negative: Patient sounds very sick or weak to the triager    Negative: Fever > 103 F (39.4 C)    Negative: [1] Fever > 101 F (38.3 C) AND [2] age > 60 years    Negative: [1] Fever > 100.0 F (37.8 C) AND [2] bedridden (e.g., nursing home patient, CVA, chronic illness, recovering from surgery)    Negative: [1] HIGH RISK patient (e.g., age > 64 years, diabetes, heart or lung disease, weak immune system) AND [2] new or worsening symptoms    Negative: [1] HIGH RISK patient AND [2] influenza is widespread in the community AND [3] ONE OR MORE respiratory symptoms: cough, sore throat, runny or stuffy nose    Negative: [1] HIGH RISK patient AND [2] influenza exposure within the last 7 days AND [3] ONE OR MORE respiratory symptoms: cough, sore throat, runny or stuffy nose    Negative: Fever present > 3 days (72 hours)    Negative: [1] Fever returns after gone for over 24 hours AND [2] symptoms worse or not improved    Negative: [1] Continuous (nonstop) coughing interferes with work or school AND [2] no improvement using cough treatment per protocol    Answer Assessment - Initial Assessment Questions  1. COVID-19 DIAGNOSIS: \"Who made your Coronavirus (COVID-19) diagnosis?\" \"Was it confirmed by a positive lab test?\" If not diagnosed by a HCP, ask \"Are there lots of cases (community spread) where you live?\" (See public health department website, if unsure)      Not confirmed  2. COVID-19 EXPOSURE: \"Was there any known exposure to COVID before the symptoms began?\" CDC Definition of close contact: within 6 feet (2 meters) for a total of 15 minutes or more over a 24-hour period.       no  3. ONSET: \"When did the COVID-19 symptoms start?\"       5/5  4. WORST SYMPTOM: \"What is your worst symptom?\" (e.g., cough, fever, shortness of breath, muscle aches)      congestion  5. COUGH: \"Do you have a " "cough?\" If so, ask: \"How bad is the cough?\"        no  6. FEVER: \"Do you have a fever?\" If so, ask: \"What is your temperature, how was it measured, and when did it start?\"      no  7. RESPIRATORY STATUS: \"Describe your breathing?\" (e.g., shortness of breath, wheezing, unable to speak)       No issues  8. BETTER-SAME-WORSE: \"Are you getting better, staying the same or getting worse compared to yesterday?\"  If getting worse, ask, \"In what way?\"      better  9. HIGH RISK DISEASE: \"Do you have any chronic medical problems?\" (e.g., asthma, heart or lung disease, weak immune system, obesity, etc.)      No issues  10. PREGNANCY: \"Is there any chance you are pregnant?\" \"When was your last menstrual period?\"        no  11. OTHER SYMPTOMS: \"Do you have any other symptoms?\"  (e.g., chills, fatigue, headache, loss of smell or taste, muscle pain, sore throat; new loss of smell or taste especially support the diagnosis of COVID-19)        Chills, body aches    Protocols used: CORONAVIRUS (COVID-19) DIAGNOSED OR [id number] 3.25      "

## 2021-05-08 LAB
LABORATORY COMMENT REPORT: NORMAL
SARS-COV-2 RNA RESP QL NAA+PROBE: NEGATIVE
SARS-COV-2 RNA RESP QL NAA+PROBE: NORMAL
SPECIMEN SOURCE: NORMAL
SPECIMEN SOURCE: NORMAL

## 2021-05-12 ENCOUNTER — MYC MEDICAL ADVICE (OUTPATIENT)
Dept: FAMILY MEDICINE | Facility: OTHER | Age: 76
End: 2021-05-12

## 2021-05-21 ENCOUNTER — ALLIED HEALTH/NURSE VISIT (OUTPATIENT)
Dept: FAMILY MEDICINE | Facility: OTHER | Age: 76
End: 2021-05-21
Attending: FAMILY MEDICINE
Payer: COMMERCIAL

## 2021-05-21 VITALS
HEIGHT: 62 IN | HEART RATE: 80 BPM | BODY MASS INDEX: 22.63 KG/M2 | OXYGEN SATURATION: 98 % | RESPIRATION RATE: 18 BRPM | DIASTOLIC BLOOD PRESSURE: 62 MMHG | SYSTOLIC BLOOD PRESSURE: 118 MMHG | WEIGHT: 123 LBS | TEMPERATURE: 98.2 F

## 2021-05-21 DIAGNOSIS — E53.8 B12 DEFICIENCY: Primary | ICD-10-CM

## 2021-05-21 PROCEDURE — 96372 THER/PROPH/DIAG INJ SC/IM: CPT

## 2021-05-21 RX ADMIN — CYANOCOBALAMIN 1000 MCG: 1000 INJECTION, SOLUTION INTRAMUSCULAR; SUBCUTANEOUS at 11:28

## 2021-05-21 ASSESSMENT — PAIN SCALES - GENERAL: PAINLEVEL: NO PAIN (0)

## 2021-05-21 ASSESSMENT — MIFFLIN-ST. JEOR: SCORE: 993.23

## 2021-05-21 NOTE — NURSING NOTE
Clinic Administered Medication Documentation      Injectable Medication Documentation    Patient was given Cyanocobalamin (B-12). Prior to medication administration, verified patients identity using patient s name and date of birth. Please see MAR and medication order for additional information. Patient instructed to remain in clinic for 15 minutes.      Was entire vial of medication used? Yes  Vial/Syringe: Single dose vial  Expiration Date:  4/30/22  Was this medication supplied by the patient? No

## 2021-05-25 ENCOUNTER — TRANSFERRED RECORDS (OUTPATIENT)
Dept: HEALTH INFORMATION MANAGEMENT | Facility: CLINIC | Age: 76
End: 2021-05-25

## 2021-06-13 ENCOUNTER — MYC MEDICAL ADVICE (OUTPATIENT)
Dept: FAMILY MEDICINE | Facility: OTHER | Age: 76
End: 2021-06-13

## 2021-06-13 DIAGNOSIS — I21.4 NSTEMI (NON-ST ELEVATED MYOCARDIAL INFARCTION) (H): Primary | ICD-10-CM

## 2021-06-14 RX ORDER — LOSARTAN POTASSIUM 25 MG/1
25 TABLET ORAL DAILY
Qty: 90 TABLET | Refills: 3 | Status: SHIPPED | OUTPATIENT
Start: 2021-06-14 | End: 2021-12-14

## 2021-06-14 RX ORDER — ATORVASTATIN CALCIUM 40 MG/1
40 TABLET, FILM COATED ORAL AT BEDTIME
Qty: 90 TABLET | Refills: 3 | Status: SHIPPED | OUTPATIENT
Start: 2021-06-14 | End: 2021-09-20

## 2021-06-21 ENCOUNTER — ALLIED HEALTH/NURSE VISIT (OUTPATIENT)
Dept: FAMILY MEDICINE | Facility: OTHER | Age: 76
End: 2021-06-21
Attending: FAMILY MEDICINE
Payer: COMMERCIAL

## 2021-06-21 DIAGNOSIS — E53.8 B12 DEFICIENCY: Primary | ICD-10-CM

## 2021-06-21 PROCEDURE — 96372 THER/PROPH/DIAG INJ SC/IM: CPT

## 2021-06-21 RX ADMIN — CYANOCOBALAMIN 1000 MCG: 1000 INJECTION, SOLUTION INTRAMUSCULAR; SUBCUTANEOUS at 09:50

## 2021-06-21 NOTE — NURSING NOTE
Clinic Administered Medication Documentation      Injectable Medication Documentation    Patient was given Cyanocobalamin (B-12). Prior to medication administration, verified patients identity using patient s name and date of birth. Please see MAR and medication order for additional information. Patient instructed to report any adverse reaction to staff immediately .      Was entire vial of medication used? Yes  Vial/Syringe: Single dose vial  Expiration Date:  02/22  Was this medication supplied by the patient? No

## 2021-07-21 ENCOUNTER — ALLIED HEALTH/NURSE VISIT (OUTPATIENT)
Dept: FAMILY MEDICINE | Facility: OTHER | Age: 76
End: 2021-07-21
Attending: FAMILY MEDICINE
Payer: COMMERCIAL

## 2021-07-21 DIAGNOSIS — E53.8 B12 DEFICIENCY: Primary | ICD-10-CM

## 2021-07-21 PROCEDURE — 96372 THER/PROPH/DIAG INJ SC/IM: CPT

## 2021-07-21 RX ADMIN — CYANOCOBALAMIN 1000 MCG: 1000 INJECTION, SOLUTION INTRAMUSCULAR; SUBCUTANEOUS at 10:09

## 2021-07-21 NOTE — PROGRESS NOTES
The following medication was given:     MEDICATION: Vitamin B12  1000mcg  ROUTE: IM  SITE: Deltoid - Right  DOSE: 1 ml  LOT #: [id number]  :  [email or web address]  EXPIRATION DATE:  11/2022  NDC#: 75221-223-42

## 2021-08-23 ENCOUNTER — ALLIED HEALTH/NURSE VISIT (OUTPATIENT)
Dept: FAMILY MEDICINE | Facility: OTHER | Age: 76
End: 2021-08-23
Attending: FAMILY MEDICINE
Payer: COMMERCIAL

## 2021-08-23 VITALS
TEMPERATURE: 97.4 F | SYSTOLIC BLOOD PRESSURE: 124 MMHG | HEART RATE: 75 BPM | DIASTOLIC BLOOD PRESSURE: 60 MMHG | OXYGEN SATURATION: 98 %

## 2021-08-23 DIAGNOSIS — E53.8 B12 DEFICIENCY: Primary | ICD-10-CM

## 2021-08-23 PROCEDURE — 96372 THER/PROPH/DIAG INJ SC/IM: CPT

## 2021-08-23 RX ADMIN — CYANOCOBALAMIN 1000 MCG: 1000 INJECTION, SOLUTION INTRAMUSCULAR; SUBCUTANEOUS at 09:38

## 2021-08-23 ASSESSMENT — PAIN SCALES - GENERAL: PAINLEVEL: NO PAIN (0)

## 2021-08-23 NOTE — NURSING NOTE
Clinic Administered Medication Documentation    Administrations This Visit     cyanocobalamin injection 1,000 mcg     Admin Date  08/23/2021 Action  Given Dose  1,000 mcg Route  Intramuscular Site  Left Deltoid Administered By  Adrián Moses LPN    Ordering Provider: Kenna Portillo MD    Patient Supplied?: No                  Injectable Medication Documentation    Patient was given Cyanocobalamin (B-12). Prior to medication administration, verified patients identity using patient s name and date of birth. Please see MAR and medication order for additional information. Patient instructed to remain in clinic for 15 minutes.      Was entire vial of medication used? Yes  Vial/Syringe: Single dose vial  Expiration Date:  11/2022  Was this medication supplied by the patient? No

## 2021-09-17 DIAGNOSIS — I21.4 NSTEMI (NON-ST ELEVATED MYOCARDIAL INFARCTION) (H): ICD-10-CM

## 2021-09-19 ENCOUNTER — HEALTH MAINTENANCE LETTER (OUTPATIENT)
Age: 76
End: 2021-09-19

## 2021-09-20 RX ORDER — ATORVASTATIN CALCIUM 40 MG/1
40 TABLET, FILM COATED ORAL AT BEDTIME
Qty: 90 TABLET | Refills: 3 | Status: SHIPPED | OUTPATIENT
Start: 2021-09-20 | End: 2022-08-24

## 2021-09-20 NOTE — TELEPHONE ENCOUNTER
Switching to [employer].  1 year sent to [facility] on 6.14.21.

## 2021-09-23 ENCOUNTER — ALLIED HEALTH/NURSE VISIT (OUTPATIENT)
Dept: FAMILY MEDICINE | Facility: OTHER | Age: 76
End: 2021-09-23
Attending: FAMILY MEDICINE
Payer: COMMERCIAL

## 2021-09-23 DIAGNOSIS — E53.8 B12 DEFICIENCY: Primary | ICD-10-CM

## 2021-09-23 PROCEDURE — 96372 THER/PROPH/DIAG INJ SC/IM: CPT

## 2021-09-23 RX ADMIN — CYANOCOBALAMIN 1000 MCG: 1000 INJECTION, SOLUTION INTRAMUSCULAR; SUBCUTANEOUS at 09:24

## 2021-09-23 NOTE — PROGRESS NOTES
Clinic Administered Medication Documentation    Administrations This Visit     cyanocobalamin injection 1,000 mcg     Admin Date  09/23/2021 Action  Given Dose  1,000 mcg Route  Intramuscular Site   Administered By  Kelly Knox LPN    Ordering Provider: Kenna Portillo MD    Patient Supplied?: No                  Injectable Medication Documentation    Patient was given Cyanocobalamin (B-12). Prior to medication administration, verified patients identity using patient s name and date of birth. Please see MAR and medication order for additional information. Patient instructed to remain in clinic for 15 minutes.      Was entire vial of medication used? Yes  Vial/Syringe: Single dose vial  Expiration Date:  12/2022  Was this medication supplied by the patient? No     Kelly TREJO. RUSSELL Knox

## 2021-09-28 ENCOUNTER — PREP FOR PROCEDURE (OUTPATIENT)
Dept: SURGERY | Facility: OTHER | Age: 76
End: 2021-09-28

## 2021-09-28 ENCOUNTER — OFFICE VISIT (OUTPATIENT)
Dept: SURGERY | Facility: OTHER | Age: 76
End: 2021-09-28
Attending: SURGERY
Payer: COMMERCIAL

## 2021-09-28 VITALS
WEIGHT: 120 LBS | TEMPERATURE: 97.4 F | BODY MASS INDEX: 22.31 KG/M2 | RESPIRATION RATE: 16 BRPM | DIASTOLIC BLOOD PRESSURE: 64 MMHG | OXYGEN SATURATION: 98 % | HEART RATE: 73 BPM | SYSTOLIC BLOOD PRESSURE: 142 MMHG

## 2021-09-28 DIAGNOSIS — Z87.19 HISTORY OF BARRETT'S ESOPHAGUS: ICD-10-CM

## 2021-09-28 DIAGNOSIS — Z01.818 PREOP TESTING: Primary | ICD-10-CM

## 2021-09-28 DIAGNOSIS — K21.9 GASTROESOPHAGEAL REFLUX DISEASE WITHOUT ESOPHAGITIS: Primary | ICD-10-CM

## 2021-09-28 DIAGNOSIS — K21.00 GASTROESOPHAGEAL REFLUX DISEASE WITH ESOPHAGITIS WITHOUT HEMORRHAGE: ICD-10-CM

## 2021-09-28 PROCEDURE — 99203 OFFICE O/P NEW LOW 30 MIN: CPT | Performed by: SURGERY

## 2021-09-28 PROCEDURE — G0463 HOSPITAL OUTPT CLINIC VISIT: HCPCS

## 2021-09-28 ASSESSMENT — PAIN SCALES - GENERAL: PAINLEVEL: NO PAIN (0)

## 2021-09-28 NOTE — NURSING NOTE
"Chief Complaint   Patient presents with     Consult     EDG consult       Initial BP (!) 142/64   Pulse 73   Temp 97.4  F (36.3  C) (Tympanic)   Resp 16   Wt 54.4 kg (120 lb)   SpO2 98%   BMI 22.31 kg/m   Estimated body mass index is 22.31 kg/m  as calculated from the following:    Height as of 5/21/21: 1.562 m (5' 1.5\").    Weight as of this encounter: 54.4 kg (120 lb).  Medication Reconciliation: complete  Erin Day LPN    "

## 2021-09-28 NOTE — PATIENT INSTRUCTIONS
Thank you for allowing our surgical team to participate in your care. Please review the following instructions to prepare for your upcoming Upper Endoscopy. You may call any of the numbers listed below with questions you may have.  [facility] Health Unit Coordinator: [phone]  Clinic Surgery Nurse (Nikki): [phone]  Surgery Education Nurse: [phone]    Date of Procedure: 10/29 with Dr. Arcos  Admit time: Surgery  will call you the day before your procedure by 5pm with your admit time. If your surgery is on Monday, please expect a call on Friday. If we were unable to reach you by 5PM, you may call  [phone] for your arrival time.    COVID-19 test is needed 4 days before procedure. This testing is done at the upper level of [facility] (weekdays and weekends-park at East Entrance) or at the [facility] (weekday mornings only).  Follow the signage for parking and bring your mobile phone (if you have one) to call the phone number ([phone]) on the sign outside the testing site for check-in. Stay in your vehicle until you are directed to enter. If you do not have a cell phone, please call the nurse for instructions on checking in. This has been scheduled for 10/25/21 at 9:45 at the [facility] site.      Please call the Surgery Education Nurses 1 week prior to your surgery date at  [phone] for further instructions. Have your medication/allergy lists ready.    Do not take Aspirin (325mg), other NSAIDs (Ibuprofen, Motrin, Aleve, Celebrex, Naproxen, etc.) vitamins/supplements 7 days before your surgery.   If you are on blood thinners or insulin, please call your primary care provider for instruction. If you are prescribed an 81 mg Aspirin, you may continue.    Please call the clinic surgery nurse or your regular doctor if you get sick within 2 weeks of the procedure. (vomiting, diarrhea, fever, cough, cold or any other symptoms of illness)    Do not eat any solid foods or milk  products after 10pm the night prior to surgery.   You may have clear liquids only up until 4 hours prior to surgery.   Please see the table at the end of instructions for a list of clear liquids.     You will need a responsible adult available to drive you home and stay with you for at least 4 hours after you leave the hospital. You will not be allowed to drive yourself. If you need to take a taxi or the bus you must have a responsible person to ride with you (not the taxi/). Your procedure will be cancelled if you do not bring a responsible adult.    Questions or concerns can be directed to the clinic or surgery education nurse at any of the numbers listed above. If you have a scheduling or appointment question, please call the Health Unit Coordinator between 8am and 4pm Monday through Friday. After hours or on weekends, please call [phone] to postpone.         Clear Liquid Diet  You may have: Do NOT have:     ? Tea, coffee (no cream)   Milk or milk products such as ice cream, malts or shakes     ? Water, Vitamin Water, Smart Water, Coconut Water, PowerAde, Propel, Soda pop, (Sprite, 7 UP, Ginger Ale, Gatorade (not red or purple)   Red or purple drinks of any kind such as  Cranberry juice or grape juice.     ? Clear nutrition drinks (Resource Breeze, Ensure Active protein drink (peach flavor)   Red or purple Jell-O, Popsicles, Rodrigo-Aid, Sorbet and candy.     ? Jell-O, Popsicles (no milk or fruit pieces) or Italian ice (not red or purple)   Juices with pulp such as orange, grapefruit, pineapple or tomato juice     ? Water, Vitamin Water, Smart Water, Coconut Water   Cream soups of any kind     ? Fat-free soup broth or bouillon   Alcohol     ? Plain hard candy, such as clear Life Savers (not red or purple)      ? Powdered Lemonade such as Crystal Light, Country Time      ? Clear juices and fruit-flavored drinks such as apple juice, white grape juice, Hi-C and Rodrigo-Aid (not red or purple)      ? Honey /  Sugar

## 2021-09-28 NOTE — PROGRESS NOTES
CLINIC NOTE - CONSULT  9/28/2021    Patient : Maria Teresa Aburto  Referring Physician : No ref. provider found    Reason for Referral : Upper endoscopy    This is a 76 year old female with a need for an upper endoscopy.  Upper endoscopy is needed for history of Bowden's.      Last EGD : 3 years  History of GERD : YES  History of PUD : NO  On PPI's : NO   Drug and Dose : N/A   If on H2 blockers or PPI's, have they helped:  N/A  History of dysphagia : NO   Dysphagia to solids greater than liquids : NO  Hematemesis : NO  Melena : NO    Past Medical History:  Past Medical History:   Diagnosis Date     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     Bowden's esophagus 10/11/2011     Constipation 10/09/2012     GERD (gastroesophageal reflux disease)[530.81] 05/11/2003     NSTEMI (non-ST elevated myocardial infarction) (H) 9/28/2020    With Takotsubo cardiomyopathy Cardiac cath, small lesion of diagonal not requiring stenting Dr Crystal Sandoval Vakil     Osteopenia 10/11/2011    dexa scans odd years starting in 2003     Precordial pain 04/28/2003    negative stress test, negative pulmonary evaluatio     Restless legs syndrome (RLS) 10/11/2011     Squamous Cell Carcinoma 10/11/2011    left leg x2     Takotsubo cardiomyopathy 9/22/2020    Episode of high BP noted on exam with no other symptoms Elevated troponin Cardiac cath, small lesion of  small diagonal, not requiring stenting Crystal Sandoval, cardiology     Urge incontinence 10/09/2012       Past Surgical History:  Past Surgical History:   Procedure Laterality Date     Breast Biospy  11/11/2000    LEFT > Fibrocystic Disease > Outcome: Fibroadenoma     COLONOSCOPY  2000     COLONOSCOPY  8-7-2009    Normal, Repeat 2015     DEXA Scan  2009     EGD       EGD  2008     EGD  2003     ENDOSCOPY UPPER, COLONOSCOPY, COMBINED N/A 9/6/2018    Procedure: COMBINED ENDOSCOPY UPPER, COLONOSCOPY;  UPPER ENDOSCOPY WITH BIOPSIES AND COLONOSCOPY WITH BIOPSIES;  Surgeon: Minh Interiano DO;   "Location: [facility] OR     Knee Replacement  2012     Oophorectomy      Ectopic Pregnancy     ROTATOR CUFF REPAIR RT/LT Right     with acromioplasty for complete rotator cuff tear, Dr Cherry     TONSILLECTOMY         Family History History:  Family History   Problem Relation Age of Onset     Endometrial Cancer Other         Family Hx     Osteoporosis Other         Family Hx     Parkinsonism Other         Family Hx     Unknown/Adopted No family hx of        History of Tobacco Use:  History   Smoking Status     Former Smoker     Packs/day: 1.00     Years: 4.00     Types: Cigarettes     Start date: 1962     Quit date: 1966   Smokeless Tobacco     Never Used       Current Medications:  Current Outpatient Medications   Medication Sig Dispense Refill     ASPIRIN CAPS 81 MG OR one capsule by mouth daily 100 3     atorvastatin (LIPITOR) 40 MG tablet Take 1 tablet (40 mg) by mouth At Bedtime 90 tablet 3     Calcium Carbonate-Vitamin D (CALCIUM + D PO)        Cyanocobalamin (VITAMIN B 12 PO) Injection [id number]       losartan (COZAAR) 25 MG tablet Take 1 tablet (25 mg) by mouth daily 90 tablet 3     multivitamin, therapeutic with minerals (MULTI-VITAMIN) TABS Take 1 tablet by mouth daily       Vitamin D, Cholecalciferol, 25 MCG (1000 UT) TABS          Allergies:  Allergies   Allergen Reactions     Nitrofurantoin Other (See Comments) and Nausea     Macrobid  \"Chills and Fevers\"     Nitrofurantoin Macrocrystal Other (See Comments) and Nausea     Macrobid  \"Chills and Fevers\"         ROS:  Pertinent items are noted in HPI.  All other systems are negative.    PHYSICAL EXAM:     Vital signs: BP (!) 142/64   Pulse 73   Temp 97.4  F (36.3  C) (Tympanic)   Resp 16   Wt 54.4 kg (120 lb)   SpO2 98%   BMI 22.31 kg/m     Weight: [unfilled]   BMI: Body mass index is 22.31 kg/m .   General: Normal, healthy, cooperative, in no acute distress, alert   Skin: no jaundice   HEENT: PERRLA and EOMI   Neck: supple   Lungs: clear to auscultation   CV: " Regular rate and rhythm without murmer   Abdominal: abdomen is soft without significant tenderness, masses, organomegaly or guarding   Extremities: No cyanosis, clubbing or edema noted bilaterally in Upper and Lower Extremities   Neurological: without deficit    Assessment:   76 year old female with need for upper endoscopy for history of Bowden's:    Plan:   Will schedule an esophagogastroduodenoscopy.  The procedures with their risks, benefits and alternatives were explained.  Risks include but are not limited to bleeding, perforation, missing lesions, need for additional procedures, reaction to anesthesia.  All the patients questions were answered.  The patient consents to proceed.  The procedure will be scheduled.

## 2021-10-01 ENCOUNTER — TELEPHONE (OUTPATIENT)
Dept: FAMILY MEDICINE | Facility: OTHER | Age: 76
End: 2021-10-01

## 2021-10-01 DIAGNOSIS — K21.9 GASTROESOPHAGEAL REFLUX DISEASE WITHOUT ESOPHAGITIS: Primary | ICD-10-CM

## 2021-10-01 RX ORDER — PANTOPRAZOLE SODIUM 20 MG/1
40 TABLET, DELAYED RELEASE ORAL DAILY
Qty: 30 TABLET | Refills: 0 | Status: SHIPPED | OUTPATIENT
Start: 2021-10-01 | End: 2021-10-01

## 2021-10-01 RX ORDER — PANTOPRAZOLE SODIUM 20 MG/1
40 TABLET, DELAYED RELEASE ORAL DAILY
Qty: 60 TABLET | Refills: 0 | Status: SHIPPED | OUTPATIENT
Start: 2021-10-01 | End: 2021-11-08

## 2021-10-01 NOTE — TELEPHONE ENCOUNTER
Pt called, requesting script for acid reflux. Pt is experiencing symptoms including heartburn and burning/fullness in esophagus. Pt has EGD scheduled for 10/29 with Dr Arcos. Allergies reviewed, pharmacy pended. Please advise. Thank you!

## 2021-10-01 NOTE — TELEPHONE ENCOUNTER
Directions for pantoprazole are: take 2 tablets by mouth daily. 30 tablets sent in for 15 day supply. Would you like patient on medication for 15 days or one month until she is seen for her EGD? Re pended medication for 60 tablets for 30 day supply.   Please advise, thank you.

## 2021-10-16 NOTE — PROGRESS NOTES
"SUBJECTIVE:   Maria Teresa Aburto is a 76 year old female who presents for Preventive Visit.      Patient has been advised of split billing requirements and indicates understanding: Yes   Are you in the first 12 months of your Medicare coverage?  No    Healthy Habits:    In general, how would you rate your overall health?  Very good    Frequency of exercise:  6-7 days/week    Duration of exercise:  30-45 minutes    Do you usually eat at least 4 servings of fruit and vegetables a day, include whole grains    & fiber and avoid regularly eating high fat or \"junk\" foods?  Yes    Taking medications regularly:  Yes    Barriers to taking medications:  None    Medication side effects:  None    Ability to successfully perform activities of daily living:  No assistance needed    Home Safety:  No safety concerns identified    Hearing Impairment:  No hearing concerns    In the past 6 months, have you been bothered by leaking of urine? Yes    In general, how would you rate your overall mental or emotional health?  Very good      PHQ-2 Total Score:    Do you feel safe in your environment? Yes    Have you ever done Advance Care Planning? (For example, a Health Directive, POLST, or a discussion with a medical provider or your loved ones about your wishes): No, advance care planning information given to patient to review.  Patient declined advance care planning discussion at this time.       Fall risk     click delete button to remove this line now  Cognitive Screening   1) Repeat 3 items (Leader, Season, Table)    2) Clock draw: NORMAL  3) 3 item recall: Recalls 3 objects  Results: 3 items recalled: COGNITIVE IMPAIRMENT LESS LIKELY    Mini-CogTM Copyright SARA Singer. Licensed by the author for use in [facility], [place]; reprinted with permission (amelia@.[facility]). All rights reserved.      Do you have sleep apnea, excessive snoring or daytime drowsiness?: no    Reviewed and updated as needed this visit by clinical staff  Tobacco  " Allergies  Meds   Med Hx  Surg Hx  Fam Hx  Soc Hx       Reviewed and updated as needed this visit by Provider               Social History     Tobacco Use     Smoking status: Former Smoker     Packs/day: 1.00     Years: 4.00     Pack years: 4.00     Types: Cigarettes     Start date:      Quit date:      Years since quittin.8     Smokeless tobacco: Never Used   Substance Use Topics     Alcohol use: Yes     If you drink alcohol do you typically have >3 drinks per day or >7 drinks per week? No    No flowsheet data found.        Hyperlipidemia Follow-Up      Are you regularly taking any medication or supplement to lower your cholesterol?   Yes- Lipitor 40    Are you having muscle aches or other side effects that you think could be caused by your cholesterol lowering medication?  No    Hypertension Follow-up      Do you check your blood pressure regularly outside of the clinic? Yes     Are you following a low salt diet? Yes    Are your blood pressures ever more than 140 on the top number (systolic) OR more   than 90 on the bottom number (diastolic), for example 140/90? 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, MOHS surgery by Dr Schultz    She had a normal eye exam    Maria Teresa had a flare of her GERD this fall and had a follow up EGD by Dr Arcos here with no dysplasia noted    Fatigue  She has noted more fatigue after supper, ready for bed after eating supper       Current providers sharing in care for this patient include:   Patient Care Team:  Kenna Portillo MD as PCP - General  Kenna Portillo MD as Assigned PCP  Panfilo Arcos MD as Assigned Surgical Provider    The following health maintenance items are reviewed in Epic and correct as of today:  Health Maintenance Due   Topic Date Due     DTAP/TDAP/TD IMMUNIZATION (3 - Td or Tdap) 10/11/2021     BP Readings from Last 3 Encounters:   21 136/76   10/29/21 162/70   21 (!) 142/64    Wt Readings from  Last 3 Encounters:   21 54.4 kg (120 lb)   10/29/21 54.4 kg (120 lb)   21 54.4 kg (120 lb)                  Patient Active Problem List   Diagnosis     Restless legs syndrome (RLS)     Bowden's esophagus     B-complex deficiency     Disorder of bone and cartilage     Squamous Cell Carcinoma     GERD (gastroesophageal reflux disease)[530.81]     Actinic keratosis     History of malignant neoplasm of skin     Atypical nevus     Medial meniscus tear     History of nonmelanoma skin cancer     Nontraumatic incomplete tear of right rotator cuff     Nontraumatic complete tear of right rotator cuff     S/P right rotator cuff repair     Vitamin B12 deficiency (non anemic)     Takotsubo cardiomyopathy     NSTEMI (non-ST elevated myocardial infarction) (H)     ACE-inhibitor cough     Hyperlipidemia LDL goal <70     Past Surgical History:   Procedure Laterality Date     Breast Biospy  2000    LEFT > Fibrocystic Disease > Outcome: Fibroadenoma     COLONOSCOPY       COLONOSCOPY  2009    Normal, Repeat      DEXA Scan       EGD       EGD       EGD       ENDOSCOPY UPPER, COLONOSCOPY, COMBINED N/A 2018    Procedure: COMBINED ENDOSCOPY UPPER, COLONOSCOPY;  UPPER ENDOSCOPY WITH BIOPSIES AND COLONOSCOPY WITH BIOPSIES;  Surgeon: Minh Interiano DO;  Location: [facility]     ESOPHAGOSCOPY, GASTROSCOPY, DUODENOSCOPY (EGD), COMBINED N/A 10/29/2021    Procedure: Upper endoscopy with biopsies;  Surgeon: Panfilo Arcos MD;  Location: [facility]     Knee Replacement       Oophorectomy      Ectopic Pregnancy     ROTATOR CUFF REPAIR RT/LT Right     with acromioplasty for complete rotator cuff tear, Dr Cherry     TONSILLECTOMY         Social History     Tobacco Use     Smoking status: Former Smoker     Packs/day: 1.00     Years: 4.00     Pack years: 4.00     Types: Cigarettes     Start date:      Quit date:      Years since quittin.8     Smokeless tobacco: Never Used   Substance  "Use Topics     Alcohol use: Yes     Family History   Problem Relation Age of Onset     Endometrial Cancer Other         Family Hx     Osteoporosis Other         Family Hx     Parkinsonism Other         Family Hx     Unknown/Adopted No family hx of          Current Outpatient Medications   Medication Sig Dispense Refill     ASPIRIN CAPS 81 MG OR one capsule by mouth daily 100 3     atorvastatin (LIPITOR) 40 MG tablet Take 1 tablet (40 mg) by mouth At Bedtime 90 tablet 3     Calcium Carbonate-Vitamin D (CALCIUM + D PO)        Cyanocobalamin (VITAMIN B 12 PO) Injection [id number]       losartan (COZAAR) 25 MG tablet Take 1 tablet (25 mg) by mouth daily 90 tablet 3     multivitamin, therapeutic with minerals (MULTI-VITAMIN) TABS Take 1 tablet by mouth daily       omeprazole (PRILOSEC) 40 MG DR capsule Take 1 capsule (40 mg) by mouth daily 90 capsule 3     Vitamin D, Cholecalciferol, 25 MCG (1000 UT) TABS        Allergies   Allergen Reactions     Nitrofurantoin Other (See Comments) and Nausea     Macrobid  \"Chills and Fevers\"     Nitrofurantoin Macrocrystal Other (See Comments) and Nausea     Macrobid  \"Chills and Fevers\"       Mammogram Screening: due    Mammogram Screening - Patient over age 75, has elected to continue with screening.  Pertinent mammograms are reviewed under the imaging tab.    Review of Systems  Constitutional, HEENT, cardiovascular, pulmonary, GI, , musculoskeletal, neuro, skin, endocrine and psych systems are negative, except as otherwise noted.    OBJECTIVE:   /76   Pulse 84   Temp 97.4  F (36.3  C) (Tympanic)   Resp 19   Ht 1.626 m (5' 4\")   Wt 54.4 kg (120 lb)   SpO2 100%   BMI 20.60 kg/m   Estimated body mass index is 20.6 kg/m  as calculated from the following:    Height as of this encounter: 1.626 m (5' 4\").    Weight as of this encounter: 54.4 kg (120 lb).  Physical Exam  GENERAL APPEARANCE: healthy, alert and no distress  EYES: Eyes grossly normal to inspection, PERRL and " conjunctivae and sclerae normal  HENT: ear canals and TM's normal, nose and mouth without ulcers or lesions, oropharynx clear and oral mucous membranes moist. 0.5 cm papule of the tongue noted of the anterior right tip of the tongue  NECK: no adenopathy, no asymmetry, masses, or scars and thyroid normal to palpation  RESP: lungs clear to auscultation - no rales, rhonchi or wheezes  BREAST: normal without masses, tenderness or nipple discharge and no palpable axillary masses or adenopathy  CV: regular rate and rhythm, normal S1 S2, no S3 or S4, no murmur, click or rub, no peripheral edema and peripheral pulses strong, no carotid or abdominal bruits   ABDOMEN: soft, nontender, no hepatosplenomegaly, no masses and bowel sounds normal  MS: no musculoskeletal defects are noted and gait is age appropriate without ataxia  SKIN: no suspicious lesions or rashes  NEURO: Normal strength and tone, sensory exam grossly normal, mentation intact and speech normal  PSYCH: mentation appears normal and affect normal/bright        ASSESSMENT / PLAN:   (Z00.00) Routine general medical examination at a health care facility  (primary encounter diagnosis)  Comment: doing well   Plan: TD (Adult), PF, Adsorbed (TDVAX) [IMM09]        Tetanus updated today    (K22.70) Bowden's esophagus without dysplasia  Comment:   Plan: CBC with platelets, Comprehensive metabolic         panel        Recent EGD done, no dysplasia  She will return fasting for lab    (K21.9) Gastroesophageal reflux disease without esophagitis  Comment:   Plan: recent EGD without dysplasia    (E53.8) Vitamin B12 deficiency (non anemic)  Comment:   Plan: stable     (I51.81) Takotsubo cardiomyopathy  Comment:   Plan: no symptoms at this time    (I21.4) NSTEMI (non-ST elevated myocardial infarction) (H)  Comment:   Plan: stable     (E78.5) Hyperlipidemia LDL goal <70  Comment:   Plan: Lipid Profile (Chol, Trig, HDL, LDL calc)        Return fasting for lab     (Z11.59) Need for  "hepatitis C screening test  Comment:   Plan: Hepatitis C Screen Reflex to HCV RNA Quant and         Genotype        Screen     (R53.83) Fatigue, unspecified type  Comment:   Plan: TSH with free T4 reflex        Rule out thyroid disease     (Z12.31) Encounter for screening mammogram for breast cancer  Comment:   Plan: MA Screen Bilateral w/Gaston        Due in December     (C44.320) SCC (squamous cell carcinoma), face  Comment:   Plan: right temple, s/p MOHS surgery with Dr Schultz    (K14.8) Tongue lesion  Comment:   Plan: has appointment to see Dr Russo regarding this       COUNSELING:  Reviewed preventive health counseling, as reflected in patient instructions       Regular exercise       Healthy diet/nutrition       Immunizations    Vaccinated for: Td             Hepatitis C screening    Estimated body mass index is 20.6 kg/m  as calculated from the following:    Height as of this encounter: 1.626 m (5' 4\").    Weight as of this encounter: 54.4 kg (120 lb).        She reports that she quit smoking about 55 years ago. Her smoking use included cigarettes. She started smoking about 59 years ago. She has a 4.00 pack-year smoking history. She has never used smokeless tobacco.      Appropriate preventive services were discussed with this patient, including applicable screening as appropriate for cardiovascular disease, diabetes, osteopenia/osteoporosis, and glaucoma.  As appropriate for age/gender, discussed screening for colorectal cancer, prostate cancer, breast cancer, and cervical cancer. Checklist reviewing preventive services available has been given to the patient.    Reviewed patients plan of care and provided an AVS. The Basic Care Plan (routine screening as documented in Health Maintenance) for Maria Teresa meets the Care Plan requirement. This Care Plan has been established and reviewed with the Patient.    Counseling Resources:  ATP IV Guidelines  Pooled Cohorts Equation Calculator  Breast Cancer Risk " Calculator  Breast Cancer: Medication to Reduce Risk  FRAX Risk Assessment  ICSI Preventive Guidelines  Dietary Guidelines for Americans, 2010  Absio's MyPlate  ASA Prophylaxis  Lung CA Screening    Kenna Portillo MD  [facility]    Identified Health Risks:

## 2021-10-16 NOTE — PATIENT INSTRUCTIONS
Preventive Health Recommendations    See your health care provider every year to    Review health changes.     Discuss preventive care.      Review your medicines if your doctor has prescribed any.      You no longer need a yearly Pap test unless you've had an abnormal Pap test in the past 10 years. If you have vaginal symptoms, such as bleeding or discharge, be sure to talk with your provider about a Pap test.      Every 1 to 2 years, have a mammogram.  If you are over 69, talk with your health care provider about whether or not you want to continue having screening mammograms.      Every 10 years, have a colonoscopy. Or, have a yearly FIT test (stool test). These exams will check for colon cancer.       Have a cholesterol test every 5 years, or more often if your doctor advises it.       Have a diabetes test (fasting glucose) every three years. If you are at risk for diabetes, you should have this test more often.       At age 65, have a bone density scan (DEXA) to check for osteoporosis (brittle bone disease).    Shots:    Get a flu shot each year.    Get a tetanus shot every 10 years.    Talk to your doctor about your pneumonia vaccines. There are now two you should receive - Pneumovax (PPSV 23) and Prevnar (PCV 13).    Talk to your pharmacist about the shingles vaccine.    Talk to your doctor about the hepatitis B vaccine.    Nutrition:     Eat at least 5 servings of fruits and vegetables each day.      Eat whole-grain bread, whole-wheat pasta and brown rice instead of white grains and rice.      Get adequate about Calcium and Vitamin D.     Lifestyle    Exercise at least 150 minutes a week (30 minutes a day, 5 days a week). This will help you control your weight and prevent disease.      Limit alcohol to one drink per day.      No smoking.       Wear sunscreen to prevent skin cancer.       See your dentist twice a year for an exam and cleaning.      See your eye doctor every 1 to 2 years to screen for  conditions such as glaucoma, macular degeneration, cataracts, etc.    Personalized Prevention Plan  You are due for the preventive services outlined below.  Your care team is available to assist you in scheduling these services.  If you have already completed any of these items, please share that information with your care team to update in your medical record.    Health Maintenance Due   Topic Date Due     Flu Vaccine (1) 09/01/2021     Diptheria Tetanus Pertussis (DTAP/TDAP/TD) Vaccine (3 - Td or Tdap) 10/11/2021     Annual Wellness Visit  11/03/2021     Depression Assessment  11/03/2021

## 2021-10-22 ENCOUNTER — ANESTHESIA EVENT (OUTPATIENT)
Dept: SURGERY | Facility: HOSPITAL | Age: 76
End: 2021-10-22
Payer: COMMERCIAL

## 2021-10-22 ASSESSMENT — LIFESTYLE VARIABLES: TOBACCO_USE: 1

## 2021-10-22 NOTE — ANESTHESIA PREPROCEDURE EVALUATION
Anesthesia Pre-Procedure Evaluation    Patient: Maria Teresa Aburto   MRN: [id number] : 1945        Preoperative Diagnosis: Gastroesophageal reflux disease without esophagitis [K21.9]  History of Bowden's esophagus [Z87.19]    Procedure : Procedure(s):  Upper endoscopy          Past Medical History:   Diagnosis Date     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     Bowden's esophagus 10/11/2011     Constipation 10/09/2012     GERD (gastroesophageal reflux disease)[530.81] 2003     NSTEMI (non-ST elevated myocardial infarction) (H) 2020    With Takotsubo cardiomyopathy Cardiac cath, small lesion of diagonal not requiring stenting Dr Crystal Sandoval [place]     Osteopenia 10/11/2011    dexa scans odd years starting in      Precordial pain 2003    negative stress test, negative pulmonary evaluatio     Restless legs syndrome (RLS) 10/11/2011     Squamous Cell Carcinoma 10/11/2011    left leg x2     Takotsubo cardiomyopathy 2020    Episode of high BP noted on exam with no other symptoms Elevated troponin Cardiac cath, small lesion of  small diagonal, not requiring stenting Crystal Sandoval, cardiology     Urge incontinence 10/09/2012      Past Surgical History:   Procedure Laterality Date     Breast Biospy  2000    LEFT > Fibrocystic Disease > Outcome: Fibroadenoma     COLONOSCOPY       COLONOSCOPY  2009    Normal, Repeat      DEXA Scan       EGD       EGD       EGD       ENDOSCOPY UPPER, COLONOSCOPY, COMBINED N/A 2018    Procedure: COMBINED ENDOSCOPY UPPER, COLONOSCOPY;  UPPER ENDOSCOPY WITH BIOPSIES AND COLONOSCOPY WITH BIOPSIES;  Surgeon: Minh Interiano DO;  Location: [facility]     Knee Replacement       Oophorectomy      Ectopic Pregnancy     ROTATOR CUFF REPAIR RT/LT Right     with acromioplasty for complete rotator cuff tear, Dr Cherry     TONSILLECTOMY        Allergies   Allergen Reactions     Nitrofurantoin Other (See Comments) and Nausea      "Macrobid  \"Chills and Fevers\"     Nitrofurantoin Macrocrystal Other (See Comments) and Nausea     Macrobid  \"Chills and Fevers\"        Social History     Tobacco Use     Smoking status: Former Smoker     Packs/day: 1.00     Years: 4.00     Pack years: 4.00     Types: Cigarettes     Start date:      Quit date:      Years since quittin.8     Smokeless tobacco: Never Used   Substance Use Topics     Alcohol use: Yes      Wt Readings from Last 1 Encounters:   21 54.4 kg (120 lb)        Anesthesia Evaluation   Pt has had prior anesthetic.     No history of anesthetic complications       ROS/MED HX  ENT/Pulmonary:     (+) MARISSA risk factors, observed stopped breathing, tobacco use, Past use,     Neurologic: Comment: RLS      Cardiovascular: Comment: Hx takotsubo cardiomyopathy 2020, resolved    (+) Dyslipidemia hypertension-----    METS/Exercise Tolerance: >4 METS    Hematologic:  - neg hematologic  ROS     Musculoskeletal: Comment: osteopenia - neg musculoskeletal ROS     GI/Hepatic: Comment: barretts esophagus    (+) GERD, Symptomatic,     Renal/Genitourinary: Comment: Urge incontinence      Endo:  - neg endo ROS     Psychiatric/Substance Use:  - neg psychiatric ROS     Infectious Disease:  - neg infectious disease ROS     Malignancy:   (+) Malignancy (SCC removed 1 month ago), History of Skin.Skin CA Remission status post Surgery.        Other:  - neg other ROS          Physical Exam    Airway  airway exam normal      Mallampati: III   TM distance: > 3 FB   Neck ROM: full   Mouth opening: > 3 cm    Respiratory Devices and Support         Dental  no notable dental history       B=Bridge, C=Chipped, L=Loose, M=Missing    Cardiovascular   cardiovascular exam normal       Rhythm and rate: regular and normal     Pulmonary   pulmonary exam normal        breath sounds clear to auscultation           OUTSIDE LABS:  CBC:   Lab Results   Component Value Date    WBC 6.4 2020    WBC 6.7 2019    HGB " 13.0 09/22/2020    HGB 13.1 08/09/2019    HCT 36.3 09/22/2020    HCT 37.8 08/09/2019     09/22/2020     08/09/2019     BMP:   Lab Results   Component Value Date     09/22/2020     (L) 09/16/2019    POTASSIUM 4.1 09/22/2020    POTASSIUM 4.1 09/16/2019    CHLORIDE 102 09/22/2020    CHLORIDE 97 09/16/2019    CO2 28 09/22/2020    CO2 27 09/16/2019    BUN 21 09/22/2020    BUN 17 09/16/2019    CR 0.71 09/22/2020    CR 0.63 09/16/2019    GLC 97 05/05/2021     (H) 09/22/2020     COAGS: No results found for: PTT, INR, FIBR  POC: No results found for: BGM, HCG, HCGS  HEPATIC:   Lab Results   Component Value Date    ALBUMIN 3.7 09/22/2020    PROTTOTAL 7.7 09/22/2020    ALT 58 (H) 05/05/2021    AST 36 05/05/2021    ALKPHOS 87 09/22/2020    BILITOTAL 0.3 09/22/2020     OTHER:   Lab Results   Component Value Date    ALTAGRACIA 9.0 09/22/2020    TSH 2.06 09/22/2020    SED 10 06/18/2018       Anesthesia Plan    ASA Status:  3   NPO Status:  NPO Appropriate    Anesthesia Type: MAC.     - Reason for MAC: chronic cardiopulmonary disease, straight local not clinically adequate              Consents    Anesthesia Plan(s) and associated risks, benefits, and realistic alternatives discussed. Questions answered and patient/representative(s) expressed understanding.     - Discussed with:  Patient      - Extended Intubation/Ventilatory Support Discussed: No.      - Patient is DNR/DNI Status: No    Use of blood products discussed: No .     Postoperative Care    Pain management: IV analgesics.   PONV prophylaxis: Ondansetron (or other 5HT-3), Dexamethasone or Solumedrol     Comments:    Consult 9/28 needs update for > 30 days            ROXANN Bee CRNA

## 2021-10-25 ENCOUNTER — ALLIED HEALTH/NURSE VISIT (OUTPATIENT)
Dept: FAMILY MEDICINE | Facility: OTHER | Age: 76
End: 2021-10-25
Attending: FAMILY MEDICINE
Payer: COMMERCIAL

## 2021-10-25 ENCOUNTER — OFFICE VISIT (OUTPATIENT)
Dept: FAMILY MEDICINE | Facility: OTHER | Age: 76
End: 2021-10-25
Attending: SURGERY
Payer: COMMERCIAL

## 2021-10-25 DIAGNOSIS — Z23 NEED FOR PROPHYLACTIC VACCINATION AND INOCULATION AGAINST INFLUENZA: Primary | ICD-10-CM

## 2021-10-25 DIAGNOSIS — Z01.818 PREOP TESTING: ICD-10-CM

## 2021-10-25 PROCEDURE — G0008 ADMIN INFLUENZA VIRUS VAC: HCPCS

## 2021-10-25 PROCEDURE — U0003 INFECTIOUS AGENT DETECTION BY NUCLEIC ACID (DNA OR RNA); SEVERE ACUTE RESPIRATORY SYNDROME CORONAVIRUS 2 (SARS-COV-2) (CORONAVIRUS DISEASE [COVID-19]), AMPLIFIED PROBE TECHNIQUE, MAKING USE OF HIGH THROUGHPUT TECHNOLOGIES AS DESCRIBED BY CMS-2020-01-R: HCPCS | Mod: ZL

## 2021-10-25 PROCEDURE — 96372 THER/PROPH/DIAG INJ SC/IM: CPT

## 2021-10-25 PROCEDURE — 90662 IIV NO PRSV INCREASED AG IM: CPT

## 2021-10-25 RX ADMIN — CYANOCOBALAMIN 1000 MCG: 1000 INJECTION, SOLUTION INTRAMUSCULAR; SUBCUTANEOUS at 09:30

## 2021-10-25 NOTE — PROGRESS NOTES
Clinic Administered Medication Documentation    Administrations This Visit     cyanocobalamin injection 1,000 mcg     Admin Date  10/25/2021 Action  Given Dose  1,000 mcg Route  Intramuscular Site   Administered By  Kelly Knox LPN    Ordering Provider: Kenna Portillo MD    NDC: 51932-036-57    Lot#: [id number]    : CodeRyte    Patient Supplied?: No                  Injectable Medication Documentation    Patient was given Cyanocobalamin (B-12). Prior to medication administration, verified patients identity using patient s name and date of birth. Please see MAR and medication order for additional information. Patient instructed to remain in clinic for 15 minutes.      Was entire vial of medication used? Yes  Vial/Syringe: Single dose vial  Expiration Date:  12/2022  Was this medication supplied by the patient? No   Kelly Knox LPN

## 2021-10-26 LAB — SARS-COV-2 RNA RESP QL NAA+PROBE: NEGATIVE

## 2021-10-29 ENCOUNTER — ANESTHESIA (OUTPATIENT)
Dept: SURGERY | Facility: HOSPITAL | Age: 76
End: 2021-10-29
Payer: COMMERCIAL

## 2021-10-29 ENCOUNTER — HOSPITAL ENCOUNTER (OUTPATIENT)
Facility: HOSPITAL | Age: 76
Discharge: HOME | End: 2021-10-29
Attending: SURGERY | Admitting: SURGERY
Payer: COMMERCIAL

## 2021-10-29 VITALS
RESPIRATION RATE: 18 BRPM | SYSTOLIC BLOOD PRESSURE: 162 MMHG | DIASTOLIC BLOOD PRESSURE: 70 MMHG | HEART RATE: 80 BPM | OXYGEN SATURATION: 96 % | WEIGHT: 120 LBS | BODY MASS INDEX: 20.49 KG/M2 | HEIGHT: 64 IN

## 2021-10-29 DIAGNOSIS — K21.9 GASTROESOPHAGEAL REFLUX DISEASE WITHOUT ESOPHAGITIS: ICD-10-CM

## 2021-10-29 DIAGNOSIS — Z87.19 HISTORY OF BARRETT'S ESOPHAGUS: ICD-10-CM

## 2021-10-29 PROCEDURE — 370N000017 HC ANESTHESIA TECHNICAL FEE, PER MIN: Performed by: SURGERY

## 2021-10-29 PROCEDURE — 99100 ANES PT EXTEME AGE<1 YR&>70: CPT | Performed by: NURSE ANESTHETIST, CERTIFIED REGISTERED

## 2021-10-29 PROCEDURE — 250N000011 HC RX IP 250 OP 636: Performed by: NURSE ANESTHETIST, CERTIFIED REGISTERED

## 2021-10-29 PROCEDURE — 250N000009 HC RX 250: Performed by: NURSE ANESTHETIST, CERTIFIED REGISTERED

## 2021-10-29 PROCEDURE — 258N000003 HC RX IP 258 OP 636: Performed by: NURSE ANESTHETIST, CERTIFIED REGISTERED

## 2021-10-29 PROCEDURE — 999N000141 HC STATISTIC PRE-PROCEDURE NURSING ASSESSMENT: Performed by: SURGERY

## 2021-10-29 PROCEDURE — 272N000001 HC OR GENERAL SUPPLY STERILE: Performed by: SURGERY

## 2021-10-29 PROCEDURE — 710N000012 HC RECOVERY PHASE 2, PER MINUTE: Performed by: SURGERY

## 2021-10-29 PROCEDURE — 360N000075 HC SURGERY LEVEL 2, PER MIN: Performed by: SURGERY

## 2021-10-29 PROCEDURE — 88342 IMHCHEM/IMCYTCHM 1ST ANTB: CPT | Mod: TC | Performed by: SURGERY

## 2021-10-29 PROCEDURE — 43239 EGD BIOPSY SINGLE/MULTIPLE: CPT | Performed by: NURSE ANESTHETIST, CERTIFIED REGISTERED

## 2021-10-29 PROCEDURE — 43239 EGD BIOPSY SINGLE/MULTIPLE: CPT | Performed by: SURGERY

## 2021-10-29 RX ORDER — SODIUM CHLORIDE, SODIUM LACTATE, POTASSIUM CHLORIDE, CALCIUM CHLORIDE 600; 310; 30; 20 MG/100ML; MG/100ML; MG/100ML; MG/100ML
INJECTION, SOLUTION INTRAVENOUS CONTINUOUS
Status: DISCONTINUED | OUTPATIENT
Start: 2021-10-29 | End: 2021-10-29 | Stop reason: HOSPADM

## 2021-10-29 RX ORDER — NALOXONE HYDROCHLORIDE 0.4 MG/ML
0.2 INJECTION, SOLUTION INTRAMUSCULAR; INTRAVENOUS; SUBCUTANEOUS
Status: DISCONTINUED | OUTPATIENT
Start: 2021-10-29 | End: 2021-10-29 | Stop reason: HOSPADM

## 2021-10-29 RX ORDER — LIDOCAINE 40 MG/G
CREAM TOPICAL
Status: DISCONTINUED | OUTPATIENT
Start: 2021-10-29 | End: 2021-10-29 | Stop reason: HOSPADM

## 2021-10-29 RX ORDER — LIDOCAINE HYDROCHLORIDE 20 MG/ML
INJECTION, SOLUTION INFILTRATION; PERINEURAL PRN
Status: DISCONTINUED | OUTPATIENT
Start: 2021-10-29 | End: 2021-10-29

## 2021-10-29 RX ORDER — OMEPRAZOLE 40 MG/1
40 CAPSULE, DELAYED RELEASE ORAL DAILY
Qty: 90 CAPSULE | Refills: 3 | Status: SHIPPED | OUTPATIENT
Start: 2021-10-29 | End: 2022-08-22

## 2021-10-29 RX ORDER — ONDANSETRON 2 MG/ML
4 INJECTION INTRAMUSCULAR; INTRAVENOUS EVERY 30 MIN PRN
Status: DISCONTINUED | OUTPATIENT
Start: 2021-10-29 | End: 2021-10-29 | Stop reason: HOSPADM

## 2021-10-29 RX ORDER — FENTANYL CITRATE 50 UG/ML
25 INJECTION, SOLUTION INTRAMUSCULAR; INTRAVENOUS EVERY 5 MIN PRN
Status: DISCONTINUED | OUTPATIENT
Start: 2021-10-29 | End: 2021-10-29 | Stop reason: HOSPADM

## 2021-10-29 RX ORDER — NALOXONE HYDROCHLORIDE 0.4 MG/ML
0.4 INJECTION, SOLUTION INTRAMUSCULAR; INTRAVENOUS; SUBCUTANEOUS
Status: DISCONTINUED | OUTPATIENT
Start: 2021-10-29 | End: 2021-10-29 | Stop reason: HOSPADM

## 2021-10-29 RX ORDER — OXYCODONE HYDROCHLORIDE 5 MG/1
5 TABLET ORAL EVERY 4 HOURS PRN
Status: DISCONTINUED | OUTPATIENT
Start: 2021-10-29 | End: 2021-10-29 | Stop reason: HOSPADM

## 2021-10-29 RX ORDER — FENTANYL CITRATE 50 UG/ML
25 INJECTION, SOLUTION INTRAMUSCULAR; INTRAVENOUS
Status: DISCONTINUED | OUTPATIENT
Start: 2021-10-29 | End: 2021-10-29 | Stop reason: HOSPADM

## 2021-10-29 RX ORDER — ONDANSETRON 4 MG/1
4 TABLET, ORALLY DISINTEGRATING ORAL EVERY 30 MIN PRN
Status: DISCONTINUED | OUTPATIENT
Start: 2021-10-29 | End: 2021-10-29 | Stop reason: HOSPADM

## 2021-10-29 RX ORDER — HYDROMORPHONE HYDROCHLORIDE 1 MG/ML
0.2 INJECTION, SOLUTION INTRAMUSCULAR; INTRAVENOUS; SUBCUTANEOUS EVERY 5 MIN PRN
Status: DISCONTINUED | OUTPATIENT
Start: 2021-10-29 | End: 2021-10-29 | Stop reason: HOSPADM

## 2021-10-29 RX ORDER — PROPOFOL 10 MG/ML
INJECTION, EMULSION INTRAVENOUS PRN
Status: DISCONTINUED | OUTPATIENT
Start: 2021-10-29 | End: 2021-10-29

## 2021-10-29 RX ADMIN — SODIUM CHLORIDE, POTASSIUM CHLORIDE, SODIUM LACTATE AND CALCIUM CHLORIDE: 600; 310; 30; 20 INJECTION, SOLUTION INTRAVENOUS at 07:26

## 2021-10-29 RX ADMIN — LIDOCAINE HYDROCHLORIDE 40 MG: 20 INJECTION, SOLUTION INFILTRATION; PERINEURAL at 08:11

## 2021-10-29 RX ADMIN — PROPOFOL 70 MG: 10 INJECTION, EMULSION INTRAVENOUS at 08:11

## 2021-10-29 RX ADMIN — PROPOFOL 30 MG: 10 INJECTION, EMULSION INTRAVENOUS at 08:13

## 2021-10-29 ASSESSMENT — MIFFLIN-ST. JEOR: SCORE: 1014.32

## 2021-10-29 NOTE — OP NOTE
REPORT OF OPERATION  DATE OF PROCEDURE: 10/29/2021    PATIENT: Maria Teresa Aburto    SURGERY PERFORMED: Esophagogastroduodenoscopy with biopsies     PREOPERATIVE DIAGNOSIS: History of Bowden's esophagus without dysplasia    POSTOPERATIVE DIAGNOSIS:    Same   Long segment Bowden's esophagus   Squamous columnar junction at 40    SURGEON: Panfilo Arcos MD    ASSISTANTS: None    ANESTHESIA: Monitored Anesthesia Care    COMPLICATIONS: None apparent    TRANSFUSIONS: None    TISSUE TO PATHOLOGY: Duodenal, Antral and Distal Esophageal    FINDINGS: Hiatal Hernia and Long segment Bowden's esophagus    INDICATIONS: This is a 76 year old female with a history of Bowden's esophagus without dysplasia who is in need of an Esophagogastroduodenoscopy.  The patient will be taken to the endoscopy suite for that procedure.    DESCRIPTIONS OF PROCEDURE IN DETAIL: After consent was obtained the patient was taken to the operative suite and alec in the left lateral decubitus position.  The patient was identified and the correct patient was confirmed. Monitored Anesthesia Care was given by anesthesia. A time out was performed verifying the correct patient and the correct procedure.  The entire operative team was in agreement.  All necessary equipment and supplies were in the room.    The endoscope was inserted into the mouth and passed without difficulty to the third portion of the duodenum.  Duodenal biopsies were taken.  The endoscope was then withdrawn through the duodenum, the duodenal bulb and pyloric channel and no abnormalities were noted.  The endoscope was brought back into the stomach and antral biopsies were obtained.  The endoscope was then retroflexed and no lesions of the fundus body or antrum were seen.  The endoscope was straightened back out and brought into the distal esophagus and the squamocolumnar junction was identified at 40 cm. Long segment Bowden's esophagus was noted extending form 40 cm to 35 cm.  Four  quadrant biopsies were taken.  The endoscope was slowly withdrawn through the remaining esophagus no other abnormalities are seen,  The endoscope was withdrawn from the patient the patient was then taken to the recovery room in stable condition tolerated the procedure well.

## 2021-10-29 NOTE — ANESTHESIA CARE TRANSFER NOTE
Patient: Maria Teresa Aburto    Procedure: Procedure(s):  Upper endoscopy with biopsies       Diagnosis: Gastroesophageal reflux disease without esophagitis [K21.9]  History of Bowden's esophagus [Z87.19]  Diagnosis Additional Information: No value filed.    Anesthesia Type:   MAC     Note:      Level of Consciousness: awake and drowsy  Oxygen Supplementation: nasal cannula    Independent Airway: airway patency satisfactory and stable  Dentition: dentition unchanged  Vital Signs Stable: post-procedure vital signs reviewed and stable  Report to RN Given: handoff report given  Patient transferred to: Phase II    Handoff Report: Identifed the Patient, Identified the Reponsible Provider, Reviewed the pertinent medical history, Discussed the surgical course, Reviewed Intra-OP anesthesia mangement and issues during anesthesia, Set expectations for post-procedure period and Allowed opportunity for questions and acknowledgement of understanding      Vitals:  Vitals Value Taken Time   BP     Temp     Pulse     Resp     SpO2         Electronically Signed By: ROXANN Aquino CRNA  October 29, 2021  8:17 AM

## 2021-10-29 NOTE — ANESTHESIA POSTPROCEDURE EVALUATION
Patient: Maria Teresa Aburto    Procedure: Procedure(s):  Upper endoscopy with biopsies       Diagnosis:Gastroesophageal reflux disease without esophagitis [K21.9]  History of Bowden's esophagus [Z87.19]  Diagnosis Additional Information: No value filed.    Anesthesia Type:  MAC    Note:  Disposition: Outpatient   Postop Pain Control: Uneventful            Sign Out: Well controlled pain   PONV: No   Neuro/Psych: Uneventful            Sign Out: Acceptable/Baseline neuro status   Airway/Respiratory: Uneventful            Sign Out: Acceptable/Baseline resp. status   CV/Hemodynamics: Uneventful            Sign Out: Acceptable CV status; No obvious hypovolemia; No obvious fluid overload   Other NRE: NONE   DID A NON-ROUTINE EVENT OCCUR? No           Last vitals:  Vitals Value Taken Time   /70 10/29/21 0845   Temp     Pulse 80 10/29/21 0845   Resp 18 10/29/21 0840   SpO2 96 % 10/29/21 0845       Electronically Signed By: ROXANN Aquino CRNA  October 29, 2021  10:21 AM

## 2021-10-29 NOTE — H&P
HISTORY AND PHYSICAL - ENDOSCOPY   10/29/2021    Patient : Maria Teresa Aburto    Planned Procedures : Esophagogastroduodenoscopy    This is a 76 year old female with a need for an Esophagogastroduodenoscopy for Gastroesohpageal Reflux and History of Bowden's Eshopagus without dysplasia.    Last EGD : 3 years    Past Medical History:  Past Medical History:   Diagnosis Date     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     Bowden's esophagus 10/11/2011     Constipation 10/09/2012     GERD (gastroesophageal reflux disease)[530.81] 05/11/2003     NSTEMI (non-ST elevated myocardial infarction) (H) 9/28/2020    With Takotsubo cardiomyopathy Cardiac cath, small lesion of diagonal not requiring stenting Dr Crystal Sandoval Vakil     Osteopenia 10/11/2011    dexa scans odd years starting in 2003     Precordial pain 04/28/2003    negative stress test, negative pulmonary evaluatio     Restless legs syndrome (RLS) 10/11/2011     Squamous Cell Carcinoma 10/11/2011    left leg x2     Takotsubo cardiomyopathy 9/22/2020    Episode of high BP noted on exam with no other symptoms Elevated troponin Cardiac cath, small lesion of  small diagonal, not requiring stenting Crystal Sandoval, cardiology     Urge incontinence 10/09/2012       Past Surgical History:  Past Surgical History:   Procedure Laterality Date     Breast Biospy  11/11/2000    LEFT > Fibrocystic Disease > Outcome: Fibroadenoma     COLONOSCOPY  2000     COLONOSCOPY  8-7-2009    Normal, Repeat 2015     DEXA Scan  2009     EGD       EGD  2008     EGD  2003     ENDOSCOPY UPPER, COLONOSCOPY, COMBINED N/A 9/6/2018    Procedure: COMBINED ENDOSCOPY UPPER, COLONOSCOPY;  UPPER ENDOSCOPY WITH BIOPSIES AND COLONOSCOPY WITH BIOPSIES;  Surgeon: Minh Interiano DO;  Location: [facility]     Knee Replacement  2012     Oophorectomy      Ectopic Pregnancy     ROTATOR CUFF REPAIR RT/LT Right     with acromioplasty for complete rotator cuff tear, Dr Cherry     TONSILLECTOMY         Family History  "History:  Family History   Problem Relation Age of Onset     Endometrial Cancer Other         Family Hx     Osteoporosis Other         Family Hx     Parkinsonism Other         Family Hx     Unknown/Adopted No family hx of        History of Tobacco Use:  History   Smoking Status     Former Smoker     Packs/day: 1.00     Years: 4.00     Types: Cigarettes     Start date: 1962     Quit date: 1966   Smokeless Tobacco     Never Used       Current Medications:  No current outpatient medications on file.       Allergies:  Allergies   Allergen Reactions     Nitrofurantoin Other (See Comments) and Nausea     Macrobid  \"Chills and Fevers\"     Nitrofurantoin Macrocrystal Other (See Comments) and Nausea     Macrobid  \"Chills and Fevers\"         ROS:  Pertinent items are noted in HPI.  All other systems are negative.    PHYSICAL EXAM:     Vital signs: /58   Resp 16   Ht 1.626 m (5' 4\")   Wt 54.4 kg (120 lb)   SpO2 100%   BMI 20.60 kg/m     Weight: [unfilled]   BMI: Body mass index is 20.6 kg/m .   General: Normal, healthy, cooperative, in no acute distress, alert   Skin: no jaundice   HEENT: PERRLA and EOMI   Neck: supple   Lungs: clear to auscultation   CV: Regular rate and rhythm without murmer   Abdominal: abdomen is soft without significant tenderness, masses, organomegaly or guarding   Extremities: No cyanosis, clubbing or edema noted bilaterally in Upper and Lower Extremities   Neurological: without deficit    Assessment:   76 year old female with need of an Esophagogastroduodenoscopy for Gastroesohpageal Reflux and History of Bowden's Eshopagus without dysplasia.    Plan:   Will plan on doing an Esophagogastroduodenoscopy without dilation.      The risks, benefits, and alternatives to the planned procedure were fully discussed with the patient and/or the patient's representative(s). The risks of bleeding, infection, death, missing pathology, the need for additional procedures intra-operatively, the possible need " for intra-operative consults, the possible need for transfusion therapy, cardiopulmonary compromise, the possible need for additional surgery for a complication were discussed with the patient and/or the patient's representative(s). The patient's and/or patient's representative(s) questions were addressed and answered. Informed consent was obtained from the patient and/or the patient's representative(s). The patient and/or the patient's representative(s) consent to proceed.    Specific risks:  Risks include but are not limited to bleeding, perforation, missing lesions, need for additional procedures, reaction to anesthesia.  All the patients questions were answered.  The patient consents to proceed.  The procedures will be scheduled.

## 2021-10-29 NOTE — ANESTHESIA POSTPROCEDURE EVALUATION
Patient: Maria Teresa Aburto    Procedure: Procedure(s):  Upper endoscopy with biopsies       Diagnosis:Gastroesophageal reflux disease without esophagitis [K21.9]  History of Bowden's esophagus [Z87.19]  Diagnosis Additional Information: No value filed.    Anesthesia Type:  MAC    Note:  Disposition: Outpatient   Postop Pain Control: Uneventful            Sign Out: Well controlled pain   PONV: No   Neuro/Psych: Uneventful            Sign Out: Acceptable/Baseline neuro status   Airway/Respiratory: Uneventful            Sign Out: Acceptable/Baseline resp. status   CV/Hemodynamics: Uneventful            Sign Out: Acceptable CV status; No obvious hypovolemia; No obvious fluid overload   Other NRE: NONE   DID A NON-ROUTINE EVENT OCCUR? No           Last vitals:  Vitals Value Taken Time   /70 10/29/21 0845   Temp     Pulse 80 10/29/21 0845   Resp 18 10/29/21 0840   SpO2 96 % 10/29/21 0845       Electronically Signed By: ROXANN Cosby CRNA  October 29, 2021  10:20 AM

## 2021-10-29 NOTE — DISCHARGE INSTRUCTIONS
Post-Anesthesia Patient Instructions    IMMEDIATELY FOLLOWING SURGERY:  Do not drive or operate machinery for the first twenty four hours after surgery.  Do not make any important decisions for twenty four hours after surgery or while taking narcotic pain medications or sedatives.  If you develop intractable nausea and vomiting or a severe headache please notify your doctor immediately.    FOLLOW-UP:  Please make an appointment with your surgeon as instructed. You do not need to follow up with anesthesia unless specifically instructed to do so.    WOUND CARE INSTRUCTIONS (if applicable):  Keep a dry clean dressing on the anesthesia/puncture wound site if there is drainage.  Once the wound has quit draining you may leave it open to air.  Generally you should leave the bandage intact for twenty four hours unless there is drainage.  If the epidural site drains for more than 36-48 hours please call the anesthesia department.    QUESTIONS?:  Please feel free to call your physician or the hospital  if you have any questions, and they will be happy to assist you.               UPPER ENDOSCOPY    AFTER THE PROCEDURE    You will return to Same Day Surgery to rest for about an hour before you go home.    The doctor will talk with you and your family.    A family member/friend may visit with you.    You may burp up any air remaining in your stomach.    You may feel dizzy or light-headed from the medicine.    Your nurse will go over the discharge instructions with you and your caregiver and answer any of your questions.      You will be contacted the next day to check on how you are doing.    If biopsies were taken, you will be contacted with the results usually within 3 days.  BACK AT HOME    Rest for an hour or two after you get home.    When your throat is no longer numb and you have a gag reflex, take a few sips of cool water.  If you can swallow comfortably, you may start eating again.    You may have a mild  sore throat for the rest of the day.    You will be contacted with results by the surgeons office within a week. If not contacted in one week, call the surgeons office.  WHAT TO WATCH FOR:  Problems rarely occur after the exam, but it is important to be aware of the early signs of a complication.  Call your doctor immediately if you have:    Difficulty swallowing or breathing    Unusual pain in your stomach or chest    Vomiting blood or dark material that looks like coffee grounds    Black or tarry stools    Temperature over 101.5 degrees    MORE QUESTIONS?  Please ask your doctor or nurse before the exam begins  or call your doctor at the clinic.    IF YOU MUST CANCEL YOUR PROCEDURE THE EVENING/NIGHT BEFORE, PLEASE CALL HOSPITAL ADMITTING -[phone] OR TOLL FREE [phone], [phone].    Phone Numbers:  Hospital - [phone] [phone]  [facility] - [phone]  Surgery Patient Education - [phone] or toll free [phone]

## 2021-11-02 PROCEDURE — 88342 IMHCHEM/IMCYTCHM 1ST ANTB: CPT | Mod: 26 | Performed by: PATHOLOGY

## 2021-11-02 PROCEDURE — 88305 TISSUE EXAM BY PATHOLOGIST: CPT | Mod: 26 | Performed by: PATHOLOGY

## 2021-11-05 LAB
PATH REPORT.ADDENDUM SPEC: NORMAL
PATH REPORT.COMMENTS IMP SPEC: NORMAL
PATH REPORT.COMMENTS IMP SPEC: NORMAL
PATH REPORT.FINAL DX SPEC: NORMAL
PATH REPORT.GROSS SPEC: NORMAL
PATH REPORT.MICROSCOPIC SPEC OTHER STN: NORMAL
PATH REPORT.RELEVANT HX SPEC: NORMAL
PHOTO IMAGE: NORMAL

## 2021-11-08 ENCOUNTER — OFFICE VISIT (OUTPATIENT)
Dept: FAMILY MEDICINE | Facility: OTHER | Age: 76
End: 2021-11-08
Attending: FAMILY MEDICINE
Payer: COMMERCIAL

## 2021-11-08 VITALS
DIASTOLIC BLOOD PRESSURE: 76 MMHG | HEIGHT: 64 IN | SYSTOLIC BLOOD PRESSURE: 136 MMHG | TEMPERATURE: 97.4 F | WEIGHT: 120 LBS | BODY MASS INDEX: 20.49 KG/M2 | RESPIRATION RATE: 19 BRPM | HEART RATE: 84 BPM | OXYGEN SATURATION: 100 %

## 2021-11-08 DIAGNOSIS — E53.8 VITAMIN B12 DEFICIENCY (NON ANEMIC): ICD-10-CM

## 2021-11-08 DIAGNOSIS — E78.5 HYPERLIPIDEMIA LDL GOAL <70: ICD-10-CM

## 2021-11-08 DIAGNOSIS — K22.70 BARRETT'S ESOPHAGUS WITHOUT DYSPLASIA: ICD-10-CM

## 2021-11-08 DIAGNOSIS — Z11.59 NEED FOR HEPATITIS C SCREENING TEST: ICD-10-CM

## 2021-11-08 DIAGNOSIS — K14.8 TONGUE LESION: ICD-10-CM

## 2021-11-08 DIAGNOSIS — R53.83 FATIGUE, UNSPECIFIED TYPE: ICD-10-CM

## 2021-11-08 DIAGNOSIS — K21.9 GASTROESOPHAGEAL REFLUX DISEASE WITHOUT ESOPHAGITIS: ICD-10-CM

## 2021-11-08 DIAGNOSIS — C44.320 SCC (SQUAMOUS CELL CARCINOMA), FACE: ICD-10-CM

## 2021-11-08 DIAGNOSIS — I51.81 TAKOTSUBO CARDIOMYOPATHY: ICD-10-CM

## 2021-11-08 DIAGNOSIS — Z12.31 ENCOUNTER FOR SCREENING MAMMOGRAM FOR BREAST CANCER: ICD-10-CM

## 2021-11-08 DIAGNOSIS — I21.4 NSTEMI (NON-ST ELEVATED MYOCARDIAL INFARCTION) (H): ICD-10-CM

## 2021-11-08 DIAGNOSIS — Z00.00 ROUTINE GENERAL MEDICAL EXAMINATION AT A HEALTH CARE FACILITY: Primary | ICD-10-CM

## 2021-11-08 PROCEDURE — G0439 PPPS, SUBSEQ VISIT: HCPCS | Performed by: FAMILY MEDICINE

## 2021-11-08 PROCEDURE — 90471 IMMUNIZATION ADMIN: CPT | Mod: GY

## 2021-11-08 ASSESSMENT — ANXIETY QUESTIONNAIRES
6. BECOMING EASILY ANNOYED OR IRRITABLE: NOT AT ALL
GAD7 TOTAL SCORE: 1
5. BEING SO RESTLESS THAT IT IS HARD TO SIT STILL: SEVERAL DAYS
IF YOU CHECKED OFF ANY PROBLEMS ON THIS QUESTIONNAIRE, HOW DIFFICULT HAVE THESE PROBLEMS MADE IT FOR YOU TO DO YOUR WORK, TAKE CARE OF THINGS AT HOME, OR GET ALONG WITH OTHER PEOPLE: NOT DIFFICULT AT ALL
7. FEELING AFRAID AS IF SOMETHING AWFUL MIGHT HAPPEN: NOT AT ALL
1. FEELING NERVOUS, ANXIOUS, OR ON EDGE: NOT AT ALL
3. WORRYING TOO MUCH ABOUT DIFFERENT THINGS: NOT AT ALL
2. NOT BEING ABLE TO STOP OR CONTROL WORRYING: NOT AT ALL

## 2021-11-08 ASSESSMENT — PAIN SCALES - GENERAL: PAINLEVEL: NO PAIN (0)

## 2021-11-08 ASSESSMENT — MIFFLIN-ST. JEOR: SCORE: 1014.32

## 2021-11-08 ASSESSMENT — ACTIVITIES OF DAILY LIVING (ADL): CURRENT_FUNCTION: NO ASSISTANCE NEEDED

## 2021-11-08 ASSESSMENT — PATIENT HEALTH QUESTIONNAIRE - PHQ9: 5. POOR APPETITE OR OVEREATING: NOT AT ALL

## 2021-11-08 NOTE — NURSING NOTE
"Chief Complaint   Patient presents with     Physical       Initial /76   Pulse 84   Temp 97.4  F (36.3  C) (Tympanic)   Resp 19   Ht 1.626 m (5' 4\")   Wt 54.4 kg (120 lb)   SpO2 100%   BMI 20.60 kg/m   Estimated body mass index is 20.6 kg/m  as calculated from the following:    Height as of this encounter: 1.626 m (5' 4\").    Weight as of this encounter: 54.4 kg (120 lb).  Medication Reconciliation: complete  Kelly Knox LPN    "

## 2021-11-09 ASSESSMENT — ANXIETY QUESTIONNAIRES: GAD7 TOTAL SCORE: 1

## 2021-11-09 ASSESSMENT — PATIENT HEALTH QUESTIONNAIRE - PHQ9: SUM OF ALL RESPONSES TO PHQ QUESTIONS 1-9: 2

## 2021-11-11 ENCOUNTER — LAB (OUTPATIENT)
Dept: LAB | Facility: OTHER | Age: 76
End: 2021-11-11
Payer: COMMERCIAL

## 2021-11-11 ENCOUNTER — MYC MEDICAL ADVICE (OUTPATIENT)
Dept: FAMILY MEDICINE | Facility: OTHER | Age: 76
End: 2021-11-11

## 2021-11-11 DIAGNOSIS — R53.83 FATIGUE, UNSPECIFIED TYPE: ICD-10-CM

## 2021-11-11 DIAGNOSIS — K22.70 BARRETT'S ESOPHAGUS WITHOUT DYSPLASIA: ICD-10-CM

## 2021-11-11 DIAGNOSIS — Z11.59 NEED FOR HEPATITIS C SCREENING TEST: ICD-10-CM

## 2021-11-11 DIAGNOSIS — E78.5 HYPERLIPIDEMIA LDL GOAL <70: ICD-10-CM

## 2021-11-11 LAB
ALBUMIN SERPL-MCNC: 3.7 G/DL (ref 3.4–5)
ALP SERPL-CCNC: 96 U/L (ref 40–150)
ALT SERPL W P-5'-P-CCNC: 42 U/L (ref 0–50)
ANION GAP SERPL CALCULATED.3IONS-SCNC: 4 MMOL/L (ref 3–14)
AST SERPL W P-5'-P-CCNC: 28 U/L (ref 0–45)
BILIRUB SERPL-MCNC: 0.5 MG/DL (ref 0.2–1.3)
BUN SERPL-MCNC: 21 MG/DL (ref 7–30)
CALCIUM SERPL-MCNC: 8.9 MG/DL (ref 8.5–10.1)
CHLORIDE BLD-SCNC: 103 MMOL/L (ref 94–109)
CHOLEST SERPL-MCNC: 137 MG/DL
CO2 SERPL-SCNC: 28 MMOL/L (ref 20–32)
CREAT SERPL-MCNC: 0.86 MG/DL (ref 0.52–1.04)
ERYTHROCYTE [DISTWIDTH] IN BLOOD BY AUTOMATED COUNT: 12 % (ref 10–15)
FASTING STATUS PATIENT QL REPORTED: YES
GFR SERPL CREATININE-BSD FRML MDRD: 65 ML/MIN/1.73M2
GLUCOSE BLD-MCNC: 86 MG/DL (ref 70–99)
HCT VFR BLD AUTO: 39.1 % (ref 35–47)
HDLC SERPL-MCNC: 52 MG/DL
HGB BLD-MCNC: 13.4 G/DL (ref 11.7–15.7)
LDLC SERPL CALC-MCNC: 72 MG/DL
MCH RBC QN AUTO: 31.6 PG (ref 26.5–33)
MCHC RBC AUTO-ENTMCNC: 34.3 G/DL (ref 31.5–36.5)
MCV RBC AUTO: 92 FL (ref 78–100)
NONHDLC SERPL-MCNC: 85 MG/DL
PLATELET # BLD AUTO: 227 10E3/UL (ref 150–450)
POTASSIUM BLD-SCNC: 4 MMOL/L (ref 3.4–5.3)
PROT SERPL-MCNC: 7.6 G/DL (ref 6.8–8.8)
RBC # BLD AUTO: 4.24 10E6/UL (ref 3.8–5.2)
SODIUM SERPL-SCNC: 135 MMOL/L (ref 133–144)
TRIGL SERPL-MCNC: 65 MG/DL
TSH SERPL DL<=0.005 MIU/L-ACNC: 2.31 MU/L (ref 0.4–4)
WBC # BLD AUTO: 5.4 10E3/UL (ref 4–11)

## 2021-11-11 PROCEDURE — 85027 COMPLETE CBC AUTOMATED: CPT | Mod: ZL

## 2021-11-11 PROCEDURE — 80053 COMPREHEN METABOLIC PANEL: CPT | Mod: ZL

## 2021-11-11 PROCEDURE — 80061 LIPID PANEL: CPT | Mod: ZL

## 2021-11-11 PROCEDURE — 86803 HEPATITIS C AB TEST: CPT | Mod: ZL,GZ

## 2021-11-11 PROCEDURE — 84443 ASSAY THYROID STIM HORMONE: CPT | Mod: ZL

## 2021-11-11 PROCEDURE — 36415 COLL VENOUS BLD VENIPUNCTURE: CPT | Mod: ZL

## 2021-11-12 LAB — HCV AB SERPL QL IA: NONREACTIVE

## 2021-11-16 ENCOUNTER — OFFICE VISIT (OUTPATIENT)
Dept: SURGERY | Facility: OTHER | Age: 76
End: 2021-11-16
Attending: SURGERY
Payer: COMMERCIAL

## 2021-11-16 VITALS
HEIGHT: 64 IN | OXYGEN SATURATION: 99 % | HEART RATE: 77 BPM | DIASTOLIC BLOOD PRESSURE: 62 MMHG | WEIGHT: 120.5 LBS | TEMPERATURE: 97.1 F | SYSTOLIC BLOOD PRESSURE: 128 MMHG | BODY MASS INDEX: 20.57 KG/M2

## 2021-11-16 DIAGNOSIS — K22.70 BARRETT'S ESOPHAGUS WITHOUT DYSPLASIA: Primary | ICD-10-CM

## 2021-11-16 PROCEDURE — 99213 OFFICE O/P EST LOW 20 MIN: CPT | Performed by: SURGERY

## 2021-11-16 PROCEDURE — G0463 HOSPITAL OUTPT CLINIC VISIT: HCPCS

## 2021-11-16 ASSESSMENT — PAIN SCALES - GENERAL: PAINLEVEL: NO PAIN (0)

## 2021-11-16 ASSESSMENT — MIFFLIN-ST. JEOR: SCORE: 1016.58

## 2021-11-16 NOTE — NURSING NOTE
"Chief Complaint   Patient presents with     Surgical Followup     post-op EGD 10/29/21       Initial /62   Pulse 77   Temp 97.1  F (36.2  C) (Tympanic)   Ht 1.626 m (5' 4\")   Wt 54.7 kg (120 lb 8 oz)   SpO2 99%   BMI 20.68 kg/m   Estimated body mass index is 20.68 kg/m  as calculated from the following:    Height as of this encounter: 1.626 m (5' 4\").    Weight as of this encounter: 54.7 kg (120 lb 8 oz).  Medication Reconciliation: complete  Nikki Chau LPN    "

## 2021-11-16 NOTE — PROGRESS NOTES
"CLINIC NOTE - POST-OP ENDOSCOPY  11/16/2021    Patient:Maria Teresa Aburto    Procedure: Esophagogastroduodenoscopy    This is a 76 year old female who is 2 weeks s/p Esophagogastroduodenoscopy with biopsy.  At the time of endoscopy long segment Bowden's was noted.  Bowden's like was probably 5 cm.  No other abnormalities were noted..     Current Medications:  Current Outpatient Medications   Medication Sig Dispense Refill     ASPIRIN CAPS 81 MG OR one capsule by mouth daily 100 3     atorvastatin (LIPITOR) 40 MG tablet Take 1 tablet (40 mg) by mouth At Bedtime 90 tablet 3     Calcium Carbonate-Vitamin D (CALCIUM + D PO)        Cyanocobalamin (VITAMIN B 12 PO) Injection [id number]       losartan (COZAAR) 25 MG tablet Take 1 tablet (25 mg) by mouth daily 90 tablet 3     multivitamin, therapeutic with minerals (MULTI-VITAMIN) TABS Take 1 tablet by mouth daily       omeprazole (PRILOSEC) 40 MG DR capsule Take 1 capsule (40 mg) by mouth daily 90 capsule 3     Vitamin D, Cholecalciferol, 25 MCG (1000 UT) TABS          Allergies:  Allergies   Allergen Reactions     Nitrofurantoin Other (See Comments) and Nausea     Macrobid  \"Chills and Fevers\"     Nitrofurantoin Macrocrystal Other (See Comments) and Nausea     Macrobid  \"Chills and Fevers\"         PHYSICAL EXAM:   Vital signs: /62   Pulse 77   Temp 97.1  F (36.2  C) (Tympanic)   Ht 1.626 m (5' 4\")   Wt 54.7 kg (120 lb 8 oz)   SpO2 99%   BMI 20.68 kg/m     Weight: [unfilled]   BMI: Body mass index is 20.68 kg/m .   General: Normal, healthy, cooperative, in no acute distress, alert   Lungs: clear to auscultation   CV: Regular rate and rhythm without murmer   Abdominal: abdomen is soft without significant tenderness, masses, organomegaly or guarding       PATHOLOGY:  Final Diagnosis   A.  Stomach, antrum: Biopsy:  - Antral type mucosa with mild chronic inactive gastritis  - Immunostain for Helicobacter pylori is in process, with the results to be reported in an " addendum      B.  Duodenum: Biopsy:  - Duodenal mucosa within normal limits  - Normal villous architecture with no increase in intraepithelial lymphocytes      C.  Esophagus, distal: Biopsy:  - Columnar mucosa with chronic inflammation, negative for intestinal metaplasia and dysplasia  - No squamous epithelium identified   Electronically signed by Al Peters MD on 11/2/2021 at  9:50 AM       ASSESSMENT:    76 year old female who is 2 weeks s/p Esophagogastroduodenoscopy with biopsy.  Stable Bowden's    PLAN:   Recommended upper endoscopy in 2 years.  We will continue with acid suppression.  We will follow-up sooner if necessary.

## 2021-11-16 NOTE — PATIENT INSTRUCTIONS
Thank you for allowing Dr. Arcos and our surgical team to participate in your care. Please call our health unit coordinator at [phone] with scheduling questions or the nurse at [phone] with any other questions or concerns.

## 2021-11-23 NOTE — PROGRESS NOTES
Otolaryngology Consultation    Patient: Maria Teresa Aburto  : 1945    Patient presents with:  Consult: Tongue Lesion; Referred by Dr. Panfilo Arcos      HPI:  Maria Teresa Aburto is a 76 year old female seen today for evaluation of a tongue lesion    She has had a tongue lesion present over 5 years  No known preceding trauma  No otalgia or weight loss  Her DDS has commented on this in the past and felt it was benign    The area tends to get caught frequently in her teeth    She had a recent EGD with Dr. Arcos and Bowden's esophagus was noted    She is on omeprazole 40    Former light smoker quit over 50 years ago      Current Outpatient Rx   Medication Sig Dispense Refill     ASPIRIN CAPS 81 MG OR one capsule by mouth daily 100 3     atorvastatin (LIPITOR) 40 MG tablet Take 1 tablet (40 mg) by mouth At Bedtime 90 tablet 3     Calcium Carbonate-Vitamin D (CALCIUM + D PO)        Cyanocobalamin (VITAMIN B 12 PO) Injection [id number]       losartan (COZAAR) 25 MG tablet Take 1 tablet (25 mg) by mouth daily 90 tablet 3     multivitamin, therapeutic with minerals (MULTI-VITAMIN) TABS Take 1 tablet by mouth daily       omeprazole (PRILOSEC) 40 MG DR capsule Take 1 capsule (40 mg) by mouth daily 90 capsule 3     Vitamin D, Cholecalciferol, 25 MCG (1000 UT) TABS          Allergies: Nitrofurantoin and Nitrofurantoin macrocrystal     Past Medical History:   Diagnosis Date     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     Bowden's esophagus 10/11/2011     Constipation 10/09/2012     GERD (gastroesophageal reflux disease)[530.81] 2003     NSTEMI (non-ST elevated myocardial infarction) (H) 2020    With Takotsubo cardiomyopathy Cardiac cath, small lesion of diagonal not requiring stenting Dr Crystal Sandoval Vakil     Osteopenia 10/11/2011    dexa scans odd years starting in      Precordial pain 2003    negative stress test, negative pulmonary evaluatio     Restless legs syndrome (RLS) 10/11/2011     Squamous Cell  "Carcinoma 10/11/2011    left leg x2     Takotsubo cardiomyopathy 2020    Episode of high BP noted on exam with no other symptoms Elevated troponin Cardiac cath, small lesion of  small diagonal, not requiring stenting Crystal Sandoval, cardiology     Urge incontinence 10/09/2012       Past Surgical History:   Procedure Laterality Date     Breast Biospy  2000    LEFT > Fibrocystic Disease > Outcome: Fibroadenoma     COLONOSCOPY       COLONOSCOPY  2009    Normal, Repeat      DEXA Scan       EGD       EGD       EGD       ENDOSCOPY UPPER, COLONOSCOPY, COMBINED N/A 2018    Procedure: COMBINED ENDOSCOPY UPPER, COLONOSCOPY;  UPPER ENDOSCOPY WITH BIOPSIES AND COLONOSCOPY WITH BIOPSIES;  Surgeon: Minh Interiano DO;  Location: [facility]     ESOPHAGOSCOPY, GASTROSCOPY, DUODENOSCOPY (EGD), COMBINED N/A 10/29/2021    Procedure: Upper endoscopy with biopsies;  Surgeon: Panfilo Arcos MD;  Location: [facility]     Knee Replacement       Oophorectomy      Ectopic Pregnancy     ROTATOR CUFF REPAIR RT/LT Right     with acromioplasty for complete rotator cuff tear, Dr Cherry     TONSILLECTOMY         ENT family history reviewed    Social History     Tobacco Use     Smoking status: Former Smoker     Packs/day: 1.00     Years: 4.00     Pack years: 4.00     Types: Cigarettes     Start date:      Quit date:      Years since quittin.9     Smokeless tobacco: Never Used   Substance Use Topics     Alcohol use: Yes     Drug use: Never       Review of Systems  ROS: 10 point ROS neg other than the symptoms noted above in the HPI GERD, rhinorrhea, excessive bruising    Physical Exam  /60 (BP Location: Left arm, Cuff Size: Adult Regular)   Pulse 81   Temp (!) 96.6  F (35.9  C) (Tympanic)   Ht 1.6 m (5' 3\")   Wt 54.4 kg (120 lb)   SpO2 99%   BMI 21.26 kg/m    General - The patient is well nourished and well developed, and appears to have good nutritional status.  Alert " and oriented to person and place, answers questions and cooperates with examination appropriately.   Head and Face - Normocephalic and atraumatic, with no gross asymmetry noted.  The facial nerve is intact, with strong symmetric movements.  Voice and Breathing - The patient was breathing comfortably without the use of accessory muscles. There was no wheezing, stridor, or stertor.  The patients voice was clear and strong, and had appropriate pitch and quality.  No juliette peripheral digital clubbing or cyanosis   Ears -The external auditory canals are patent, the tympanic membranes are intact without effusion, retraction or mass.  Bony landmarks are intact.  Eyes - Extraocular movements intact, and the pupils were reactive to light.  Sclera were not icteric or injected, conjunctiva were pink and moist.  Mouth - Examination of the oral cavity showed pink, healthy oral mucosa. No  ulcerations noted.  The tongue was mobile and midline, and the dentition were in good condition.    There is a right anterior oral tongue bite fibroma measuring approximately 1 cm  No ulceration  Throat - The walls of the oropharynx were smooth, pink, moist, symmetric, and had no lesions or ulcerations.  The tonsillar pillars and soft palate were symmetric.  The uvula was midline on elevation.    Neck - No palpable enlarged fixed cervical lymph nodes.  No neck cysts or unusual tenderness to palpation.   No palpable fixed thyroid nodules or concerning goiter.  The trachea is grossly midline.   Nose - External contour is symmetric, no gross deflection or scars.  Nasal mucosa is pink and moist with no abnormal mucus.  The septum and turbinates were evaluated.  No polyps, masses, or purulence noted on examination.    Procedure - Oral cavity lesion Removal    Informed consent was discussed and signed, and risks of the procedure were discussed, including bleeding, anesthesia, infection, scar formation, numbness, need for additional surgery, injury to  salivary tissue.  I then infiltrated the mucosal tissues with 1% lidocaine with 1:200,000 epinephrine.  I then used a 15-blade to create an thin elliptical incision around the right anterior tongue lesion.  I then elevated the  ellipse and a thin cuff of underlying normal appearing mucosa with the scalpel.  I proceeded to use a fine iris scissor to undermine the lesion and excise it.   Hemostasis was achieved with direct pressure and silver nitrate cautery.  The total length of the incision was 1.0 cm.  The specimen(s) was placed in formalin and sent to pathology.  The mucosal edges were then reapproximated using 4-0 chromic, simple interrupted sutures .  The patient tolerated the procedure without any difficulty.      A/P - This patient has had removal of an oral cavity lesion today.  I have instructed the patient on wound care with 1/2 strength peroxide rinses for 1 week, and  Diet.  Ibuprofen alternated with tylenol prn pain.  The patient will be called with pathology results.                Damaris Russo D.O.  Otolaryngology/Head and Neck Surgery  Allergy

## 2021-11-26 ENCOUNTER — ALLIED HEALTH/NURSE VISIT (OUTPATIENT)
Dept: FAMILY MEDICINE | Facility: OTHER | Age: 76
End: 2021-11-26
Attending: FAMILY MEDICINE
Payer: COMMERCIAL

## 2021-11-26 DIAGNOSIS — E53.8 VITAMIN B12 DEFICIENCY (NON ANEMIC): Primary | ICD-10-CM

## 2021-11-26 PROCEDURE — 96372 THER/PROPH/DIAG INJ SC/IM: CPT

## 2021-11-26 RX ORDER — CYANOCOBALAMIN 1000 UG/ML
1000 INJECTION, SOLUTION INTRAMUSCULAR; SUBCUTANEOUS
Status: ACTIVE | OUTPATIENT
Start: 2021-11-26 | End: 2022-11-21

## 2021-11-26 RX ADMIN — CYANOCOBALAMIN 1000 MCG: 1000 INJECTION, SOLUTION INTRAMUSCULAR; SUBCUTANEOUS at 09:07

## 2021-11-26 NOTE — PROGRESS NOTES
Clinic Administered Medication Documentation    Administrations This Visit     cyanocobalamin injection 1,000 mcg     Admin Date  11/26/2021 Action  Given Dose  1,000 mcg Route  Intramuscular Site   Administered By  Lechevalier, Pamela M., LPN    Ordering Provider: Kenna Portillo MD    Patient Supplied?: No                  Injectable Medication Documentation    Patient was given Cyanocobalamin (B-12). Prior to medication administration, verified patients identity using patient s name and date of birth. Please see MAR and medication order for additional information. Patient instructed to report any adverse reaction to staff immediately .      Was entire vial of medication used? Yes  Vial/Syringe: Single dose vial  Expiration Date:  12/22  Was this medication supplied by the patient? No   Pamela M Lechevalier LPN

## 2021-11-29 ENCOUNTER — OFFICE VISIT (OUTPATIENT)
Dept: OTOLARYNGOLOGY | Facility: OTHER | Age: 76
End: 2021-11-29
Attending: OTOLARYNGOLOGY
Payer: COMMERCIAL

## 2021-11-29 VITALS
DIASTOLIC BLOOD PRESSURE: 60 MMHG | HEIGHT: 63 IN | SYSTOLIC BLOOD PRESSURE: 134 MMHG | HEART RATE: 81 BPM | TEMPERATURE: 96.6 F | BODY MASS INDEX: 21.26 KG/M2 | OXYGEN SATURATION: 99 % | WEIGHT: 120 LBS

## 2021-11-29 DIAGNOSIS — D10.30 FIBROMA OF MOUTH: Primary | ICD-10-CM

## 2021-11-29 PROCEDURE — G0463 HOSPITAL OUTPT CLINIC VISIT: HCPCS | Mod: 25

## 2021-11-29 PROCEDURE — 40812 EXCISE/REPAIR MOUTH LESION: CPT | Performed by: OTOLARYNGOLOGY

## 2021-11-29 PROCEDURE — 88305 TISSUE EXAM BY PATHOLOGIST: CPT | Mod: TC | Performed by: OTOLARYNGOLOGY

## 2021-11-29 PROCEDURE — 99203 OFFICE O/P NEW LOW 30 MIN: CPT | Mod: 25 | Performed by: OTOLARYNGOLOGY

## 2021-11-29 ASSESSMENT — MIFFLIN-ST. JEOR: SCORE: 998.45

## 2021-11-29 ASSESSMENT — PAIN SCALES - GENERAL: PAINLEVEL: NO PAIN (0)

## 2021-11-29 NOTE — NURSING NOTE
"Chief Complaint   Patient presents with     Consult     Tongue Lesion; Referred by Dr. Panfilo Arcos       Initial /60 (BP Location: Left arm, Cuff Size: Adult Regular)   Pulse 81   Temp (!) 96.6  F (35.9  C) (Tympanic)   Ht 1.6 m (5' 3\")   Wt 54.4 kg (120 lb)   SpO2 99%   BMI 21.26 kg/m   Estimated body mass index is 21.26 kg/m  as calculated from the following:    Height as of this encounter: 1.6 m (5' 3\").    Weight as of this encounter: 54.4 kg (120 lb).  Medication Reconciliation: complete  Rubi Tenorio LPN    "

## 2021-11-29 NOTE — LETTER
2021         RE: Maria Teresa Aburto  [address] [facility]  [place] [address]        Dear Colleague,    Thank you for referring your patient, Maria Teresa Aburto, to the [facility]. Please see a copy of my visit note below.    Otolaryngology Consultation    Patient: Maria Teresa Aburto  : 1945    Patient presents with:  Consult: Tongue Lesion; Referred by Dr. Pafnilo Arcos      HPI:  Maria Teresa Aburto is a 76 year old female seen today for evaluation of a tongue lesion    She has had a tongue lesion present over 5 years  No known preceding trauma  No otalgia or weight loss  Her DDS has commented on this in the past and felt it was benign    The area tends to get caught frequently in her teeth    She had a recent EGD with Dr. Arcos and Bowden's esophagus was noted    She is on omeprazole 40    Former light smoker quit over 50 years ago      Current Outpatient Rx   Medication Sig Dispense Refill     ASPIRIN CAPS 81 MG OR one capsule by mouth daily 100 3     atorvastatin (LIPITOR) 40 MG tablet Take 1 tablet (40 mg) by mouth At Bedtime 90 tablet 3     Calcium Carbonate-Vitamin D (CALCIUM + D PO)        Cyanocobalamin (VITAMIN B 12 PO) Injection [id number]       losartan (COZAAR) 25 MG tablet Take 1 tablet (25 mg) by mouth daily 90 tablet 3     multivitamin, therapeutic with minerals (MULTI-VITAMIN) TABS Take 1 tablet by mouth daily       omeprazole (PRILOSEC) 40 MG DR capsule Take 1 capsule (40 mg) by mouth daily 90 capsule 3     Vitamin D, Cholecalciferol, 25 MCG (1000 UT) TABS          Allergies: Nitrofurantoin and Nitrofurantoin macrocrystal     Past Medical History:   Diagnosis Date     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     Bowden's esophagus 10/11/2011     Constipation 10/09/2012     GERD (gastroesophageal reflux disease)[530.81] 2003     NSTEMI (non-ST elevated myocardial infarction) (H) 2020    With Takotsubo cardiomyopathy Cardiac cath, small lesion of diagonal not requiring  stenting Dr Crystal Sandoval V[place]     Osteopenia 10/11/2011    dexa scans odd years starting in      Precordial pain 2003    negative stress test, negative pulmonary evaluatio     Restless legs syndrome (RLS) 10/11/2011     Squamous Cell Carcinoma 10/11/2011    left leg x2     Takotsubo cardiomyopathy 2020    Episode of high BP noted on exam with no other symptoms Elevated troponin Cardiac cath, small lesion of  small diagonal, not requiring stenting Crystal Sandoval, cardiology     Urge incontinence 10/09/2012       Past Surgical History:   Procedure Laterality Date     Breast Biospy  2000    LEFT > Fibrocystic Disease > Outcome: Fibroadenoma     COLONOSCOPY       COLONOSCOPY  2009    Normal, Repeat      DEXA Scan       EGD       EGD       EGD       ENDOSCOPY UPPER, COLONOSCOPY, COMBINED N/A 2018    Procedure: COMBINED ENDOSCOPY UPPER, COLONOSCOPY;  UPPER ENDOSCOPY WITH BIOPSIES AND COLONOSCOPY WITH BIOPSIES;  Surgeon: Minh Interiano DO;  Location: [facility] OR     ESOPHAGOSCOPY, GASTROSCOPY, DUODENOSCOPY (EGD), COMBINED N/A 10/29/2021    Procedure: Upper endoscopy with biopsies;  Surgeon: Panfilo Arcos MD;  Location: [facility]     Knee Replacement       Oophorectomy      Ectopic Pregnancy     ROTATOR CUFF REPAIR RT/LT Right     with acromioplasty for complete rotator cuff tear, Dr Cherry     TONSILLECTOMY         ENT family history reviewed    Social History     Tobacco Use     Smoking status: Former Smoker     Packs/day: 1.00     Years: 4.00     Pack years: 4.00     Types: Cigarettes     Start date:      Quit date:      Years since quittin.9     Smokeless tobacco: Never Used   Substance Use Topics     Alcohol use: Yes     Drug use: Never       Review of Systems  ROS: 10 point ROS neg other than the symptoms noted above in the HPI GERD, rhinorrhea, excessive bruising    Physical Exam  /60 (BP Location: Left arm, Cuff Size: Adult  "Regular)   Pulse 81   Temp (!) 96.6  F (35.9  C) (Tympanic)   Ht 1.6 m (5' 3\")   Wt 54.4 kg (120 lb)   SpO2 99%   BMI 21.26 kg/m    General - The patient is well nourished and well developed, and appears to have good nutritional status.  Alert and oriented to person and place, answers questions and cooperates with examination appropriately.   Head and Face - Normocephalic and atraumatic, with no gross asymmetry noted.  The facial nerve is intact, with strong symmetric movements.  Voice and Breathing - The patient was breathing comfortably without the use of accessory muscles. There was no wheezing, stridor, or stertor.  The patients voice was clear and strong, and had appropriate pitch and quality.  No juliette peripheral digital clubbing or cyanosis   Ears -The external auditory canals are patent, the tympanic membranes are intact without effusion, retraction or mass.  Bony landmarks are intact.  Eyes - Extraocular movements intact, and the pupils were reactive to light.  Sclera were not icteric or injected, conjunctiva were pink and moist.  Mouth - Examination of the oral cavity showed pink, healthy oral mucosa. No  ulcerations noted.  The tongue was mobile and midline, and the dentition were in good condition.    There is a right anterior oral tongue bite fibroma measuring approximately 1 cm  No ulceration  Throat - The walls of the oropharynx were smooth, pink, moist, symmetric, and had no lesions or ulcerations.  The tonsillar pillars and soft palate were symmetric.  The uvula was midline on elevation.    Neck - No palpable enlarged fixed cervical lymph nodes.  No neck cysts or unusual tenderness to palpation.   No palpable fixed thyroid nodules or concerning goiter.  The trachea is grossly midline.   Nose - External contour is symmetric, no gross deflection or scars.  Nasal mucosa is pink and moist with no abnormal mucus.  The septum and turbinates were evaluated.  No polyps, masses, or purulence noted on " examination.    Procedure - Oral cavity lesion Removal    Informed consent was discussed and signed, and risks of the procedure were discussed, including bleeding, anesthesia, infection, scar formation, numbness, need for additional surgery, injury to salivary tissue.  I then infiltrated the mucosal tissues with 1% lidocaine with 1:200,000 epinephrine.  I then used a 15-blade to create an thin elliptical incision around the right anterior tongue lesion.  I then elevated the  ellipse and a thin cuff of underlying normal appearing mucosa with the scalpel.  I proceeded to use a fine iris scissor to undermine the lesion and excise it.   Hemostasis was achieved with direct pressure and silver nitrate cautery.  The total length of the incision was 1.0 cm.  The specimen(s) was placed in formalin and sent to pathology.  The mucosal edges were then reapproximated using 4-0 chromic, simple interrupted sutures .  The patient tolerated the procedure without any difficulty.      A/P - This patient has had removal of an oral cavity lesion today.  I have instructed the patient on wound care with 1/2 strength peroxide rinses for 1 week, and  Diet.  Ibuprofen alternated with tylenol prn pain.  The patient will be called with pathology results.                Damaris Russo D.O.  Otolaryngology/Head and Neck Surgery  Allergy        Again, thank you for allowing me to participate in the care of your patient.        Sincerely,        Damaris Russo MD

## 2021-11-29 NOTE — PATIENT INSTRUCTIONS
Thank you for allowing Dr. Russo and our ENT team to participate in your care.  If your medications are too expensive, please give the nurse a call.  We can possibly change this medication.  If you have a scheduling or an appointment question please contact our Health Unit Coordinator at their direct line [phone].   ALL nursing questions or concerns can be directed to your ENT nurse at: [phone] - Cat        POST PROCEDURE INSTRUCTIONS      For oral lesions, rinse your mouth with a mixture of half water and half hydrogen peroxide 3 times daily, after meals and before bed.       For lip lesions, apply Aquaphor Healing Ointment to the wound 3 times daily after cleaning with above mixture(Unless specified Bacitracin).      You can apply ice to the surgical area to help reduce swelling. (no longer than 20 minutes at a time)       You can use acetaminophen(Tylenol) or the prescription you received for pain.       If you have sutures in place these are dissolvable. They will fall out on their own. DO NOT play with them as this will cause more irritation to the surgical area.       If cautery was used, that is the blackened area you see. This will fade away and can become a white patchy area. This is normal! Continue to clean wound as described above.     SIGNS OF INFECTION ARE:    Redness, swelling, red streaks, pus, drainage, warmth, fever, increased pain, foul smell.     Contact your primary health care provider if you notice any of the warning signs.     FOLLOW - UP    Follow-up as needed in clinic.     Pathology results will be called to you when they are back. This can take approx. 7-10 days.

## 2021-12-02 LAB
PATH REPORT.COMMENTS IMP SPEC: NORMAL
PATH REPORT.COMMENTS IMP SPEC: NORMAL
PATH REPORT.FINAL DX SPEC: NORMAL
PATH REPORT.GROSS SPEC: NORMAL
PATH REPORT.MICROSCOPIC SPEC OTHER STN: NORMAL
PATH REPORT.RELEVANT HX SPEC: NORMAL
PHOTO IMAGE: NORMAL

## 2021-12-02 PROCEDURE — 88305 TISSUE EXAM BY PATHOLOGIST: CPT | Mod: 26 | Performed by: PATHOLOGY

## 2021-12-14 ENCOUNTER — TELEPHONE (OUTPATIENT)
Dept: FAMILY MEDICINE | Facility: OTHER | Age: 76
End: 2021-12-14
Payer: COMMERCIAL

## 2021-12-14 DIAGNOSIS — I21.4 NSTEMI (NON-ST ELEVATED MYOCARDIAL INFARCTION) (H): ICD-10-CM

## 2021-12-14 RX ORDER — LOSARTAN POTASSIUM 25 MG/1
25 TABLET ORAL DAILY
Qty: 90 TABLET | Refills: 3 | Status: SHIPPED | OUTPATIENT
Start: 2021-12-14 | End: 2022-09-16

## 2021-12-14 NOTE — TELEPHONE ENCOUNTER
Called patient back and states her Losartan was denied but her establish care with Dr Alcala isn't until August 2022. Wondering if this could be filled? I pended the RX for a 90 day supply with 3 refills.

## 2021-12-14 NOTE — TELEPHONE ENCOUNTER
"To: Nurse to Dr. Kenna Portillo  Patient would like to speak with nurse, no details \"personal\".  Phone is [phone].  Thank you  "

## 2021-12-22 ENCOUNTER — TELEPHONE (OUTPATIENT)
Dept: MAMMOGRAPHY | Facility: OTHER | Age: 76
End: 2021-12-22
Payer: COMMERCIAL

## 2021-12-22 ENCOUNTER — ANCILLARY PROCEDURE (OUTPATIENT)
Dept: MAMMOGRAPHY | Facility: OTHER | Age: 76
End: 2021-12-22
Attending: FAMILY MEDICINE
Payer: COMMERCIAL

## 2021-12-22 DIAGNOSIS — Z12.31 ENCOUNTER FOR SCREENING MAMMOGRAM FOR BREAST CANCER: ICD-10-CM

## 2021-12-22 PROCEDURE — 77063 BREAST TOMOSYNTHESIS BI: CPT | Mod: TC

## 2021-12-27 ENCOUNTER — ALLIED HEALTH/NURSE VISIT (OUTPATIENT)
Dept: FAMILY MEDICINE | Facility: OTHER | Age: 76
End: 2021-12-27
Attending: FAMILY MEDICINE
Payer: COMMERCIAL

## 2021-12-27 DIAGNOSIS — E53.8 VITAMIN B12 DEFICIENCY (NON ANEMIC): Primary | ICD-10-CM

## 2021-12-27 PROCEDURE — 96372 THER/PROPH/DIAG INJ SC/IM: CPT

## 2021-12-27 RX ADMIN — CYANOCOBALAMIN 1000 MCG: 1000 INJECTION, SOLUTION INTRAMUSCULAR; SUBCUTANEOUS at 08:20

## 2021-12-27 NOTE — PROGRESS NOTES
Clinic Administered Medication Documentation          Injectable Medication Documentation    Patient was given Cyanocobalamin (B-12). Prior to medication administration, verified patients identity using patient s name and date of birth. Please see MAR and medication order for additional information. Patient instructed to stay in clinic after the injection but patient declined.      Was entire vial of medication used? Yes  Vial/Syringe: Single dose vial  Expiration Date:  6/1/23  Was this medication supplied by the patient? No

## 2022-04-20 ENCOUNTER — ALLIED HEALTH/NURSE VISIT (OUTPATIENT)
Dept: FAMILY MEDICINE | Facility: OTHER | Age: 77
End: 2022-04-20
Attending: FAMILY MEDICINE
Payer: COMMERCIAL

## 2022-04-20 DIAGNOSIS — E53.8 VITAMIN B12 DEFICIENCY (NON ANEMIC): Primary | ICD-10-CM

## 2022-04-20 PROCEDURE — 96372 THER/PROPH/DIAG INJ SC/IM: CPT

## 2022-04-20 RX ADMIN — CYANOCOBALAMIN 1000 MCG: 1000 INJECTION, SOLUTION INTRAMUSCULAR; SUBCUTANEOUS at 09:54

## 2022-04-20 NOTE — PROGRESS NOTES
Clinic Administered Medication Documentation          Injectable Medication Documentation    Patient was given Cyanocobalamin (B-12). Prior to medication administration, verified patients identity using patient s name and date of birth. Please see MAR and medication order for additional information. Patient instructed to remain in clinic for 15 minutes.      Was entire vial of medication used? Yes  Vial/Syringe: Single dose vial  Expiration Date:  07/2023  Was this medication supplied by the patient? No

## 2022-05-25 ENCOUNTER — ALLIED HEALTH/NURSE VISIT (OUTPATIENT)
Dept: FAMILY MEDICINE | Facility: OTHER | Age: 77
End: 2022-05-25
Attending: FAMILY MEDICINE
Payer: COMMERCIAL

## 2022-05-25 DIAGNOSIS — E53.8 VITAMIN B12 DEFICIENCY (NON ANEMIC): Primary | ICD-10-CM

## 2022-05-25 PROCEDURE — 96372 THER/PROPH/DIAG INJ SC/IM: CPT

## 2022-05-25 RX ADMIN — CYANOCOBALAMIN 1000 MCG: 1000 INJECTION, SOLUTION INTRAMUSCULAR; SUBCUTANEOUS at 09:47

## 2022-05-25 NOTE — PROGRESS NOTES
The following medication was given:     MEDICATION: Vitamin B12  1000mcg  ROUTE: IM  SITE: Deltoid - Right  DOSE: 1 ml  LOT #: [id number]  :  [employer]  EXPIRATION DATE:  12/22  NDC#: 12179-090-23  CB LPN

## 2022-05-26 NOTE — NURSING NOTE
he following medication was given:     MEDICATION: Vitamin B12  1000mcg  ROUTE: IM  SITE: Deltoid - Left  DOSE: 1ml  LOT #: [id number]  :  [employer]  EXPIRATION DATE:  07/2018  NDC#: 5299-3858-88    Renee Evans LPN  
5

## 2022-07-06 ENCOUNTER — ALLIED HEALTH/NURSE VISIT (OUTPATIENT)
Dept: FAMILY MEDICINE | Facility: OTHER | Age: 77
End: 2022-07-06
Attending: FAMILY MEDICINE
Payer: COMMERCIAL

## 2022-07-06 DIAGNOSIS — E53.8 VITAMIN B12 DEFICIENCY (NON ANEMIC): Primary | ICD-10-CM

## 2022-07-06 PROCEDURE — 96372 THER/PROPH/DIAG INJ SC/IM: CPT

## 2022-07-06 RX ADMIN — CYANOCOBALAMIN 1000 MCG: 1000 INJECTION, SOLUTION INTRAMUSCULAR; SUBCUTANEOUS at 14:55

## 2022-07-06 NOTE — PROGRESS NOTES
The following medication was given:     MEDICATION: Vitamin B12  1000mcg  ROUTE: IM  SITE: Forearm - Left  DOSE: 1ml  LOT #: [id number]  :  [employer]  EXPIRATION DATE:  1/31/24

## 2022-07-19 ENCOUNTER — TRANSFERRED RECORDS (OUTPATIENT)
Dept: HEALTH INFORMATION MANAGEMENT | Facility: CLINIC | Age: 77
End: 2022-07-19

## 2022-08-04 ENCOUNTER — ALLIED HEALTH/NURSE VISIT (OUTPATIENT)
Dept: FAMILY MEDICINE | Facility: OTHER | Age: 77
End: 2022-08-04
Attending: FAMILY MEDICINE
Payer: COMMERCIAL

## 2022-08-04 DIAGNOSIS — E53.8 VITAMIN B12 DEFICIENCY (NON ANEMIC): Primary | ICD-10-CM

## 2022-08-04 PROCEDURE — 96372 THER/PROPH/DIAG INJ SC/IM: CPT

## 2022-08-04 RX ADMIN — CYANOCOBALAMIN 1000 MCG: 1000 INJECTION, SOLUTION INTRAMUSCULAR; SUBCUTANEOUS at 10:10

## 2022-08-04 NOTE — PROGRESS NOTES
Clinic Administered Medication Documentation    Administrations This Visit     cyanocobalamin injection 1,000 mcg     Admin Date  08/04/2022 Action  Given Dose  1,000 mcg Route  Intramuscular Site  Right Deltoid Administered By  Merlene Thompson LPN    Ordering Provider: Kenna Portillo MD    Patient Supplied?: No                  Injectable Medication Documentation    Patient was given Cyanocobalamin (B-12). Prior to medication administration, verified patients identity using patient s name and date of birth. Please see MAR and medication order for additional information.       Was entire vial of medication used? Yes  Vial/Syringe: Single dose vial  Expiration Date:  01/24  Was this medication supplied by the patient? No

## 2022-08-19 ENCOUNTER — OFFICE VISIT (OUTPATIENT)
Dept: FAMILY MEDICINE | Facility: OTHER | Age: 77
End: 2022-08-19
Attending: FAMILY MEDICINE
Payer: COMMERCIAL

## 2022-08-19 ENCOUNTER — TELEPHONE (OUTPATIENT)
Dept: FAMILY MEDICINE | Facility: OTHER | Age: 77
End: 2022-08-19

## 2022-08-19 ENCOUNTER — ANCILLARY PROCEDURE (OUTPATIENT)
Dept: GENERAL RADIOLOGY | Facility: OTHER | Age: 77
End: 2022-08-19
Attending: FAMILY MEDICINE
Payer: COMMERCIAL

## 2022-08-19 VITALS
SYSTOLIC BLOOD PRESSURE: 150 MMHG | DIASTOLIC BLOOD PRESSURE: 65 MMHG | RESPIRATION RATE: 17 BRPM | BODY MASS INDEX: 20.38 KG/M2 | OXYGEN SATURATION: 100 % | HEART RATE: 79 BPM | TEMPERATURE: 97.8 F | HEIGHT: 63 IN | WEIGHT: 115 LBS

## 2022-08-19 DIAGNOSIS — I51.81 TAKOTSUBO CARDIOMYOPATHY: Primary | ICD-10-CM

## 2022-08-19 DIAGNOSIS — I73.9 PAD (PERIPHERAL ARTERY DISEASE) (H): ICD-10-CM

## 2022-08-19 DIAGNOSIS — D64.9 ANEMIA, UNSPECIFIED TYPE: ICD-10-CM

## 2022-08-19 DIAGNOSIS — E55.9 VITAMIN D DEFICIENCY: ICD-10-CM

## 2022-08-19 DIAGNOSIS — E78.5 HYPERLIPIDEMIA LDL GOAL <70: ICD-10-CM

## 2022-08-19 DIAGNOSIS — Z85.828 HISTORY OF NONMELANOMA SKIN CANCER: ICD-10-CM

## 2022-08-19 DIAGNOSIS — R79.9 ABNORMAL FINDING OF BLOOD CHEMISTRY, UNSPECIFIED: ICD-10-CM

## 2022-08-19 DIAGNOSIS — R53.82 CHRONIC FATIGUE: ICD-10-CM

## 2022-08-19 DIAGNOSIS — R06.09 DOE (DYSPNEA ON EXERTION): ICD-10-CM

## 2022-08-19 DIAGNOSIS — K22.70 BARRETT'S ESOPHAGUS WITHOUT DYSPLASIA: ICD-10-CM

## 2022-08-19 DIAGNOSIS — G25.81 RESTLESS LEGS SYNDROME (RLS): ICD-10-CM

## 2022-08-19 DIAGNOSIS — K21.9 GASTROESOPHAGEAL REFLUX DISEASE WITHOUT ESOPHAGITIS: ICD-10-CM

## 2022-08-19 LAB
BASOPHILS # BLD AUTO: 0.1 10E3/UL (ref 0–0.2)
BASOPHILS NFR BLD AUTO: 1 %
EOSINOPHIL # BLD AUTO: 0.3 10E3/UL (ref 0–0.7)
EOSINOPHIL NFR BLD AUTO: 4 %
ERYTHROCYTE [DISTWIDTH] IN BLOOD BY AUTOMATED COUNT: 12.7 % (ref 10–15)
FERRITIN SERPL-MCNC: 440 NG/ML (ref 8–252)
HCT VFR BLD AUTO: 30.7 % (ref 35–47)
HGB BLD-MCNC: 10.8 G/DL (ref 11.7–15.7)
IMM GRANULOCYTES # BLD: 0 10E3/UL
IMM GRANULOCYTES NFR BLD: 0 %
IRON SATN MFR SERPL: 38 % (ref 15–46)
IRON SERPL-MCNC: 94 UG/DL (ref 35–180)
LYMPHOCYTES # BLD AUTO: 1.2 10E3/UL (ref 0.8–5.3)
LYMPHOCYTES NFR BLD AUTO: 19 %
MCH RBC QN AUTO: 31.9 PG (ref 26.5–33)
MCHC RBC AUTO-ENTMCNC: 35.2 G/DL (ref 31.5–36.5)
MCV RBC AUTO: 91 FL (ref 78–100)
MONOCYTES # BLD AUTO: 0.9 10E3/UL (ref 0–1.3)
MONOCYTES NFR BLD AUTO: 14 %
NEUTROPHILS # BLD AUTO: 4 10E3/UL (ref 1.6–8.3)
NEUTROPHILS NFR BLD AUTO: 62 %
NRBC # BLD AUTO: 0 10E3/UL
NRBC BLD AUTO-RTO: 0 /100
PLATELET # BLD AUTO: 222 10E3/UL (ref 150–450)
RBC # BLD AUTO: 3.39 10E6/UL (ref 3.8–5.2)
TIBC SERPL-MCNC: 250 UG/DL (ref 240–430)
TSH SERPL DL<=0.005 MIU/L-ACNC: 2.51 MU/L (ref 0.4–4)
VIT B12 SERPL-MCNC: 800 PG/ML (ref 232–1245)
WBC # BLD AUTO: 6.4 10E3/UL (ref 4–11)

## 2022-08-19 PROCEDURE — 82306 VITAMIN D 25 HYDROXY: CPT | Mod: ZL | Performed by: FAMILY MEDICINE

## 2022-08-19 PROCEDURE — 99215 OFFICE O/P EST HI 40 MIN: CPT | Performed by: FAMILY MEDICINE

## 2022-08-19 PROCEDURE — 85014 HEMATOCRIT: CPT | Mod: ZL | Performed by: FAMILY MEDICINE

## 2022-08-19 PROCEDURE — 71046 X-RAY EXAM CHEST 2 VIEWS: CPT | Mod: TC

## 2022-08-19 PROCEDURE — G0463 HOSPITAL OUTPT CLINIC VISIT: HCPCS

## 2022-08-19 PROCEDURE — 82607 VITAMIN B-12: CPT | Mod: ZL | Performed by: FAMILY MEDICINE

## 2022-08-19 PROCEDURE — 84443 ASSAY THYROID STIM HORMONE: CPT | Mod: ZL | Performed by: FAMILY MEDICINE

## 2022-08-19 PROCEDURE — 83550 IRON BINDING TEST: CPT | Mod: ZL | Performed by: FAMILY MEDICINE

## 2022-08-19 PROCEDURE — 82728 ASSAY OF FERRITIN: CPT | Mod: ZL | Performed by: FAMILY MEDICINE

## 2022-08-19 PROCEDURE — 36415 COLL VENOUS BLD VENIPUNCTURE: CPT | Mod: ZL | Performed by: FAMILY MEDICINE

## 2022-08-19 RX ORDER — PRAMIPEXOLE DIHYDROCHLORIDE 0.25 MG/1
.25-.5 TABLET ORAL AT BEDTIME
Qty: 90 TABLET | Refills: 1 | Status: SHIPPED | OUTPATIENT
Start: 2022-08-19 | End: 2022-10-31

## 2022-08-19 ASSESSMENT — PAIN SCALES - GENERAL: PAINLEVEL: NO PAIN (0)

## 2022-08-19 NOTE — PROGRESS NOTES
Assessment & Plan     Gastroesophageal reflux disease without esophagitis / Bowden's esophagus without dysplasia  Recent egd 10/2021. On PPI.     Hyperlipidemia LDL goal <70  Stable    Takotsubo cardiomyopathy / MERCEDES (dyspnea on exertion)  EF recovered with but new MERCEDES. Will repeat echo, get labs. Note that pt also had covid, ? Post covid lung disease. Defer PFT, CT for now but will get CXR  - Echocardiogram Complete  - XR Chest 2 Views    Restless legs syndrome (RLS) / Chronic fatigue  Labs as below. Restart mirapex as helpful in the past, even if used short term. Consider sleep med referral due to insomnia, possible sleep disordered breathing, daytime fatigue.   - Iron and iron binding capacity  - CBC with platelets and differential  - Ferritin  - pramipexole (MIRAPEX) 0.25 MG tablet  Dispense: 90 tablet; Refill: 1  - Ferritin  - CBC with platelets and differential  - Iron and iron binding capacity  - TSH with free T4 reflex  - Vitamin B12  - Vitamin D Deficiency    History of nonmelanoma skin cancer  NOted.     PAD (peripheral artery disease) (H)  Positive screening for insurance, verify with KRISTAL  - US KRISTAL Doppler No Exercise      45 minutes spent on the date of the encounter doing chart review, review of test results, interpretation of tests, patient visit, documentation and discussion with family     Return in about 1 month (around 9/19/2022) for follow up establish care. .    Luz Alcala MD  [facility] Morel is a 77 year old accompanied by her spouse, presenting for the following health issues:  Establish Care, Hypertension, and Lipids      HPI     Hyperlipidemia Follow-Up      Are you regularly taking any medication or supplement to lower your cholesterol?   Yes- lipitor    Are you having muscle aches or other side effects that you think could be caused by your cholesterol lowering medication?  No    Hypertension Follow-up      Do you check your blood pressure  regularly outside of the clinic? Yes     Are you following a low salt diet? Yes    Are your blood pressures ever more than 140 on the top number (systolic) OR more   than 90 on the bottom number (diastolic), for example 140/90? Yes     Vascular Disease Follow-up      How often do you take nitroglycerin? Never    Do you take an aspirin every day? Yes     PAD screening through insurance was positive. No claudication, but does have RLS that are severe and worsening. ON mirapex in the past, but did nto want to take regularly.     Cardiac cath in 2020 without significant CAD.     Heart Failure Follow-up  Are you experiencing any shortness of breath? Yes, with activity only   How would you describe your shortness of breath?  Slightly worse        Are you experiencing any swelling in your legs or feet?  No    Are you using more pillows than usual? No    Do you cough at night?  No    Do you check your weight daily?  No    Have you had a weight change recently?  No    Are you having any of the following side effects from your medications? (Select all that apply)  Fatigue    Since your last visit, how many times have you gone to the cardiologist, urgent care, emergency room, or hospital because of your heart failure?   None     EF recovered on last echo, no longer following cardiology      Insomnia  Onset/Duration: chronic  Description:   Frequency of insomnia:  nightly  Time to fall asleep (sleep latency): 2 hours  Middle of night awakening:  YES  Early morning awakening:  YES  Progression of Symptoms:  worsening  Accompanying Signs & Symptoms:  Daytime sleepiness/napping: YES  Excessive snoring/apnea: no, but breathind is abl per   Restless legs: YES  Waking to urinate: No  Chronic pain:  No  Depression symptoms (if yes, do PHQ9): YES  Anxiety symptoms (if yes, do RAKAN-7): YES  History:  Prior Insomnia: YES- related to rls  New stressful situation: YES  Precipitating factors:   Caffeine intake: YES  OTC decongestants:  "No  Any new medications: No  Alleviating factors:  Self medicating (alcohol, etc.):  No  Stress-reduction (exercise, yoga, meditation etc): YES  Therapies tried and outcome: none      SOCIAL HISTORY:  Living Situation: Lives with  of 50+ years.   Supports/Family: 4 children, tc - [place], [place].   Smoking:  Drinking/Drugs:    FUNCTIONAL HISTORY:  ADLs:  IADLS: impendent.  Safety/Falls: No assistive device.   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     COGNITIVE STATUS  Testing:  Cognitive domain impairment:      GERIATRIC ROS:  Continence: Poorly - incontinence for years, mixed incontinence.   Nutrition: Appetite low since covid, down 5 lbs.   Sleep: Terrible see above.   Pain: No chronic pain.   Vision/Hearing: No hearing aides, but some decline. Vision fair, no Macular or glaucome  Mood:  No history. Currently feeling low.     Review of Systems   Constitutional, HEENT, cardiovascular, pulmonary, GI, , musculoskeletal, neuro, skin, endocrine and psych systems are negative, except as otherwise noted.      Objective    BP (!) 150/65 (BP Location: Right arm, Patient Position: Chair)   Pulse 79   Temp 97.8  F (36.6  C)   Resp 17   Ht 1.6 m (5' 3\")   Wt 52.2 kg (115 lb)   SpO2 100%   BMI 20.37 kg/m    Body mass index is 20.37 kg/m .  Physical Exam   GENERAL: alert and no distress  EYES: Eyes grossly normal to inspection  HENT: normal cephalic/atraumatic and ear canals and TM's normal  NECK: no adenopathy, no asymmetry, masses, or scars and thyroid normal to palpation  RESP: lungs clear to auscultation - no rales, rhonchi or wheezes  CV: regular rates and rhythm, normal S1 S2, no S3 or S4, no murmur, click or rub and no peripheral edema  ABDOMEN: soft, nontender, no hepatosplenomegaly, no masses and bowel sounds normal  MS: no gross musculoskeletal defects noted, no edema  SKIN: no suspicious lesions or rashes  NEURO: mentation intact, speech normal and cranial nerves 2-12 " intact  PSYCH: mentation appears normal, tearful, judgement and insight intact and appearance well groomed    Results for orders placed or performed in visit on 08/19/22   XR Chest 2 Views     Status: None    Narrative    PROCEDURE: XR CHEST 2 VIEWS 8/19/2022 10:35 AM    HISTORY: MERCEDES (dyspnea on exertion)    COMPARISONS: 9/22/2020.    TECHNIQUE: 2 views.    FINDINGS: There is a mild right convex scoliosis. Lungs are clear and  no pleural effusion is seen. Heart is not enlarged. Lungs are somewhat  hyperinflated.         Impression    IMPRESSION: No acute infiltrate.    VINAY HANLEY MD         SYSTEM ID:  [id number]   Vitamin D Deficiency     Status: Abnormal   Result Value Ref Range    Vitamin D, Total (25-Hydroxy) 79 (H) 20 - 75 ug/L    Narrative    Season, race, dietary intake, and treatment affect the concentration of 25-hydroxy-Vitamin D. Values may decrease during winter months and increase during summer months. Values 20-29 ug/L may indicate Vitamin D insufficiency and values <20 ug/L may indicate Vitamin D deficiency.    Vitamin D determination is routinely performed by an immunoassay specific for 25 hydroxyvitamin D3.  If an individual is on vitamin D2(ergocalciferol) supplementation, please specify 25 OH vitamin D2 and D3 level determination by LCMSMS test VITD23.     Vitamin B12     Status: Normal   Result Value Ref Range    Vitamin B12 800 232 - 1,245 pg/mL   TSH with free T4 reflex     Status: Normal   Result Value Ref Range    TSH 2.51 0.40 - 4.00 mU/L   Ferritin     Status: Abnormal   Result Value Ref Range    Ferritin 440 (H) 8 - 252 ng/mL   Iron and iron binding capacity     Status: Normal   Result Value Ref Range    Iron 94 35 - 180 ug/dL    Iron Binding Capacity 250 240 - 430 ug/dL    Iron Sat Index 38 15 - 46 %   CBC with platelets and differential     Status: Abnormal   Result Value Ref Range    WBC Count 6.4 4.0 - 11.0 10e3/uL    RBC Count 3.39 (L) 3.80 - 5.20 10e6/uL    Hemoglobin 10.8 (L)  11.7 - 15.7 g/dL    Hematocrit 30.7 (L) 35.0 - 47.0 %    MCV 91 78 - 100 fL    MCH 31.9 26.5 - 33.0 pg    MCHC 35.2 31.5 - 36.5 g/dL    RDW 12.7 10.0 - 15.0 %    Platelet Count 222 150 - 450 10e3/uL    % Neutrophils 62 %    % Lymphocytes 19 %    % Monocytes 14 %    % Eosinophils 4 %    % Basophils 1 %    % Immature Granulocytes 0 %    NRBCs per 100 WBC 0 <1 /100    Absolute Neutrophils 4.0 1.6 - 8.3 10e3/uL    Absolute Lymphocytes 1.2 0.8 - 5.3 10e3/uL    Absolute Monocytes 0.9 0.0 - 1.3 10e3/uL    Absolute Eosinophils 0.3 0.0 - 0.7 10e3/uL    Absolute Basophils 0.1 0.0 - 0.2 10e3/uL    Absolute Immature Granulocytes 0.0 <=0.4 10e3/uL    Absolute NRBCs 0.0 10e3/uL   CBC with platelets and differential     Status: Abnormal    Narrative    The following orders were created for panel order CBC with platelets and differential.  Procedure                               Abnormality         Status                     ---------                               -----------         ------                     CBC with platelets and d...[[id number]]  Abnormal            Final result                 Please view results for these tests on the individual orders.           .  ..

## 2022-08-19 NOTE — NURSING NOTE
"Chief Complaint   Patient presents with     Establish Care     Hypertension     Lipids       Initial BP (!) 150/65 (BP Location: Right arm, Patient Position: Chair)   Pulse 79   Temp 97.8  F (36.6  C)   Resp 17   Ht 1.6 m (5' 3\")   Wt 52.2 kg (115 lb)   SpO2 100%   BMI 20.37 kg/m   Estimated body mass index is 20.37 kg/m  as calculated from the following:    Height as of this encounter: 1.6 m (5' 3\").    Weight as of this encounter: 52.2 kg (115 lb).  Medication Reconciliation: complete  Ruth Hayes, RUSSELL    "

## 2022-08-19 NOTE — TELEPHONE ENCOUNTER
Pt called said the appointment on 9-21-22 with Dr Alcala will not work as tests are not being done till 9-22. Pt did state that nurse offered a 9-30-22. Pt is wondering if 9-30-22 appointment was still available. Please call pt. [phone]. Can leave message

## 2022-08-20 LAB — DEPRECATED CALCIDIOL+CALCIFEROL SERPL-MC: 79 UG/L (ref 20–75)

## 2022-08-21 DIAGNOSIS — Z87.19 HISTORY OF BARRETT'S ESOPHAGUS: ICD-10-CM

## 2022-08-22 ENCOUNTER — LAB (OUTPATIENT)
Dept: LAB | Facility: OTHER | Age: 77
End: 2022-08-22
Payer: COMMERCIAL

## 2022-08-22 DIAGNOSIS — D64.9 ANEMIA, UNSPECIFIED TYPE: ICD-10-CM

## 2022-08-22 LAB
BASOPHILS # BLD AUTO: 0 10E3/UL (ref 0–0.2)
BASOPHILS NFR BLD AUTO: 0 %
EOSINOPHIL # BLD AUTO: 0.3 10E3/UL (ref 0–0.7)
EOSINOPHIL NFR BLD AUTO: 4 %
ERYTHROCYTE [DISTWIDTH] IN BLOOD BY AUTOMATED COUNT: 12.6 % (ref 10–15)
HCT VFR BLD AUTO: 29.6 % (ref 35–47)
HGB BLD-MCNC: 10.3 G/DL (ref 11.7–15.7)
IMM GRANULOCYTES # BLD: 0 10E3/UL
IMM GRANULOCYTES NFR BLD: 0 %
LYMPHOCYTES # BLD AUTO: 1.2 10E3/UL (ref 0.8–5.3)
LYMPHOCYTES NFR BLD AUTO: 18 %
MCH RBC QN AUTO: 31.7 PG (ref 26.5–33)
MCHC RBC AUTO-ENTMCNC: 34.8 G/DL (ref 31.5–36.5)
MCV RBC AUTO: 91 FL (ref 78–100)
MONOCYTES # BLD AUTO: 1 10E3/UL (ref 0–1.3)
MONOCYTES NFR BLD AUTO: 14 %
NEUTROPHILS # BLD AUTO: 4.2 10E3/UL (ref 1.6–8.3)
NEUTROPHILS NFR BLD AUTO: 64 %
NRBC # BLD AUTO: 0 10E3/UL
NRBC BLD AUTO-RTO: 0 /100
PATH REPORT.COMMENTS IMP SPEC: NORMAL
PATH REPORT.COMMENTS IMP SPEC: NORMAL
PATH REPORT.FINAL DX SPEC: NORMAL
PATH REPORT.MICROSCOPIC SPEC OTHER STN: NORMAL
PATH REPORT.MICROSCOPIC SPEC OTHER STN: NORMAL
PLATELET # BLD AUTO: 206 10E3/UL (ref 150–450)
RBC # BLD AUTO: 3.25 10E6/UL (ref 3.8–5.2)
RETICS # AUTO: 0.04 10E6/UL (ref 0.03–0.1)
RETICS/RBC NFR AUTO: 1.3 % (ref 0.5–2)
WBC # BLD AUTO: 6.7 10E3/UL (ref 4–11)

## 2022-08-22 PROCEDURE — 36415 COLL VENOUS BLD VENIPUNCTURE: CPT | Mod: ZL

## 2022-08-22 PROCEDURE — 85025 COMPLETE CBC W/AUTO DIFF WBC: CPT | Mod: ZL

## 2022-08-22 PROCEDURE — 85060 BLOOD SMEAR INTERPRETATION: CPT | Performed by: PATHOLOGY

## 2022-08-22 PROCEDURE — 85045 AUTOMATED RETICULOCYTE COUNT: CPT | Mod: ZL

## 2022-08-22 RX ORDER — OMEPRAZOLE 40 MG/1
CAPSULE, DELAYED RELEASE ORAL
Qty: 90 CAPSULE | Refills: 3 | Status: SHIPPED | OUTPATIENT
Start: 2022-08-22 | End: 2023-09-08

## 2022-08-22 NOTE — TELEPHONE ENCOUNTER
Omeprazole      Last Written Prescription Date:  10/29/21  Last Fill Quantity: 90,   # refills: 3  Last Office Visit: 8/19/22  Future Office visit:    Next 5 appointments (look out 90 days)    Sep 06, 2022 10:15 AM  (Arrive by 10:00 AM)  Nurse Only with HC FP NURSE  [facility] [place] ([facility] [place] ) [address]  [place] [address]  [phone]   Sep 30, 2022  1:00 PM  (Arrive by 12:45 PM)  Office Visit with Luz Alcala MD  [facility] [place] ([facility] [place] ) [address]  [place] [address]  [phone]   Nov 18, 2022  9:00 AM  (Arrive by 8:45 AM)  PHYSICAL with Luz Alcala MD  [facility] [place] ([facility] [place] ) [address]  [place] [address]  [phone]

## 2022-08-24 DIAGNOSIS — I21.4 NSTEMI (NON-ST ELEVATED MYOCARDIAL INFARCTION) (H): ICD-10-CM

## 2022-08-25 RX ORDER — ATORVASTATIN CALCIUM 40 MG/1
40 TABLET, FILM COATED ORAL AT BEDTIME
Qty: 90 TABLET | Refills: 0 | Status: SHIPPED | OUTPATIENT
Start: 2022-08-25 | End: 2022-11-22

## 2022-09-06 ENCOUNTER — ALLIED HEALTH/NURSE VISIT (OUTPATIENT)
Dept: FAMILY MEDICINE | Facility: OTHER | Age: 77
End: 2022-09-06
Attending: FAMILY MEDICINE
Payer: COMMERCIAL

## 2022-09-06 DIAGNOSIS — E53.8 VITAMIN B12 DEFICIENCY (NON ANEMIC): Primary | ICD-10-CM

## 2022-09-06 PROCEDURE — 96372 THER/PROPH/DIAG INJ SC/IM: CPT

## 2022-09-06 RX ADMIN — CYANOCOBALAMIN 1000 MCG: 1000 INJECTION, SOLUTION INTRAMUSCULAR; SUBCUTANEOUS at 10:01

## 2022-09-06 NOTE — PROGRESS NOTES
Clinic Administered Medication Documentation    Administrations This Visit     cyanocobalamin injection 1,000 mcg     Admin Date  09/06/2022 Action  Given Dose  1,000 mcg Route  Intramuscular Site  Left Deltoid Administered By  Maria Esther Leon LPN    Ordering Provider: Kenna Portillo MD    Patient Supplied?: No                  Injectable Medication Documentation    Patient was given Cyanocobalamin (B-12). Prior to medication administration, verified patients identity using patient s name and date of birth. Please see MAR and medication order for additional information. Patient instructed to remain in clinic for 15 minutes.      Was entire vial of medication used? Yes  Vial/Syringe: Single dose vial  Expiration Date:  03/2024  Was this medication supplied by the patient? No

## 2022-09-12 ENCOUNTER — HOSPITAL ENCOUNTER (OUTPATIENT)
Dept: CARDIOLOGY | Facility: OTHER | Age: 77
Discharge: HOME | End: 2022-09-12
Attending: FAMILY MEDICINE
Payer: COMMERCIAL

## 2022-09-12 ENCOUNTER — TELEPHONE (OUTPATIENT)
Dept: FAMILY MEDICINE | Facility: OTHER | Age: 77
End: 2022-09-12

## 2022-09-12 ENCOUNTER — HOSPITAL ENCOUNTER (EMERGENCY)
Facility: HOSPITAL | Age: 77
Discharge: HOME | End: 2022-09-12
Attending: EMERGENCY MEDICINE | Admitting: EMERGENCY MEDICINE
Payer: COMMERCIAL

## 2022-09-12 ENCOUNTER — TELEPHONE (OUTPATIENT)
Dept: ULTRASOUND IMAGING | Facility: OTHER | Age: 77
End: 2022-09-12

## 2022-09-12 ENCOUNTER — HOSPITAL ENCOUNTER (OUTPATIENT)
Dept: ULTRASOUND IMAGING | Facility: OTHER | Age: 77
Discharge: HOME | End: 2022-09-12
Attending: FAMILY MEDICINE
Payer: COMMERCIAL

## 2022-09-12 ENCOUNTER — HOSPITAL ENCOUNTER (EMERGENCY)
Facility: OTHER | Age: 77
Discharge: HOME | End: 2022-09-12
Payer: COMMERCIAL

## 2022-09-12 VITALS
SYSTOLIC BLOOD PRESSURE: 161 MMHG | DIASTOLIC BLOOD PRESSURE: 73 MMHG | OXYGEN SATURATION: 97 % | RESPIRATION RATE: 15 BRPM | HEART RATE: 64 BPM | TEMPERATURE: 97.7 F

## 2022-09-12 VITALS
DIASTOLIC BLOOD PRESSURE: 75 MMHG | RESPIRATION RATE: 16 BRPM | SYSTOLIC BLOOD PRESSURE: 175 MMHG | HEIGHT: 63 IN | WEIGHT: 115 LBS | HEART RATE: 92 BPM | TEMPERATURE: 96.9 F | BODY MASS INDEX: 20.38 KG/M2 | OXYGEN SATURATION: 99 %

## 2022-09-12 VITALS — DIASTOLIC BLOOD PRESSURE: 76 MMHG | SYSTOLIC BLOOD PRESSURE: 192 MMHG

## 2022-09-12 DIAGNOSIS — I10 HYPERTENSION, UNSPECIFIED TYPE: ICD-10-CM

## 2022-09-12 DIAGNOSIS — R06.09 DOE (DYSPNEA ON EXERTION): ICD-10-CM

## 2022-09-12 DIAGNOSIS — I73.9 PAD (PERIPHERAL ARTERY DISEASE) (H): ICD-10-CM

## 2022-09-12 LAB
ANION GAP SERPL CALCULATED.3IONS-SCNC: 6 MMOL/L (ref 3–14)
BASOPHILS # BLD AUTO: 0.1 10E3/UL (ref 0–0.2)
BASOPHILS NFR BLD AUTO: 1 %
BUN SERPL-MCNC: 25 MG/DL (ref 7–30)
CALCIUM SERPL-MCNC: 8.6 MG/DL (ref 8.5–10.1)
CHLORIDE BLD-SCNC: 104 MMOL/L (ref 94–109)
CO2 SERPL-SCNC: 25 MMOL/L (ref 20–32)
CREAT SERPL-MCNC: 1.21 MG/DL (ref 0.52–1.04)
EOSINOPHIL # BLD AUTO: 0.2 10E3/UL (ref 0–0.7)
EOSINOPHIL NFR BLD AUTO: 4 %
ERYTHROCYTE [DISTWIDTH] IN BLOOD BY AUTOMATED COUNT: 12.4 % (ref 10–15)
GFR SERPL CREATININE-BSD FRML MDRD: 46 ML/MIN/1.73M2
GLUCOSE BLD-MCNC: 122 MG/DL (ref 70–99)
HCT VFR BLD AUTO: 29.1 % (ref 35–47)
HGB BLD-MCNC: 10.1 G/DL (ref 11.7–15.7)
HOLD SPECIMEN: NORMAL
IMM GRANULOCYTES # BLD: 0 10E3/UL
IMM GRANULOCYTES NFR BLD: 0 %
LVEF ECHO: NORMAL
LYMPHOCYTES # BLD AUTO: 1.3 10E3/UL (ref 0.8–5.3)
LYMPHOCYTES NFR BLD AUTO: 22 %
MCH RBC QN AUTO: 31.9 PG (ref 26.5–33)
MCHC RBC AUTO-ENTMCNC: 34.7 G/DL (ref 31.5–36.5)
MCV RBC AUTO: 92 FL (ref 78–100)
MONOCYTES # BLD AUTO: 0.9 10E3/UL (ref 0–1.3)
MONOCYTES NFR BLD AUTO: 14 %
NEUTROPHILS # BLD AUTO: 3.6 10E3/UL (ref 1.6–8.3)
NEUTROPHILS NFR BLD AUTO: 59 %
NRBC # BLD AUTO: 0 10E3/UL
NRBC BLD AUTO-RTO: 0 /100
PLATELET # BLD AUTO: 206 10E3/UL (ref 150–450)
POTASSIUM BLD-SCNC: 3.3 MMOL/L (ref 3.4–5.3)
RBC # BLD AUTO: 3.17 10E6/UL (ref 3.8–5.2)
SODIUM SERPL-SCNC: 135 MMOL/L (ref 133–144)
WBC # BLD AUTO: 6.1 10E3/UL (ref 4–11)

## 2022-09-12 PROCEDURE — 99283 EMERGENCY DEPT VISIT LOW MDM: CPT

## 2022-09-12 PROCEDURE — 82310 ASSAY OF CALCIUM: CPT | Performed by: EMERGENCY MEDICINE

## 2022-09-12 PROCEDURE — 93306 TTE W/DOPPLER COMPLETE: CPT | Mod: 26 | Performed by: INTERNAL MEDICINE

## 2022-09-12 PROCEDURE — 93306 TTE W/DOPPLER COMPLETE: CPT

## 2022-09-12 PROCEDURE — 36415 COLL VENOUS BLD VENIPUNCTURE: CPT | Performed by: EMERGENCY MEDICINE

## 2022-09-12 PROCEDURE — 250N000013 HC RX MED GY IP 250 OP 250 PS 637: Performed by: EMERGENCY MEDICINE

## 2022-09-12 PROCEDURE — 99283 EMERGENCY DEPT VISIT LOW MDM: CPT | Performed by: EMERGENCY MEDICINE

## 2022-09-12 PROCEDURE — 85025 COMPLETE CBC W/AUTO DIFF WBC: CPT | Performed by: EMERGENCY MEDICINE

## 2022-09-12 PROCEDURE — 93922 UPR/L XTREMITY ART 2 LEVELS: CPT

## 2022-09-12 RX ORDER — POTASSIUM CHLORIDE 1500 MG/1
20 TABLET, EXTENDED RELEASE ORAL ONCE
Status: COMPLETED | OUTPATIENT
Start: 2022-09-12 | End: 2022-09-12

## 2022-09-12 RX ORDER — LOSARTAN POTASSIUM 25 MG/1
25 TABLET ORAL DAILY
Status: DISCONTINUED | OUTPATIENT
Start: 2022-09-12 | End: 2022-09-12 | Stop reason: HOSPADM

## 2022-09-12 RX ADMIN — POTASSIUM CHLORIDE 20 MEQ: 1500 TABLET, EXTENDED RELEASE ORAL at 18:26

## 2022-09-12 RX ADMIN — LOSARTAN POTASSIUM 25 MG: 25 TABLET, FILM COATED ORAL at 17:52

## 2022-09-12 ASSESSMENT — ENCOUNTER SYMPTOMS
NEUROLOGICAL NEGATIVE: 1
CONSTITUTIONAL NEGATIVE: 1
EYES NEGATIVE: 1
RESPIRATORY NEGATIVE: 1
MUSCULOSKELETAL NEGATIVE: 1
CARDIOVASCULAR NEGATIVE: 1
GASTROINTESTINAL NEGATIVE: 1

## 2022-09-12 NOTE — ED PROVIDER NOTES
"  History     Chief Complaint   Patient presents with     Hypertension     HPI  Maria Teresa Aburto is a 77 year old female who comes to the emerge department complaining of elevated blood pressure.  Patient states that she underwent Doppler exams at [facility].  At that point time her blood pressure was quite elevated.  She was encouraged to seek treatment in the emergency department.  She states she feels well.  She denies headache or dizziness.  She is not having pain in her chest.  She does not feel short of breath.  She has had issues with elevated blood pressure in the past.  She is currently on losartan.  She usually takes it in the evening.  She has not taken it yet today.    Allergies:  Allergies   Allergen Reactions     Nitrofurantoin Other (See Comments) and Nausea     Macrobid  \"Chills and Fevers\"     Nitrofurantoin Macrocrystal Other (See Comments) and Nausea     Macrobid  \"Chills and Fevers\"         Problem List:    Patient Active Problem List    Diagnosis Date Noted     Hyperlipidemia LDL goal <70 05/04/2021     Priority: Medium     ACE-inhibitor cough 11/03/2020     Priority: Medium     NSTEMI (non-ST elevated myocardial infarction) (H) 09/28/2020     Priority: Medium     With Takotsubo cardiomyopathy  Cardiac cath, small lesion of diagonal not requiring stenting  Dr Crystal Sandoval Vakil       Takotsubo cardiomyopathy 09/22/2020     Priority: Medium     Episode of high BP noted on exam with no other symptoms  Elevated troponin  Cardiac cath, small lesion of  small diagonal, not requiring stenting  Crystal Sandoval, cardiology       S/P right rotator cuff repair 09/16/2019     Priority: Medium     Vitamin B12 deficiency (non anemic) 09/16/2019     Priority: Medium     Nontraumatic incomplete tear of right rotator cuff 07/31/2019     Priority: Medium     Added automatically from request for surgery [id number]       Nontraumatic complete tear of right rotator cuff 07/31/2019     " Priority: Medium     Added automatically from request for surgery [id number]       Actinic keratosis 07/17/2012     Priority: Medium     History of malignant neoplasm of skin 07/17/2012     Priority: Medium     Atypical nevus 07/17/2012     Priority: Medium     History of nonmelanoma skin cancer 07/17/2012     Priority: Medium     Overview:   SCC right leg 2001       Medial meniscus tear 02/13/2012     Priority: Medium     Overview:   IMO Update 10/11       Restless legs syndrome (RLS) 10/11/2011     Priority: Medium     Bowden's esophagus 10/11/2011     Priority: Medium     B-complex deficiency 10/11/2011     Priority: Medium     Problem list name updated by automated process. Provider to review       Disorder of bone and cartilage 10/11/2011     Priority: Medium     dexa scans odd years starting in 2003  Problem list name updated by automated process. Provider to review       Squamous Cell Carcinoma 10/11/2011     Priority: Medium     left leg x2       GERD (gastroesophageal reflux disease)[530.81] 05/11/2003     Priority: Medium        Past Medical History:    Past Medical History:   Diagnosis Date     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     Bowden's esophagus 10/11/2011     Constipation 10/09/2012     GERD (gastroesophageal reflux disease)[530.81] 05/11/2003     NSTEMI (non-ST elevated myocardial infarction) (H) 9/28/2020     Osteopenia 10/11/2011     Precordial pain 04/28/2003     Restless legs syndrome (RLS) 10/11/2011     Squamous Cell Carcinoma 10/11/2011     Takotsubo cardiomyopathy 9/22/2020     Urge incontinence 10/09/2012       Past Surgical History:    Past Surgical History:   Procedure Laterality Date     Breast Biospy  11/11/2000    LEFT > Fibrocystic Disease > Outcome: Fibroadenoma     COLONOSCOPY  2000     COLONOSCOPY  8-7-2009    Normal, Repeat 2015     DEXA Scan  2009     EGD       EGD  2008     EGD  2003     ENDOSCOPY UPPER, COLONOSCOPY, COMBINED N/A 9/6/2018    Procedure: COMBINED ENDOSCOPY UPPER,  COLONOSCOPY;  UPPER ENDOSCOPY WITH BIOPSIES AND COLONOSCOPY WITH BIOPSIES;  Surgeon: Minh Interiano DO;  Location: [facility] OR     ESOPHAGOSCOPY, GASTROSCOPY, DUODENOSCOPY (EGD), COMBINED N/A 10/29/2021    Procedure: Upper endoscopy with biopsies;  Surgeon: Panfilo Arcos MD;  Location: [facility] OR     Knee Replacement       Oophorectomy      Ectopic Pregnancy     ROTATOR CUFF REPAIR RT/LT Right     with acromioplasty for complete rotator cuff tear, Dr Cherry     TONSILLECTOMY         Family History:    Family History   Problem Relation Age of Onset     Endometrial Cancer Other         Family Hx     Osteoporosis Other         Family Hx     Parkinsonism Other         Family Hx     Unknown/Adopted No family hx of        Social History:  Marital Status:   [2]  Social History     Tobacco Use     Smoking status: Former Smoker     Packs/day: 1.00     Years: 4.00     Pack years: 4.00     Types: Cigarettes     Start date:      Quit date:      Years since quittin.7     Smokeless tobacco: Never Used   Substance Use Topics     Alcohol use: Yes     Drug use: Never        Medications:    ASPIRIN CAPS 81 MG OR  atorvastatin (LIPITOR) 40 MG tablet  losartan (COZAAR) 25 MG tablet  omeprazole (PRILOSEC) 40 MG DR capsule  pramipexole (MIRAPEX) 0.25 MG tablet  Calcium Carbonate-Vitamin D (CALCIUM + D PO)  Cyanocobalamin (VITAMIN B 12 PO)  multivitamin w/minerals (THERA-VIT-M) tablet  Vitamin D, Cholecalciferol, 25 MCG (1000 UT) TABS          Review of Systems   Constitutional: Negative.    HENT: Negative.    Eyes: Negative.    Respiratory: Negative.    Cardiovascular: Negative.    Gastrointestinal: Negative.    Genitourinary: Negative.    Musculoskeletal: Negative.    Skin: Negative.    Neurological: Negative.    All other systems reviewed and found unremarkable    Physical Exam   BP: 150/68  Pulse: 95  Temp: 97.7  F (36.5  C)  Resp: 16  SpO2: 100 %      Physical Exam  Constitutional:       Appearance:  Normal appearance. She is normal weight.   HENT:      Head: Normocephalic and atraumatic.      Mouth/Throat:      Mouth: Mucous membranes are moist.      Pharynx: Oropharynx is clear.   Eyes:      Extraocular Movements: Extraocular movements intact.      Conjunctiva/sclera: Conjunctivae normal.      Pupils: Pupils are equal, round, and reactive to light.   Cardiovascular:      Rate and Rhythm: Normal rate and regular rhythm.      Pulses: Normal pulses.   Pulmonary:      Effort: Pulmonary effort is normal.      Breath sounds: Normal breath sounds. No wheezing or rhonchi.   Abdominal:      General: Abdomen is flat.      Palpations: Abdomen is soft.   Musculoskeletal:         General: No swelling or tenderness. Normal range of motion.      Cervical back: Normal range of motion and neck supple.   Skin:     General: Skin is warm and dry.   Neurological:      General: No focal deficit present.      Mental Status: She is alert and oriented to person, place, and time.   Psychiatric:         Mood and Affect: Mood normal.         ED Course              ED Course as of 09/12/22 1843   Mon Sep 12, 2022   1822 The patient remained very stable and essentially asymptomatic throughout her stay in the department.  I will advise her to take her Cozaar twice a day.  She states she has dermatology appointment tomorrow.  She has a follow-up appointment with her primary care provider on Friday.  I will advise her to contact her primary care provider tomorrow and discuss her elevated blood pressure and did make a plan.  I will also advise her to return to the emergency department if further problems occur.                       Results for orders placed or performed during the hospital encounter of 09/12/22 (from the past 24 hour(s))   CBC with platelets differential    Narrative    The following orders were created for panel order CBC with platelets differential.  Procedure                               Abnormality         Status                      ---------                               -----------         ------                     CBC with platelets and d...[[id number]]  Abnormal            Final result                 Please view results for these tests on the individual orders.   Basic metabolic panel   Result Value Ref Range    Sodium 135 133 - 144 mmol/L    Potassium 3.3 (L) 3.4 - 5.3 mmol/L    Chloride 104 94 - 109 mmol/L    Carbon Dioxide (CO2) 25 20 - 32 mmol/L    Anion Gap 6 3 - 14 mmol/L    Urea Nitrogen 25 7 - 30 mg/dL    Creatinine 1.21 (H) 0.52 - 1.04 mg/dL    Calcium 8.6 8.5 - 10.1 mg/dL    Glucose 122 (H) 70 - 99 mg/dL    GFR Estimate 46 (L) >60 mL/min/1.73m2   CBC with platelets and differential   Result Value Ref Range    WBC Count 6.1 4.0 - 11.0 10e3/uL    RBC Count 3.17 (L) 3.80 - 5.20 10e6/uL    Hemoglobin 10.1 (L) 11.7 - 15.7 g/dL    Hematocrit 29.1 (L) 35.0 - 47.0 %    MCV 92 78 - 100 fL    MCH 31.9 26.5 - 33.0 pg    MCHC 34.7 31.5 - 36.5 g/dL    RDW 12.4 10.0 - 15.0 %    Platelet Count 206 150 - 450 10e3/uL    % Neutrophils 59 %    % Lymphocytes 22 %    % Monocytes 14 %    % Eosinophils 4 %    % Basophils 1 %    % Immature Granulocytes 0 %    NRBCs per 100 WBC 0 <1 /100    Absolute Neutrophils 3.6 1.6 - 8.3 10e3/uL    Absolute Lymphocytes 1.3 0.8 - 5.3 10e3/uL    Absolute Monocytes 0.9 0.0 - 1.3 10e3/uL    Absolute Eosinophils 0.2 0.0 - 0.7 10e3/uL    Absolute Basophils 0.1 0.0 - 0.2 10e3/uL    Absolute Immature Granulocytes 0.0 <=0.4 10e3/uL    Absolute NRBCs 0.0 10e3/uL   Extra Tube    Narrative    The following orders were created for panel order Extra Tube.  Procedure                               Abnormality         Status                     ---------                               -----------         ------                     Extra Blue Top Tube[[id number]]                              In process                 Extra Red Top Tube[[id number]]                               In process                 Extra Green Top  (Lithium...[[id number]]                      In process                   Please view results for these tests on the individual orders.       Medications   losartan (COZAAR) tablet 25 mg (25 mg Oral Given 9/12/22 1752)   potassium chloride ER (KLOR-CON M) CR tablet 20 mEq (20 mEq Oral Given 9/12/22 1826)       Assessments & Plan (with Medical Decision Making)     I have reviewed the nursing notes.    I have reviewed the findings, diagnosis, plan and need for follow up with the patient.      New Prescriptions    No medications on file       Final diagnoses:   Hypertension, unspecified type       9/12/2022   [facility] EM[facility]ENC[facility]     Minh Thibodeaux,   09/12/22 [id number]

## 2022-09-12 NOTE — ED TRIAGE NOTES
"Patient had \"doppler tests\" done on extremities at [facility] today and was told to go to ED bc of hypertension. /68 in triage. Denies chest pain, dizziness, headache, vision changes.      "

## 2022-09-12 NOTE — PROGRESS NOTES
KRISTAL was started and patient was found to have increased blood pressures on both arms. DI nurse called to have a manual pressure taken, resulted at 190/67. This tech stepped out to attempt to contact the patient's primary care provider, Dr. Alcala, who was found to be out of office today. Patient was notified and given options. She decided to continue with the test and requested to talk to Mary Sharma RN afterwards about her options for addressing her high blood pressure.

## 2022-09-12 NOTE — PROGRESS NOTES
This writer called to check manual blood pressure. Manual reading on left arm with adult regular cuff was 190/67. Patient has not taken her losartan today (she takes in evening). She is being seen for KRISTAL in [place]. Advised patient to be evaluated in ER, as she also has symptoms of shortness of breath and states she has had lingering symptoms of COVID since illness in June.

## 2022-09-12 NOTE — TELEPHONE ENCOUNTER
"Pt presented to [facility] ED on advice of staff.  BP's were extremely elevated druing US KRISTAL Doppler.  Pt states ED was \"a zoo\" & left before being seen.  Caller asking if she should present to  ED based on BP's.  Writer advised patient to present immediately to ED for evaluation.  Pt relayed understanding  No further concerns at calls end.   "

## 2022-09-12 NOTE — TELEPHONE ENCOUNTER
Nurse from [facility] called stating the patient was at [facility] for an US KRISTAL today.  Reports patients  BP is 190/67  Repeat BP's were high as well  184/78, 192/76.    She has had small headaches, also feel's pounding HR in chest and head. And ringing in her ears.    Provider is not in today.  Advised nurse to send the patient to the ER to be evaluated.  She states that 's what she was thinking but wanted confirmation to do so.

## 2022-09-12 NOTE — ED TRIAGE NOTES
Pt was seen in clinic today for echo and US on lower extremities.  Was told her BP was elevated and needed to be seen in ED.      Triage Assessment     Row Name 09/12/22 [id number]       Triage Assessment (Adult)    Airway WDL WDL       Respiratory WDL    Respiratory WDL WDL       Skin Circulation/Temperature WDL    Skin Circulation/Temperature WDL WDL       Cardiac WDL    Cardiac WDL X  hypertension       Peripheral/Neurovascular WDL    Peripheral Neurovascular WDL WDL       Cognitive/Neuro/Behavioral WDL    Cognitive/Neuro/Behavioral WDL WDL

## 2022-09-13 ENCOUNTER — ALLIED HEALTH/NURSE VISIT (OUTPATIENT)
Dept: FAMILY MEDICINE | Facility: OTHER | Age: 77
End: 2022-09-13
Attending: FAMILY MEDICINE
Payer: COMMERCIAL

## 2022-09-13 VITALS
OXYGEN SATURATION: 100 % | SYSTOLIC BLOOD PRESSURE: 118 MMHG | HEIGHT: 63 IN | HEART RATE: 88 BPM | WEIGHT: 115 LBS | BODY MASS INDEX: 20.38 KG/M2 | DIASTOLIC BLOOD PRESSURE: 64 MMHG | TEMPERATURE: 97.4 F

## 2022-09-13 DIAGNOSIS — R03.0 ELEVATED BLOOD PRESSURE READING WITHOUT DIAGNOSIS OF HYPERTENSION: Primary | ICD-10-CM

## 2022-09-13 PROCEDURE — G0463 HOSPITAL OUTPT CLINIC VISIT: HCPCS

## 2022-09-13 PROCEDURE — 99207 PR NO CHARGE NURSE ONLY: CPT

## 2022-09-13 ASSESSMENT — ANXIETY QUESTIONNAIRES
GAD7 TOTAL SCORE: 0
2. NOT BEING ABLE TO STOP OR CONTROL WORRYING: NOT AT ALL
5. BEING SO RESTLESS THAT IT IS HARD TO SIT STILL: NOT AT ALL
7. FEELING AFRAID AS IF SOMETHING AWFUL MIGHT HAPPEN: NOT AT ALL
3. WORRYING TOO MUCH ABOUT DIFFERENT THINGS: NOT AT ALL
4. TROUBLE RELAXING: NOT AT ALL
1. FEELING NERVOUS, ANXIOUS, OR ON EDGE: NOT AT ALL
6. BECOMING EASILY ANNOYED OR IRRITABLE: NOT AT ALL
GAD7 TOTAL SCORE: 0

## 2022-09-13 ASSESSMENT — PATIENT HEALTH QUESTIONNAIRE - PHQ9: SUM OF ALL RESPONSES TO PHQ QUESTIONS 1-9: 0

## 2022-09-13 ASSESSMENT — PAIN SCALES - GENERAL: PAINLEVEL: NO PAIN (0)

## 2022-09-13 NOTE — PROGRESS NOTES
Patient here for blood pressure check. Blood pressure today was 118/64. Patient has been checking her blood pressures at home with readings in normal range. However, patient was seen in the ER yesterday with high blood pressure.

## 2022-09-16 ENCOUNTER — OFFICE VISIT (OUTPATIENT)
Dept: FAMILY MEDICINE | Facility: OTHER | Age: 77
End: 2022-09-16
Attending: FAMILY MEDICINE
Payer: COMMERCIAL

## 2022-09-16 VITALS
OXYGEN SATURATION: 100 % | TEMPERATURE: 97.9 F | WEIGHT: 115 LBS | DIASTOLIC BLOOD PRESSURE: 60 MMHG | BODY MASS INDEX: 20.38 KG/M2 | HEART RATE: 87 BPM | SYSTOLIC BLOOD PRESSURE: 110 MMHG | HEIGHT: 63 IN

## 2022-09-16 DIAGNOSIS — R79.89 ELEVATED FERRITIN: ICD-10-CM

## 2022-09-16 DIAGNOSIS — G25.81 RESTLESS LEGS SYNDROME (RLS): ICD-10-CM

## 2022-09-16 DIAGNOSIS — R06.09 DOE (DYSPNEA ON EXERTION): Primary | ICD-10-CM

## 2022-09-16 DIAGNOSIS — I21.4 NSTEMI (NON-ST ELEVATED MYOCARDIAL INFARCTION) (H): ICD-10-CM

## 2022-09-16 DIAGNOSIS — D64.9 ANEMIA, UNSPECIFIED TYPE: ICD-10-CM

## 2022-09-16 DIAGNOSIS — K22.70 BARRETT'S ESOPHAGUS WITHOUT DYSPLASIA: ICD-10-CM

## 2022-09-16 LAB
CRP SERPL-MCNC: <2.9 MG/L (ref 0–8)
ERYTHROCYTE [SEDIMENTATION RATE] IN BLOOD BY WESTERGREN METHOD: 34 MM/HR (ref 0–30)

## 2022-09-16 PROCEDURE — 85652 RBC SED RATE AUTOMATED: CPT | Mod: ZL | Performed by: FAMILY MEDICINE

## 2022-09-16 PROCEDURE — G0463 HOSPITAL OUTPT CLINIC VISIT: HCPCS

## 2022-09-16 PROCEDURE — 86140 C-REACTIVE PROTEIN: CPT | Mod: ZL | Performed by: FAMILY MEDICINE

## 2022-09-16 PROCEDURE — 86038 ANTINUCLEAR ANTIBODIES: CPT | Mod: ZL | Performed by: FAMILY MEDICINE

## 2022-09-16 PROCEDURE — 99214 OFFICE O/P EST MOD 30 MIN: CPT | Performed by: FAMILY MEDICINE

## 2022-09-16 PROCEDURE — 36415 COLL VENOUS BLD VENIPUNCTURE: CPT | Mod: ZL | Performed by: FAMILY MEDICINE

## 2022-09-16 RX ORDER — LOSARTAN POTASSIUM 50 MG/1
50 TABLET ORAL DAILY
Qty: 90 TABLET | Refills: 3 | Status: SHIPPED | OUTPATIENT
Start: 2022-09-16 | End: 2023-07-10

## 2022-09-16 ASSESSMENT — PAIN SCALES - GENERAL: PAINLEVEL: NO PAIN (0)

## 2022-09-16 NOTE — PROGRESS NOTES
Assessment & Plan     MERCEDES (dyspnea on exertion)  Pt had echo with normal EF, etc. Angiogram done 9/2020which did not reveal any obstructive disease which makes my concern for Angina less compelling. Will get PFT. Consider chest/Abd imaging at follow up if anemia persisting.   - General PFT Lab (Please always keep checked)  - Pulmonary Function Test  - General PFT Lab (Please always keep checked)    Bowden's esophagus without dysplasia  Continue PPI, pt is taking. Anemia does not appear to be related to chronic disease. Pt wondering if reflux could play a role in breathing as this was how it was discovered. Possible, but will exclude other causes.     Anemia, unspecified type / Elevated ferritin  Chronic disease, high ferritin. Unclear cause, labs as below. If negative, hematology consultation/.   - Anti Nuclear Hyun IgG by IFA with Reflex  - ESR: Erythrocyte sedimentation rate  - CRP, inflammation    Restless legs syndrome (RLS)  Improved with mirapex, continue will recommend sleep study.   - Adult Sleep Eval & Management  Referral    NSTEMI (non-ST elevated myocardial infarction) (H) / Hypertension  With intermittent high BPs, unclear why. Will increase losartan to 50mg permanently, tolerating well. Monitor BPs.   - losartan (COZAAR) 50 MG tablet  Dispense: 90 tablet; Refill: 3    35 minutes spent on the date of the encounter doing chart review, review of test results, interpretation of tests, patient visit and documentation     Luz Alcala MD  [facility] - MARTY Morel is a 77 year old, presenting for the following health issues:  Lipids, Hypertension, follow up on echo, and follow up on doppler      HPI     *Wants to discuss results from the ECHO and doppler that she had done this week      Hyperlipidemia Follow-Up      Are you regularly taking any medication or supplement to lower your cholesterol?   Yes- lipitor    Are you having muscle aches or other side effects  that you think could be caused by your cholesterol lowering medication?  No    Hypertension Follow-up      Do you check your blood pressure regularly outside of the clinic? Yes     Are you following a low salt diet? No    Are your blood pressures ever more than 140 on the top number (systolic) OR more   than 90 on the bottom number (diastolic), for example 140/90? Yes    Heart Failure Follow-up  Are you experiencing any shortness of breath? Yes, with activity only   How would you describe your shortness of breath?  stable since last visit        Are you experiencing any swelling in your legs or feet?  No    Are you using more pillows than usual? No    Do you cough at night?  No    Do you check your weight daily?  No    Have you had a weight change recently?  No    Are you having any of the following side effects from your medications? (Select all that apply)  Fatigue    Since your last visit, how many times have you gone to the cardiologist, urgent care, emergency room, or hospital because of your heart failure?   None     ECHO reviewed, EF recovered and stable.     Insomnia  Onset/Duration: Chronic  Description:   Frequency of insomnia:  nightly  Progression of Symptoms:  Improving with mirapex  Accompanying Signs & Symptoms:  Daytime sleepiness/napping: YES  Excessive snoring/apnea: No  Restless legs: YES  Waking to urinate: No  Chronic pain:  No  Depression symptoms (if yes, do PHQ9): YES  Anxiety symptoms (if yes, do RAKAN-7): YES  History:  Prior Insomnia: YES  New stressful situation: No  Alleviating factors:  Self medicating (alcohol, etc.):  No  Therapies tried and outcome: mirapex 0 helpful      Concern - MERCEDES  Onset: months  Description: less exercise capacity, winded with ambulation  Intensity: moderate  Progression of Symptoms:  same  Accompanying Signs & Symptoms: no cough, wheeze, chest pain, nausea, sweating  Previous history of similar problem: yes with CHF  Precipitating factors:        Worsened by:  "exercise  Alleviating factors:        Improved by: rest  Therapies tried and outcome:  none       Review of Systems   Constitutional, HEENT, cardiovascular, pulmonary, gi and gu systems are negative, except as otherwise noted.      Objective    /60 (BP Location: Left arm, Patient Position: Sitting, Cuff Size: Adult Regular)   Pulse 87   Temp 97.9  F (36.6  C)   Ht 1.6 m (5' 3\")   Wt 52.2 kg (115 lb)   SpO2 100%   BMI 20.37 kg/m    Body mass index is 20.37 kg/m .  Physical Exam   GENERAL: alert and no distress  NECK: no adenopathy, no asymmetry, masses, or scars and thyroid normal to palpation  RESP: lungs clear to auscultation - no rales, rhonchi or wheezes  CV: regular rates and rhythm, normal S1 S2, no S3 or S4, no murmur, click or rub and no peripheral edema  MS: no gross musculoskeletal defects noted, no edema  SKIN: no suspicious lesions or rashes  NEURO: mentation intact, speech normal and gait normal   PSYCH: mentation appears normal, affect normal/bright    Results for orders placed or performed in visit on 09/16/22   Anti Nuclear Hyun IgG by IFA with Reflex     Status: Normal   Result Value Ref Range    SERAFIN interpretation Negative Negative   ESR: Erythrocyte sedimentation rate     Status: Abnormal   Result Value Ref Range    Erythrocyte Sedimentation Rate 34 (H) 0 - 30 mm/hr   CRP, inflammation     Status: Normal   Result Value Ref Range    CRP Inflammation <2.9 0.0 - 8.0 mg/L         "

## 2022-09-16 NOTE — NURSING NOTE
"Chief Complaint   Patient presents with     Lipids     Hypertension     follow up on echo     follow up on doppler       Initial /60 (BP Location: Left arm, Patient Position: Sitting, Cuff Size: Adult Regular)   Pulse 87   Temp 97.9  F (36.6  C)   Ht 1.6 m (5' 3\")   Wt 52.2 kg (115 lb)   SpO2 100%   BMI 20.37 kg/m   Estimated body mass index is 20.37 kg/m  as calculated from the following:    Height as of this encounter: 1.6 m (5' 3\").    Weight as of this encounter: 52.2 kg (115 lb).  Medication Reconciliation: complete  Deborah Greco LPN  "

## 2022-09-19 ENCOUNTER — TELEPHONE (OUTPATIENT)
Dept: FAMILY MEDICINE | Facility: OTHER | Age: 77
End: 2022-09-19

## 2022-09-19 DIAGNOSIS — D64.9 ANEMIA, UNSPECIFIED TYPE: Primary | ICD-10-CM

## 2022-09-19 LAB — ANA SER QL IF: NEGATIVE

## 2022-09-23 RX ORDER — FLUOROURACIL 50 MG/G
CREAM TOPICAL
COMMUNITY
Start: 2022-09-15 | End: 2022-11-14

## 2022-09-26 ENCOUNTER — LAB (OUTPATIENT)
Dept: LAB | Facility: OTHER | Age: 77
End: 2022-09-26
Attending: FAMILY MEDICINE
Payer: COMMERCIAL

## 2022-09-26 ENCOUNTER — ONCOLOGY VISIT (OUTPATIENT)
Dept: ONCOLOGY | Facility: OTHER | Age: 77
End: 2022-09-26
Attending: FAMILY MEDICINE
Payer: COMMERCIAL

## 2022-09-26 VITALS
HEART RATE: 80 BPM | RESPIRATION RATE: 20 BRPM | WEIGHT: 118.17 LBS | SYSTOLIC BLOOD PRESSURE: 124 MMHG | OXYGEN SATURATION: 100 % | TEMPERATURE: 96.8 F | DIASTOLIC BLOOD PRESSURE: 60 MMHG | BODY MASS INDEX: 20.94 KG/M2 | HEIGHT: 63 IN

## 2022-09-26 DIAGNOSIS — R61 NIGHT SWEATS: ICD-10-CM

## 2022-09-26 DIAGNOSIS — D64.9 ANEMIA, UNSPECIFIED TYPE: ICD-10-CM

## 2022-09-26 DIAGNOSIS — R41.82 ALTERED MENTAL STATUS: ICD-10-CM

## 2022-09-26 DIAGNOSIS — R53.83 FATIGUE, UNSPECIFIED TYPE: ICD-10-CM

## 2022-09-26 DIAGNOSIS — R06.02 SOB (SHORTNESS OF BREATH): Primary | ICD-10-CM

## 2022-09-26 DIAGNOSIS — D64.9 ANEMIA, UNSPECIFIED TYPE: Primary | ICD-10-CM

## 2022-09-26 DIAGNOSIS — R10.13 ABDOMINAL PAIN, EPIGASTRIC: ICD-10-CM

## 2022-09-26 DIAGNOSIS — D64.9 ANEMIA, UNSPECIFIED: ICD-10-CM

## 2022-09-26 LAB
ALBUMIN SERPL-MCNC: 3.9 G/DL (ref 3.4–5)
ALP SERPL-CCNC: 129 U/L (ref 40–150)
ALT SERPL W P-5'-P-CCNC: 51 U/L (ref 0–50)
ANION GAP SERPL CALCULATED.3IONS-SCNC: 5 MMOL/L (ref 3–14)
AST SERPL W P-5'-P-CCNC: 39 U/L (ref 0–45)
BASOPHILS # BLD AUTO: 0.1 10E3/UL (ref 0–0.2)
BASOPHILS NFR BLD AUTO: 1 %
BILIRUB SERPL-MCNC: 0.3 MG/DL (ref 0.2–1.3)
BUN SERPL-MCNC: 31 MG/DL (ref 7–30)
CALCIUM SERPL-MCNC: 8.8 MG/DL (ref 8.5–10.1)
CHLORIDE BLD-SCNC: 101 MMOL/L (ref 94–109)
CO2 SERPL-SCNC: 26 MMOL/L (ref 20–32)
CREAT SERPL-MCNC: 1.33 MG/DL (ref 0.52–1.04)
EOSINOPHIL # BLD AUTO: 0.3 10E3/UL (ref 0–0.7)
EOSINOPHIL NFR BLD AUTO: 3 %
ERYTHROCYTE [DISTWIDTH] IN BLOOD BY AUTOMATED COUNT: 12.2 % (ref 10–15)
ERYTHROCYTE [SEDIMENTATION RATE] IN BLOOD BY WESTERGREN METHOD: 25 MM/HR (ref 0–30)
FERRITIN SERPL-MCNC: 426 NG/ML (ref 8–252)
GFR SERPL CREATININE-BSD FRML MDRD: 41 ML/MIN/1.73M2
GLUCOSE BLD-MCNC: 99 MG/DL (ref 70–99)
HCT VFR BLD AUTO: 32.3 % (ref 35–47)
HGB BLD-MCNC: 11.4 G/DL (ref 11.7–15.7)
IMM GRANULOCYTES # BLD: 0 10E3/UL
IMM GRANULOCYTES NFR BLD: 0 %
IRON SATN MFR SERPL: 25 % (ref 15–46)
IRON SERPL-MCNC: 63 UG/DL (ref 35–180)
LDH SERPL L TO P-CCNC: 233 U/L (ref 81–234)
LYMPHOCYTES # BLD AUTO: 1.4 10E3/UL (ref 0.8–5.3)
LYMPHOCYTES NFR BLD AUTO: 15 %
MCH RBC QN AUTO: 32.1 PG (ref 26.5–33)
MCHC RBC AUTO-ENTMCNC: 35.3 G/DL (ref 31.5–36.5)
MCV RBC AUTO: 91 FL (ref 78–100)
MONOCYTES # BLD AUTO: 1.1 10E3/UL (ref 0–1.3)
MONOCYTES NFR BLD AUTO: 12 %
NEUTROPHILS # BLD AUTO: 6.4 10E3/UL (ref 1.6–8.3)
NEUTROPHILS NFR BLD AUTO: 69 %
NRBC # BLD AUTO: 0 10E3/UL
NRBC BLD AUTO-RTO: 0 /100
PLATELET # BLD AUTO: 264 10E3/UL (ref 150–450)
POTASSIUM BLD-SCNC: 4.5 MMOL/L (ref 3.4–5.3)
PROT SERPL-MCNC: 7.7 G/DL (ref 6.8–8.8)
RBC # BLD AUTO: 3.55 10E6/UL (ref 3.8–5.2)
SODIUM SERPL-SCNC: 132 MMOL/L (ref 133–144)
TIBC SERPL-MCNC: 255 UG/DL (ref 240–430)
WBC # BLD AUTO: 9.2 10E3/UL (ref 4–11)

## 2022-09-26 PROCEDURE — 86335 IMMUNFIX E-PHORSIS/URINE/CSF: CPT | Mod: ZL | Performed by: PATHOLOGY

## 2022-09-26 PROCEDURE — G0463 HOSPITAL OUTPT CLINIC VISIT: HCPCS

## 2022-09-26 PROCEDURE — 85025 COMPLETE CBC W/AUTO DIFF WBC: CPT | Mod: ZL

## 2022-09-26 PROCEDURE — 83550 IRON BINDING TEST: CPT | Mod: ZL

## 2022-09-26 PROCEDURE — 86431 RHEUMATOID FACTOR QUANT: CPT | Mod: ZL

## 2022-09-26 PROCEDURE — 99205 OFFICE O/P NEW HI 60 MIN: CPT | Performed by: INTERNAL MEDICINE

## 2022-09-26 PROCEDURE — 80053 COMPREHEN METABOLIC PANEL: CPT | Mod: ZL

## 2022-09-26 PROCEDURE — 83521 IG LIGHT CHAINS FREE EACH: CPT | Mod: ZL

## 2022-09-26 PROCEDURE — 85652 RBC SED RATE AUTOMATED: CPT | Mod: ZL

## 2022-09-26 PROCEDURE — 82728 ASSAY OF FERRITIN: CPT | Mod: ZL

## 2022-09-26 PROCEDURE — 99417 PROLNG OP E/M EACH 15 MIN: CPT | Performed by: INTERNAL MEDICINE

## 2022-09-26 PROCEDURE — 84165 PROTEIN E-PHORESIS SERUM: CPT | Mod: ZL | Performed by: PATHOLOGY

## 2022-09-26 PROCEDURE — 36415 COLL VENOUS BLD VENIPUNCTURE: CPT | Mod: ZL

## 2022-09-26 PROCEDURE — 86334 IMMUNOFIX E-PHORESIS SERUM: CPT | Mod: ZL | Performed by: PATHOLOGY

## 2022-09-26 PROCEDURE — 82784 ASSAY IGA/IGD/IGG/IGM EACH: CPT | Mod: ZL

## 2022-09-26 PROCEDURE — 82232 ASSAY OF BETA-2 PROTEIN: CPT | Mod: ZL

## 2022-09-26 PROCEDURE — 85810 BLOOD VISCOSITY EXAMINATION: CPT | Mod: ZL

## 2022-09-26 PROCEDURE — 83615 LACTATE (LD) (LDH) ENZYME: CPT | Mod: ZL

## 2022-09-26 PROCEDURE — 84155 ASSAY OF PROTEIN SERUM: CPT | Mod: ZL

## 2022-09-26 ASSESSMENT — PAIN SCALES - GENERAL: PAINLEVEL: NO PAIN (0)

## 2022-09-26 ASSESSMENT — PATIENT HEALTH QUESTIONNAIRE - PHQ9: SUM OF ALL RESPONSES TO PHQ QUESTIONS 1-9: 3

## 2022-09-26 NOTE — NURSING NOTE
"Oncology Rooming Note    September 26, 2022 1:56 PM   Maria Teresa Aburto is a 77 year old female who presents for:    Chief Complaint   Patient presents with     Hematology     Consult Anemia       Initial Vitals: /60   Pulse 80   Temp 96.8  F (36  C) (Tympanic)   Resp 20   Ht 1.6 m (5' 3\")   Wt 53.6 kg (118 lb 2.7 oz)   SpO2 100%   BMI 20.93 kg/m   Estimated body mass index is 20.93 kg/m  as calculated from the following:    Height as of this encounter: 1.6 m (5' 3\").    Weight as of this encounter: 53.6 kg (118 lb 2.7 oz). Body surface area is 1.54 meters squared.  No Pain (0) Comment: Data Unavailable   No LMP recorded. Patient is postmenopausal.  Allergies reviewed: Yes  Medications reviewed: Yes    Medications: Medication refills not needed today.  Pharmacy name entered into [facility]: [id number] - [place], [place] - [id number]            "

## 2022-09-26 NOTE — PATIENT INSTRUCTIONS
We would like to see you back after  labs CT of chest, abdomen and pelvis and neck. When you are in need of a refill of your medications, please call your pharmacy and they will send us the request. If you have any questions please call [phone]

## 2022-09-26 NOTE — PROGRESS NOTES
Visit Date: 09/26/2022    HEMATOLOGY/ONCOLOGY CONSULTATION     REASON FOR CONSULTATION:  Anemia/elevated ferritin.    REQUESTING PHYSICIAN:  Dr. Luz Alcala.    HISTORY OF PRESENT ILLNESS:  Mrs. Aburto is a 77-year-old white female with history of coronary artery disease, Bowden's esophagus, hypertension, whom we were asked to evaluate concerning diagnosis of anemia in the setting of elevated ferritin.  The patient most recently had been evaluated in the emergency room for hypertension.  She was also seen by Dr. Alcala who increased her losartan dose.  As part of the workup, labs were drawn.  Her CBC revealed white count of 6.7, H and H 10.3 and 29.6, MCV 91, platelet count 206.  Peripheral blood smear was ordered and revealed the patient had likely anemia of chronic, also iron studies were obtained and ferritin was elevated at 440.  TSH was 2.51.  Vitamin B12 was 800.  Sed rate was elevated at 34.  SERAFIN was negative.  She says she may have been anemic.  Her major complaint is that she has increasing fatigue throughout the day.  She also has dyspnea on exertion.  She says her heartburn symptoms are still present.  Omeprazole was not helping.  She was diagnosed with Bowden's esophagus without dysplasia after EGD performed by Dr. Panfilo Arcos approximately a year ago.  There is no family history of hematologic malignancy.  She is a nonsmoker, nondrinker.  She also described this lightheadedness when she stands up at times and she has to lie down to help alleviate the symptoms.  She denies any fevers.  She may have had night sweats.  No weight loss, no bone pain.  No bright red blood per rectum, hematemesis, change in bowel habits.  She said she had COVID-19 back in June of last year.  She also has history of basal cell carcinoma as well as squamous cell carcinoma of skin.  She had a Mohs procedure done at [facility] for basal cell carcinoma of the temple.    PAST MEDICAL HISTORY:  Significant for hyperlipidemia,  non-ST elevated MI, Takotsubo cardiomyopathy, status post rotator cuff repair,  vitamin B12 deficiency, nontraumatic incomplete tear of right rotator cuff, nontraumatic complete tear of right rotator cuff, medial meniscus tear, restless legs syndrome, Bowden's esophagus, B complex deficiency, disorder of bone and cartilage, gastroesophageal reflux disease, actinic keratosis, history of squamous cell carcinoma of skin, history of basal cell carcinoma of skin.    ALLERGIES:  SHE IS ALLERGIC TO MACROBID.    MEDICATIONS:  Include:  1.  Cozaar 50 mg daily.  2.  5-FU 5% Efudex cream apply to affected areas 2 times daily.  3.  Lipitor 40 mg at bedtime.  4.  Prilosec 40 mg at bedtime.  5.  Mirapex 0.25-0.5 mg at bedtime.  6.  Vitamin B12 injections 1000 mcg every 30 days.    7.  Aspirin 81 mg daily,  8.  Multivitamin daily.  9.  Calcium carbonate.  10.  Vitamin D daily.    SOCIAL HISTORY:  Tobacco negative.  Alcohol is negative.  She works as an  for [employer].  She is currently retired.    FAMILY HISTORY:  Significant with mother with endometrial cancer.    REVIEW OF SYSTEMS:      CENTRAL NERVOUS SYSTEM:  Question of headaches versus lightheadedness versus change in mental status.  ENT:  Negative for hearing loss.  RESPIRATORY:  Significant for dyspnea on exertion.  No cough, hemoptysis.  CARDIAC:  No chest pain, palpitation, orthopnea, PND, ankle edema.  GASTROINTESTINAL:  Negative for change in bowel habits, bright red blood per rectum.  She has chronic reflux.  No hematemesis.  She does have epigastric pain.  MUSCULOSKELETAL:  Occasional neck pain.  GENITOURINARY:  Negative for hematuria.  CONSTITUTIONAL:  Negative for fevers.  She may have night sweats.  No weight loss.    HEMATOLOGIC:  Significant for easy bruising.  No epistaxis or gingival bleeding.    PHYSICAL EXAMINATION:    GENERAL:  She is an elderly white female in no acute distress.  ECOG performance status of 1.  VITAL  SIGNS:  Blood pressure 124/60, pulse 80, respirations 20, temperature 96.8.  HEENT:  Atraumatic, normocephalic.  Oropharynx nonerythematous.  NECK:  Supple, no thyromegaly.  LUNGS:  Clear to auscultation and percussion.  HEART:  Regular rhythm, S1, S2 normal.  ABDOMEN:  Soft, some mild epigastric tenderness to palpation.  No rebound.  LYMPHATICS:  No cervical, supraclavicular, axillary or inguinal nodes.  EXTREMITIES:  No edema.  NEUROLOGIC:  Nonfocal.    LABORATORY DATA:  Pending.    IMPRESSION:  Normochromic normocytic anemia in the setting of elevated sed rate, elevated ferritin, rule out underlying malignancy versus hemochromatosis, which is doubtful given that she has no evidence of iron overload.  Nonetheless, we will repeat CBC, CMP, LDH, iron studies, B12, folate, sed rate, rheumatoid factor.  We will obtain a hemochromatosis DNA mutation.  Otherwise, we will also obtain myeloma profile.  Given her multiple complaints and history of   Bowden's esophagus, we will obtain MRI of the brain, CT neck, chest, abdomen and pelvis to rule out occult malignancy given the elevated ferritin.  If above workup is negative, she may need a repeat EGD given her history of Bowden's esophagus and constant reflux symptoms.    One hundred and twenty minutes was spent with the patient.  Time was spent reviewing multiple physician labs, performing history and physical, documenting history and physical, ordering scans and follow up labs.    Sheldon Silverman MD        D: 2022   T: 2022   MT: [id number]    Name:     VINICIUS SORIANO  MRN:      [id number]        Account:    [id number]   :      1945           Visit Date: 2022     Document: [id number]

## 2022-09-27 ENCOUNTER — HOSPITAL ENCOUNTER (OUTPATIENT)
Dept: RESPIRATORY THERAPY | Facility: HOSPITAL | Age: 77
Discharge: HOME | End: 2022-09-27
Attending: FAMILY MEDICINE | Admitting: INTERNAL MEDICINE
Payer: COMMERCIAL

## 2022-09-27 ENCOUNTER — DOCUMENTATION ONLY (OUTPATIENT)
Dept: SLEEP MEDICINE | Facility: HOSPITAL | Age: 77
End: 2022-09-27

## 2022-09-27 DIAGNOSIS — R06.09 DOE (DYSPNEA ON EXERTION): ICD-10-CM

## 2022-09-27 LAB — TOTAL PROTEIN SERUM FOR ELP: 7.2 G/DL (ref 6.4–8.3)

## 2022-09-27 PROCEDURE — 94010 BREATHING CAPACITY TEST: CPT

## 2022-09-27 PROCEDURE — 94010 BREATHING CAPACITY TEST: CPT | Mod: 26 | Performed by: INTERNAL MEDICINE

## 2022-09-27 PROCEDURE — 94726 PLETHYSMOGRAPHY LUNG VOLUMES: CPT

## 2022-09-27 PROCEDURE — 94726 PLETHYSMOGRAPHY LUNG VOLUMES: CPT | Mod: 26 | Performed by: INTERNAL MEDICINE

## 2022-09-27 PROCEDURE — 94729 DIFFUSING CAPACITY: CPT

## 2022-09-27 PROCEDURE — 94729 DIFFUSING CAPACITY: CPT | Mod: 26 | Performed by: INTERNAL MEDICINE

## 2022-09-27 NOTE — PROGRESS NOTES
SLEEP HISTORY QUESTIONNAIRE    Please describe the main reason for your sleep appointment? Dr. Alcala recommends sleep study to see if there is any connection to some of my health issues.    How long has this been a problem? Many years, worse last 8 months    Have you been diagnosed with a sleep problem in the past? NO    If so, what? n/a    What treatment was recommended? n/a    Have you had a sleep study in the past? NO    If yes, where and when? n/a    Sleep Habits:   Do you read in bed? No  Do you eat in bed? No  Do you watch TV in bed? No  Do you work in bed? No  Do you use a phone or computer in bed? No    Is you sleep disturbed by:   Bed partner: No  Children: No  Noise: No   Pets: No  Other: no      On two or more nights per week, do you drink alcohol to help you fall asleep?NO    On two or more nights per week, do you take melatonin to help you fall asleep? NO    On two or more nights per week, do you take over the counter medicine to fall asleep?  NO    Do you take drinks with caffeine (coffee, tea, soda, energy drinks)? YES    Do you have 3 or more caffeine drinks in a day? NO    Do you have caffeine drinks within 6 hours of bedtime? NO    Do you smoke or use tobacco? NO    Do you exercise? YES    Sleep Routine:   Using a 24 Hour Clock    What time do you usually get into bed on workdays? n/a    Weekend/non work days? 930pm    What time do you get out of bed on workdays? n/a      Weekend/non work days?6am    Do you work the evening or night shift or do your shifts rotate? NO    How long does it usually take to fall to sleep? 5 min    How many times do you wake during the night? 2    How much time do you feel that you are awake during the entire night? 10 min    How long does it take for you to fall back to sleep after you wake up? 5 min    Why do you think you wake up? To go to bathroom, before parmi rls    What do you do when you wake up? Bathroom, before parmi I would walk    How much sleep do you  think you get on work nights? n/a    How much sleep do you think you get on weekends/non work days? 8    How much sleep do you think you need to feel your best? 7-8    How many days during a week do you take a nap on average? 0    What is the average length of your naps? n/a    Do you feel better after taking a nap? DOES NOT APPLY    If you could chose the best sleep schedule for you, what time would you go to bed? 10pm  What time would you get up? 6-630am    Do you read in bed? NO    Do you eat in bed? NO    Do you watch TV in bed? NO    Do you do work in bed? NO    Do you use a computer or phone in bed? NO    Sleep Disruptions?   Leg movements:  Do you ever have restless, crawling, aching or other unusual feelings in your legs? YES    Do you ever wake yourself by kicking your legs during the night? YES    Are the sheets and blankets messed up or tossed about when you get up? NO    Night-time behaviors:   Do you have nightmares or night terrors? NO   How often? n/a    Have you had times when you were sleep walking? NO    Have you been seen doing anything unusual while you sleep at nights? NO  What? n/a  How often? n/a    Have you ever hurt yourself or someone else while you were sleeping? NO  Please describe: n/a    Do you clench or grind your teeth during the night? no    Sleep Apnea (pauses in breathing during sleep):  Do you wake with a headache in the morning? NO  How often? n/a    Does your bed partner, family or friends ever say that you snore? YES  How many nights per week do you snore? ?  Can snoring be heard outside the bedroom? NO    Do you ever wake yourself up from snoring, gasping or choking? YES    Have you ever been told that you stop breathing or have pauses in your breathing? YES    Do you wake in the morning with a dry throat or mouth? YES    Do you have trouble breathing through your nose? NO    Do you have problems with heartburn, reflux or a hiatal hernia? YES    Which positions do you usually  sleep in? (stomach, back, sides, all) back    Do you use oxygen or any other medical equipment when you sleep? NO    Do members of your family (related by blood) snore? YES    Have any members of your family been diagnosed with with sleep apnea? YES    Do other members of your family have restless leg? YES    Do other members of your family have sleep walking? NO    Have you ever had an accident, or near accident due to sleepiness while driving? NO    Does your sleepiness affect your work on the job or at school? NO    Do you ever fall asleep by accident while doing a task? NO    Have you had sudden muscle weakness when laughing, angry or surprised? NO    Have you ever been unable to move your body when falling asleep or waking up? NO    Do you ever have trouble  your dreams from real life events? NO  Please describe: n/a    Physical Health: (including illness and injury): During the past 30 days, on how many days was your physical health not good? 0/30 days     Mental Health: (including stress, depression, and problems with emotions): During the last 30 days, how may days was your mental health not good? 0/30 days.     During the past 30 days, on how many days did poor physical or mental health keep you from doing your usual activities? This might be self-care, work, or play? 0/30 days.     Social History:   Marital status:     Who lives in your home with you?     Mother (alive or dead)? dead If has , from what? Dementia, choking  Father (alive or dead)? dead If has , from what? Not sure, 91 years old    Siblings: YES  Have any ? NO  If so, from what? n/a    Currently working? NO  If yes, work: n/a  Former jobs: [employer], office work     Sleepiness Scale:   Sitting and reading 3   Watching TV 3   Sitting in a public place 2-3   Riding in a car 0   Lying down to rest in the afternoon 2   Sitting and talking to someone 0   Sitting quietly after a lunch  without alcohol 0   In a car, stopping for a few minutes in traffic 0       Surgical History:   Past Surgical History:   Procedure Laterality Date     Breast Biospy  11/11/2000    LEFT > Fibrocystic Disease > Outcome: Fibroadenoma     COLONOSCOPY  2000     COLONOSCOPY  8-7-2009    Normal, Repeat 2015     DEXA Scan  2009     EGD       EGD  2008     EGD  2003     ENDOSCOPY UPPER, COLONOSCOPY, COMBINED N/A 9/6/2018    Procedure: COMBINED ENDOSCOPY UPPER, COLONOSCOPY;  UPPER ENDOSCOPY WITH BIOPSIES AND COLONOSCOPY WITH BIOPSIES;  Surgeon: Minh Interiano DO;  Location: [facility] OR     ESOPHAGOSCOPY, GASTROSCOPY, DUODENOSCOPY (EGD), COMBINED N/A 10/29/2021    Procedure: Upper endoscopy with biopsies;  Surgeon: Panfilo Arcos MD;  Location: [facility] OR     Knee Replacement  2012     Oophorectomy      Ectopic Pregnancy     ROTATOR CUFF REPAIR RT/LT Right     with acromioplasty for complete rotator cuff tear, Dr Cherry     TONSILLECTOMY         Medical Conditions:   Past Medical History:   Diagnosis Date     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     Bowden's esophagus 10/11/2011     Constipation 10/09/2012     GERD (gastroesophageal reflux disease)[530.81] 05/11/2003     Hypertension      NSTEMI (non-ST elevated myocardial infarction) (H) 09/28/2020    With Takotsubo cardiomyopathy Cardiac cath, small lesion of diagonal not requiring stenting Dr Crystal Sandoval Vakil     Osteopenia 10/11/2011    dexa scans odd years starting in 2003     Precordial pain 04/28/2003    negative stress test, negative pulmonary evaluatio     Restless legs syndrome (RLS) 10/11/2011     Squamous Cell Carcinoma 10/11/2011    left leg x2     Takotsubo cardiomyopathy 09/22/2020    Episode of high BP noted on exam with no other symptoms Elevated troponin Cardiac cath, small lesion of  small diagonal, not requiring stenting Crystal Sandoval, cardiology     Urge incontinence 10/09/2012       Medications:   Current Outpatient Medications   Medication Sig      ASPIRIN CAPS 81 MG OR one capsule by mouth daily     atorvastatin (LIPITOR) 40 MG tablet Take 1 tablet (40 mg) by mouth At Bedtime     Calcium Carbonate-Vitamin D (CALCIUM + D PO)      Cyanocobalamin (VITAMIN B 12 PO) Injection [id number]     fluorouracil (EFUDEX) 5 % external cream APPLY TOPICALLY TO AFFECTED AREA 2 TIMES DAILY. WAIT 3-4 WEEKS FOR BIOPSY SITE TO HEAL THEN APPLY TOPICALLY TO CHEST 2 TIMES DAILY. (Patient not taking: Reported on 9/26/2022)     losartan (COZAAR) 50 MG tablet Take 1 tablet (50 mg) by mouth daily     multivitamin w/minerals (THERA-VIT-M) tablet Take 1 tablet by mouth daily     omeprazole (PRILOSEC) 40 MG DR capsule TAKE 1 CAPSULE BY MOUTH DAILY BY MOUTH     pramipexole (MIRAPEX) 0.25 MG tablet Take 1-2 tablets (0.25-0.5 mg) by mouth At Bedtime     Vitamin D, Cholecalciferol, 25 MCG (1000 UT) TABS      Current Facility-Administered Medications   Medication     cyanocobalamin injection 1,000 mcg       Are you currently having any of the following symptoms?   General:   Obvious weight gain or loss NO  Fever, chills or sweats NO  Drug allergies: no    Eyes:   Changes in vision NO  Blind spots NO  Double vision NO  Other no    Ear, Nose and Throat:   Ear pain NO  Sore throat NO  Sinus pain NO  Post-nasal drip NO  Runny nose NO  Bloody nose NO    Heart:   Rapid or irregular heart beat NO  Chest pain or pressure NO  Out of breath when lying down NO  Swelling in feet or legs YES  High blood pressure YES  Heart disease NO    Nervous system   Headaches NO  Weakness in arms or legs YES  Numbness in arms of legs NO  Other: no    Skin  Rashes NO  New moles or skin changes NO  Other no    Lungs  Shortness of breath at rest NO  Shortness of breath with activity YES  Dry cough NO  Coughing up mucous or phlegm NO  Coughing up blood NO  Wheezing when breathing NO    Lymph System  Swollen lymph nodes NO  New lumps or bumps NO  Changes in breasts or discharge NO    Digestive System   Nausea or vomiting  NO  Loose or watery stools NO  Hard, dry stools (constipation) YES  Fat or grease in stools NO  Blood in stools NO  Stools are black or bloody NO  Abdominal (belly) pain NO    Urinary Tract   Pain when you urinate (pee) NO  Blood in your urine NO  Urinate (pee) more than normal NO  Irregular periods NO    Muscles and bones   Muscle pain NO  Joint or bone pain NO  Swollen joints NO  Other no    Glands  Increased thirst or urination NO  Diabetes NO  Morning glucose: no  Afternoon glucose: no    Mental Health  Depression NO  Anxiety NO  Other mental health issues: no

## 2022-09-27 NOTE — PROGRESS NOTES
Chart review prior to sleep testing.    Patient Summary:  77 year old yo female who is referred for concern for sleep-disordered breathing.    Patient Active Problem List    Diagnosis Date Noted     Hyperlipidemia LDL goal <70 05/04/2021     Priority: Medium     ACE-inhibitor cough 11/03/2020     Priority: Medium     NSTEMI (non-ST elevated myocardial infarction) (H) 09/28/2020     Priority: Medium     With Takotsubo cardiomyopathy  Cardiac cath, small lesion of diagonal not requiring stenting  Dr Crystal Sandoval Vakil       Takotsubo cardiomyopathy 09/22/2020     Priority: Medium     Episode of high BP noted on exam with no other symptoms  Elevated troponin  Cardiac cath, small lesion of  small diagonal, not requiring stenting  Crystal Sandoval, cardiology       S/P right rotator cuff repair 09/16/2019     Priority: Medium     Vitamin B12 deficiency (non anemic) 09/16/2019     Priority: Medium     Nontraumatic incomplete tear of right rotator cuff 07/31/2019     Priority: Medium     Added automatically from request for surgery [id number]       Nontraumatic complete tear of right rotator cuff 07/31/2019     Priority: Medium     Added automatically from request for surgery [id number]       Actinic keratosis 07/17/2012     Priority: Medium     History of malignant neoplasm of skin 07/17/2012     Priority: Medium     Atypical nevus 07/17/2012     Priority: Medium     History of nonmelanoma skin cancer 07/17/2012     Priority: Medium     Overview:   SCC right leg 2001       Medial meniscus tear 02/13/2012     Priority: Medium     Overview:   IMO Update 10/11       Restless legs syndrome (RLS) 10/11/2011     Priority: Medium     Bowden's esophagus 10/11/2011     Priority: Medium     B-complex deficiency 10/11/2011     Priority: Medium     Problem list name updated by automated process. Provider to review       Disorder of bone and cartilage 10/11/2011     Priority: Medium     dexa scans odd years starting in  "2003  Problem list name updated by automated process. Provider to review       Squamous Cell Carcinoma 10/11/2011     Priority: Medium     left leg x2       GERD (gastroesophageal reflux disease)[530.81] 05/11/2003     Priority: Medium       Current Outpatient Medications   Medication     ASPIRIN CAPS 81 MG OR     atorvastatin (LIPITOR) 40 MG tablet     Calcium Carbonate-Vitamin D (CALCIUM + D PO)     Cyanocobalamin (VITAMIN B 12 PO)     fluorouracil (EFUDEX) 5 % external cream     losartan (COZAAR) 50 MG tablet     multivitamin w/minerals (THERA-VIT-M) tablet     omeprazole (PRILOSEC) 40 MG DR capsule     pramipexole (MIRAPEX) 0.25 MG tablet     Vitamin D, Cholecalciferol, 25 MCG (1000 UT) TABS     Current Facility-Administered Medications   Medication     cyanocobalamin injection 1,000 mcg       Pertinent PMHx of vitamin B-12 deficiency, HLD, HTN, NSTEMI, takotsubo cardiomyopathy, RLS, squamous cell carcinoma, normochromic normocytic anemia with elevated ferritin, elevated sed rate and normal TIBC.    Echo 9/12/2022 with LVEF 55-60%, grade 1 diastolic dysfunction, RVSP 23.1 mmHg + RA.    Reviewed her recent visit with Dr. Alcala on 9/16/2022.  RLS improved with pramipexole 0.25-0.5mg at bedtime.  Recommendation for sleep testing.    STOP-BANG score of 4, with unknown neck circumference.  Tsaile score of 10-11.  BMI of Estimated body mass index is 20.93 kg/m  as calculated from the following:    Height as of 9/26/22: 1.6 m (5' 3\").    Weight as of 9/26/22: 53.6 kg (118 lb 2.7 oz).     Per questionnaire: \"Dr. Alcala recommends sleep study to see if there is any connection to some of my health issues\"    Caffeine use:  No for 3+ per day.  No for within 6 hours of bed.    Tobacco use: No    Sleep pattern:  9:30pm - 6am, total sleep time 7-8 hours.  Time to fall asleep: ~5 minutes.  Awakenings: 2 times per night, 5 minutes to return to sleep, awake for total of 10 minutes per night.  Napping.  0 days per week, - " hours per nap.    Yes for RLS screen.  No for sleep walking.  No for dream enactment behavior.  No for bruxism.    No for morning headaches.  Yes for snoring.  Yes for observed apnea.  Yes for FHx of MARISSA.    SHx:  , lives with .    A/P:    1.)  High likelihood of MARISSA with STOP-BANG score of 4.  - Co-morbid HTN, CAD   - Would appear to be candidate for either home sleep testing or in-lab PSG.    2.)  RLS  - Appears to be controlled with currently prescribed pramipexole 0.25-0.5mg at bedtime  - Will assess at clinic visit, likely recommend optimal timing for medication 1-2 hours before bed  - Ferritin mildly elevated, working with Dr. Silverman  ---  This note was written with the assistance of the Dragon voice-dictation technology software. The final document, although reviewed, may contain errors. For corrections, please contact the office.    Wesley Garcia MD    Sleep Medicine    [place], [place]  o Main Office: [phone]    [facility] [place], [place] ([phone])  o Schedule visits: [phone]  o Main Office: [phone]

## 2022-09-27 NOTE — PROGRESS NOTES
SANGEETA PUCKETT       Name: Maria Teresa Aburto MRN# [id number]   Age: 77 year old YOB: 1945     Stop Bang questionnaire completed with a score of >3 to allow for HST     Have you been told you snore loudly (louder than talking or loud enough to be heard through doors)? NO    Do you often feel tired, fatigued, or sleepy during the daytime? YES    Has anyone observed you stop breathing during your sleep? YES    Do you have or are you being treated for high blood pressure? YES    Is your BMI greater than 35? NO    Is your neck size circumference 16 inches or greater? NO    Are you over 50 years old? YES    Stop Bang Score (# of yes): 4

## 2022-09-28 ENCOUNTER — PATIENT OUTREACH (OUTPATIENT)
Dept: ONCOLOGY | Facility: OTHER | Age: 77
End: 2022-09-28

## 2022-09-28 LAB
ALBUMIN SERPL ELPH-MCNC: 4 G/DL (ref 3.7–5.1)
ALPHA1 GLOB SERPL ELPH-MCNC: 0.3 G/DL (ref 0.2–0.4)
ALPHA2 GLOB SERPL ELPH-MCNC: 0.6 G/DL (ref 0.5–0.9)
B-GLOBULIN SERPL ELPH-MCNC: 0.8 G/DL (ref 0.6–1)
B2 MICROGLOB TUMOR MARKER SER-MCNC: 3.4 MG/L
GAMMA GLOB SERPL ELPH-MCNC: 1.4 G/DL (ref 0.7–1.6)
IGA SERPL-MCNC: 413 MG/DL (ref 84–499)
IGG SERPL-MCNC: 1383 MG/DL (ref 610–1616)
IGM SERPL-MCNC: 122 MG/DL (ref 35–242)
KAPPA LC FREE SER-MCNC: 4.38 MG/DL (ref 0.33–1.94)
KAPPA LC FREE/LAMBDA FREE SER NEPH: 2 {RATIO} (ref 0.26–1.65)
LAMBDA LC FREE SERPL-MCNC: 2.19 MG/DL (ref 0.57–2.63)
M PROTEIN SERPL ELPH-MCNC: 0 G/DL
PROT ELPH PNL UR ELPH: NORMAL
PROT PATTERN SERPL ELPH-IMP: NORMAL
PROT PATTERN SERPL IFE-IMP: NORMAL
RHEUMATOID FACT SER NEPH-ACNC: 8 IU/ML
VISC SER: 1.6 CP (ref 1.4–1.8)

## 2022-09-28 PROCEDURE — 84165 PROTEIN E-PHORESIS SERUM: CPT | Mod: 26

## 2022-09-28 PROCEDURE — 86334 IMMUNOFIX E-PHORESIS SERUM: CPT

## 2022-09-28 PROCEDURE — 86335 IMMUNFIX E-PHORSIS/URINE/CSF: CPT

## 2022-09-28 NOTE — PROGRESS NOTES
Patient updated on need of lab draw. We will have her set up at 1015 for lab prior to CT/MRI that day.

## 2022-10-03 NOTE — PROGRESS NOTES
Maria Teresa Aburto is a 77 year old female who is being evaluated via a billable telephone visit.       What phone number would you like to be contacted at?@ [phone]  Home Phone [phone]   Work Phone Not on file.   Mobile [phone]       How would you like to obtain your AVS? Tu Closet Mi Closet       Telephone Virtual Visit Details     Type of service:  Telephone Virtual Visit     Start Time: 3:30pm  End Time: 3:45pm    Virtual visit for restless leg syndrome and risk factors for sleep disordered breathing.     A/P:     1.)  High likelihood of MARISSA with STOP-BANG score of 4.  - Co-morbid HTN, CAD  - Would appear to be candidate for either home sleep testing or in-lab PSG.  - He agreed to proceed with home sleep testing with Noxturnal T3 device.     2.)  RLS  - Appears to be controlled with currently prescribed pramipexole 0.25-0.5mg at bedtime  -She reports significant improvement in sleep quality since starting the pramipexole.  Plan continue on current regiment.  - Ferritin mildly elevated, working with Dr. Silverman    SUBJECTIVE:  Maria Teresa Aburto is a 77 year old female.    77 year old yo female who is referred for concern for sleep-disordered breathing.    Pertinent PMHx of vitamin B-12 deficiency, HLD, HTN, NSTEMI, takotsubo cardiomyopathy, RLS, squamous cell carcinoma, normochromic normocytic anemia with elevated ferritin, elevated sed rate and normal TIBC.     Echo 9/12/2022 with LVEF 55-60%, grade 1 diastolic dysfunction, RVSP 23.1 mmHg + RA.     Reviewed her recent visit with Dr. Alcala on 9/16/2022.  RLS improved with pramipexole 0.25-0.5mg at bedtime.  Recommendation for sleep testing.     Today -overall, she has much fewer sleep concerns today, this is due to her sleep dramatically improving after the start of the pramipexole with Dr Alcala.    STOP-BANG score of 4, with unknown neck circumference.  [place] score of 10-11.  BMI of Estimated body mass index is 20.93 kg/m  as calculated from the  "following:    Height as of 9/26/22: 1.6 m (5' 3\").    Weight as of 9/26/22: 53.6 kg (118 lb 2.7 oz).      Per questionnaire: \"Dr. Alcala recommends sleep study to see if there is any connection to some of my health issues\"     Caffeine use:  No for 3+ per day.  No for within 6 hours of bed.     Tobacco use: No     Sleep pattern:  9:30pm - 6am, total sleep time 7-8 hours.  Time to fall asleep: ~5 minutes.  Awakenings: 2 times per night, 5 minutes to return to sleep, awake for total of 10 minutes per night.  Napping.  0 days per week, - hours per nap.     Yes for RLS screen.  No for sleep walking.  No for dream enactment behavior.  No for bruxism.     No for morning headaches.  Yes for snoring.  Yes for observed apnea.  Yes for FHx of MARISSA.     SHx:  , lives with .        Past medical history:    Patient Active Problem List    Diagnosis Date Noted     Hyperlipidemia LDL goal <70 05/04/2021     Priority: Medium     ACE-inhibitor cough 11/03/2020     Priority: Medium     NSTEMI (non-ST elevated myocardial infarction) (H) 09/28/2020     Priority: Medium     With Takotsubo cardiomyopathy  Cardiac cath, small lesion of diagonal not requiring stenting  Dr Crystal Sandoval [place]       Takotsubo cardiomyopathy 09/22/2020     Priority: Medium     Episode of high BP noted on exam with no other symptoms  Elevated troponin  Cardiac cath, small lesion of  small diagonal, not requiring stenting  Crystal Sandoval, cardiology       S/P right rotator cuff repair 09/16/2019     Priority: Medium     Vitamin B12 deficiency (non anemic) 09/16/2019     Priority: Medium     Nontraumatic incomplete tear of right rotator cuff 07/31/2019     Priority: Medium     Added automatically from request for surgery [id number]       Nontraumatic complete tear of right rotator cuff 07/31/2019     Priority: Medium     Added automatically from request for surgery [id number]       Actinic keratosis 07/17/2012     Priority: Medium     History of " malignant neoplasm of skin 07/17/2012     Priority: Medium     Atypical nevus 07/17/2012     Priority: Medium     History of nonmelanoma skin cancer 07/17/2012     Priority: Medium     Overview:   SCC right leg 2001       Medial meniscus tear 02/13/2012     Priority: Medium     Overview:   IMO Update 10/11       Restless legs syndrome (RLS) 10/11/2011     Priority: Medium     Bowden's esophagus 10/11/2011     Priority: Medium     B-complex deficiency 10/11/2011     Priority: Medium     Problem list name updated by automated process. Provider to review       Disorder of bone and cartilage 10/11/2011     Priority: Medium     dexa scans odd years starting in 2003  Problem list name updated by automated process. Provider to review       Squamous Cell Carcinoma 10/11/2011     Priority: Medium     left leg x2       GERD (gastroesophageal reflux disease)[530.81] 05/11/2003     Priority: Medium       10 point ROS of systems including Constitutional, Eyes, Respiratory, Cardiovascular, Gastroenterology, Genitourinary, Integumentary, Muscularskeletal, Psychiatric were all negative except for pertinent positives noted in my HPI.    Current Outpatient Medications   Medication Sig Dispense Refill     ASPIRIN CAPS 81 MG OR one capsule by mouth daily 100 3     atorvastatin (LIPITOR) 40 MG tablet Take 1 tablet (40 mg) by mouth At Bedtime 90 tablet 0     Calcium Carbonate-Vitamin D (CALCIUM + D PO)        Cyanocobalamin (VITAMIN B 12 PO) Injection [id number]       fluorouracil (EFUDEX) 5 % external cream APPLY TOPICALLY TO AFFECTED AREA 2 TIMES DAILY. WAIT 3-4 WEEKS FOR BIOPSY SITE TO HEAL THEN APPLY TOPICALLY TO CHEST 2 TIMES DAILY. (Patient not taking: Reported on 9/26/2022)       losartan (COZAAR) 50 MG tablet Take 1 tablet (50 mg) by mouth daily 90 tablet 3     multivitamin w/minerals (THERA-VIT-M) tablet Take 1 tablet by mouth daily       omeprazole (PRILOSEC) 40 MG DR capsule TAKE 1 CAPSULE BY MOUTH DAILY BY MOUTH 90 capsule 3      pramipexole (MIRAPEX) 0.25 MG tablet Take 1-2 tablets (0.25-0.5 mg) by mouth At Bedtime 90 tablet 1     Vitamin D, Cholecalciferol, 25 MCG (1000 UT) TABS          OBJECTIVE:  There were no vitals taken for this visit.    Physical Exam:  healthy, alert and no distress  PSYCH: Alert and oriented times 3; coherent speech, normal   rate and volume, able to articulate logical thoughts, able   to abstract reason, no tangential thoughts, no hallucinations   or delusions  His affect is normal  RESP: No cough, no audible wheezing, able to talk in full sentences  Remainder of exam unable to be completed due to telephone visits    ---  This note was written with the assistance of the Dragon voice-dictation technology software. The final document, although reviewed, may contain errors. For corrections, please contact the office.    Wesley Garcia MD    Sleep Medicine    [place], [place]  o Main Office: [phone]    [facility] [place], [place] ([phone])  o Schedule visits: [phone]  o Main Office: [phone]    Time spent on the date of service:  25 minutes.

## 2022-10-04 ENCOUNTER — VIRTUAL VISIT (OUTPATIENT)
Dept: PULMONOLOGY | Facility: OTHER | Age: 77
End: 2022-10-04
Attending: FAMILY MEDICINE
Payer: COMMERCIAL

## 2022-10-04 VITALS
BODY MASS INDEX: 20.91 KG/M2 | DIASTOLIC BLOOD PRESSURE: 60 MMHG | WEIGHT: 118 LBS | HEIGHT: 63 IN | SYSTOLIC BLOOD PRESSURE: 124 MMHG

## 2022-10-04 DIAGNOSIS — R06.81 APNEA: ICD-10-CM

## 2022-10-04 DIAGNOSIS — R06.83 SNORING: Primary | ICD-10-CM

## 2022-10-04 DIAGNOSIS — G25.81 RESTLESS LEGS SYNDROME (RLS): ICD-10-CM

## 2022-10-04 DIAGNOSIS — I21.4 NSTEMI (NON-ST ELEVATED MYOCARDIAL INFARCTION) (H): ICD-10-CM

## 2022-10-04 PROCEDURE — 99443 PR PHYSICIAN TELEPHONE EVALUATION 21-30 MIN: CPT | Mod: TEL | Performed by: FAMILY MEDICINE

## 2022-10-04 ASSESSMENT — SLEEP AND FATIGUE QUESTIONNAIRES
HOW LIKELY ARE YOU TO NOD OFF OR FALL ASLEEP IN A CAR, WHILE STOPPED FOR A FEW MINUTES IN TRAFFIC: WOULD NEVER DOZE
HOW LIKELY ARE YOU TO NOD OFF OR FALL ASLEEP WHILE SITTING AND READING: HIGH CHANCE OF DOZING
HOW LIKELY ARE YOU TO NOD OFF OR FALL ASLEEP WHILE SITTING AND TALKING TO SOMEONE: WOULD NEVER DOZE
HOW LIKELY ARE YOU TO NOD OFF OR FALL ASLEEP WHEN YOU ARE A PASSENGER IN A CAR FOR AN HOUR WITHOUT A BREAK: WOULD NEVER DOZE
HOW LIKELY ARE YOU TO NOD OFF OR FALL ASLEEP WHILE WATCHING TV: HIGH CHANCE OF DOZING
HOW LIKELY ARE YOU TO NOD OFF OR FALL ASLEEP WHILE SITTING QUIETLY AFTER LUNCH WITHOUT ALCOHOL: WOULD NEVER DOZE
HOW LIKELY ARE YOU TO NOD OFF OR FALL ASLEEP WHILE SITTING INACTIVE IN A PUBLIC PLACE: SLIGHT CHANCE OF DOZING
HOW LIKELY ARE YOU TO NOD OFF OR FALL ASLEEP WHILE LYING DOWN TO REST IN THE AFTERNOON WHEN CIRCUMSTANCES PERMIT: SLIGHT CHANCE OF DOZING

## 2022-10-04 ASSESSMENT — PAIN SCALES - GENERAL: PAINLEVEL: MILD PAIN (2)

## 2022-10-05 ENCOUNTER — HOSPITAL ENCOUNTER (OUTPATIENT)
Dept: CT IMAGING | Facility: HOSPITAL | Age: 77
Discharge: HOME | End: 2022-10-05
Attending: INTERNAL MEDICINE
Payer: COMMERCIAL

## 2022-10-05 ENCOUNTER — LAB (OUTPATIENT)
Dept: LAB | Facility: OTHER | Age: 77
End: 2022-10-05
Attending: INTERNAL MEDICINE
Payer: COMMERCIAL

## 2022-10-05 ENCOUNTER — HOSPITAL ENCOUNTER (OUTPATIENT)
Dept: MRI IMAGING | Facility: HOSPITAL | Age: 77
Discharge: HOME | End: 2022-10-05
Attending: INTERNAL MEDICINE
Payer: COMMERCIAL

## 2022-10-05 DIAGNOSIS — D64.9 ANEMIA, UNSPECIFIED TYPE: ICD-10-CM

## 2022-10-05 DIAGNOSIS — R53.83 FATIGUE, UNSPECIFIED TYPE: ICD-10-CM

## 2022-10-05 DIAGNOSIS — R41.82 ALTERED MENTAL STATUS: ICD-10-CM

## 2022-10-05 DIAGNOSIS — D64.9 ANEMIA, UNSPECIFIED: ICD-10-CM

## 2022-10-05 DIAGNOSIS — R06.02 SOB (SHORTNESS OF BREATH): ICD-10-CM

## 2022-10-05 DIAGNOSIS — R61 NIGHT SWEATS: ICD-10-CM

## 2022-10-05 DIAGNOSIS — R10.13 ABDOMINAL PAIN, EPIGASTRIC: ICD-10-CM

## 2022-10-05 LAB
HOLD SPECIMEN: NORMAL
LAB DIRECTOR COMMENTS: NORMAL
LAB DIRECTOR DISCLAIMER: NORMAL
LAB DIRECTOR INTERPRETATION: NORMAL
LAB DIRECTOR METHODOLOGY: NORMAL
LAB DIRECTOR RESULTS: NORMAL
SPECIMEN DESCRIPTION: NORMAL

## 2022-10-05 PROCEDURE — 250N000011 HC RX IP 250 OP 636: Performed by: RADIOLOGY

## 2022-10-05 PROCEDURE — 74177 CT ABD & PELVIS W/CONTRAST: CPT

## 2022-10-05 PROCEDURE — A9585 GADOBUTROL INJECTION: HCPCS | Performed by: RADIOLOGY

## 2022-10-05 PROCEDURE — 81256 HFE GENE: CPT | Mod: ZL

## 2022-10-05 PROCEDURE — 70553 MRI BRAIN STEM W/O & W/DYE: CPT

## 2022-10-05 PROCEDURE — 255N000002 HC RX 255 OP 636: Performed by: RADIOLOGY

## 2022-10-05 PROCEDURE — 70491 CT SOFT TISSUE NECK W/DYE: CPT

## 2022-10-05 PROCEDURE — 36415 COLL VENOUS BLD VENIPUNCTURE: CPT | Mod: ZL

## 2022-10-05 RX ORDER — IOPAMIDOL 755 MG/ML
17 INJECTION, SOLUTION INTRAVASCULAR ONCE
Status: COMPLETED | OUTPATIENT
Start: 2022-10-05 | End: 2022-10-05

## 2022-10-05 RX ORDER — GADOBUTROL 604.72 MG/ML
2 INJECTION INTRAVENOUS ONCE
Status: COMPLETED | OUTPATIENT
Start: 2022-10-05 | End: 2022-10-05

## 2022-10-05 RX ORDER — IOPAMIDOL 755 MG/ML
120 INJECTION, SOLUTION INTRAVASCULAR ONCE
Status: COMPLETED | OUTPATIENT
Start: 2022-10-05 | End: 2022-10-05

## 2022-10-05 RX ADMIN — GADOBUTROL 2 ML: 604.72 INJECTION INTRAVENOUS at 11:15

## 2022-10-05 RX ADMIN — IOPAMIDOL 17 ML: 755 INJECTION, SOLUTION INTRAVENOUS at 12:53

## 2022-10-05 RX ADMIN — GADOBUTROL 2 ML: 604.72 INJECTION INTRAVENOUS at 11:14

## 2022-10-05 RX ADMIN — IOPAMIDOL 120 ML: 755 INJECTION, SOLUTION INTRAVENOUS at 12:54

## 2022-10-13 PROCEDURE — G0452 MOLECULAR PATHOLOGY INTERPR: HCPCS | Mod: 26 | Performed by: PATHOLOGY

## 2022-10-17 ENCOUNTER — ONCOLOGY VISIT (OUTPATIENT)
Dept: ONCOLOGY | Facility: OTHER | Age: 77
End: 2022-10-17
Attending: INTERNAL MEDICINE
Payer: COMMERCIAL

## 2022-10-17 VITALS
HEART RATE: 81 BPM | DIASTOLIC BLOOD PRESSURE: 70 MMHG | HEIGHT: 63 IN | BODY MASS INDEX: 20.9 KG/M2 | OXYGEN SATURATION: 100 % | WEIGHT: 117.95 LBS | TEMPERATURE: 96.8 F | SYSTOLIC BLOOD PRESSURE: 118 MMHG | RESPIRATION RATE: 18 BRPM

## 2022-10-17 DIAGNOSIS — D47.2 MONOCLONAL GAMMOPATHY: ICD-10-CM

## 2022-10-17 DIAGNOSIS — R91.1 LUNG NODULE, SOLITARY: Primary | ICD-10-CM

## 2022-10-17 PROCEDURE — 99215 OFFICE O/P EST HI 40 MIN: CPT | Performed by: INTERNAL MEDICINE

## 2022-10-17 PROCEDURE — 99417 PROLNG OP E/M EACH 15 MIN: CPT | Performed by: INTERNAL MEDICINE

## 2022-10-17 PROCEDURE — G0463 HOSPITAL OUTPT CLINIC VISIT: HCPCS

## 2022-10-17 ASSESSMENT — PAIN SCALES - GENERAL: PAINLEVEL: NO PAIN (0)

## 2022-10-17 NOTE — PATIENT INSTRUCTIONS
We will order a Thyroid Ultrasound, Pet Scan and Bone Marrow Biopsy and will need a prop before bone marrow biopsy then have you follow up with DR Silverman for all results      If you have any questions please call [phone]

## 2022-10-17 NOTE — PROGRESS NOTES
Visit Date: 10/17/2022    HISTORY OF PRESENT ILLNESS:  Mrs. Aburto returns for followup of anemia, elevated ferritin.  We had seen the patient in consultation at the request of Dr. Luz Alcala on 09/26/2022.  At that time, Mrs. Aburto was a 77-year-old white female with history of coronary artery disease, Bowden's esophagus, hypertension, we were asked to evaluate concerning diagnosis of anemia in the setting of elevated ferritin.  The patient most recently had been evaluated in the Emergency Room for hypertension.  She was seen by Dr. Alcala who increased her losartan dose.  As part of the workup, labs were drawn.  Her CBC revealed a white count of 6.7, H and H 10.3 and 29.6, MCV 91, platelet count 206.  Peripheral blood smear was ordered and revealed the patient had likely anemia of chronic disease.  Iron studies were obtained and ferritin was elevated at 440.  TSH was 2.51.  Vitamin B12 was 800.  Sed rate was elevated at 34.  SERAFNI was negative.  Her major complaint was related to the fact that she had increasing fatigue throughout the day.  She also had dyspnea on exertion.  She did have heartburn symptoms.  Omeprazole was not helping.  She was diagnosed with Bowden's esophagus without dysplasia after EGD performed by Dr. Panfilo Arcos approximately a year ago.  There is no family history of hematologic malignancy.  She was nonsmoker, nondrinker.  She also described lightheadedness when she stands up at times.  She has to lie down to help alleviate her symptoms.  She said she had COVID-19 back in 06/2021.  She also had a history of basal cell carcinoma, as well as squamous cell carcinoma of skin and had a Mohs procedure done at [facility] with basal cell carcinoma of the temple.  She is currently on Efudex cream.  When we saw the patient, there was no evidence of iron overload.  Nonetheless, we wanted to rule out hemochromatosis by obtaining hemochromatosis DNA mutation.  This was performed and was read as  the patient having no evidence of abnormal genes suggesting hemochromatosis.  C282Y mutation was normal.  H63D mutation was normal.  She was heterozygous for the S65C gene, but this is not usually associated with iron overload or hemochromatosis unless there is a concomitant C282Y mutation.  Given her elevated ferritin, we wanted to rule out occult malignancy by obtaining CT neck, which was read as multinodular thyroid nodule measuring less than 1.5 cm.  There was a infrahyoid subsegmental focus measuring 11 x 6 x 5 mm.  CT chest, abdomen and pelvis revealed lung nodules bilaterally with the largest being 7 mm in the right upper lobe.  She also had an MRI of the brain, which was read as normal brain for age.  We also wanted to rule out underlying monoclonal paraproteinemia and this came back positive.  The patient had elevated kappa free light chains with a kappa lambda ratio of 2.00, which was elevated.  Beta 2 microglobulin was also elevated at 3.4.  The patient states she still has ongoing fatigue.  She says after climbing the stairs, she has to take a break and she gets this sort of spastic neck pain.  She has seen a sleep specialist and there are plans for potential sleep study as she has a history of snoring.  She denies any tobacco exposure.  She may have been exposed to asbestos as a child.  She said she also was exposed to DDT as [place].  Otherwise, no bright red blood per rectum, hematemesis, heartburn.    PHYSICAL EXAMINATION:    GENERAL:  She is a frail, elderly white female in no acute distress.  ECOG performance status is 1.  VITAL SIGNS:  Reveal blood pressure 118/70, pulse 71, respirations 18, temperature 96.8.  HEENT:  Atraumatic, normocephalic.  Oropharynx nonerythematous.  NECK:  Supple, no thyromegaly.  LUNGS:  Clear to auscultation and percussion.  HEART:  Regular rhythm.  S1, S2 normal.  ABDOMEN:  Soft, normoactive bowel sounds, no masses, nontender.  LYMPHATICS:  No cervical,  supraclavicular, axillary or inguinal nodes.  EXTREMITIES:  No edema.  NEUROLOGIC:  Nonfocal.    LABORATORY DATA:  As above.  Also, sed rate was 25.  Ferritin was 426.  Serum iron was 63, percent saturation 25%.    IMPRESSION:     1.  Anemia in the setting of elevated ferritin.  No evidence of iron overload, hemochromatosis DNA mutational analysis was not consistent with hemochromatosis.  2.  Lung nodules, rule out occult malignancy.  We will obtain a PET scan to define these lung nodules.  3.  Monoclonal gammopathy with elevated kappa free light chains, rule out myeloma, rule out amyloid.  We will obtain a PET scan, also proceed with bone marrow aspiration biopsy to absolutely rule out myeloma or amyloidosis.  Otherwise, we will see the patient after the above workup.    Eighty-two minutes were spent on this patient.  Time was spent reviewing multiple physician provider notes, lab results, imaging results, performing history and physical, documenting history and physical, ordering bone marrow aspiration biopsy, PET scan and followup.    Sheldon Silverman MD        D: 10/17/2022   T: 10/17/2022   MT: [facility]    Name:     VINICIUS SORIANO  MRN:      [id number]        Account:    [id number]   :      1945           Visit Date: 10/17/2022     Document: [id number]    cc:  Luz Alcala MD

## 2022-10-17 NOTE — NURSING NOTE
"Oncology Rooming Note    October 17, 2022 11:00 AM   Maria Teresa Aburto is a 77 year old female who presents for:    Chief Complaint   Patient presents with     Hematology     Follow up. Anemia.      Initial Vitals: /70   Pulse 81   Temp 96.8  F (36  C) (Tympanic)   Resp 18   Ht 1.6 m (5' 3\")   Wt 53.5 kg (117 lb 15.1 oz)   SpO2 100%   BMI 20.89 kg/m   Estimated body mass index is 20.89 kg/m  as calculated from the following:    Height as of this encounter: 1.6 m (5' 3\").    Weight as of this encounter: 53.5 kg (117 lb 15.1 oz). Body surface area is 1.54 meters squared.  No Pain (0) Comment: Data Unavailable   No LMP recorded. Patient is postmenopausal.  Allergies reviewed: Yes  Medications reviewed: Yes    Medications: Medication refills not needed today.  Pharmacy name entered into [employer]: [id number] - [id number], [place] - [id number]              "

## 2022-10-20 ENCOUNTER — TELEPHONE (OUTPATIENT)
Dept: ONCOLOGY | Facility: OTHER | Age: 77
End: 2022-10-20

## 2022-10-20 ENCOUNTER — TELEPHONE (OUTPATIENT)
Dept: FAMILY MEDICINE | Facility: OTHER | Age: 77
End: 2022-10-20

## 2022-10-20 ENCOUNTER — HOSPITAL ENCOUNTER (OUTPATIENT)
Dept: ULTRASOUND IMAGING | Facility: HOSPITAL | Age: 77
Discharge: HOME | End: 2022-10-20
Attending: INTERNAL MEDICINE | Admitting: INTERNAL MEDICINE
Payer: COMMERCIAL

## 2022-10-20 DIAGNOSIS — R91.1 LUNG NODULE, SOLITARY: ICD-10-CM

## 2022-10-20 DIAGNOSIS — D47.2 MONOCLONAL GAMMOPATHY: ICD-10-CM

## 2022-10-20 PROCEDURE — 76536 US EXAM OF HEAD AND NECK: CPT

## 2022-10-20 NOTE — TELEPHONE ENCOUNTER
RENÉM to schedule covid test 1-2 days prior to DOS or take at home test 1-2 days prior and take picture and bring to appointment      covid test / [facility] / DOS 10-31-22 / [place]

## 2022-10-20 NOTE — TELEPHONE ENCOUNTER
1:12 PM    Reason for Call: OVERBOOK    Patient is having the following symptoms: Preop 10/31/2022 / biopsy / [facility] / [place]     The patient is requesting an appointment for anytime before procedure on 10/31/2022 with Dr Alcala.    Was an appointment offered for this call? Yes I offered to schedule with other providers but patient declined     Preferred method for responding to this message: Telephone Call  What is your phone number ?[phone]    If we cannot reach you directly, may we leave a detailed response at the number you provided? Yes    Can this message wait until your PCP/provider returns, if unavailable today? YES    Gi Sommer

## 2022-10-24 ENCOUNTER — ANESTHESIA EVENT (OUTPATIENT)
Dept: SURGERY | Facility: HOSPITAL | Age: 77
End: 2022-10-24
Payer: COMMERCIAL

## 2022-10-24 DIAGNOSIS — D64.9 ANEMIA, UNSPECIFIED TYPE: Primary | ICD-10-CM

## 2022-10-24 RX ORDER — LIDOCAINE HYDROCHLORIDE 10 MG/ML
8-10 INJECTION, SOLUTION EPIDURAL; INFILTRATION; INTRACAUDAL; PERINEURAL
Status: CANCELLED | OUTPATIENT
Start: 2022-10-24

## 2022-10-24 RX ORDER — LIDOCAINE 40 MG/G
CREAM TOPICAL
Status: CANCELLED | OUTPATIENT
Start: 2022-10-24

## 2022-10-24 ASSESSMENT — LIFESTYLE VARIABLES: TOBACCO_USE: 1

## 2022-10-24 NOTE — H&P (VIEW-ONLY)
[facility] [place]  [address]  [facility] [place] [address]  Phone: [phone]  Primary Provider: Luz Alcala  Pre-op Performing Provider: LOVE HORTON    PREOPERATIVE EVALUATION:  Today's date: 10/25/2022    Maria Teresa Aburto is a 77 year old female who presents for a preoperative evaluation.    Surgical Information:  Surgery/Procedure: Bone marrow biopsy with aspiration  Surgery Location: [facility]  Surgeon: Dr. Recinos  Surgery Date: 10/31  Time of Surgery: unknown  Where patient plans to recover: At home with family  Fax number for surgical facility:     Type of Anesthesia Anticipated: to be determined    Assessment & Plan     The proposed surgical procedure is considered LOW risk.    Preop general physical exam/Anemia, unspecified type  Approval given to proceed with bone marrow biopsy. Scheduled for covid test on 10/28. Medication instructions reviewed today. Labs obtained. Hgb stable. Hyponatremia noted.   - CBC with platelets and differential  - Comprehensive metabolic panel (BMP + Alb, Alk Phos, ALT, AST, Total. Bili, TP)  - EKG 12-lead complete w/read - (Clinic Performed)    Facial Rash  Onset Saturday. Unclear etiology. Discussed OTC hydrocortisone. Monitor at this time. Possible malar rash, however spares nose. Discussed to follow-up in 1-2 weeks if not improving.     Bowden's esophagus without dysplasia  Stable. Continue PPI.     MERCEDES  Stable, without any recent worsening. Echo with normal EF. Normal angiogram in 2020. Order in place to obtain PFT.     Restless legs syndrome (RLS)  Improved with mirapex.     Takotsubo cardiomyopathy  Recent echo on 09/2022- EF 55-60%.     Hyperlipidemia LDL goal <70  Continues on    Benign essential hypertension  Controlled.           Risks and Recommendations:  The patient has the following additional risks and recommendations for perioperative complications:   - No identified additional risk factors other than previously addressed    Medication  Instructions:   - aspirin: Bleeding risk is low for this procedure and daily aspirin may be continued without modification (per pathologist).    - ACE/ARB: May be continued on the day of surgery.    - Statins: Continue taking on the day of surgery.      RECOMMENDATION:  APPROVAL GIVEN to proceed with proposed procedure, without further diagnostic evaluation.      30 minutes spent on the date of the encounter doing chart review, history and exam, documentation and further activities per the note        Subjective     HPI related to upcoming procedure: M.S. is a 77-year-old female presenting for pre-operative exam for bone marrow biopsy due to ongoing anemia.       Preop Questions 10/25/2022   1. Have you ever had a heart attack or stroke? YES - broken heart   2. Have you ever had surgery on your heart or blood vessels, such as a stent placement, a coronary artery bypass, or surgery on an artery in your head, neck, heart, or legs? YES - angoigram   3. Do you have chest pain with activity? No   4. Do you have a history of  heart failure? No   5. Do you currently have a cold, bronchitis or symptoms of other infection? No   6. Do you have a cough, shortness of breath, or wheezing? No   7. Do you or anyone in your family have previous history of blood clots? No   8. Do you or does anyone in your family have a serious bleeding problem such as prolonged bleeding following surgeries or cuts? No   9. Have you ever had problems with anemia or been told to take iron pills? YES - anemia   10. Have you had any abnormal blood loss such as black, tarry or bloody stools, or abnormal vaginal bleeding? No   11. Have you ever had a blood transfusion? No   12. Are you willing to have a blood transfusion if it is medically needed before, during, or after your surgery? Yes   13. Have you or any of your relatives ever had problems with anesthesia? No   14. Do you have sleep apnea, excessive snoring or daytime drowsiness? No   15. Do you  have any artifical heart valves or other implanted medical devices like a pacemaker, defibrillator, or continuous glucose monitor? No   16. Do you have artificial joints? No   17. Are you allergic to latex? No     Health Care Directive:  Patient does not have a Health Care Directive or Living Will: Discussed advance care planning with patient; however, patient declined at this time.    Preoperative Review of :   reviewed - no record of controlled substances prescribed.      Status of Chronic Conditions:  ANEMIA - Patient has a recent history of moderate-severe anemia, which has been symptomatic. Work up to date has revealed elevated ferritin, no hemochromatosis. CT neck, chest, abdomen, pelvis completed. MRI brain completed.      HYPERLIPIDEMIA - Patient has a long history of significant Hyperlipidemia requiring medication for treatment with recent good control. Patient reports no problems or side effects with the medication.     HYPERTENSION - Patient has longstanding history of HTN , currently denies any symptoms referable to elevated blood pressure. Specifically denies chest pain, palpitations, dyspnea, orthopnea, PND or peripheral edema. Blood pressure readings have been in normal range. Current medication regimen is as listed below. Patient denies any side effects of medication.       Review of Systems  CONSTITUTIONAL: NEGATIVE for fever, chills, change in weight  INTEGUMENTARY/SKIN: POSITIVE for rash. NEGATIVE for moles or lesions  EYES: NEGATIVE for vision changes or irritation  ENT/MOUTH: NEGATIVE for ear, mouth and throat problems  RESP: NEGATIVE for significant cough or SOB  CV: NEGATIVE for chest pain, palpitations or peripheral edema  GI: NEGATIVE for nausea, abdominal pain, heartburn, or change in bowel habits  : NEGATIVE for frequency, dysuria, or hematuria  MUSCULOSKELETAL: NEGATIVE for significant arthralgias or myalgia  NEURO: NEGATIVE for weakness, dizziness or paresthesias  ENDOCRINE:  NEGATIVE for temperature intolerance, skin/hair changes  HEME: NEGATIVE for bleeding problems  PSYCHIATRIC: NEGATIVE for changes in mood or affect    Patient Active Problem List    Diagnosis Date Noted     Hyperlipidemia LDL goal <70 05/04/2021     Priority: Medium     ACE-inhibitor cough 11/03/2020     Priority: Medium     NSTEMI (non-ST elevated myocardial infarction) (H) 09/28/2020     Priority: Medium     With Takotsubo cardiomyopathy  Cardiac cath, small lesion of diagonal not requiring stenting  Dr Crystal Sandoval Vakil       Takotsubo cardiomyopathy 09/22/2020     Priority: Medium     Episode of high BP noted on exam with no other symptoms  Elevated troponin  Cardiac cath, small lesion of  small diagonal, not requiring stenting  Crystal Sandoval, cardiology       S/P right rotator cuff repair 09/16/2019     Priority: Medium     Vitamin B12 deficiency (non anemic) 09/16/2019     Priority: Medium     Nontraumatic incomplete tear of right rotator cuff 07/31/2019     Priority: Medium     Added automatically from request for surgery [id number]       Nontraumatic complete tear of right rotator cuff 07/31/2019     Priority: Medium     Added automatically from request for surgery [id number]       Actinic keratosis 07/17/2012     Priority: Medium     History of malignant neoplasm of skin 07/17/2012     Priority: Medium     Atypical nevus 07/17/2012     Priority: Medium     History of nonmelanoma skin cancer 07/17/2012     Priority: Medium     Overview:   SCC right leg 2001       Medial meniscus tear 02/13/2012     Priority: Medium     Overview:   IMO Update 10/11       Restless legs syndrome (RLS) 10/11/2011     Priority: Medium     Bowden's esophagus 10/11/2011     Priority: Medium     B-complex deficiency 10/11/2011     Priority: Medium     Problem list name updated by automated process. Provider to review       Disorder of bone and cartilage 10/11/2011     Priority: Medium     dexa scans odd years starting in  2003  Problem list name updated by automated process. Provider to review       Squamous Cell Carcinoma 10/11/2011     Priority: Medium     left leg x2       GERD (gastroesophageal reflux disease)[530.81] 05/11/2003     Priority: Medium      Past Medical History:   Diagnosis Date     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     Bowden's esophagus 10/11/2011     Constipation 10/09/2012     GERD (gastroesophageal reflux disease)[530.81] 05/11/2003     Hypertension      NSTEMI (non-ST elevated myocardial infarction) (H) 09/28/2020    With Takotsubo cardiomyopathy Cardiac cath, small lesion of diagonal not requiring stenting Dr Crystal Sandoval Vakil     Osteopenia 10/11/2011    dexa scans odd years starting in 2003     Precordial pain 04/28/2003    negative stress test, negative pulmonary evaluatio     Restless legs syndrome (RLS) 10/11/2011     Squamous Cell Carcinoma 10/11/2011    left leg x2     Takotsubo cardiomyopathy 09/22/2020    Episode of high BP noted on exam with no other symptoms Elevated troponin Cardiac cath, small lesion of  small diagonal, not requiring stenting Crystal Sandoval, cardiology     Urge incontinence 10/09/2012     Past Surgical History:   Procedure Laterality Date     Breast Biospy  11/11/2000    LEFT > Fibrocystic Disease > Outcome: Fibroadenoma     COLONOSCOPY  2000     COLONOSCOPY  8-7-2009    Normal, Repeat 2015     DEXA Scan  2009     EGD       EGD  2008     EGD  2003     ENDOSCOPY UPPER, COLONOSCOPY, COMBINED N/A 9/6/2018    Procedure: COMBINED ENDOSCOPY UPPER, COLONOSCOPY;  UPPER ENDOSCOPY WITH BIOPSIES AND COLONOSCOPY WITH BIOPSIES;  Surgeon: Minh Interiano DO;  Location: [facility]     ESOPHAGOSCOPY, GASTROSCOPY, DUODENOSCOPY (EGD), COMBINED N/A 10/29/2021    Procedure: Upper endoscopy with biopsies;  Surgeon: Panfilo Arcos MD;  Location: [facility]     Knee Replacement  2012     Oophorectomy      Ectopic Pregnancy     ROTATOR CUFF REPAIR RT/LT Right     with acromioplasty for  "complete rotator cuff tear, Dr Cherry     TONSILLECTOMY       Current Outpatient Medications   Medication Sig Dispense Refill     ASPIRIN CAPS 81 MG OR one capsule by mouth daily 100 3     atorvastatin (LIPITOR) 40 MG tablet Take 1 tablet (40 mg) by mouth At Bedtime 90 tablet 0     Calcium Carbonate-Vitamin D (CALCIUM + D PO)        Cyanocobalamin (VITAMIN B 12 PO) Injection w42mgdd       fluorouracil (EFUDEX) 5 % external cream        losartan (COZAAR) 50 MG tablet Take 1 tablet (50 mg) by mouth daily 90 tablet 3     multivitamin w/minerals (THERA-VIT-M) tablet Take 1 tablet by mouth daily       omeprazole (PRILOSEC) 40 MG DR capsule TAKE 1 CAPSULE BY MOUTH DAILY BY MOUTH 90 capsule 3     pramipexole (MIRAPEX) 0.25 MG tablet Take 1-2 tablets (0.25-0.5 mg) by mouth At Bedtime 90 tablet 1     Vitamin D, Cholecalciferol, 25 MCG (1000 UT) TABS          Allergies   Allergen Reactions     Nitrofurantoin Other (See Comments) and Nausea     Macrobid  \"Chills and Fevers\"     Nitrofurantoin Macrocrystal Other (See Comments) and Nausea     Macrobid  \"Chills and Fevers\"          Social History     Tobacco Use     Smoking status: Former     Packs/day: 1.00     Years: 4.00     Pack years: 4.00     Types: Cigarettes     Start date:      Quit date:      Years since quittin.8     Smokeless tobacco: Never   Substance Use Topics     Alcohol use: Yes     Family History   Problem Relation Age of Onset     Uterine Cancer Mother      Lung Cancer Mother      Osteoarthritis Mother      Thyroid Disease Father      Coronary Artery Disease Father      Thyroid Disease Brother      Hyperlipidemia Son      Coronary Artery Disease Son      Heart Surgery Son      Thyroid Disease Son      Thyroid Disease Daughter      Endometrial Cancer Other         Family Hx     Osteoporosis Other         Family Hx     Parkinsonism Other         Family Hx     Multiple Sclerosis Maternal Aunt      Anuerysm Paternal Aunt      Unknown/Adopted No family " "hx of      Hypertension No family hx of      Breast Cancer No family hx of      Cerebrovascular Disease No family hx of      Colon Cancer No family hx of      Prostate Cancer No family hx of      Anesthesia Reaction No family hx of      Asthma No family hx of      Genetic Disorder No family hx of      History   Drug Use Unknown         Objective     /62   Pulse 69   Temp 97.6  F (36.4  C)   Ht 1.6 m (5' 3\")   Wt 53.5 kg (118 lb)   SpO2 100%   BMI 20.90 kg/m      Physical Exam    GENERAL APPEARANCE: healthy, alert and no distress     EYES: EOMI, PERRL     HENT: ear canals and TM's normal and nose and mouth without ulcers or lesions     NECK: no adenopathy, no asymmetry, masses, or scars and thyroid normal to palpation     RESP: lungs clear to auscultation - no rales, rhonchi or wheezes     CV: regular rates and rhythm, normal S1 S2, no S3 or S4 and no murmur, click or rub     ABDOMEN:  soft, nontender, no HSM or masses and bowel sounds normal     MS: extremities normal- no gross deformities noted, no evidence of inflammation in joints, FROM in all extremities.     SKIN: Flat, erythematous rash noted to bilateral cheeks, spares nose.      NEURO: Normal strength and tone, sensory exam grossly normal, mentation intact and speech normal     PSYCH: mentation appears normal. and affect normal/bright     LYMPHATICS: No cervical adenopathy    Recent Labs   Lab Test 09/26/22  1523 09/12/22  1753   HGB 11.4* 10.1*    206   * 135   POTASSIUM 4.5 3.3*   CR 1.33* 1.21*        Diagnostics:  Recent Results (from the past 24 hour(s))   Comprehensive metabolic panel (BMP + Alb, Alk Phos, ALT, AST, Total. Bili, TP)    Collection Time: 10/25/22  1:32 PM   Result Value Ref Range    Sodium 126 (L) 136 - 145 mmol/L    Potassium 4.3 3.4 - 5.3 mmol/L    Chloride 94 (L) 98 - 107 mmol/L    Carbon Dioxide (CO2) 24 22 - 29 mmol/L    Anion Gap 8 7 - 15 mmol/L    Urea Nitrogen 24.1 (H) 8.0 - 23.0 mg/dL    Creatinine 1.05 " (H) 0.51 - 0.95 mg/dL    Calcium 9.0 8.8 - 10.2 mg/dL    Glucose 90 70 - 99 mg/dL    Alkaline Phosphatase 120 (H) 35 - 104 U/L    AST 43 (H) 10 - 35 U/L    ALT 42 (H) 10 - 35 U/L    Protein Total 7.1 6.4 - 8.3 g/dL    Albumin 4.0 3.5 - 5.2 g/dL    Bilirubin Total 0.2 <=1.2 mg/dL    GFR Estimate 54 (L) >60 mL/min/1.73m2   CBC with platelets and differential    Collection Time: 10/25/22  1:32 PM   Result Value Ref Range    WBC Count 7.6 4.0 - 11.0 10e3/uL    RBC Count 3.52 (L) 3.80 - 5.20 10e6/uL    Hemoglobin 11.3 (L) 11.7 - 15.7 g/dL    Hematocrit 32.2 (L) 35.0 - 47.0 %    MCV 92 78 - 100 fL    MCH 32.1 26.5 - 33.0 pg    MCHC 35.1 31.5 - 36.5 g/dL    RDW 11.3 10.0 - 15.0 %    Platelet Count 219 150 - 450 10e3/uL    % Neutrophils 64 %    % Lymphocytes 17 %    % Monocytes 13 %    % Eosinophils 5 %    % Basophils 1 %    % Immature Granulocytes 0 %    NRBCs per 100 WBC 0 <1 /100    Absolute Neutrophils 4.9 1.6 - 8.3 10e3/uL    Absolute Lymphocytes 1.3 0.8 - 5.3 10e3/uL    Absolute Monocytes 1.0 0.0 - 1.3 10e3/uL    Absolute Eosinophils 0.4 0.0 - 0.7 10e3/uL    Absolute Basophils 0.1 0.0 - 0.2 10e3/uL    Absolute Immature Granulocytes 0.0 <=0.4 10e3/uL    Absolute NRBCs 0.0 10e3/uL      EKG: Normal Sinus Rhythm, unchanged from previous tracings    Revised Cardiac Risk Index (RCRI):  The patient has the following serious cardiovascular risks for perioperative complications:   - No serious cardiac risks = 0 points     RCRI Interpretation: 0 points: Class I (very low risk - 0.4% complication rate)           Signed Electronically by: ROXANN Byrd CNP  Copy of this evaluation report is provided to requesting physician.

## 2022-10-24 NOTE — ANESTHESIA PREPROCEDURE EVALUATION
Anesthesia Pre-Procedure Evaluation    Patient: Maria Teresa Aburto   MRN: [id number] : 1945        Procedure : Procedure(s):  BIOPSY, BONE MARROW WITH ASPIRATION          Past Medical History:   Diagnosis Date     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     Bowden's esophagus 10/11/2011     Constipation 10/09/2012     GERD (gastroesophageal reflux disease)[530.81] 2003     Hypertension      NSTEMI (non-ST elevated myocardial infarction) (H) 2020    With Takotsubo cardiomyopathy Cardiac cath, small lesion of diagonal not requiring stenting Dr Crystal Sandoval Vakil     Osteopenia 10/11/2011    dexa scans odd years starting in      Precordial pain 2003    negative stress test, negative pulmonary evaluatio     Restless legs syndrome (RLS) 10/11/2011     Squamous Cell Carcinoma 10/11/2011    left leg x2     Takotsubo cardiomyopathy 2020    Episode of high BP noted on exam with no other symptoms Elevated troponin Cardiac cath, small lesion of  small diagonal, not requiring stenting Crystal Sandoval, cardiology     Urge incontinence 10/09/2012      Past Surgical History:   Procedure Laterality Date     Breast Biospy  2000    LEFT > Fibrocystic Disease > Outcome: Fibroadenoma     COLONOSCOPY       COLONOSCOPY  2009    Normal, Repeat      DEXA Scan       EGD       EGD       EGD       ENDOSCOPY UPPER, COLONOSCOPY, COMBINED N/A 2018    Procedure: COMBINED ENDOSCOPY UPPER, COLONOSCOPY;  UPPER ENDOSCOPY WITH BIOPSIES AND COLONOSCOPY WITH BIOPSIES;  Surgeon: Minh Interiano DO;  Location: [facility]     ESOPHAGOSCOPY, GASTROSCOPY, DUODENOSCOPY (EGD), COMBINED N/A 10/29/2021    Procedure: Upper endoscopy with biopsies;  Surgeon: Panfilo Arcos MD;  Location: [facility]     Knee Replacement       Oophorectomy      Ectopic Pregnancy     ROTATOR CUFF REPAIR RT/LT Right     with acromioplasty for complete rotator cuff tear, Dr Cherry     TONSILLECTOMY       "  Allergies   Allergen Reactions     Nitrofurantoin Other (See Comments) and Nausea     Macrobid  \"Chills and Fevers\"     Nitrofurantoin Macrocrystal Other (See Comments) and Nausea     Macrobid  \"Chills and Fevers\"        Social History     Tobacco Use     Smoking status: Former     Packs/day: 1.00     Years: 4.00     Pack years: 4.00     Types: Cigarettes     Start date:      Quit date:      Years since quittin.8     Smokeless tobacco: Never   Substance Use Topics     Alcohol use: Yes      Wt Readings from Last 1 Encounters:   10/17/22 53.5 kg (117 lb 15.1 oz)        Anesthesia Evaluation   Pt has had prior anesthetic. Type: MAC and General.    No history of anesthetic complications       ROS/MED HX  ENT/Pulmonary:     (+) MARISSA risk factors, observed stopped breathing, tobacco use, Past use,     Neurologic: Comment: RLS      Cardiovascular: Comment: Hx takotsubo cardiomyopathy 2020, resolved    (+) Dyslipidemia hypertension--CAD ---Previous cardiac testing   Echo: Date: 22 Results:  Global and regional left ventricular function is normal with an EF of 55-60%.  Right ventricular function, chamber size, wall motion, and thickness are  normal.  Both atria appear normal.  Pulmonary artery systolic pressure is normal.  Small PFO noted by color doppler.  The inferior vena cava is normal.  No pericardial effusion is present.  There is no prior study for direct comparison.  ______________________________________________________________________________  Left Ventricle  Global and regional left ventricular function is normal with an EF of 55-60%.  Left ventricular wall thickness is normal. Left ventricular size is normal.  Grade I or early diastolic dysfunction. No regional wall motion abnormalities  are seen.     Right Ventricle  Right ventricular function, chamber size, wall motion, and thickness are  normal.     Atria  Both atria appear normal. The atrial septum is intact as assessed by color  Doppler " .     Mitral Valve  The mitral valve is normal.     Aortic Valve  Trileaflet aortic sclerosis without stenosis. The mean AoV pressure gradient  is 4.0 mmHg. The calculated aortic valve are is 1.7 cm^2.     Tricuspid Valve  The tricuspid valve is normal. Mild tricuspid insufficiency is present. The  right ventricular systolic pressure is approximated at 23.1 mmHg plus the  right atrial pressure. Pulmonary artery systolic pressure is normal.     Pulmonic Valve  The pulmonic valve is normal.     Vessels  The thoracic aorta is normal. The pulmonary artery and bifurcation cannot be  assessed. The inferior vena cava is normal.     Pericardium  No pericardial effusion is present.  Stress Test: Date: Results:    ECG Reviewed: Date: 10/25/22 Results:  Nsr  Cath:  Date: 2020 Results:  See cardiology tab     METS/Exercise Tolerance: 4 - Raking leaves, gardening    Hematologic:     (+) anemia,     Musculoskeletal: Comment: osteopenia - neg musculoskeletal ROS     GI/Hepatic: Comment: barretts esophagus    (+) GERD, Symptomatic,     Renal/Genitourinary:  - neg Renal ROS     Endo:  - neg endo ROS     Psychiatric/Substance Use:  - neg psychiatric ROS     Infectious Disease:  - neg infectious disease ROS     Malignancy:   (+) Malignancy (SCC removed 1 month ago), History of Skin.Skin CA Remission status post Surgery.        Other:  - neg other ROS          Physical Exam    Airway        Mallampati: III   TM distance: > 3 FB   Neck ROM: full   Mouth opening: > 3 cm    Respiratory Devices and Support         Dental       (+) caps      Cardiovascular   cardiovascular exam normal          Pulmonary   pulmonary exam normal                OUTSIDE LABS:  CBC:   Lab Results   Component Value Date    WBC 9.2 09/26/2022    WBC 6.1 09/12/2022    HGB 11.4 (L) 09/26/2022    HGB 10.1 (L) 09/12/2022    HCT 32.3 (L) 09/26/2022    HCT 29.1 (L) 09/12/2022     09/26/2022     09/12/2022     BMP:   Lab Results   Component Value Date    NA  132 (L) 09/26/2022     09/12/2022    POTASSIUM 4.5 09/26/2022    POTASSIUM 3.3 (L) 09/12/2022    CHLORIDE 101 09/26/2022    CHLORIDE 104 09/12/2022    CO2 26 09/26/2022    CO2 25 09/12/2022    BUN 31 (H) 09/26/2022    BUN 25 09/12/2022    CR 1.33 (H) 09/26/2022    CR 1.21 (H) 09/12/2022    GLC 99 09/26/2022     (H) 09/12/2022     COAGS: No results found for: PTT, INR, FIBR  POC: No results found for: BGM, HCG, HCGS  HEPATIC:   Lab Results   Component Value Date    ALBUMIN 3.9 09/26/2022    PROTTOTAL 7.7 09/26/2022    ALT 51 (H) 09/26/2022    AST 39 09/26/2022    ALKPHOS 129 09/26/2022    BILITOTAL 0.3 09/26/2022     OTHER:   Lab Results   Component Value Date    ALTAGRACIA 8.8 09/26/2022    TSH 2.51 08/19/2022    CRP <2.9 09/16/2022    SED 25 09/26/2022       Anesthesia Plan    ASA Status:  3   NPO Status:  NPO Appropriate    Anesthesia Type: MAC.     - Reason for MAC: chronic cardiopulmonary disease, straight local not clinically adequate   Induction: Propofol.   Maintenance: N/A.        Consents    Anesthesia Plan(s) and associated risks, benefits, and realistic alternatives discussed. Questions answered and patient/representative(s) expressed understanding.     - Discussed: Risks, Benefits and Alternatives for BOTH SEDATION and the PROCEDURE were discussed     - Discussed with:  Patient      - Extended Intubation/Ventilatory Support Discussed: No.      - Patient is DNR/DNI Status: No    Use of blood products discussed: No .     Postoperative Care            Comments:    Other Comments: Risks and benefits of MAC anesthetic discussed including dental damage, aspiration, loss of airway, conversion to general anesthetic, CV complications, MI, stroke, death. Pt wishes to proceed. HP 10/25/22            ROXANN Aquino CRNA

## 2022-10-24 NOTE — PROGRESS NOTES
[facility] [place]  [address]  [place] [address]  Phone: [phone]  Primary Provider: Luz Alcala  Pre-op Performing Provider: LOVE HORTON    PREOPERATIVE EVALUATION:  Today's date: 10/25/2022    Maria Teresa Aburto is a 77 year old female who presents for a preoperative evaluation.    Surgical Information:  Surgery/Procedure: Bone marrow biopsy with aspiration  Surgery Location: [facility]  Surgeon: Dr. Recinos  Surgery Date: 10/31  Time of Surgery: unknown  Where patient plans to recover: At home with family  Fax number for surgical facility:     Type of Anesthesia Anticipated: to be determined    Assessment & Plan     The proposed surgical procedure is considered LOW risk.    Preop general physical exam/Anemia, unspecified type  Approval given to proceed with bone marrow biopsy. Scheduled for covid test on 10/28. Medication instructions reviewed today. Labs obtained. Hgb stable. Hyponatremia noted.   - CBC with platelets and differential  - Comprehensive metabolic panel (BMP + Alb, Alk Phos, ALT, AST, Total. Bili, TP)  - EKG 12-lead complete w/read - (Clinic Performed)    Facial Rash  Onset Saturday. Unclear etiology. Discussed OTC hydrocortisone. Monitor at this time. Possible malar rash, however spares nose. Discussed to follow-up in 1-2 weeks if not improving.     Bowden's esophagus without dysplasia  Stable. Continue PPI.     MERCEDES  Stable, without any recent worsening. Echo with normal EF. Normal angiogram in 2020. Order in place to obtain PFT.     Restless legs syndrome (RLS)  Improved with mirapex.     Takotsubo cardiomyopathy  Recent echo on 09/2022- EF 55-60%.     Hyperlipidemia LDL goal <70  Continues on    Benign essential hypertension  Controlled.           Risks and Recommendations:  The patient has the following additional risks and recommendations for perioperative complications:   - No identified additional risk factors other than previously addressed    Medication  Instructions:   - aspirin: Bleeding risk is low for this procedure and daily aspirin may be continued without modification (per pathologist).    - ACE/ARB: May be continued on the day of surgery.    - Statins: Continue taking on the day of surgery.      RECOMMENDATION:  APPROVAL GIVEN to proceed with proposed procedure, without further diagnostic evaluation.      30 minutes spent on the date of the encounter doing chart review, history and exam, documentation and further activities per the note        Subjective     HPI related to upcoming procedure: M.S. is a 77-year-old female presenting for pre-operative exam for bone marrow biopsy due to ongoing anemia.       Preop Questions 10/25/2022   1. Have you ever had a heart attack or stroke? YES - broken heart   2. Have you ever had surgery on your heart or blood vessels, such as a stent placement, a coronary artery bypass, or surgery on an artery in your head, neck, heart, or legs? YES - angoigram   3. Do you have chest pain with activity? No   4. Do you have a history of  heart failure? No   5. Do you currently have a cold, bronchitis or symptoms of other infection? No   6. Do you have a cough, shortness of breath, or wheezing? No   7. Do you or anyone in your family have previous history of blood clots? No   8. Do you or does anyone in your family have a serious bleeding problem such as prolonged bleeding following surgeries or cuts? No   9. Have you ever had problems with anemia or been told to take iron pills? YES - anemia   10. Have you had any abnormal blood loss such as black, tarry or bloody stools, or abnormal vaginal bleeding? No   11. Have you ever had a blood transfusion? No   12. Are you willing to have a blood transfusion if it is medically needed before, during, or after your surgery? Yes   13. Have you or any of your relatives ever had problems with anesthesia? No   14. Do you have sleep apnea, excessive snoring or daytime drowsiness? No   15. Do you  have any artifical heart valves or other implanted medical devices like a pacemaker, defibrillator, or continuous glucose monitor? No   16. Do you have artificial joints? No   17. Are you allergic to latex? No     Health Care Directive:  Patient does not have a Health Care Directive or Living Will: Discussed advance care planning with patient; however, patient declined at this time.    Preoperative Review of :   reviewed - no record of controlled substances prescribed.      Status of Chronic Conditions:  ANEMIA - Patient has a recent history of moderate-severe anemia, which has been symptomatic. Work up to date has revealed elevated ferritin, no hemochromatosis. CT neck, chest, abdomen, pelvis completed. MRI brain completed.      HYPERLIPIDEMIA - Patient has a long history of significant Hyperlipidemia requiring medication for treatment with recent good control. Patient reports no problems or side effects with the medication.     HYPERTENSION - Patient has longstanding history of HTN , currently denies any symptoms referable to elevated blood pressure. Specifically denies chest pain, palpitations, dyspnea, orthopnea, PND or peripheral edema. Blood pressure readings have been in normal range. Current medication regimen is as listed below. Patient denies any side effects of medication.       Review of Systems  CONSTITUTIONAL: NEGATIVE for fever, chills, change in weight  INTEGUMENTARY/SKIN: POSITIVE for rash. NEGATIVE for moles or lesions  EYES: NEGATIVE for vision changes or irritation  ENT/MOUTH: NEGATIVE for ear, mouth and throat problems  RESP: NEGATIVE for significant cough or SOB  CV: NEGATIVE for chest pain, palpitations or peripheral edema  GI: NEGATIVE for nausea, abdominal pain, heartburn, or change in bowel habits  : NEGATIVE for frequency, dysuria, or hematuria  MUSCULOSKELETAL: NEGATIVE for significant arthralgias or myalgia  NEURO: NEGATIVE for weakness, dizziness or paresthesias  ENDOCRINE:  NEGATIVE for temperature intolerance, skin/hair changes  HEME: NEGATIVE for bleeding problems  PSYCHIATRIC: NEGATIVE for changes in mood or affect    Patient Active Problem List    Diagnosis Date Noted     Hyperlipidemia LDL goal <70 05/04/2021     Priority: Medium     ACE-inhibitor cough 11/03/2020     Priority: Medium     NSTEMI (non-ST elevated myocardial infarction) (H) 09/28/2020     Priority: Medium     With Takotsubo cardiomyopathy  Cardiac cath, small lesion of diagonal not requiring stenting  Dr Crystal Sandoval Vakil       Takotsubo cardiomyopathy 09/22/2020     Priority: Medium     Episode of high BP noted on exam with no other symptoms  Elevated troponin  Cardiac cath, small lesion of  small diagonal, not requiring stenting  Crystal Sandoval, cardiology       S/P right rotator cuff repair 09/16/2019     Priority: Medium     Vitamin B12 deficiency (non anemic) 09/16/2019     Priority: Medium     Nontraumatic incomplete tear of right rotator cuff 07/31/2019     Priority: Medium     Added automatically from request for surgery [id number]       Nontraumatic complete tear of right rotator cuff 07/31/2019     Priority: Medium     Added automatically from request for surgery [id number]       Actinic keratosis 07/17/2012     Priority: Medium     History of malignant neoplasm of skin 07/17/2012     Priority: Medium     Atypical nevus 07/17/2012     Priority: Medium     History of nonmelanoma skin cancer 07/17/2012     Priority: Medium     Overview:   SCC right leg 2001       Medial meniscus tear 02/13/2012     Priority: Medium     Overview:   IMO Update 10/11       Restless legs syndrome (RLS) 10/11/2011     Priority: Medium     Bowden's esophagus 10/11/2011     Priority: Medium     B-complex deficiency 10/11/2011     Priority: Medium     Problem list name updated by automated process. Provider to review       Disorder of bone and cartilage 10/11/2011     Priority: Medium     dexa scans odd years starting in  2003  Problem list name updated by automated process. Provider to review       Squamous Cell Carcinoma 10/11/2011     Priority: Medium     left leg x2       GERD (gastroesophageal reflux disease)[530.81] 05/11/2003     Priority: Medium      Past Medical History:   Diagnosis Date     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     Bowden's esophagus 10/11/2011     Constipation 10/09/2012     GERD (gastroesophageal reflux disease)[530.81] 05/11/2003     Hypertension      NSTEMI (non-ST elevated myocardial infarction) (H) 09/28/2020    With Takotsubo cardiomyopathy Cardiac cath, small lesion of diagonal not requiring stenting Dr Crystal Sandoval Vakil     Osteopenia 10/11/2011    dexa scans odd years starting in 2003     Precordial pain 04/28/2003    negative stress test, negative pulmonary evaluatio     Restless legs syndrome (RLS) 10/11/2011     Squamous Cell Carcinoma 10/11/2011    left leg x2     Takotsubo cardiomyopathy 09/22/2020    Episode of high BP noted on exam with no other symptoms Elevated troponin Cardiac cath, small lesion of  small diagonal, not requiring stenting Crystal Sandoval, cardiology     Urge incontinence 10/09/2012     Past Surgical History:   Procedure Laterality Date     Breast Biospy  11/11/2000    LEFT > Fibrocystic Disease > Outcome: Fibroadenoma     COLONOSCOPY  2000     COLONOSCOPY  8-7-2009    Normal, Repeat 2015     DEXA Scan  2009     EGD       EGD  2008     EGD  2003     ENDOSCOPY UPPER, COLONOSCOPY, COMBINED N/A 9/6/2018    Procedure: COMBINED ENDOSCOPY UPPER, COLONOSCOPY;  UPPER ENDOSCOPY WITH BIOPSIES AND COLONOSCOPY WITH BIOPSIES;  Surgeon: Minh Interiano DO;  Location: [facility] OR     ESOPHAGOSCOPY, GASTROSCOPY, DUODENOSCOPY (EGD), COMBINED N/A 10/29/2021    Procedure: Upper endoscopy with biopsies;  Surgeon: Panfilo Arcos MD;  Location: [facility] OR     Knee Replacement  2012     Oophorectomy      Ectopic Pregnancy     ROTATOR CUFF REPAIR RT/LT Right     with acromioplasty for  "complete rotator cuff tear, Dr Cherry     TONSILLECTOMY       Current Outpatient Medications   Medication Sig Dispense Refill     ASPIRIN CAPS 81 MG OR one capsule by mouth daily 100 3     atorvastatin (LIPITOR) 40 MG tablet Take 1 tablet (40 mg) by mouth At Bedtime 90 tablet 0     Calcium Carbonate-Vitamin D (CALCIUM + D PO)        Cyanocobalamin (VITAMIN B 12 PO) Injection [id number]       fluorouracil (EFUDEX) 5 % external cream        losartan (COZAAR) 50 MG tablet Take 1 tablet (50 mg) by mouth daily 90 tablet 3     multivitamin w/minerals (THERA-VIT-M) tablet Take 1 tablet by mouth daily       omeprazole (PRILOSEC) 40 MG DR capsule TAKE 1 CAPSULE BY MOUTH DAILY BY MOUTH 90 capsule 3     pramipexole (MIRAPEX) 0.25 MG tablet Take 1-2 tablets (0.25-0.5 mg) by mouth At Bedtime 90 tablet 1     Vitamin D, Cholecalciferol, 25 MCG (1000 UT) TABS          Allergies   Allergen Reactions     Nitrofurantoin Other (See Comments) and Nausea     Macrobid  \"Chills and Fevers\"     Nitrofurantoin Macrocrystal Other (See Comments) and Nausea     Macrobid  \"Chills and Fevers\"          Social History     Tobacco Use     Smoking status: Former     Packs/day: 1.00     Years: 4.00     Pack years: 4.00     Types: Cigarettes     Start date:      Quit date:      Years since quittin.8     Smokeless tobacco: Never   Substance Use Topics     Alcohol use: Yes     Family History   Problem Relation Age of Onset     Uterine Cancer Mother      Lung Cancer Mother      Osteoarthritis Mother      Thyroid Disease Father      Coronary Artery Disease Father      Thyroid Disease Brother      Hyperlipidemia Son      Coronary Artery Disease Son      Heart Surgery Son      Thyroid Disease Son      Thyroid Disease Daughter      Endometrial Cancer Other         Family Hx     Osteoporosis Other         Family Hx     Parkinsonism Other         Family Hx     Multiple Sclerosis Maternal Aunt      Anuerysm Paternal Aunt      Unknown/Adopted No family " "hx of      Hypertension No family hx of      Breast Cancer No family hx of      Cerebrovascular Disease No family hx of      Colon Cancer No family hx of      Prostate Cancer No family hx of      Anesthesia Reaction No family hx of      Asthma No family hx of      Genetic Disorder No family hx of      History   Drug Use Unknown         Objective     /62   Pulse 69   Temp 97.6  F (36.4  C)   Ht 1.6 m (5' 3\")   Wt 53.5 kg (118 lb)   SpO2 100%   BMI 20.90 kg/m      Physical Exam    GENERAL APPEARANCE: healthy, alert and no distress     EYES: EOMI, PERRL     HENT: ear canals and TM's normal and nose and mouth without ulcers or lesions     NECK: no adenopathy, no asymmetry, masses, or scars and thyroid normal to palpation     RESP: lungs clear to auscultation - no rales, rhonchi or wheezes     CV: regular rates and rhythm, normal S1 S2, no S3 or S4 and no murmur, click or rub     ABDOMEN:  soft, nontender, no HSM or masses and bowel sounds normal     MS: extremities normal- no gross deformities noted, no evidence of inflammation in joints, FROM in all extremities.     SKIN: Flat, erythematous rash noted to bilateral cheeks, spares nose.      NEURO: Normal strength and tone, sensory exam grossly normal, mentation intact and speech normal     PSYCH: mentation appears normal. and affect normal/bright     LYMPHATICS: No cervical adenopathy    Recent Labs   Lab Test 09/26/22  1523 09/12/22  1753   HGB 11.4* 10.1*    206   * 135   POTASSIUM 4.5 3.3*   CR 1.33* 1.21*        Diagnostics:  Recent Results (from the past 24 hour(s))   Comprehensive metabolic panel (BMP + Alb, Alk Phos, ALT, AST, Total. Bili, TP)    Collection Time: 10/25/22  1:32 PM   Result Value Ref Range    Sodium 126 (L) 136 - 145 mmol/L    Potassium 4.3 3.4 - 5.3 mmol/L    Chloride 94 (L) 98 - 107 mmol/L    Carbon Dioxide (CO2) 24 22 - 29 mmol/L    Anion Gap 8 7 - 15 mmol/L    Urea Nitrogen 24.1 (H) 8.0 - 23.0 mg/dL    Creatinine 1.05 " (H) 0.51 - 0.95 mg/dL    Calcium 9.0 8.8 - 10.2 mg/dL    Glucose 90 70 - 99 mg/dL    Alkaline Phosphatase 120 (H) 35 - 104 U/L    AST 43 (H) 10 - 35 U/L    ALT 42 (H) 10 - 35 U/L    Protein Total 7.1 6.4 - 8.3 g/dL    Albumin 4.0 3.5 - 5.2 g/dL    Bilirubin Total 0.2 <=1.2 mg/dL    GFR Estimate 54 (L) >60 mL/min/1.73m2   CBC with platelets and differential    Collection Time: 10/25/22  1:32 PM   Result Value Ref Range    WBC Count 7.6 4.0 - 11.0 10e3/uL    RBC Count 3.52 (L) 3.80 - 5.20 10e6/uL    Hemoglobin 11.3 (L) 11.7 - 15.7 g/dL    Hematocrit 32.2 (L) 35.0 - 47.0 %    MCV 92 78 - 100 fL    MCH 32.1 26.5 - 33.0 pg    MCHC 35.1 31.5 - 36.5 g/dL    RDW 11.3 10.0 - 15.0 %    Platelet Count 219 150 - 450 10e3/uL    % Neutrophils 64 %    % Lymphocytes 17 %    % Monocytes 13 %    % Eosinophils 5 %    % Basophils 1 %    % Immature Granulocytes 0 %    NRBCs per 100 WBC 0 <1 /100    Absolute Neutrophils 4.9 1.6 - 8.3 10e3/uL    Absolute Lymphocytes 1.3 0.8 - 5.3 10e3/uL    Absolute Monocytes 1.0 0.0 - 1.3 10e3/uL    Absolute Eosinophils 0.4 0.0 - 0.7 10e3/uL    Absolute Basophils 0.1 0.0 - 0.2 10e3/uL    Absolute Immature Granulocytes 0.0 <=0.4 10e3/uL    Absolute NRBCs 0.0 10e3/uL      EKG: Normal Sinus Rhythm, unchanged from previous tracings    Revised Cardiac Risk Index (RCRI):  The patient has the following serious cardiovascular risks for perioperative complications:   - No serious cardiac risks = 0 points     RCRI Interpretation: 0 points: Class I (very low risk - 0.4% complication rate)           Signed Electronically by: ROXANN Byrd CNP  Copy of this evaluation report is provided to requesting physician.

## 2022-10-25 ENCOUNTER — OFFICE VISIT (OUTPATIENT)
Dept: FAMILY MEDICINE | Facility: OTHER | Age: 77
End: 2022-10-25
Attending: FAMILY MEDICINE
Payer: COMMERCIAL

## 2022-10-25 VITALS
HEART RATE: 69 BPM | SYSTOLIC BLOOD PRESSURE: 124 MMHG | DIASTOLIC BLOOD PRESSURE: 62 MMHG | HEIGHT: 63 IN | WEIGHT: 118 LBS | OXYGEN SATURATION: 100 % | BODY MASS INDEX: 20.91 KG/M2 | TEMPERATURE: 97.6 F

## 2022-10-25 DIAGNOSIS — Z01.818 PREOP GENERAL PHYSICAL EXAM: Primary | ICD-10-CM

## 2022-10-25 DIAGNOSIS — K22.70 BARRETT'S ESOPHAGUS WITHOUT DYSPLASIA: ICD-10-CM

## 2022-10-25 DIAGNOSIS — D64.9 ANEMIA, UNSPECIFIED TYPE: ICD-10-CM

## 2022-10-25 DIAGNOSIS — G25.81 RESTLESS LEGS SYNDROME (RLS): ICD-10-CM

## 2022-10-25 DIAGNOSIS — I51.81 TAKOTSUBO CARDIOMYOPATHY: ICD-10-CM

## 2022-10-25 DIAGNOSIS — I10 BENIGN ESSENTIAL HYPERTENSION: ICD-10-CM

## 2022-10-25 DIAGNOSIS — R21 FACIAL RASH: ICD-10-CM

## 2022-10-25 DIAGNOSIS — R06.09 DOE (DYSPNEA ON EXERTION): ICD-10-CM

## 2022-10-25 DIAGNOSIS — E78.5 HYPERLIPIDEMIA LDL GOAL <70: ICD-10-CM

## 2022-10-25 LAB
ALBUMIN SERPL BCG-MCNC: 4 G/DL (ref 3.5–5.2)
ALP SERPL-CCNC: 120 U/L (ref 35–104)
ALT SERPL W P-5'-P-CCNC: 42 U/L (ref 10–35)
ANION GAP SERPL CALCULATED.3IONS-SCNC: 8 MMOL/L (ref 7–15)
AST SERPL W P-5'-P-CCNC: 43 U/L (ref 10–35)
BASOPHILS # BLD AUTO: 0.1 10E3/UL (ref 0–0.2)
BASOPHILS NFR BLD AUTO: 1 %
BILIRUB SERPL-MCNC: 0.2 MG/DL
BUN SERPL-MCNC: 24.1 MG/DL (ref 8–23)
CALCIUM SERPL-MCNC: 9 MG/DL (ref 8.8–10.2)
CHLORIDE SERPL-SCNC: 94 MMOL/L (ref 98–107)
CREAT SERPL-MCNC: 1.05 MG/DL (ref 0.51–0.95)
DEPRECATED HCO3 PLAS-SCNC: 24 MMOL/L (ref 22–29)
EOSINOPHIL # BLD AUTO: 0.4 10E3/UL (ref 0–0.7)
EOSINOPHIL NFR BLD AUTO: 5 %
ERYTHROCYTE [DISTWIDTH] IN BLOOD BY AUTOMATED COUNT: 11.3 % (ref 10–15)
GFR SERPL CREATININE-BSD FRML MDRD: 54 ML/MIN/1.73M2
GLUCOSE SERPL-MCNC: 90 MG/DL (ref 70–99)
HCT VFR BLD AUTO: 32.2 % (ref 35–47)
HGB BLD-MCNC: 11.3 G/DL (ref 11.7–15.7)
IMM GRANULOCYTES # BLD: 0 10E3/UL
IMM GRANULOCYTES NFR BLD: 0 %
LYMPHOCYTES # BLD AUTO: 1.3 10E3/UL (ref 0.8–5.3)
LYMPHOCYTES NFR BLD AUTO: 17 %
MCH RBC QN AUTO: 32.1 PG (ref 26.5–33)
MCHC RBC AUTO-ENTMCNC: 35.1 G/DL (ref 31.5–36.5)
MCV RBC AUTO: 92 FL (ref 78–100)
MONOCYTES # BLD AUTO: 1 10E3/UL (ref 0–1.3)
MONOCYTES NFR BLD AUTO: 13 %
NEUTROPHILS # BLD AUTO: 4.9 10E3/UL (ref 1.6–8.3)
NEUTROPHILS NFR BLD AUTO: 64 %
NRBC # BLD AUTO: 0 10E3/UL
NRBC BLD AUTO-RTO: 0 /100
PLATELET # BLD AUTO: 219 10E3/UL (ref 150–450)
POTASSIUM SERPL-SCNC: 4.3 MMOL/L (ref 3.4–5.3)
PROT SERPL-MCNC: 7.1 G/DL (ref 6.4–8.3)
RBC # BLD AUTO: 3.52 10E6/UL (ref 3.8–5.2)
SODIUM SERPL-SCNC: 126 MMOL/L (ref 136–145)
WBC # BLD AUTO: 7.6 10E3/UL (ref 4–11)

## 2022-10-25 PROCEDURE — G0463 HOSPITAL OUTPT CLINIC VISIT: HCPCS | Mod: 25

## 2022-10-25 PROCEDURE — 99214 OFFICE O/P EST MOD 30 MIN: CPT

## 2022-10-25 PROCEDURE — 85025 COMPLETE CBC W/AUTO DIFF WBC: CPT | Mod: ZL

## 2022-10-25 PROCEDURE — 93005 ELECTROCARDIOGRAM TRACING: CPT

## 2022-10-25 PROCEDURE — 80053 COMPREHEN METABOLIC PANEL: CPT | Mod: ZL

## 2022-10-25 PROCEDURE — G0463 HOSPITAL OUTPT CLINIC VISIT: HCPCS

## 2022-10-25 PROCEDURE — 36415 COLL VENOUS BLD VENIPUNCTURE: CPT | Mod: ZL

## 2022-10-25 PROCEDURE — 93010 ELECTROCARDIOGRAM REPORT: CPT | Mod: 77 | Performed by: INTERNAL MEDICINE

## 2022-10-25 ASSESSMENT — PAIN SCALES - GENERAL: PAINLEVEL: NO PAIN (0)

## 2022-10-25 NOTE — NURSING NOTE
"Chief Complaint   Patient presents with     Pre-Op Exam       Initial /62   Pulse 69   Temp 97.6  F (36.4  C)   Ht 1.6 m (5' 3\")   Wt 53.5 kg (118 lb)   SpO2 100%   BMI 20.90 kg/m   Estimated body mass index is 20.9 kg/m  as calculated from the following:    Height as of this encounter: 1.6 m (5' 3\").    Weight as of this encounter: 53.5 kg (118 lb).  Medication Reconciliation: complete  Adrián Moess LPN  "

## 2022-10-25 NOTE — PATIENT INSTRUCTIONS
Medication Instructions:   - aspirin: Bleeding risk is low for this procedure and daily aspirin may be continued without modification.    - ACE/ARB: May be continued on the day of surgery.    - Statins: Continue taking on the day of surgery.          Preparing for Your Surgery  Getting started  A nurse will call you to review your health history and instructions. They will give you an arrival time based on your scheduled surgery time. Please be ready to share:  Your doctor's clinic name and phone number  Your medical, surgical and anesthesia history  A list of allergies and sensitivities  A list of medicines, including herbal treatments and over-the-counter drugs  Whether the patient has a legal guardian (ask how to send us the papers in advance)  Please tell us if you're pregnant--or if there's any chance you might be pregnant. Some surgeries may injure a fetus (unborn baby), so they require a pregnancy test. Surgeries that are safe for a fetus don't always need a test, and you can choose whether to have one.   If you have a child who's having surgery, please ask for a copy of Preparing for Your Child's Surgery.    Preparing for surgery  Within 10 to 30 days of surgery: Have a pre-op exam (sometimes called an H&P, or History and Physical). This can be done at a clinic or pre-operative center.  If you're having a , you may not need this exam. Talk to your care team.  At your pre-op exam, talk to your care team about all medicines you take. If you need to stop any medicines before surgery, ask when to start taking them again.  We do this for your safety. Many medicines can make you bleed too much during surgery. Some change how well surgery (anesthesia) drugs work.  Call your insurance company to let them know you're having surgery. (If you don't have insurance, call [phone].)  Call your clinic if there's any change in your health. This includes signs of a cold or flu (sore throat, runny nose, cough,  rash, fever). It also includes a scrape or scratch near the surgery site.  If you have questions on the day of surgery, call your hospital or surgery center.  COVID testing  You may need to be tested for COVID-19 before having surgery. If so, we will give you instructions (or click here).  Eating and drinking guidelines  For your safety: Unless your surgeon tells you otherwise, follow the guidelines below.  Eat and drink as usual until 8 hours before surgery. After that, no food or milk.  Drink clear liquids until 2 hours before surgery. These are liquids you can see through, like water, Gatorade and Propel Water. You may also have black coffee and tea (no cream or milk).  Nothing by mouth within 2 hours of surgery. This includes gum, candy and breath mints.  If you drink alcohol: Stop drinking it the night before surgery.  If your care team tells you to take medicine on the morning of surgery, it's okay to take it with a sip of water.  Preventing infection  Shower or bathe the night before and morning of your surgery. Follow the instructions your clinic gave you. (If no instructions, use regular soap.)  Don't shave or clip hair near your surgery site. We'll remove the hair if needed.  Don't smoke or vape the morning of surgery. You may chew nicotine gum up to 2 hours before surgery. A nicotine patch is okay.  Note: Some surgeries require you to completely quit smoking and nicotine. Check with your surgeon.  Your care team will make every effort to keep you safe from infection. We will:  Clean our hands often with soap and water (or an alcohol-based hand rub).  Clean the skin at your surgery site with a special soap that kills germs.  Give you a special gown to keep you warm. (Cold raises the risk of infection.)  Wear special hair covers, masks, gowns and gloves during surgery.  Give antibiotic medicine, if prescribed. Not all surgeries need antibiotics.  What to bring on the day of surgery  Photo ID and insurance  card  Copy of your health care directive, if you have one  Glasses and hearing aides (bring cases)  You can't wear contacts during surgery  Inhaler and eye drops, if you use them (tell us about these when you arrive)  CPAP machine or breathing device, if you use them  A few personal items, if spending the night  If you have . . .  A pacemaker, ICD (cardiac defibrillator) or other implant: Bring the ID card.  An implanted stimulator: Bring the remote control.  A legal guardian: Bring a copy of the certified (court-stamped) guardianship papers.  Please remove any jewelry, including body piercings. Leave jewelry and other valuables at home.  If you're going home the day of surgery  You must have a responsible adult drive you home. They should stay with you overnight as well.  If you don't have someone to stay with you, and you aren't safe to go home alone, we may keep you overnight. Insurance often won't pay for this.  After surgery  If it's hard to control your pain or you need more pain medicine, please call your surgeon's office.  Questions?   If you have any questions for your care team, list them here: _________________________________________________________________________________________________________________________________________________________________________ ____________________________________ ____________________________________ ____________________________________  For informational purposes only. Not to replace the advice of your health care provider. Copyright   2003, 2019 [facility]. All rights reserved. Clinically reviewed by Lawanda Vila MD. [place] [id number] - REV 07/22.

## 2022-10-28 ENCOUNTER — ALLIED HEALTH/NURSE VISIT (OUTPATIENT)
Dept: FAMILY MEDICINE | Facility: OTHER | Age: 77
End: 2022-10-28
Attending: FAMILY MEDICINE
Payer: COMMERCIAL

## 2022-10-28 DIAGNOSIS — Z01.818 PRE-OP EXAM: Primary | ICD-10-CM

## 2022-10-28 LAB — SARS-COV-2 RNA RESP QL NAA+PROBE: NEGATIVE

## 2022-10-28 PROCEDURE — 96372 THER/PROPH/DIAG INJ SC/IM: CPT

## 2022-10-28 PROCEDURE — U0005 INFEC AGEN DETEC AMPLI PROBE: HCPCS | Mod: ZL

## 2022-10-28 RX ADMIN — CYANOCOBALAMIN 1000 MCG: 1000 INJECTION, SOLUTION INTRAMUSCULAR; SUBCUTANEOUS at 09:14

## 2022-10-28 NOTE — PROGRESS NOTES
Clinic Administered Medication Documentation    Administrations This Visit     cyanocobalamin injection 1,000 mcg     Admin Date  10/28/2022 Action  Given Dose  1,000 mcg Route  Intramuscular Site   Administered By  Vanesa Power LPN    Ordering Provider: Kenna Portillo MD    Patient Supplied?: No                  Injectable Medication Documentation    Patient was given Cyanocobalamin (B-12). Prior to medication administration, verified patients identity using patient s name and date of birth. Please see MAR and medication order for additional information. Patient instructed to remain in clinic for 15 minutes.      Was entire vial of medication used? Yes  Vial/Syringe: Single dose vial  Expiration Date:  04/24  Was this medication supplied by the patient? No   Vanesa Power LPN

## 2022-10-29 DIAGNOSIS — G25.81 RESTLESS LEGS SYNDROME (RLS): ICD-10-CM

## 2022-10-31 ENCOUNTER — LAB (OUTPATIENT)
Dept: LAB | Facility: HOSPITAL | Age: 77
End: 2022-10-31
Attending: PATHOLOGY
Payer: COMMERCIAL

## 2022-10-31 ENCOUNTER — HOSPITAL ENCOUNTER (OUTPATIENT)
Facility: HOSPITAL | Age: 77
Discharge: HOME | End: 2022-10-31
Attending: PATHOLOGY | Admitting: PATHOLOGY
Payer: COMMERCIAL

## 2022-10-31 ENCOUNTER — ANESTHESIA (OUTPATIENT)
Dept: SURGERY | Facility: HOSPITAL | Age: 77
End: 2022-10-31
Payer: COMMERCIAL

## 2022-10-31 VITALS
SYSTOLIC BLOOD PRESSURE: 142 MMHG | OXYGEN SATURATION: 100 % | TEMPERATURE: 97.5 F | DIASTOLIC BLOOD PRESSURE: 52 MMHG | WEIGHT: 115.6 LBS | BODY MASS INDEX: 20.48 KG/M2 | RESPIRATION RATE: 16 BRPM | HEART RATE: 85 BPM | HEIGHT: 63 IN

## 2022-10-31 DIAGNOSIS — D64.9 ANEMIA, UNSPECIFIED TYPE: ICD-10-CM

## 2022-10-31 DIAGNOSIS — R91.1 LUNG NODULE, SOLITARY: ICD-10-CM

## 2022-10-31 DIAGNOSIS — D47.2 MONOCLONAL GAMMOPATHY: ICD-10-CM

## 2022-10-31 LAB
BASOPHILS # BLD AUTO: 0.1 10E3/UL (ref 0–0.2)
BASOPHILS NFR BLD AUTO: 1 %
EOSINOPHIL # BLD AUTO: 0.4 10E3/UL (ref 0–0.7)
EOSINOPHIL NFR BLD AUTO: 9 %
ERYTHROCYTE [DISTWIDTH] IN BLOOD BY AUTOMATED COUNT: 11.3 % (ref 10–15)
HCT VFR BLD AUTO: 32.2 % (ref 35–47)
HGB BLD-MCNC: 11.4 G/DL (ref 11.7–15.7)
IMM GRANULOCYTES # BLD: 0 10E3/UL
IMM GRANULOCYTES NFR BLD: 0 %
LYMPHOCYTES # BLD AUTO: 1.3 10E3/UL (ref 0.8–5.3)
LYMPHOCYTES NFR BLD AUTO: 26 %
MCH RBC QN AUTO: 32.3 PG (ref 26.5–33)
MCHC RBC AUTO-ENTMCNC: 35.4 G/DL (ref 31.5–36.5)
MCV RBC AUTO: 91 FL (ref 78–100)
MONOCYTES # BLD AUTO: 0.7 10E3/UL (ref 0–1.3)
MONOCYTES NFR BLD AUTO: 14 %
NEUTROPHILS # BLD AUTO: 2.4 10E3/UL (ref 1.6–8.3)
NEUTROPHILS NFR BLD AUTO: 50 %
NRBC # BLD AUTO: 0 10E3/UL
NRBC BLD AUTO-RTO: 0 /100
PLATELET # BLD AUTO: 212 10E3/UL (ref 150–450)
RBC # BLD AUTO: 3.53 10E6/UL (ref 3.8–5.2)
RETICS # AUTO: 0.03 10E6/UL (ref 0.03–0.1)
RETICS/RBC NFR AUTO: 0.7 % (ref 0.5–2)
WBC # BLD AUTO: 4.8 10E3/UL (ref 4–11)

## 2022-10-31 PROCEDURE — 88275 CYTOGENETICS 100-300: CPT

## 2022-10-31 PROCEDURE — 88185 FLOWCYTOMETRY/TC ADD-ON: CPT

## 2022-10-31 PROCEDURE — 88305 TISSUE EXAM BY PATHOLOGIST: CPT | Mod: TC

## 2022-10-31 PROCEDURE — 88184 FLOWCYTOMETRY/ TC 1 MARKER: CPT | Performed by: STUDENT IN AN ORGANIZED HEALTH CARE EDUCATION/TRAINING PROGRAM

## 2022-10-31 PROCEDURE — 36415 COLL VENOUS BLD VENIPUNCTURE: CPT

## 2022-10-31 PROCEDURE — 88311 DECALCIFY TISSUE: CPT | Mod: TC

## 2022-10-31 PROCEDURE — 88237 TISSUE CULTURE BONE MARROW: CPT

## 2022-10-31 PROCEDURE — 370N000017 HC ANESTHESIA TECHNICAL FEE, PER MIN: Performed by: PATHOLOGY

## 2022-10-31 PROCEDURE — 250N000011 HC RX IP 250 OP 636: Performed by: NURSE ANESTHETIST, CERTIFIED REGISTERED

## 2022-10-31 PROCEDURE — 88184 FLOWCYTOMETRY/ TC 1 MARKER: CPT

## 2022-10-31 PROCEDURE — 99100 ANES PT EXTEME AGE<1 YR&>70: CPT | Performed by: NURSE ANESTHETIST, CERTIFIED REGISTERED

## 2022-10-31 PROCEDURE — 38222 DX BONE MARROW BX & ASPIR: CPT | Performed by: PATHOLOGY

## 2022-10-31 PROCEDURE — 38222 DX BONE MARROW BX & ASPIR: CPT | Performed by: NURSE ANESTHETIST, CERTIFIED REGISTERED

## 2022-10-31 PROCEDURE — 360N000074 HC SURGERY LEVEL 1, PER MIN: Performed by: PATHOLOGY

## 2022-10-31 PROCEDURE — 250N000009 HC RX 250: Performed by: PATHOLOGY

## 2022-10-31 PROCEDURE — 258N000003 HC RX IP 258 OP 636: Performed by: NURSE ANESTHETIST, CERTIFIED REGISTERED

## 2022-10-31 PROCEDURE — 250N000009 HC RX 250: Performed by: NURSE ANESTHETIST, CERTIFIED REGISTERED

## 2022-10-31 PROCEDURE — 999N000141 HC STATISTIC PRE-PROCEDURE NURSING ASSESSMENT: Performed by: PATHOLOGY

## 2022-10-31 PROCEDURE — 710N000012 HC RECOVERY PHASE 2, PER MINUTE: Performed by: PATHOLOGY

## 2022-10-31 PROCEDURE — 88188 FLOWCYTOMETRY/READ 9-15: CPT | Performed by: STUDENT IN AN ORGANIZED HEALTH CARE EDUCATION/TRAINING PROGRAM

## 2022-10-31 PROCEDURE — 85045 AUTOMATED RETICULOCYTE COUNT: CPT

## 2022-10-31 PROCEDURE — 88264 CHROMOSOME ANALYSIS 20-25: CPT

## 2022-10-31 PROCEDURE — 272N000001 HC OR GENERAL SUPPLY STERILE: Performed by: PATHOLOGY

## 2022-10-31 PROCEDURE — 85025 COMPLETE CBC W/AUTO DIFF WBC: CPT

## 2022-10-31 RX ORDER — NALOXONE HYDROCHLORIDE 0.4 MG/ML
0.2 INJECTION, SOLUTION INTRAMUSCULAR; INTRAVENOUS; SUBCUTANEOUS
Status: DISCONTINUED | OUTPATIENT
Start: 2022-10-31 | End: 2022-10-31 | Stop reason: HOSPADM

## 2022-10-31 RX ORDER — SODIUM CHLORIDE, SODIUM LACTATE, POTASSIUM CHLORIDE, CALCIUM CHLORIDE 600; 310; 30; 20 MG/100ML; MG/100ML; MG/100ML; MG/100ML
INJECTION, SOLUTION INTRAVENOUS CONTINUOUS
Status: DISCONTINUED | OUTPATIENT
Start: 2022-10-31 | End: 2022-10-31 | Stop reason: HOSPADM

## 2022-10-31 RX ORDER — FENTANYL CITRATE 50 UG/ML
25 INJECTION, SOLUTION INTRAMUSCULAR; INTRAVENOUS
Status: DISCONTINUED | OUTPATIENT
Start: 2022-10-31 | End: 2022-10-31 | Stop reason: HOSPADM

## 2022-10-31 RX ORDER — PROPOFOL 10 MG/ML
INJECTION, EMULSION INTRAVENOUS PRN
Status: DISCONTINUED | OUTPATIENT
Start: 2022-10-31 | End: 2022-10-31

## 2022-10-31 RX ORDER — ONDANSETRON 4 MG/1
4 TABLET, ORALLY DISINTEGRATING ORAL EVERY 30 MIN PRN
Status: DISCONTINUED | OUTPATIENT
Start: 2022-10-31 | End: 2022-10-31 | Stop reason: HOSPADM

## 2022-10-31 RX ORDER — ONDANSETRON 2 MG/ML
4 INJECTION INTRAMUSCULAR; INTRAVENOUS EVERY 30 MIN PRN
Status: DISCONTINUED | OUTPATIENT
Start: 2022-10-31 | End: 2022-10-31 | Stop reason: HOSPADM

## 2022-10-31 RX ORDER — LIDOCAINE HYDROCHLORIDE 10 MG/ML
8-10 INJECTION, SOLUTION EPIDURAL; INFILTRATION; INTRACAUDAL; PERINEURAL
Status: DISCONTINUED | OUTPATIENT
Start: 2022-10-31 | End: 2022-10-31 | Stop reason: HOSPADM

## 2022-10-31 RX ORDER — HYDRALAZINE HYDROCHLORIDE 20 MG/ML
5-10 INJECTION INTRAMUSCULAR; INTRAVENOUS EVERY 10 MIN PRN
Status: DISCONTINUED | OUTPATIENT
Start: 2022-10-31 | End: 2022-10-31 | Stop reason: HOSPADM

## 2022-10-31 RX ORDER — LIDOCAINE 40 MG/G
CREAM TOPICAL
Status: DISCONTINUED | OUTPATIENT
Start: 2022-10-31 | End: 2022-10-31 | Stop reason: HOSPADM

## 2022-10-31 RX ORDER — FENTANYL CITRATE 50 UG/ML
25 INJECTION, SOLUTION INTRAMUSCULAR; INTRAVENOUS EVERY 5 MIN PRN
Status: DISCONTINUED | OUTPATIENT
Start: 2022-10-31 | End: 2022-10-31 | Stop reason: HOSPADM

## 2022-10-31 RX ORDER — HYDROMORPHONE HYDROCHLORIDE 1 MG/ML
0.2 INJECTION, SOLUTION INTRAMUSCULAR; INTRAVENOUS; SUBCUTANEOUS EVERY 5 MIN PRN
Status: DISCONTINUED | OUTPATIENT
Start: 2022-10-31 | End: 2022-10-31 | Stop reason: HOSPADM

## 2022-10-31 RX ORDER — NALOXONE HYDROCHLORIDE 0.4 MG/ML
0.4 INJECTION, SOLUTION INTRAMUSCULAR; INTRAVENOUS; SUBCUTANEOUS
Status: DISCONTINUED | OUTPATIENT
Start: 2022-10-31 | End: 2022-10-31 | Stop reason: HOSPADM

## 2022-10-31 RX ORDER — HALOPERIDOL 5 MG/ML
0.5 INJECTION INTRAMUSCULAR
Status: DISCONTINUED | OUTPATIENT
Start: 2022-10-31 | End: 2022-10-31 | Stop reason: HOSPADM

## 2022-10-31 RX ORDER — LIDOCAINE HYDROCHLORIDE 20 MG/ML
INJECTION, SOLUTION INFILTRATION; PERINEURAL PRN
Status: DISCONTINUED | OUTPATIENT
Start: 2022-10-31 | End: 2022-10-31

## 2022-10-31 RX ORDER — PRAMIPEXOLE DIHYDROCHLORIDE 0.25 MG/1
.25-.5 TABLET ORAL AT BEDTIME
Qty: 90 TABLET | Refills: 1 | Status: SHIPPED | OUTPATIENT
Start: 2022-10-31 | End: 2023-05-05

## 2022-10-31 RX ORDER — OXYCODONE HYDROCHLORIDE 5 MG/1
5 TABLET ORAL EVERY 4 HOURS PRN
Status: DISCONTINUED | OUTPATIENT
Start: 2022-10-31 | End: 2022-10-31 | Stop reason: HOSPADM

## 2022-10-31 RX ORDER — MEPERIDINE HYDROCHLORIDE 25 MG/ML
12.5 INJECTION INTRAMUSCULAR; INTRAVENOUS; SUBCUTANEOUS
Status: DISCONTINUED | OUTPATIENT
Start: 2022-10-31 | End: 2022-10-31 | Stop reason: HOSPADM

## 2022-10-31 RX ADMIN — PROPOFOL 50 MG: 10 INJECTION, EMULSION INTRAVENOUS at 07:53

## 2022-10-31 RX ADMIN — LIDOCAINE HYDROCHLORIDE 40 MG: 20 INJECTION, SOLUTION INFILTRATION; PERINEURAL at 07:43

## 2022-10-31 RX ADMIN — SODIUM CHLORIDE, POTASSIUM CHLORIDE, SODIUM LACTATE AND CALCIUM CHLORIDE: 600; 310; 30; 20 INJECTION, SOLUTION INTRAVENOUS at 06:50

## 2022-10-31 RX ADMIN — PROPOFOL 50 MG: 10 INJECTION, EMULSION INTRAVENOUS at 07:47

## 2022-10-31 RX ADMIN — PROPOFOL 50 MG: 10 INJECTION, EMULSION INTRAVENOUS at 07:43

## 2022-10-31 ASSESSMENT — ACTIVITIES OF DAILY LIVING (ADL)
ADLS_ACUITY_SCORE: 35
ADLS_ACUITY_SCORE: 35

## 2022-10-31 NOTE — DISCHARGE INSTRUCTIONS
BONE MARROW BIOPSY PATIENT INSTRUCTIONS    1. Please keep the elastic bandage in place for 2 hours. When you remove it, you may cover the area with a Band-Aid.    2. Please keep the area dry for 24 hours. This helps prevent bleeding and speeds healing. There is a small nick in the skin where the biopsy was done. This is normal.     3. Avoid aspirin or aspirin containing products for 24 hours. This helps prevent bleeding. You make take non-aspirin pain relievers (e.g. Tylenol, Motrin). Follow the instructions on the bottle.     4. Some bruising is normal. If you have very severe pain or bleeding that does not stop easily with light pressure, contact your regular doctor. If the area becomes very swollen, hot and red with tallow or green discharge contact your doctor.    5. Your results take 2 to 3 weeks to come back. You will receive them at your follow up appointment with Dr. Silverman.     6. Call the emergency department at [facility] ([phone]) with any other questions or concerns.             Post-Anesthesia Patient Instructions    IMMEDIATELY FOLLOWING SURGERY:  Do not drive or operate machinery for the first twenty four hours after surgery.  Do not make any important decisions for twenty four hours after surgery or while taking narcotic pain medications or sedatives.  If you develop intractable nausea and vomiting or a severe headache please notify your doctor immediately.    FOLLOW-UP:  Please make an appointment with your surgeon as instructed. You do not need to follow up with anesthesia unless specifically instructed to do so.    WOUND CARE INSTRUCTIONS (if applicable):  Keep a dry clean dressing on the anesthesia/puncture wound site if there is drainage.  Once the wound has quit draining you may leave it open to air.  Generally you should leave the bandage intact for twenty four hours unless there is drainage.  If the epidural site drains for more than 36-48 hours please call the anesthesia  department.    QUESTIONS?:  Please feel free to call your physician or the hospital  if you have any questions, and they will be happy to assist you.

## 2022-10-31 NOTE — ANESTHESIA CARE TRANSFER NOTE
Patient: Maria Teresa Aburto    Procedure: Procedure(s):  BIOPSY, BONE MARROW WITH ASPIRATION       Diagnosis: Anemia, unspecified type [D64.9]  Diagnosis Additional Information: No value filed.    Anesthesia Type:   MAC     Note:    Oropharynx: spontaneously breathing  Level of Consciousness: drowsy  Oxygen Supplementation: room air    Independent Airway: airway patency satisfactory and stable  Dentition: dentition unchanged  Vital Signs Stable: post-procedure vital signs reviewed and stable  Report to RN Given: handoff report given  Patient transferred to: Phase II    Handoff Report: Identifed the Patient, Identified the Reponsible Provider, Reviewed the pertinent medical history, Discussed the surgical course, Reviewed Intra-OP anesthesia mangement and issues during anesthesia, Set expectations for post-procedure period and Allowed opportunity for questions and acknowledgement of understanding      Vitals:  Vitals Value Taken Time   BP     Temp     Pulse     Resp     SpO2         Electronically Signed By: ROXANN De La Fuente CRNA  October 31, 2022  7:58 AM

## 2022-10-31 NOTE — ANESTHESIA POSTPROCEDURE EVALUATION
Patient: Maria Teresa Aburto    Procedure: Procedure(s):  BIOPSY, BONE MARROW WITH ASPIRATION       Anesthesia Type:  MAC    Note:  Disposition: Outpatient   Postop Pain Control: Uneventful            Sign Out: Well controlled pain   PONV: No   Neuro/Psych: Uneventful            Sign Out: Acceptable/Baseline neuro status   Airway/Respiratory: Uneventful            Sign Out: Acceptable/Baseline resp. status   CV/Hemodynamics: Uneventful            Sign Out: Acceptable CV status; No obvious hypovolemia; No obvious fluid overload   Other NRE: NONE   DID A NON-ROUTINE EVENT OCCUR? No           Last vitals:  Vitals Value Taken Time   /52 10/31/22 0845   Temp 97.5  F (36.4  C) 10/31/22 0850   Pulse 85 10/31/22 0845   Resp 16 10/31/22 0845   SpO2 100 % 10/31/22 0855       Electronically Signed By: ROXANN Aquino CRNA  October 31, 2022  9:33 AM

## 2022-10-31 NOTE — INTERVAL H&P NOTE
I have reviewed the surgical (or preoperative) H&P that is linked to this encounter, and examined the patient. There are no significant changes    Clinical Conditions Present on Arrival:  Clinically Significant Risk Factors Present on Admission           # Hyponatremia: Lowest Na = 126 mmol/L (Ref range: 136-145) in last 30 days, will monitor as appropriate

## 2022-10-31 NOTE — OR NURSING
Patient and responsible adult given discharge instructions with no questions regarding instructions. Benjamin score 20/20. Pain level 0/10.  Discharged from unit via ambulatory with . Patient discharged to home.

## 2022-10-31 NOTE — PROCEDURES
PATHOLOGY PROCEDURE NOTE:    PROCEDURE:  Bone Marrow Biopsy and Aspiration  INDICATION:  Anemia  SITE:    Right posterior superior iliac crest   The patient was identified and the procedure to be performed was reviewed to be correct.  Pause for the Cause was taken. The procedure and the potential risks involved, including, but not limited to pain, bleeding at procedure site, and infection, were discussed with the patient. Post-procedure care instructions were given. An opportunity to ask questions was provided. The patient acknowledged understanding of the procedure and an informed consent was obtained.  Biopsy site was prepped in the usual sterile fashion. Adequate aspirate and core biopsy material were obtained.    LOCAL ANESTHETIC used:  8 ml of 1% Lidocaine.    SPECIMEN OBTAINED:  Needle core biopsy:  Right side.  Aspirate fluid:   Right side.  DRESSING:    Pressure bandage.  COMPLICATIONS:  None. The patient tolerated the procedure well.  POST-PROCEDURE CARE: Patient advised to stay supine for 30 minutes post-procedure  and to keep dressing dry for 24 hours.    PROCEDURE WAS PERFORMED BY: Carlos Lynn DO

## 2022-11-01 ENCOUNTER — TELEPHONE (OUTPATIENT)
Dept: PET IMAGING | Facility: HOSPITAL | Age: 77
End: 2022-11-01

## 2022-11-01 LAB
PATH REPORT.COMMENTS IMP SPEC: NORMAL
PATH REPORT.FINAL DX SPEC: NORMAL
PATH REPORT.MICROSCOPIC SPEC OTHER STN: NORMAL
PATH REPORT.RELEVANT HX SPEC: NORMAL

## 2022-11-02 ENCOUNTER — HOSPITAL ENCOUNTER (OUTPATIENT)
Dept: PET IMAGING | Facility: HOSPITAL | Age: 77
Discharge: HOME | End: 2022-11-02
Attending: INTERNAL MEDICINE | Admitting: INTERNAL MEDICINE
Payer: COMMERCIAL

## 2022-11-02 DIAGNOSIS — D47.2 MONOCLONAL GAMMOPATHY: ICD-10-CM

## 2022-11-02 DIAGNOSIS — R91.1 LUNG NODULE, SOLITARY: ICD-10-CM

## 2022-11-02 PROCEDURE — 343N000001 HC RX 343: Performed by: INTERNAL MEDICINE

## 2022-11-02 PROCEDURE — 78815 PET IMAGE W/CT SKULL-THIGH: CPT | Mod: PI

## 2022-11-02 PROCEDURE — A9552 F18 FDG: HCPCS | Performed by: INTERNAL MEDICINE

## 2022-11-02 RX ADMIN — FLUDEOXYGLUCOSE F-18 12.39 MCI.: 500 INJECTION, SOLUTION INTRAVENOUS at 09:16

## 2022-11-08 PROCEDURE — 88311 DECALCIFY TISSUE: CPT | Mod: 26 | Performed by: PATHOLOGY

## 2022-11-08 PROCEDURE — 88161 CYTOPATH SMEAR OTHER SOURCE: CPT | Mod: 26 | Performed by: PATHOLOGY

## 2022-11-08 PROCEDURE — 85097 BONE MARROW INTERPRETATION: CPT | Performed by: PATHOLOGY

## 2022-11-08 PROCEDURE — 88342 IMHCHEM/IMCYTCHM 1ST ANTB: CPT | Mod: 26 | Performed by: PATHOLOGY

## 2022-11-08 PROCEDURE — 85060 BLOOD SMEAR INTERPRETATION: CPT | Performed by: PATHOLOGY

## 2022-11-08 PROCEDURE — 88341 IMHCHEM/IMCYTCHM EA ADD ANTB: CPT | Mod: 26 | Performed by: PATHOLOGY

## 2022-11-08 PROCEDURE — 88313 SPECIAL STAINS GROUP 2: CPT | Mod: 26 | Performed by: PATHOLOGY

## 2022-11-08 PROCEDURE — 88305 TISSUE EXAM BY PATHOLOGIST: CPT | Mod: 26 | Performed by: PATHOLOGY

## 2022-11-14 ENCOUNTER — ONCOLOGY VISIT (OUTPATIENT)
Dept: ONCOLOGY | Facility: OTHER | Age: 77
End: 2022-11-14
Attending: INTERNAL MEDICINE
Payer: COMMERCIAL

## 2022-11-14 VITALS
BODY MASS INDEX: 21.02 KG/M2 | HEIGHT: 63 IN | TEMPERATURE: 96.4 F | OXYGEN SATURATION: 100 % | RESPIRATION RATE: 20 BRPM | SYSTOLIC BLOOD PRESSURE: 128 MMHG | HEART RATE: 80 BPM | DIASTOLIC BLOOD PRESSURE: 78 MMHG | WEIGHT: 118.61 LBS

## 2022-11-14 DIAGNOSIS — R79.89 HIGH SERUM VITAMIN D: ICD-10-CM

## 2022-11-14 DIAGNOSIS — E78.5 HYPERLIPIDEMIA LDL GOAL <70: ICD-10-CM

## 2022-11-14 DIAGNOSIS — D47.2 MONOCLONAL GAMMOPATHY: ICD-10-CM

## 2022-11-14 DIAGNOSIS — E83.50 UNSPECIFIED DISORDER OF CALCIUM METABOLISM: ICD-10-CM

## 2022-11-14 DIAGNOSIS — E04.1 THYROID NODULE: ICD-10-CM

## 2022-11-14 DIAGNOSIS — D64.9 ANEMIA, UNSPECIFIED TYPE: Primary | ICD-10-CM

## 2022-11-14 LAB
ALBUMIN SERPL BCG-MCNC: 3.9 G/DL (ref 3.5–5.2)
ALP SERPL-CCNC: 120 U/L (ref 35–104)
ALT SERPL W P-5'-P-CCNC: 49 U/L (ref 10–35)
ANION GAP SERPL CALCULATED.3IONS-SCNC: 8 MMOL/L (ref 7–15)
AST SERPL W P-5'-P-CCNC: 46 U/L (ref 10–35)
BASOPHILS # BLD AUTO: 0.1 10E3/UL (ref 0–0.2)
BASOPHILS NFR BLD AUTO: 1 %
BILIRUB SERPL-MCNC: 0.3 MG/DL
BUN SERPL-MCNC: 26.3 MG/DL (ref 8–23)
CALCIUM SERPL-MCNC: 9.2 MG/DL (ref 8.8–10.2)
CHLORIDE SERPL-SCNC: 94 MMOL/L (ref 98–107)
CREAT SERPL-MCNC: 1.17 MG/DL (ref 0.51–0.95)
DEPRECATED HCO3 PLAS-SCNC: 25 MMOL/L (ref 22–29)
EOSINOPHIL # BLD AUTO: 0.3 10E3/UL (ref 0–0.7)
EOSINOPHIL NFR BLD AUTO: 6 %
ERYTHROCYTE [DISTWIDTH] IN BLOOD BY AUTOMATED COUNT: 11.2 % (ref 10–15)
FERRITIN SERPL-MCNC: 513 NG/ML (ref 11–328)
GFR SERPL CREATININE-BSD FRML MDRD: 48 ML/MIN/1.73M2
GLUCOSE SERPL-MCNC: 93 MG/DL (ref 70–99)
HCT VFR BLD AUTO: 34.3 % (ref 35–47)
HGB BLD-MCNC: 12.2 G/DL (ref 11.7–15.7)
IMM GRANULOCYTES # BLD: 0 10E3/UL
IMM GRANULOCYTES NFR BLD: 0 %
IRON BINDING CAPACITY (ROCHE): 240 UG/DL (ref 240–430)
IRON SATN MFR SERPL: 33 % (ref 15–46)
IRON SERPL-MCNC: 79 UG/DL (ref 37–145)
LDH SERPL L TO P-CCNC: 217 U/L (ref 0–250)
LYMPHOCYTES # BLD AUTO: 1.2 10E3/UL (ref 0.8–5.3)
LYMPHOCYTES NFR BLD AUTO: 22 %
MCH RBC QN AUTO: 32.1 PG (ref 26.5–33)
MCHC RBC AUTO-ENTMCNC: 35.6 G/DL (ref 31.5–36.5)
MCV RBC AUTO: 90 FL (ref 78–100)
MONOCYTES # BLD AUTO: 0.9 10E3/UL (ref 0–1.3)
MONOCYTES NFR BLD AUTO: 17 %
NEUTROPHILS # BLD AUTO: 3 10E3/UL (ref 1.6–8.3)
NEUTROPHILS NFR BLD AUTO: 54 %
NRBC # BLD AUTO: 0 10E3/UL
NRBC BLD AUTO-RTO: 0 /100
PATH REPORT.ADDENDUM SPEC: NORMAL
PATH REPORT.COMMENTS IMP SPEC: NORMAL
PATH REPORT.COMMENTS IMP SPEC: NORMAL
PATH REPORT.FINAL DX SPEC: NORMAL
PATH REPORT.GROSS SPEC: NORMAL
PATH REPORT.MICROSCOPIC SPEC OTHER STN: NORMAL
PATH REPORT.MICROSCOPIC SPEC OTHER STN: NORMAL
PATH REPORT.RELEVANT HX SPEC: NORMAL
PATH REPORT.SUPPLEMENTAL REPORTS SPEC: NORMAL
PLATELET # BLD AUTO: 229 10E3/UL (ref 150–450)
POTASSIUM SERPL-SCNC: 4.4 MMOL/L (ref 3.4–5.3)
PROT SERPL-MCNC: 7.3 G/DL (ref 6.4–8.3)
RBC # BLD AUTO: 3.8 10E6/UL (ref 3.8–5.2)
SODIUM SERPL-SCNC: 127 MMOL/L (ref 136–145)
TSH SERPL DL<=0.005 MIU/L-ACNC: 2.53 UIU/ML (ref 0.3–4.2)
WBC # BLD AUTO: 5.6 10E3/UL (ref 4–11)

## 2022-11-14 PROCEDURE — 83615 LACTATE (LD) (LDH) ENZYME: CPT | Mod: ZL | Performed by: INTERNAL MEDICINE

## 2022-11-14 PROCEDURE — 99417 PROLNG OP E/M EACH 15 MIN: CPT | Performed by: INTERNAL MEDICINE

## 2022-11-14 PROCEDURE — 82728 ASSAY OF FERRITIN: CPT | Mod: ZL | Performed by: INTERNAL MEDICINE

## 2022-11-14 PROCEDURE — 84443 ASSAY THYROID STIM HORMONE: CPT | Mod: ZL | Performed by: INTERNAL MEDICINE

## 2022-11-14 PROCEDURE — 80061 LIPID PANEL: CPT | Mod: ZL | Performed by: INTERNAL MEDICINE

## 2022-11-14 PROCEDURE — 83550 IRON BINDING TEST: CPT | Mod: ZL | Performed by: INTERNAL MEDICINE

## 2022-11-14 PROCEDURE — 80053 COMPREHEN METABOLIC PANEL: CPT | Mod: ZL | Performed by: INTERNAL MEDICINE

## 2022-11-14 PROCEDURE — 99215 OFFICE O/P EST HI 40 MIN: CPT | Performed by: INTERNAL MEDICINE

## 2022-11-14 PROCEDURE — G0463 HOSPITAL OUTPT CLINIC VISIT: HCPCS

## 2022-11-14 PROCEDURE — 82306 VITAMIN D 25 HYDROXY: CPT | Mod: ZL | Performed by: INTERNAL MEDICINE

## 2022-11-14 PROCEDURE — 36415 COLL VENOUS BLD VENIPUNCTURE: CPT | Mod: ZL | Performed by: INTERNAL MEDICINE

## 2022-11-14 PROCEDURE — 85004 AUTOMATED DIFF WBC COUNT: CPT | Mod: ZL | Performed by: INTERNAL MEDICINE

## 2022-11-14 ASSESSMENT — PAIN SCALES - GENERAL: PAINLEVEL: NO PAIN (0)

## 2022-11-14 NOTE — PROGRESS NOTES
Visit Date: 11/14/2022    HISTORY OF PRESENT ILLNESS:  Mrs. Aburto returns for followup of anemia and elevated ferritin.  We had seen the patient in consultation at the request of Dr. Luz Alcala on 03/26/2022.  At that time, Mrs. Aburto was a 77-year-old white female with history of coronary artery disease, Bowden's esophagus, hypertension, we were asked to evaluate concerning diagnosis of anemia in the setting of elevated ferritin.  The patient most recently had been evaluated in the Emergency Room for hypertension.  She was seen by Dr. Luz Alcala, who increased her losartan dose. As part of the workup, labs were drawn.  Her CBC revealed a white count of 6.7, H and H 10.3 and 29.6, MCV 91, platelet count was 206.  Peripheral blood smear was ordered and revealed the patient had likely anemia of chronic disease.  Iron studies were obtained and ferritin was elevated at 440.  TSH was 2.51.  Vitamin B12 was 800.  Sed rate was elevated at 34.  SERAFIN was negative.  Her major complaint was related to the fact she had increased fatigue throughout the day.  She also had dyspnea on exertion.  She did have heartburn symptoms.  Omeprazole was not helping.  She was diagnosed with Bowden's esophagus without dysplasia after EGD was performed by Dr. Panfilo Arcos approximately a year ago.  There was no family history of hematologic malignancy.  She was a nonsmoker, nondrinker.  She also described lightheadedness when she stands up at times. She has to lie down to help alleviate her symptoms.  She said she had COVID-19 back in 06/2021.  She has a history of basal cell carcinoma as well as squamous cell carcinoma of the skin, had a Mohs procedure done at [facility] with basal cell carcinoma of the temple.  She is currently on Efudex cream.  When we saw the patient, there was no evidence of iron overload.  Nonetheless, we wanted to rule out hemochromatosis by obtaining hemochromatosis DNA mutation performed and was read as the  patient having no evidence of abnormal gene to suggest hemochromatosis. C282Y mutation was normal, H63D mutation was normal.  She was heterozygous for the S65C gene.  That is not usually associated with iron overload or hemochromatosis unless there is a concomitant C282Y mutation.  Given her elevated ferritin, we wanted to rule out occult malignancy by obtaining CT neck, which was read as a multinodular thyroid, measuring less than 1.5 cm.  There was infrahilar right segmental focus measuring 11 x 6.5 mm.  CT chest, abdomen and pelvis revealed lung nodules bilaterally with the largest being 7 mm in the right upper lobe.  She also had an MRI of the brain, which was read as normal brain for age.  We also wanted to rule out underlying monoclonal paraproteinemia.  This came back positive.  The patient had an elevated kappa free light chains with a kappa lambda ratio of 2.0, which was elevated.  Beta 2 microglobulin was also elevated at 3.4.  The patient was having ongoing fatigue, especially after climbing stairs, where she develops spastic neck pain after going up stairs with fatigue associated. She is also being seen by Sleep Medicine to rule out sleep apnea.  She denied tobacco exposure.  She may have been exposed to asbestos as a child.  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  There is no bright red blood per rectum, hematemesis, heartburn. When we saw the patient, we felt the patient had anemia in the setting of elevated ferritin.  No evidence of iron overload. Hemochromatosis DNA mutation analysis was not consistent with hereditary hemochromatosis.  Given her monoclonal gammopathy with elevated kappa free light chains, we wanted to rule out myeloma, amyloid.  We proceeded with bone marrow aspiration biopsy.  This was performed on 10/31/2022.  The patient was found to have a normocellular bone marrow 25% with maturing trilineage hematopoiesis, no morphologic evidence of B cell neoplasm, plasma cell  dyscrasia or myelodysplastic syndrome.  There was increased storage iron.  A Congo red stain was performed.  There was no amyloid deposition noted.  This was all consistent with anemia of chronic disease.  We also ordered a PET scan to rule out myelomatous lesion.  PET scan revealed that the patient had a right thyroid nodule measuring 1.5 cm in dimension.  There was an SUV of 35.8, which is consistent with possible neoplasm.  Otherwise, no other evidence of hypermetabolism in neck, chest, abdomen and pelvis.  She did have a thyroid ultrasound on 10/20/2022, which was read as a 1.3 cm right lower lobe thyroid nodule.  Given these findings, the patient will likely need an FNA of this nodule to rule out malignancy.  Otherwise, the patient says she is feeling much better.  She denies any increasing fatigue.  No shortness of breath, chest pain, bone pain, fevers, night sweats.    PHYSICAL EXAMINATION:    GENERAL:  She is a well-developed, well-nourished elderly white female in no acute distress, ECOG performance status is 0.  VITAL SIGNS:  Reveal blood pressure 130/78, pulse 80, respirations 20, temperature 96.4.  HEENT:  Atraumatic, normocephalic.  Oropharynx nonerythematous.  NECK:  Supple, no thyromegaly.  LUNGS:  Clear to auscultation and percussion.  HEART:  Regular rhythm.  S1, S2 normal.  ABDOMEN:  Soft, normoactive bowel sounds, no masses, nontender.  LYMPHATICS:  No cervical, supraclavicular, axillary or inguinal nodes.  EXTREMITIES:  No edema.  NEUROLOGIC:  Nonfocal.    LABORATORY DATA:  Pending.  Repeat CBC revealed a white count of 5.6, H and H 12.2 and 34.3, platelet count is 229. BUN is 26.3, creatinine 1.17, alkaline phosphatase 120, AST 46, ALT 49.    IMPRESSION AND PLAN:  1.  Monoclonal gammopathy with elevated kappa free light chains.  Bone marrow aspiration biopsy was negative for myeloma or amyloid. Findings were consistent with anemia of chronic disease.  2.  Hypermetabolic right lower lobe thyroid  nodule.  Given these findings, we would like to obtain an FNA biopsy of this right thyroid lung nodule to rule out malignancy versus thyroiditis versus benign.  3.  Elevated ferritin. May be acute inflammatory process due to underlying thyroiditis.  Await results of the biopsy.  We will repeat ferritin.  If ferritin remains elevated, we may proceed with MRI of the abdomen with attention to the liver to absolutely rule out hemochromatosis.  Otherwise, we will see the patient after the above workup.    Eighty minutes was spent with this patient.  Time was spent reviewing bone marrow biopsy results with pathology with Dr. Chacon, reviewing the PET scan results with Radiology, Dr. Eckert; performing history and physical, documenting history and physical, ordering followup labs and scans, and ordering FNA biopsy.    Sheldon Silverman MD        D: 2022   T: 2022   MT: [id number]    Name:     VINICIUS SORIANO  MRN:      [id number]        Account:    [id number]   :      1945           Visit Date: 2022     Document: [id number]    cc:  Luz Alcala MD

## 2022-11-14 NOTE — NURSING NOTE
"Oncology Rooming Note    November 14, 2022 8:33 AM   Maria Teresa Aburto is a 77 year old female who presents for:    Chief Complaint   Patient presents with     Oncology Clinic Visit     Follow up Anemia and Pet Scan and Bone Marrow Biopsy     Initial Vitals: /78   Pulse 80   Temp (!) 96.4  F (35.8  C) (Tympanic)   Resp 20   Ht 1.6 m (5' 3\")   Wt 53.8 kg (118 lb 9.7 oz)   SpO2 100%   BMI 21.01 kg/m   Estimated body mass index is 21.01 kg/m  as calculated from the following:    Height as of this encounter: 1.6 m (5' 3\").    Weight as of this encounter: 53.8 kg (118 lb 9.7 oz). Body surface area is 1.55 meters squared.  No Pain (0) Comment: Data Unavailable   No LMP recorded. Patient is postmenopausal.  Allergies reviewed: Yes  Medications reviewed: Yes    Medications: Medication refills not needed today.  Pharmacy name entered into [facility]: [id number] - [place], [place] - [address]            "

## 2022-11-14 NOTE — PATIENT INSTRUCTIONS
Please call us when you know when your biopsy is so we can get you scheduled back to see Dr. Silverman

## 2022-11-16 ENCOUNTER — MYC MEDICAL ADVICE (OUTPATIENT)
Dept: FAMILY MEDICINE | Facility: OTHER | Age: 77
End: 2022-11-16

## 2022-11-18 ENCOUNTER — OFFICE VISIT (OUTPATIENT)
Dept: FAMILY MEDICINE | Facility: OTHER | Age: 77
End: 2022-11-18
Attending: FAMILY MEDICINE
Payer: COMMERCIAL

## 2022-11-18 VITALS
BODY MASS INDEX: 21.1 KG/M2 | HEART RATE: 80 BPM | SYSTOLIC BLOOD PRESSURE: 130 MMHG | OXYGEN SATURATION: 100 % | TEMPERATURE: 97.1 F | WEIGHT: 119.1 LBS | HEIGHT: 63 IN | DIASTOLIC BLOOD PRESSURE: 62 MMHG

## 2022-11-18 DIAGNOSIS — Z12.31 ENCOUNTER FOR SCREENING MAMMOGRAM FOR BREAST CANCER: ICD-10-CM

## 2022-11-18 DIAGNOSIS — Z78.0 ASYMPTOMATIC MENOPAUSE: ICD-10-CM

## 2022-11-18 DIAGNOSIS — E83.50 UNSPECIFIED DISORDER OF CALCIUM METABOLISM: ICD-10-CM

## 2022-11-18 DIAGNOSIS — E78.5 HYPERLIPIDEMIA LDL GOAL <70: ICD-10-CM

## 2022-11-18 DIAGNOSIS — Z00.00 ENCOUNTER FOR MEDICARE ANNUAL WELLNESS EXAM: Primary | ICD-10-CM

## 2022-11-18 DIAGNOSIS — R79.89 HIGH SERUM VITAMIN D: ICD-10-CM

## 2022-11-18 LAB
CHOLEST SERPL-MCNC: 141 MG/DL
HDLC SERPL-MCNC: 57 MG/DL
LDLC SERPL CALC-MCNC: 73 MG/DL
NONHDLC SERPL-MCNC: 84 MG/DL
TRIGL SERPL-MCNC: 56 MG/DL

## 2022-11-18 PROCEDURE — G0439 PPPS, SUBSEQ VISIT: HCPCS | Performed by: FAMILY MEDICINE

## 2022-11-18 PROCEDURE — 90662 IIV NO PRSV INCREASED AG IM: CPT

## 2022-11-18 ASSESSMENT — ENCOUNTER SYMPTOMS
CHILLS: 0
ARTHRALGIAS: 0
BREAST MASS: 0
HEMATURIA: 0
WEAKNESS: 1
COUGH: 0
SHORTNESS OF BREATH: 0
FREQUENCY: 0
CONSTIPATION: 0
HEADACHES: 0
JOINT SWELLING: 0
PARESTHESIAS: 0
SORE THROAT: 0
PALPITATIONS: 0
MYALGIAS: 0
FEVER: 0
NERVOUS/ANXIOUS: 0
DIARRHEA: 0
NAUSEA: 0
DYSURIA: 0
DIZZINESS: 1
ABDOMINAL PAIN: 0
EYE PAIN: 0
HEARTBURN: 0
HEMATOCHEZIA: 0

## 2022-11-18 ASSESSMENT — PAIN SCALES - GENERAL: PAINLEVEL: NO PAIN (0)

## 2022-11-18 ASSESSMENT — ACTIVITIES OF DAILY LIVING (ADL): CURRENT_FUNCTION: NO ASSISTANCE NEEDED

## 2022-11-18 NOTE — PROGRESS NOTES
"   SUBJECTIVE:   CC: Maria Teresa is an 77 year old who presents for preventive health visit.     Patient has been advised of split billing requirements and indicates understanding: Yes  Healthy Habits:     In general, how would you rate your overall health?  Good    Frequency of exercise:  6-7 days/week    Duration of exercise:  30-45 minutes    Do you usually eat at least 4 servings of fruit and vegetables a day, include whole grains    & fiber and avoid regularly eating high fat or \"junk\" foods?  Yes    Taking medications regularly:  Yes    Medication side effects:  None    Ability to successfully perform activities of daily living:  No assistance needed    Home Safety:  Throw rugs in the hallway and lack of grab bars in the bathroom    Hearing Impairment:  No hearing concerns    In the past 6 months, have you been bothered by leaking of urine? Yes    In general, how would you rate your overall mental or emotional health?  Good      PHQ-2 Total Score: 0    Additional concerns today:  No    Today's PHQ-2 Score:   PHQ-2 (  [employer]) 2022   Q1: Little interest or pleasure in doing things 0   Q2: Feeling down, depressed or hopeless 0   PHQ-2 Score 0   PHQ-2 Total Score (12-17 Years)- Positive if 3 or more points; Administer PHQ-A if positive -   Q1: Little interest or pleasure in doing things Not at all   Q2: Feeling down, depressed or hopeless Not at all   PHQ-2 Score 0       Social History     Tobacco Use     Smoking status: Former     Packs/day: 1.00     Years: 4.00     Pack years: 4.00     Types: Cigarettes     Start date:      Quit date: 1966     Years since quittin.9     Smokeless tobacco: Never   Substance Use Topics     Alcohol use: Yes     If you drink alcohol do you typically have >3 drinks per day or >7 drinks per week? No    Alcohol Use 2022   Prescreen: >3 drinks/day or >7 drinks/week? No   Prescreen: >3 drinks/day or >7 drinks/week? -   No flowsheet data found.    Reviewed orders with " patient.  Reviewed health maintenance and updated orders accordingly - Yes    Cancer Screenings:  Colon - Up to date  Cervical - NA  Breast - Mammo ordered, due in dec  Lung - NA  Prostate -  NA  Skin - Hx of basal cell on chest wall, has annual derm follow up    Immunizations:  Tdap - 2011  Zoster - Shingrix 2020/2021  Influenza - 11/2022  Prevnar - 2017  Pneumovax - 2016 - last ldl 72  COVID - Due for bivalent booster, plans to get next 1-2 mo    Cardiovascular:  Fasting glucose/Hgb A1C:   Cholesterol: on statin - atorvastatin  ASCVD score:     The ASCVD Risk score (Sorin FELDMAN, et al., 2019) failed to calculate for the following reasons:    The patient has a prior MI or stroke diagnosis    Other:  Osteoporosis - 2015 last dexa borderline osteopenia - will update  --Vitamin D - 79 2022    Geriatric Syndromes:  Continence: stable   Cognitive Screening: Normal Mini Cog 7/2022 per insurance exam at her home.   Fall Risk: Low  Advanced Care Plan- Does not have on file    Pertinent mammograms are reviewed under the imaging tab.    Reviewed and updated as needed this visit by clinical staff   Tobacco  Allergies  Meds  Problems  Med Hx  Surg Hx  Fam Hx          Reviewed and updated as needed this visit by Provider   Tobacco  Allergies  Meds  Problems  Med Hx  Surg Hx  Fam Hx             Review of Systems  CONSTITUTIONAL: NEGATIVE for fever, chills, change in weight  INTEGUMENTARY/SKIN: NEGATIVE for worrisome rashes, moles or lesions  EYES: NEGATIVE for vision changes or irritation  ENT: NEGATIVE for ear, mouth and throat problems  RESP: NEGATIVE for significant cough or SOB  BREAST: NEGATIVE for masses, tenderness or discharge  CV: NEGATIVE for chest pain, palpitations or peripheral edema  GI: NEGATIVE for nausea, abdominal pain, heartburn, or change in bowel habits  : NEGATIVE for unusual urinary or vaginal symptoms. No vaginal bleeding.  MUSCULOSKELETAL: NEGATIVE for significant arthralgias or  "myalgia  NEURO: NEGATIVE for weakness, dizziness or paresthesias  PSYCHIATRIC: NEGATIVE for changes in mood or affect      OBJECTIVE:   /62 (BP Location: Left arm, Patient Position: Sitting, Cuff Size: Adult Regular)   Pulse 80   Temp 97.1  F (36.2  C) (Tympanic)   Ht 1.6 m (5' 3\")   Wt 54 kg (119 lb 1.6 oz)   SpO2 100%   BMI 21.10 kg/m    Physical Exam  GENERAL APPEARANCE: healthy, alert and no distress  EYES: Eyes grossly normal to inspection, PERRL and conjunctivae and sclerae normal  HENT: ear canals and TM's normal, nose and mouth without ulcers or lesions, oropharynx clear and oral mucous membranes moist  NECK: no adenopathy, no asymmetry, masses, or scars and thyroid normal to palpation  RESP: lungs clear to auscultation - no rales, rhonchi or wheezes  CV: regular rates and rhythm, normal S1 S2, no S3 or S4, no murmur, click or rub, no peripheral edema and peripheral pulses strong  ABDOMEN: soft, nontender, no hepatosplenomegaly, no masses and bowel sounds normal  MS: no musculoskeletal defects are noted and gait is age appropriate without ataxia  SKIN: no suspicious lesions or rashes  NEURO: Normal strength and tone, sensory exam grossly normal, mentation intact and speech normal  PSYCH: mentation appears normal and affect normal/bright    Diagnostic Test Results:  Labs reviewed in Epic  No results found for any visits on 11/18/22.    ASSESSMENT/PLAN:   Maria Teresa was seen today for physical.    Diagnoses and all orders for this visit:    Encounter for Medicare annual wellness exam  See HCM above    Asymptomatic menopause  -     DX Hip/Pelvis/Spine; Future    Encounter for screening mammogram for breast cancer  -     MA Screen Bilateral w/Gaston; Future    Other orders  -     INFLUENZA, QUAD, HD, PF, 65+ (FLUZONE HD)    Patient has been advised of split billing requirements and indicates understanding: Yes      COUNSELING:  Reviewed preventive health counseling, as reflected in patient " instructions        She reports that she quit smoking about 56 years ago. Her smoking use included cigarettes. She started smoking about 60 years ago. She has a 4.00 pack-year smoking history. She has never used smokeless tobacco.      Luz Alcala MD  [facility]

## 2022-11-18 NOTE — PATIENT INSTRUCTIONS
Patient Education   Personalized Prevention Plan  You are due for the preventive services outlined below.  Your care team is available to assist you in scheduling these services.  If you have already completed any of these items, please share that information with your care team to update in your medical record.  Health Maintenance Due   Topic Date Due     COVID-19 Vaccine (5 - Booster for Moderna series) 08/03/2022     Flu Vaccine (1) 09/01/2022     Annual Wellness Visit  11/08/2022

## 2022-11-20 LAB — DEPRECATED CALCIDIOL+CALCIFEROL SERPL-MC: 54 UG/L (ref 20–75)

## 2022-11-21 ENCOUNTER — OFFICE VISIT (OUTPATIENT)
Dept: SLEEP MEDICINE | Facility: HOSPITAL | Age: 77
End: 2022-11-21
Attending: FAMILY MEDICINE
Payer: COMMERCIAL

## 2022-11-21 DIAGNOSIS — I21.4 NSTEMI (NON-ST ELEVATED MYOCARDIAL INFARCTION) (H): ICD-10-CM

## 2022-11-21 DIAGNOSIS — G47.33 OSA (OBSTRUCTIVE SLEEP APNEA): Primary | ICD-10-CM

## 2022-11-22 ENCOUNTER — DOCUMENTATION ONLY (OUTPATIENT)
Dept: SLEEP MEDICINE | Facility: HOSPITAL | Age: 77
End: 2022-11-22
Attending: FAMILY MEDICINE
Payer: COMMERCIAL

## 2022-11-22 DIAGNOSIS — R06.83 SNORING: ICD-10-CM

## 2022-11-22 DIAGNOSIS — R06.81 APNEA: ICD-10-CM

## 2022-11-22 DIAGNOSIS — G25.81 RESTLESS LEGS SYNDROME (RLS): ICD-10-CM

## 2022-11-22 DIAGNOSIS — I21.4 NSTEMI (NON-ST ELEVATED MYOCARDIAL INFARCTION) (H): ICD-10-CM

## 2022-11-22 PROCEDURE — G0399 HOME SLEEP TEST/TYPE 3 PORTA: HCPCS

## 2022-11-22 PROCEDURE — G0399 HOME SLEEP TEST/TYPE 3 PORTA: HCPCS | Mod: 26

## 2022-11-22 RX ORDER — ATORVASTATIN CALCIUM 40 MG/1
40 TABLET, FILM COATED ORAL AT BEDTIME
Qty: 90 TABLET | Refills: 1 | Status: SHIPPED | OUTPATIENT
Start: 2022-11-22 | End: 2023-05-08

## 2022-11-22 NOTE — PROGRESS NOTES
This HSAT was performed using a Noxturnal T3 device which recorded snore, sound, movement activity, body position, nasal pressure, oronasal thermal airflow, pulse, oximetry and both chest and abdominal respiratory effort. HSAT data was restricted to the time patient states they were in bed.     HSAT was scored using 1B 4% hypopnea rule.     HST AHI (Non-PAT): 17     Snoring was reported as mild.  Time with SpO2 below 89% was 3.4 minutes.   Overall signal quality was good     Pt will follow up with sleep provider to determine appropriate therapy.

## 2022-11-30 LAB
CULTURE HARVEST COMPLETE DATE: NORMAL
CULTURE HARVEST COMPLETE DATE: NORMAL
INTERPRETATION: NORMAL
ISCN: NORMAL
METHODS: NORMAL

## 2022-11-30 PROCEDURE — 88291 CYTO/MOLECULAR REPORT: CPT | Performed by: MEDICAL GENETICS

## 2022-11-30 NOTE — PROCEDURES
"HOME SLEEP STUDY INTERPRETATION        Patient: Maria Teresa Aburto  MRN: [id number]  YOB: 1945  Study Date: 2022  PCP/Referring Provider: Luz Alcala;   Ordering Provider: Wesley Garcia MD, MD         Indications for Home Study: Maria Teresa Aburto is a 77 year old female with a history of vitamin B-12 deficiency, HLD, HTN, NSTEMI, takotsubo cardiomyopathy, RLS, squamous cell carcinoma, normochromic normocytic anemia with elevated ferritin, elevated sed rate and normal TIBC who presents with symptoms suggestive of obstructive sleep apnea.    Estimated body mass index is 21.1 kg/m  as calculated from the following:    Height as of 22: 1.6 m (5' 3\").    Weight as of 22: 54 kg (119 lb 1.6 oz).  Total score - [place]: 8 (10/4/2022  3:09 PM)  STOP-BANGSTOP-BAN/8        Data: A full night home sleep study was performed recording the standard physiologic parameters including body position, movement, sound, nasal pressure, thermal oral airflow, chest and abdominal movements with respiratory inductance plethysmography, and oxygen saturation by pulse oximetry. Pulse rate was estimated by oximetry recording. This study was considered adequate based on > 4 hours of quality oximetry and respiratory recording. As specified by the AASM Manual for the Scoring of Sleep and Associated events, version 2.3, Rule VIII.D 1B, 4% oxygen desaturation scoring for hypopneas is used as a standard of care on all home sleep apnea testing.        Analysis Time:  500.7 minutes        Respiration:   Sleep Associated Hypoxemia: sustained hypoxemia was not present. Average oxygen saturation was 95%.  Time with saturation less than or equal to 88% was 1.9 minutes. The lowest oxygen saturation was 83%.   Snoring: Snoring was present.  Respiratory events: The home study revealed a presence of 87 obstructive apneas and 4 mixed and central apneas. There were 50 hypopneas resulting in a combined apnea/hypopnea " index [AHI] of 17 events per hour.  AHI was 18.2 per hour supine, - per hour prone, 11 per hour on left side, and 0 per hour on right side.   Pattern: Excluding events noted above, respiratory rate and pattern was Normal.      Position: Percent of time spent: supine - 81.5%, prone - 0.1%, on left - 17.5%, on right - 0%.      Heart Rate: By pulse oximetry normal rate was noted.       Assessment:     Moderate obstructive sleep apnea.    Sleep associated hypoxemia was not present.    Recommendations:    Consider auto-CPAP at 5-15 cmH2O, oral appliance therapy or polysomnography with full night PAP titration.    Suggest optimizing sleep hygiene and avoiding sleep deprivation.    Weight management.        Diagnosis Code(s): Obstructive Sleep Apnea G47.33    Wesley Garcia MD, MD, November 30, 2022   Diplomate, American Board of [employer] Medicine, Sleep Medicine

## 2022-12-01 LAB — INTERPRETATION: NORMAL

## 2022-12-02 ENCOUNTER — HOSPITAL ENCOUNTER (OUTPATIENT)
Dept: ULTRASOUND IMAGING | Facility: OTHER | Age: 77
Discharge: HOME | End: 2022-12-02
Attending: INTERNAL MEDICINE | Admitting: INTERNAL MEDICINE
Payer: COMMERCIAL

## 2022-12-02 VITALS
RESPIRATION RATE: 16 BRPM | OXYGEN SATURATION: 100 % | DIASTOLIC BLOOD PRESSURE: 71 MMHG | SYSTOLIC BLOOD PRESSURE: 161 MMHG | HEART RATE: 82 BPM | TEMPERATURE: 96.1 F

## 2022-12-02 DIAGNOSIS — E04.1 THYROID NODULE: ICD-10-CM

## 2022-12-02 PROCEDURE — 250N000009 HC RX 250: Performed by: RADIOLOGY

## 2022-12-02 PROCEDURE — 88173 CYTOPATH EVAL FNA REPORT: CPT

## 2022-12-02 PROCEDURE — 10005 FNA BX W/US GDN 1ST LES: CPT

## 2022-12-02 RX ADMIN — LIDOCAINE HYDROCHLORIDE 10 ML: 10 INJECTION, SOLUTION INFILTRATION; PERINEURAL at 08:37

## 2022-12-02 NOTE — PROGRESS NOTES
Total of 5 samples taken.  Cytology personnel in room: yes  Insertion complications: no  Patient tolerated the procedure:  yes  Bandaide applied:  yes

## 2022-12-02 NOTE — DISCHARGE INSTRUCTIONS
ULTRASOUND GUIDED THYROID BIOPSY    Ultrasound guided thyroid biopsy is a procedure which involves the insertion of a small needle into your thyroid gland to withdraw a small amount of tissue that will be examined by a pathologist. Your doctor will notify you of the results of your biopsy, usually within a few days. Because this procedure requires the insertion of a needle into your thyroid gland, there is a small risk of bleeding, local tenderness and rarely infection.    ACTIVITY: Most patients can return to work the day, however, avoid any vigorous physical activity for 24 hours.    COMFORT: If you experience discomfort or tenderness at the site, you may take  Your usual or recommended pain medication. Do not take aspirin the day of the procedure. You may feel more comfortable lying with your head partially raised. Avoid strain on your neck to support your head while you sit up.    DIET: You may resume your normal diet.    CARE OF BIOPSY SITE: Occasionally, a patient may have a small amount of bleeding from the needle puncture site. If this happens, you should lie down and apply gentle direct pressure to the bleeding site for about 10 minutes. When the bleeding has stopped, apply another clean band-aid.  Keep the bandage on for 24 hours. Then you may remove the bandage and shower. You may put on a clean band-aid or leave it open to air.    RETURN TO AN EMERGENCY ROOM FOR:   * persistent bleeding  * difficulty breathing or neck swelling    CALL YOUR DOCTOR FOR:   For questions, problems or concerns, contact the Radiology Department at [phone].  * a fever over 101 degrees  * Increased redness, increased swelling, and/or persistent drainage or discomfort around the site    It was a privilege caring for you today. I aim to give you the highest quality care, spending time to review your chart to be familiar with your particular health issues. Thank you for allowing me to serve you today. If you have any questions or  concerns, or you have ideas for how I could have improved your care today, please don't hesitate to reach me at my office number Monday - Friday from 8:00 am - 4:30 pm at [phone]. I wish you wellness. It was my pleasure to meet you.     Warmly,       Mary DORSEY RN - [facility] Imaging Department

## 2022-12-05 ENCOUNTER — TELEPHONE (OUTPATIENT)
Dept: ONCOLOGY | Facility: OTHER | Age: 77
End: 2022-12-05

## 2022-12-05 ENCOUNTER — TELEPHONE (OUTPATIENT)
Dept: OTOLARYNGOLOGY | Facility: OTHER | Age: 77
End: 2022-12-05

## 2022-12-05 ENCOUNTER — ONCOLOGY VISIT (OUTPATIENT)
Dept: ONCOLOGY | Facility: OTHER | Age: 77
End: 2022-12-05
Attending: FAMILY MEDICINE
Payer: COMMERCIAL

## 2022-12-05 ENCOUNTER — ALLIED HEALTH/NURSE VISIT (OUTPATIENT)
Dept: FAMILY MEDICINE | Facility: OTHER | Age: 77
End: 2022-12-05
Attending: FAMILY MEDICINE
Payer: COMMERCIAL

## 2022-12-05 VITALS
RESPIRATION RATE: 20 BRPM | BODY MASS INDEX: 21.25 KG/M2 | WEIGHT: 119.93 LBS | DIASTOLIC BLOOD PRESSURE: 70 MMHG | SYSTOLIC BLOOD PRESSURE: 130 MMHG | OXYGEN SATURATION: 100 % | HEIGHT: 63 IN | HEART RATE: 89 BPM | TEMPERATURE: 96.7 F

## 2022-12-05 DIAGNOSIS — E83.19 IRON OVERLOAD: ICD-10-CM

## 2022-12-05 DIAGNOSIS — E04.1 THYROID NODULE: Primary | ICD-10-CM

## 2022-12-05 DIAGNOSIS — D47.2 MONOCLONAL GAMMOPATHY: ICD-10-CM

## 2022-12-05 DIAGNOSIS — D64.9 ANEMIA, UNSPECIFIED TYPE: Primary | ICD-10-CM

## 2022-12-05 DIAGNOSIS — D64.9 ANEMIA, UNSPECIFIED TYPE: ICD-10-CM

## 2022-12-05 LAB
ALBUMIN SERPL BCG-MCNC: 4 G/DL (ref 3.5–5.2)
ALP SERPL-CCNC: 115 U/L (ref 35–104)
ALT SERPL W P-5'-P-CCNC: 43 U/L (ref 10–35)
ANION GAP SERPL CALCULATED.3IONS-SCNC: 9 MMOL/L (ref 7–15)
AST SERPL W P-5'-P-CCNC: 43 U/L (ref 10–35)
BASOPHILS # BLD AUTO: 0.1 10E3/UL (ref 0–0.2)
BASOPHILS NFR BLD AUTO: 1 %
BILIRUB SERPL-MCNC: 0.3 MG/DL
BUN SERPL-MCNC: 22 MG/DL (ref 8–23)
CALCIUM SERPL-MCNC: 9.2 MG/DL (ref 8.8–10.2)
CHLORIDE SERPL-SCNC: 95 MMOL/L (ref 98–107)
CREAT SERPL-MCNC: 0.99 MG/DL (ref 0.51–0.95)
DEPRECATED HCO3 PLAS-SCNC: 23 MMOL/L (ref 22–29)
EOSINOPHIL # BLD AUTO: 0.3 10E3/UL (ref 0–0.7)
EOSINOPHIL NFR BLD AUTO: 5 %
ERYTHROCYTE [DISTWIDTH] IN BLOOD BY AUTOMATED COUNT: 11.4 % (ref 10–15)
FERRITIN SERPL-MCNC: 459 NG/ML (ref 11–328)
GFR SERPL CREATININE-BSD FRML MDRD: 58 ML/MIN/1.73M2
GLUCOSE SERPL-MCNC: 100 MG/DL (ref 70–99)
HCT VFR BLD AUTO: 34.2 % (ref 35–47)
HGB BLD-MCNC: 11.9 G/DL (ref 11.7–15.7)
IMM GRANULOCYTES # BLD: 0 10E3/UL
IMM GRANULOCYTES NFR BLD: 0 %
IRON BINDING CAPACITY (ROCHE): 223 UG/DL (ref 240–430)
IRON SATN MFR SERPL: 40 % (ref 15–46)
IRON SERPL-MCNC: 90 UG/DL (ref 37–145)
LDH SERPL L TO P-CCNC: 220 U/L (ref 0–250)
LYMPHOCYTES # BLD AUTO: 0.9 10E3/UL (ref 0.8–5.3)
LYMPHOCYTES NFR BLD AUTO: 16 %
MCH RBC QN AUTO: 30.9 PG (ref 26.5–33)
MCHC RBC AUTO-ENTMCNC: 34.8 G/DL (ref 31.5–36.5)
MCV RBC AUTO: 89 FL (ref 78–100)
MONOCYTES # BLD AUTO: 0.7 10E3/UL (ref 0–1.3)
MONOCYTES NFR BLD AUTO: 13 %
NEUTROPHILS # BLD AUTO: 3.8 10E3/UL (ref 1.6–8.3)
NEUTROPHILS NFR BLD AUTO: 65 %
NRBC # BLD AUTO: 0 10E3/UL
NRBC BLD AUTO-RTO: 0 /100
PLATELET # BLD AUTO: 202 10E3/UL (ref 150–450)
POTASSIUM SERPL-SCNC: 4.6 MMOL/L (ref 3.4–5.3)
PROT SERPL-MCNC: 7.1 G/DL (ref 6.4–8.3)
RBC # BLD AUTO: 3.85 10E6/UL (ref 3.8–5.2)
SODIUM SERPL-SCNC: 127 MMOL/L (ref 136–145)
TSH SERPL DL<=0.005 MIU/L-ACNC: 2.83 UIU/ML (ref 0.3–4.2)
WBC # BLD AUTO: 5.8 10E3/UL (ref 4–11)

## 2022-12-05 PROCEDURE — 80053 COMPREHEN METABOLIC PANEL: CPT | Mod: ZL | Performed by: INTERNAL MEDICINE

## 2022-12-05 PROCEDURE — 84443 ASSAY THYROID STIM HORMONE: CPT | Mod: ZL | Performed by: INTERNAL MEDICINE

## 2022-12-05 PROCEDURE — 96372 THER/PROPH/DIAG INJ SC/IM: CPT

## 2022-12-05 PROCEDURE — 36415 COLL VENOUS BLD VENIPUNCTURE: CPT | Mod: ZL | Performed by: INTERNAL MEDICINE

## 2022-12-05 PROCEDURE — 85025 COMPLETE CBC W/AUTO DIFF WBC: CPT | Mod: ZL | Performed by: INTERNAL MEDICINE

## 2022-12-05 PROCEDURE — 99417 PROLNG OP E/M EACH 15 MIN: CPT | Performed by: INTERNAL MEDICINE

## 2022-12-05 PROCEDURE — 83615 LACTATE (LD) (LDH) ENZYME: CPT | Mod: ZL | Performed by: INTERNAL MEDICINE

## 2022-12-05 PROCEDURE — G0463 HOSPITAL OUTPT CLINIC VISIT: HCPCS | Mod: 25

## 2022-12-05 PROCEDURE — 99215 OFFICE O/P EST HI 40 MIN: CPT | Performed by: INTERNAL MEDICINE

## 2022-12-05 PROCEDURE — 83550 IRON BINDING TEST: CPT | Mod: ZL | Performed by: INTERNAL MEDICINE

## 2022-12-05 PROCEDURE — G0463 HOSPITAL OUTPT CLINIC VISIT: HCPCS

## 2022-12-05 PROCEDURE — 82728 ASSAY OF FERRITIN: CPT | Mod: ZL | Performed by: INTERNAL MEDICINE

## 2022-12-05 RX ADMIN — CYANOCOBALAMIN 1000 MCG: 1000 INJECTION, SOLUTION INTRAMUSCULAR; SUBCUTANEOUS at 09:03

## 2022-12-05 ASSESSMENT — PAIN SCALES - GENERAL: PAINLEVEL: NO PAIN (0)

## 2022-12-05 NOTE — PROGRESS NOTES
Visit Date: 12/05/2022    HISTORY OF PRESENT ILLNESS:  Ms. Aburto is seen on an emergent basis for followup of anemia, elevated ferritin and thyroid nodule.  We had seen the patient in consultation at the request of Dr. Luz Alcala on 03/26/2022.  At that time, Ms. Aburto was a 77-year-old white female with a history of coronary artery disease, Bowden's esophagus, hypertension, who we were asked to evaluate concerning diagnosis of anemia in the setting of elevated ferritin.  The patient most recently had been evaluated in the Emergency Room for hypertension.  We were asked to evaluate concerning diagnosis of anemia in the setting of elevated ferritin.  The patient most recently had been evaluated in the Emergency Room for hypertension.  She was seen by Dr. Luz Alcala, increased losartan dose was part of the workup, labs were drawn.  Her CBC revealed a white count 6.7, H and H of 10.3 and 29.6, MCV 91, platelet count 206.  Peripheral blood smear was ordered and revealed the patient had likely anemia of chronic disease.  Iron studies were obtained, ferritin was elevated at 440.  TSH was 2.51.  Vitamin B12 was 800.  Sed rate is elevated at 34.  SERAFIN was negative.  Her major complaint related to the fact she had increased fatigue throughout the day.  She also had dyspnea on exertion.  She did have heartburn symptoms.  Omeprazole was not helping.  She was diagnosed with Bowden's esophagus without dysplasia.  FDG was performed by Dr. Panfilo Arcos approximately a year ago.  There was no family history of hematologic malignancy.  She was a nonsmoker, nondrinker.  She also described lightheadedness when she stood up at times, she had to lie down to help alleviate her symptoms.  She said she had COVID-19 back in 06/2021.  She had a history of basal cell carcinoma, as well as squamous cell carcinoma of the skin.  She had a Mohs procedure done, essentially basal cell carcinoma of the temple.  She is currently on  Efudex cream.  There was no evidence of iron overload and wanted to rule out hemochromatosis by obtaining hemochromatosis DNA mutation performed and was read as the patient having no evidence of abnormality suggesting hemochromatosis, C282Y mutation was normal, H63D mutation was normal.  She was heterozygous for the S65C.  This is not usually associated with iron overload or hemochromatosis, unless there was a concomitant C282Y mutation.  Given her elevated ferritin, we wanted to rule out occult malignancy by obtaining CT neck which was read as a multinodular thyroid measuring less than 1.5 cm.  There was an infrahilar right segmental focus measuring 11 x 6.5 mm.  CT chest, abdomen and pelvis revealed lung nodules bilaterally with the largest being 7 mm in the right upper lobe.  She also had an MRI of the brain, which was read as normal brain for her age.  We also wanted to rule out underlying monoclonal paraproteinemia.  This came back positive.  The patient had an elevated kappa free light chains with kappa/lambda ratio 2.0, which was elevated.  Beta 2 microglobulin was also elevated at 2.5.  The patient was having ongoing fatigue, especially after climbing stairs where she developed spastic neck pain after going up stairs with fatigue associated.  She has also been seen by Sleep Medicine to rule out sleep apnea.  She denied tobacco exposure.  She may have been exposed to asbestos as a child.  She also was exposed to DTE, as [place].  There is no bright red blood per rectum, hematemesis, heartburn.  When we saw the patient, we felt the patient had anemia with an elevated ferritin.  No evidence of iron overload.  Hematomas DNA mutation analysis was not consistent with hereditary hemochromatosis.  Given her monoclonal gammopathy with elevated kappa free light chains, we wanted to rule out myeloma, amyloid.  We proceeded with bone marrow aspiration biopsy which was performed on 10/31/2022.  The patient  was found to have a normocellular bone marrow 25% with maturing trilineage hematopoiesis.  No morphologic evidence of cell neoplasm, plasma cell dyscrasia or myelodysplastic syndrome.  There was increased storage iron.  A [place] red stain was performed, there was no amyloid deposition noted.  This was all consistent with anemia of chronic disease.  We also ordered a PET scan to rule out myelomatous lesion.  PET scan revealed the patient had a right thyroid nodule measuring 1.5 cm in dimension.  There was an SUV of 25.8, which was consistent with possible neoplasm.  Otherwise, no other evidence of hypermetabolism in the neck, chest, abdomen and pelvis.  She did have a thyroid ultrasound on 10/20/2022 that was read as a 1.3 cm right lower lobe thyroid nodule.  Given these findings, we elected to proceed with FNA of this thyroid nodule that was performed on 12/02/2022 at [facility] by Dr. Estrella.  Apparently, this suspected nodule was difficult to biopsy, as it was heavily calcified.  According Dr. Estrella, the area adjacent to the nodule sample is still pending.  Apparently, the patient is somewhat frustrated because they could not get the biopsy, biopsy results are still pending.  Nonetheless, they are willing to see ENT to adequately assess this thyroid nodule since it was PET avid. She may need a repeat biopsy .  They would like to consult first with ENT.  Otherwise,   her iron studies and ferritin is still elevated.  She denies any fevers, night sweats, weight loss.  She still has some fatigue, although it is improved.    PHYSICAL EXAMINATION:    GENERAL:  She is an elderly white female in no acute distress, ECOG performance status is 0.  VITAL SIGNS:  Reveal blood pressure 130/70, pulse 89, respirations 20, temperature 96.7.  HEENT:  Atraumatic, normocephalic.  Oropharynx nonerythematous.  NECK:  supple   LUNGS:  Few rhonchi on the right.  HEART:  Regular rhythm.  S1, S2 normal.  ABDOMEN:  Soft,  normoactive bowel sounds.  No mass, no tenderness.  LYMPHATICS:  No axillary, cervical, supraclavicular, axillary or inguinal nodes.  EXTREMITIES:  No edema.  NEUROLOGIC:  Nonfocal.    LABORATORY DATA:  Laboratories reveal CBC with white count 5.8, H and H of 11.9 and 34.2, platelet count is 202.  BUN 22, creatinine 0.99, alkaline phosphatase 115, AST 43, ALT 43.  CBC -- white count 5.8, H and H is 11.9 and 34.3, platelet count 202.    IMPRESSION:  1.  Monoclonal gammopathy with elevated kappa free light chains.  Bone marrow aspiration biopsy was negative for myeloma or amyloid.  Findings were consistent with anemia of chronic disease.  2.  Hypermetabolic right lower lobe thyroid nodule.  FNA biopsy of this right thyroid lobe lung nodule by Radiology was difficult to appreciate; therefore we will refer the patient to Dr. Russo of ENT for possible repeat biopsy.  3.  Elevated ferritin in the setting of elevated LFTs.  We would like to absolutely rule out hemochromatosis by obtaining MRI of the abdomen, attention liver.  This will be done without contrast to assess for iron deposition.    TIME SPENT:  Otherwise, 75 minutes were spent on this patient.  Time was spent reviewing multiple physician labs, discussing the case with Dr. Estrella of Radiology, performing history and physical, making the referral for Dr. Russo and ordering followup labs and scans.    Sheldon Silverman MD        D: 2022   T: 2022   MT: [id number]    Name:     VINICIUS SORIANO  MRN:      [id number]        Account:    [id number]   :      1945           Visit Date: 2022     Document: [id number]    cc:  DO Luz Woodward MD

## 2022-12-05 NOTE — NURSING NOTE
"Oncology Rooming Note    December 5, 2022 9:48 AM   Maria Teresa Aburto is a 77 year old female who presents for:    Chief Complaint   Patient presents with     Oncology Clinic Visit     Follow up Thyroid Nodule and biopsy     Initial Vitals: /70   Pulse 89   Temp (!) 96.7  F (35.9  C) (Tympanic)   Resp 20   Ht 1.6 m (5' 3\")   Wt 54.4 kg (119 lb 14.9 oz)   SpO2 100%   BMI 21.24 kg/m   Estimated body mass index is 21.24 kg/m  as calculated from the following:    Height as of this encounter: 1.6 m (5' 3\").    Weight as of this encounter: 54.4 kg (119 lb 14.9 oz). Body surface area is 1.56 meters squared.  No Pain (0) Comment: Data Unavailable   No LMP recorded. Patient is postmenopausal.  Allergies reviewed: Yes  Medications reviewed: Yes    Medications: Medication refills not needed today.  Pharmacy name entered into [facility]: [id number] - [id number] - [address]            "

## 2022-12-05 NOTE — TELEPHONE ENCOUNTER
Per Dr. Silverman, this patient needs to be seen for a right thyroid nodule. Please advise below.

## 2022-12-05 NOTE — PATIENT INSTRUCTIONS
ENT Referral placed and will have labs today and will schedule MRI Abdominal and then see DR Silverman to go over MRI and labs

## 2022-12-05 NOTE — TELEPHONE ENCOUNTER
Pt spouse areli called asking if pt can come in to see provatas still. Advised Bx is not back yet, spouse states that bx was not successful anyways so they still want to speak with him. Offered phone visit per miguel but pt is not there so advised cannot make phone visit without pt, spouse stated that he will go and get pt to bring her in, spoke with Gege and miguel and he stated he will order CT but this would need to be discussed with the pt also. Advised spouse that pt could come in per provatas to speak about ordering CT

## 2022-12-05 NOTE — TELEPHONE ENCOUNTER
Received a call from Dr Remy, he saw patient for elevated ferritin.  PET scan lit up in right thyroid nodule.  Dr Camilo attempted FNA but reported that the nodule was very hard and difficult to obtain FNA.  He thinks the tissue sample was from adjacent tissue.  FNA results pending.  Dr Remy requests that consultation with you.  JS

## 2022-12-06 LAB — PATH REPORT.FINAL DX SPEC: NORMAL

## 2022-12-06 NOTE — PROGRESS NOTES
Maria Teresa is a 77 year old who is being evaluated via a billable telephone visit.      What phone number would you like to be contacted at? [phone]  How would you like to obtain your AVS? [employer]  Phone call duration: 25 minutesMaria Teresa Aburto is a 77 year old female who is being evaluated via a billable telephone visit.  Cat Louis       What phone number would you like to be contacted at?@ [phone]  Home Phone [phone]   Work Phone Not on file.   Mobile [phone]       How would you like to obtain your AVS? [employer]       Telephone Virtual Visit Details     Type of service:  Telephone Virtual Visit     Start Time: 0930  End Time: 9:50 AM    Virtual visit for review of home sleep testing results.     A/P:     1.)  Moderate MARISSA (AHI 17) without sleep-associated hypoxemia  - Co-morbid HTN, CAD  -Overall, my concerns that this level of moderate obstructive sleep apnea is having significant cardiovascular risk is lower, especially with the very normal SPO2 during the night.  - I will reach out to Dr Alcala and Dr. Silverman to see if they would have increased clinical concern for this level of sleep apnea, otherwise I would feel comfortable with monitoring at for now without specific treatment.     2.)  RLS  - Appears to be controlled with currently prescribed pramipexole 0.25mg at bedtime  - We discussed considering moving her pramipexole dose slightly earlier, aiming for 1-2 hours before bedtime.  - Will assess at clinic visit, likely recommend optimal timing for medication 1-2 hours before bed  - Ferritin mildly elevated, working with Dr. Silverman    SUBJECTIVE:  Maria Teresa Aburto is a 77 year old female.    77 year old yo female who is referred for concern for sleep-disordered breathing.    Pertinent PMHx of vitamin B-12 deficiency, HLD, HTN, NSTEMI, takotsubo cardiomyopathy, RLS, squamous cell carcinoma, normochromic normocytic anemia with elevated ferritin, elevated sed rate and normal  "TIBC.     Echo 9/12/2022 with LVEF 55-60%, grade 1 diastolic dysfunction, RVSP 23.1 mmHg + RA.     Reviewed her recent visit with Dr. Alcala on 9/16/2022.  RLS improved with pramipexole 0.25-0.5mg at bedtime.  Recommendation for sleep testing.     Today -we reviewed her home sleep test results in detail.  She feels that her sleep continues to be dramatically improved since the start of her pramipexole.  She has continued on 0.25 mg dose, and seems to take it roughly 30 min before bed, which is usually between 9-9:30 PM.  While infrequent, she has noticed some similar restless leg type feelings as early as 730-8 PM.    STOP-BANG score of 4, with unknown neck circumference.  [place] score of 10-11.  BMI of Estimated body mass index is 20.93 kg/m  as calculated from the following:    Height as of 9/26/22: 1.6 m (5' 3\").    Weight as of 9/26/22: 53.6 kg (118 lb 2.7 oz).      Per questionnaire: \"Dr. Alcala recommends sleep study to see if there is any connection to some of my health issues\"     Caffeine use:  No for 3+ per day.  No for within 6 hours of bed.     Tobacco use: No     Sleep pattern:  9:30pm - 6am, total sleep time 7-8 hours.  Time to fall asleep: ~5 minutes.  Awakenings: 2 times per night, 5 minutes to return to sleep, awake for total of 10 minutes per night.  Napping.  0 days per week, - hours per nap.     Yes for RLS screen.  No for sleep walking.  No for dream enactment behavior.  No for bruxism.     No for morning headaches.  Yes for snoring.  Yes for observed apnea.  Yes for FHx of MARISSA.     SHx:  , lives with .     Insomnia Severity Index    Difficulty falling asleep 1    Difficulty staying asleep 2    Problems waking up too early 3    How SATISFIED/DISSATISFIED are you with your CURRENT sleep pattern? 2    How NOTICEABLE to others do you think your sleep problem is in terms of impairing the quality of your life? 1    How WORRIED/DISTRESSED are you about your current sleep problem? 0  " "  To what extent do you consider your sleep problem to INTERFERE with your daily functioning (e.g. daytime fatigue, mood, ability to function at work/daily chores, concentration, memory, mood, etc.) CURRENTLY? 1    Total Score 10      HOME SLEEP STUDY INTERPRETATION           Patient: Maria Teresa Aburto  MRN: [id number]  YOB: 1945  Study Date: 2022  PCP/Referring Provider: Luz Alcala;   Ordering Provider: Wesley Garcia MD, MD           Indications for Home Study: Maria Teresa Aburto is a 77 year old female with a history of vitamin B-12 deficiency, HLD, HTN, NSTEMI, takotsubo cardiomyopathy, RLS, squamous cell carcinoma, normochromic normocytic anemia with elevated ferritin, elevated sed rate and normal TIBC who presents with symptoms suggestive of obstructive sleep apnea.     Estimated body mass index is 21.1 kg/m  as calculated from the following:    Height as of 22: 1.6 m (5' 3\").    Weight as of 22: 54 kg (119 lb 1.6 oz).  Total score - [place]: 8 (10/4/2022  3:09 PM)  STOP-BANGSTOP-BAN/8           Data: A full night home sleep study was performed recording the standard physiologic parameters including body position, movement, sound, nasal pressure, thermal oral airflow, chest and abdominal movements with respiratory inductance plethysmography, and oxygen saturation by pulse oximetry. Pulse rate was estimated by oximetry recording. This study was considered adequate based on > 4 hours of quality oximetry and respiratory recording. As specified by the AASM Manual for the Scoring of Sleep and Associated events, version 2.3, Rule VIII.D 1B, 4% oxygen desaturation scoring for hypopneas is used as a standard of care on all home sleep apnea testing.     Analysis Time:  500.7 minutes     Respiration:   Sleep Associated Hypoxemia: sustained hypoxemia was not present. Average oxygen saturation was 95%.  Time with saturation less than or equal to 88% was 1.9 minutes. The " lowest oxygen saturation was 83%.   Snoring: Snoring was present.  Respiratory events: The home study revealed a presence of 87 obstructive apneas and 4 mixed and central apneas. There were 50 hypopneas resulting in a combined apnea/hypopnea index [AHI] of 17 events per hour.  AHI was 18.2 per hour supine, - per hour prone, 11 per hour on left side, and 0 per hour on right side.   Pattern: Excluding events noted above, respiratory rate and pattern was Normal.     Position: Percent of time spent: supine - 81.5%, prone - 0.1%, on left - 17.5%, on right - 0%.     Heart Rate: By pulse oximetry normal rate was noted.         Assessment:     Moderate obstructive sleep apnea.    Sleep associated hypoxemia was not present.     Recommendations:    Consider auto-CPAP at 5-15 cmH2O, oral appliance therapy or polysomnography with full night PAP titration.    Suggest optimizing sleep hygiene and avoiding sleep deprivation.    Weight management.           Diagnosis Code(s): Obstructive Sleep Apnea G47.33     Wesley Garcia MD, MD, November 30, 2022   Diplomate, American Board of Family Medicine, Sleep Medicine    Past medical history:    Patient Active Problem List    Diagnosis Date Noted     Hyperlipidemia LDL goal <70 05/04/2021     Priority: Medium     ACE-inhibitor cough 11/03/2020     Priority: Medium     NSTEMI (non-ST elevated myocardial infarction) (H) 09/28/2020     Priority: Medium     With Takotsubo cardiomyopathy  Cardiac cath, small lesion of diagonal not requiring stenting  Dr Crystal Sandoval Vakil       Takotsubo cardiomyopathy 09/22/2020     Priority: Medium     Episode of high BP noted on exam with no other symptoms  Elevated troponin  Cardiac cath, small lesion of  small diagonal, not requiring stenting  Crystal Sandoval, cardiology       S/P right rotator cuff repair 09/16/2019     Priority: Medium     Vitamin B12 deficiency (non anemic) 09/16/2019     Priority: Medium     Nontraumatic  incomplete tear of right rotator cuff 07/31/2019     Priority: Medium     Added automatically from request for surgery [id number]       Nontraumatic complete tear of right rotator cuff 07/31/2019     Priority: Medium     Added automatically from request for surgery [id number]       Actinic keratosis 07/17/2012     Priority: Medium     History of malignant neoplasm of skin 07/17/2012     Priority: Medium     Atypical nevus 07/17/2012     Priority: Medium     History of nonmelanoma skin cancer 07/17/2012     Priority: Medium     Overview:   SCC right leg 2001       Medial meniscus tear 02/13/2012     Priority: Medium     Overview:   IMO Update 10/11       Restless legs syndrome (RLS) 10/11/2011     Priority: Medium     Bowden's esophagus 10/11/2011     Priority: Medium     B-complex deficiency 10/11/2011     Priority: Medium     Problem list name updated by automated process. Provider to review       Disorder of bone and cartilage 10/11/2011     Priority: Medium     dexa scans odd years starting in 2003  Problem list name updated by automated process. Provider to review       Squamous Cell Carcinoma 10/11/2011     Priority: Medium     left leg x2       GERD (gastroesophageal reflux disease)[530.81] 05/11/2003     Priority: Medium       10 point ROS of systems including Constitutional, Eyes, Respiratory, Cardiovascular, Gastroenterology, Genitourinary, Integumentary, Muscularskeletal, Psychiatric were all negative except for pertinent positives noted in my HPI.    Current Outpatient Medications   Medication Sig Dispense Refill     ASPIRIN CAPS 81 MG OR one capsule by mouth daily 100 3     atorvastatin (LIPITOR) 40 MG tablet TAKE 1 TABLET (40 MG) BY MOUTH AT BEDTIME 90 tablet 1     Calcium Carbonate-Vitamin D (CALCIUM + D PO)        Cyanocobalamin (VITAMIN B 12 PO) Injection [id number]       losartan (COZAAR) 50 MG tablet Take 1 tablet (50 mg) by mouth daily 90 tablet 3     multivitamin w/minerals (THERA-VIT-M) tablet Take 1  tablet by mouth daily       omeprazole (PRILOSEC) 40 MG DR capsule TAKE 1 CAPSULE BY MOUTH DAILY BY MOUTH 90 capsule 3     pramipexole (MIRAPEX) 0.25 MG tablet TAKE 1-2 TABLETS (0.25-0.5 MG) BY MOUTH AT BEDTIME 90 tablet 1     Vitamin D, Cholecalciferol, 25 MCG (1000 UT) TABS          OBJECTIVE:  There were no vitals taken for this visit.    Physical Exam:  healthy, alert and no distress  PSYCH: Alert and oriented times 3; coherent speech, normal   rate and volume, able to articulate logical thoughts, able   to abstract reason, no tangential thoughts, no hallucinations   or delusions  His affect is normal  RESP: No cough, no audible wheezing, able to talk in full sentences  Remainder of exam unable to be completed due to telephone visits    ---  This note was written with the assistance of the Dragon voice-dictation technology software. The final document, although reviewed, may contain errors. For corrections, please contact the office.    Wesley Garcia MD    Sleep Medicine    [facility]  - [place], [place]  o Main Office: [phone]    [facility] [place], [place]  o [address], [address]  o Schedule visits: [phone]  o Main Office: [phone]  o Fax: [phone]    Time spent on the date of service:  25 minutes.

## 2022-12-06 NOTE — PROGRESS NOTES
Otolaryngology Consultation    Patient: Maria Teresa Aburto  : 1945    Patient presents with:  Consult: Thyroid Nodule; Referred by Dr. Silverman      HPI:  Maria Teresa Aburto is a 77 year old female seen today at the request of Luz Alcala  for evaluation of a thyroid nodule.    This nodule was found incidentally in a work up for anemia.   She is seeing Dr. Youngblood for anemia.  PET scan was ordered to rule myelomatous lesion and showed a 1.5 cm right thyroid nodule SUV 25.8.  No concerning hypermetabolism or cervical lymphadenopathy in the neck      The patient denies dysphonia, dysphagia or any other compressive symptoms.    There is no family history of thyroid cancer, and no personal history of head or neck irradiation.    There is a significant family history of thyroid disease including a daughter with Graves'    The thyroid ultrasound was dated 10/20/2022 was reviewed.  The dominant nodule(s) measures 1.3 cm RIGHT lower lobe nodule  The nodule is densely calcified and bilobed.  Radiologist felt the follow-up ultrasound was of little benefit to follow the calcified nodule and recommended follow-up MRI of the neck  The nodule is calcified and was not able to be adequately biopsied on attempted fine-needle aspiration at [facility] on 22 was benign colloid nodule.  However, Dr. Marion in radiology felt the biopsy may have been from adjacent thyroid tissue and not representative of the nodule due to severity of calcifications.    CT neck 10/5/22 negative for cervical lymphadenopathy      CT neck dated 10/5/2022 shows a calcified lobulated right thyroid mass measuring 1.3 cm in the setting of a right multinodular goiter  No substernal mass  no cartilage invasion in the trachea is grossly midline there is no cervical lymphadenopathy or mediastinal lymphadenopathy    PET scan 22     Labs 22:  TSH 2.83.    Calcium 9.2    The patient denies tremor, hair loss or weight loss.     She notes  bilateral tinnitus.  This is not overly bothersome.  She denies hearing loss fluctuating hearing loss or chronic vertigo    Accompanied by Golden,     Hematology impression, Dr. Silverman:  1.  Monoclonal gammopathy with elevated kappa free light chains.  Bone marrow aspiration biopsy was negative for myeloma or amyloid.  Findings consistent with anemia of chronic disease  2.  Elevated ferritin in the setting of elevated LFTs.  Rule out hemochromatosis.  MRI abdomen attention liver.  Liver biopsy pending    Current Outpatient Rx   Medication Sig Dispense Refill     ASPIRIN CAPS 81 MG OR one capsule by mouth daily 100 3     atorvastatin (LIPITOR) 40 MG tablet TAKE 1 TABLET (40 MG) BY MOUTH AT BEDTIME 90 tablet 1     Calcium Carbonate-Vitamin D (CALCIUM + D PO)        Cyanocobalamin (VITAMIN B 12 PO) Injection [id number]       losartan (COZAAR) 50 MG tablet Take 1 tablet (50 mg) by mouth daily 90 tablet 3     multivitamin w/minerals (THERA-VIT-M) tablet Take 1 tablet by mouth daily       omeprazole (PRILOSEC) 40 MG DR capsule TAKE 1 CAPSULE BY MOUTH DAILY BY MOUTH 90 capsule 3     pramipexole (MIRAPEX) 0.25 MG tablet TAKE 1-2 TABLETS (0.25-0.5 MG) BY MOUTH AT BEDTIME 90 tablet 1     Vitamin D, Cholecalciferol, 25 MCG (1000 UT) TABS          Allergies: Nitrofurantoin and Nitrofurantoin macrocrystal     Past Medical History:   Diagnosis Date     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     Bowden's esophagus 10/11/2011     Constipation 10/09/2012     GERD (gastroesophageal reflux disease)[530.81] 05/11/2003     Hypertension      NSTEMI (non-ST elevated myocardial infarction) (H) 09/28/2020    With Takotsubo cardiomyopathy Cardiac cath, small lesion of diagonal not requiring stenting Dr Crystal Sandoval Vakil     Osteopenia 10/11/2011    dexa scans odd years starting in 2003     Precordial pain 04/28/2003    negative stress test, negative pulmonary evaluatio     Restless legs syndrome (RLS) 10/11/2011     Squamous Cell Carcinoma  10/11/2011    left leg x2     Takotsubo cardiomyopathy 2020    Episode of high BP noted on exam with no other symptoms Elevated troponin Cardiac cath, small lesion of  small diagonal, not requiring stenting Crystal Sandoval, cardiology     Urge incontinence 10/09/2012       Past Surgical History:   Procedure Laterality Date     BONE MARROW BIOPSY, BONE SPECIMEN, NEEDLE/TROCAR N/A 10/31/2022    Procedure: BIOPSY, BONE MARROW WITH ASPIRATION;  Surgeon: Carlos Recinos DO;  Location: [facility] OR     Breast Biospy  2000    LEFT > Fibrocystic Disease > Outcome: Fibroadenoma     COLONOSCOPY       COLONOSCOPY  2009    Normal, Repeat      DEXA Scan       EGD       EGD       EGD       ENDOSCOPY UPPER, COLONOSCOPY, COMBINED N/A 2018    Procedure: COMBINED ENDOSCOPY UPPER, COLONOSCOPY;  UPPER ENDOSCOPY WITH BIOPSIES AND COLONOSCOPY WITH BIOPSIES;  Surgeon: Minh Interiano DO;  Location: [facility] OR     ESOPHAGOSCOPY, GASTROSCOPY, DUODENOSCOPY (EGD), COMBINED N/A 10/29/2021    Procedure: Upper endoscopy with biopsies;  Surgeon: Panfilo Arcos MD;  Location: [facility] OR     Knee Replacement       Oophorectomy      Ectopic Pregnancy     ROTATOR CUFF REPAIR RT/LT Right     with acromioplasty for complete rotator cuff tear, Dr Cherry     TONSILLECTOMY         ENT family history reviewed    Social History     Tobacco Use     Smoking status: Former     Packs/day: 1.00     Years: 4.00     Pack years: 4.00     Types: Cigarettes     Start date:      Quit date:      Years since quittin.9     Smokeless tobacco: Never   Substance Use Topics     Alcohol use: Yes     Drug use: Never       Review of Systems  ROS: 10 point ROS neg other than the symptoms noted above in the HPI and heartburn, tinnitus, occasional weakness    Physical Exam  /78 (BP Location: Left arm, Patient Position: Sitting, Cuff Size: Adult Regular)   Pulse 81   Temp 97.1  F (36.2  C) (Tympanic)   Ht 1.6 m  "(5' 3\")   Wt 54.4 kg (119 lb 14.9 oz)   SpO2 98%   BMI 21.24 kg/m      General - The patient is well nourished and well developed, and appears to have good nutritional status.  Alert and oriented to person and place, answers questions and cooperates with examination appropriately.   Head and Face - Normocephalic and atraumatic, with no gross asymmetry noted.  The facial nerve is intact, with strong symmetric movements.  Voice and Breathing - The patient was breathing comfortably without the use of accessory muscles. There was no wheezing, stridor, or stertor.  The patients voice was clear and strong, and had appropriate pitch and quality.  Ears -The external auditory canals are patent, the tympanic membranes are intact without effusion, retraction or mass.  Bony landmarks are intact.  Eyes - Extraocular movements intact, and the pupils were reactive to light.  Sclera were not icteric or injected, conjunctiva were pink and moist.  Mouth - Examination of the oral cavity showed pink, healthy oral mucosa. No lesions or ulcerations noted.  The tongue was mobile and midline, and the dentition were in good condition.    Throat - The walls of the oropharynx were smooth, pink, moist, symmetric, and had no lesions or ulcerations.  The tonsillar pillars and soft palate were symmetric.  The uvula was midline on elevation.  Fossa clear   neck - Normal midline excursion of the laryngotracheal complex during swallowing.  Full range of motion on passive movement.  Palpation of the occipital, submental, submandibular, internal jugular chain, and supraclavicular nodes did not demonstrate any abnormal lymph nodes or masses.  There is a firm 2 cm right thyroid nodule.  Mildly tender to palpation and grossly normal.  The trachea was mobile and midline.  Nose - External contour is symmetric, no gross deflection or scars.  Nasal mucosa is pink and moist with no abnormal mucus.  The septum was intact, turbinates of normal size and " position.  No polyps, masses, or purulence noted on examination.    Attempts at mirror laryngoscopy were not possible due to gag reflex.  Therefore I proceeded with a fiberoptic examination after informed consent.  First I sprayed both sides of the nose with a mixture of lidocaine and neosynephrine.  I then passed the scope through the nasal cavity.  The nasal cavity was unremarkable.  The nasopharynx was mucosally covered and symmetric.  The eustachian tube openings were unobstructed.  Going further, the base of tongue was free of lesions, and the vallecula was open.  The epiglottis was smooth and mucosally covered.  The supraglottic larynx was then clearly visualized.  The vocal cords moved smoothly and symmetrically.  The pyriform sinuses were open and without juliette mass or pooling of secretions, and the limited view of the postcricoid region did not show any lesions.  The patient tolerated the procedure well.      Impression and Plan- Maria Teresa Aburto is a 77 year old female with:    ICD-10-CM    1. Right thyroid nodule  E04.1 MR Soft Tissue Neck w/o & w Contrast      2. Tinnitus, bilateral  H93.13         Options of surveillance vs right lobectomy for diagnostic purposes discussed.    Fine-needle aspiration is difficult in heavily calcified nodules.  I discussed that there is a high likelihood that the recent FNA was actually from adjacent thyroid tissue and not the nodule itself.    MRI neck to follow right hypermetabolic thyroid nodule. Scheduled in May after they return from [place].  See Chen or Leyla after    Consider endocrine consult [facility]/virtual, they decline at this point      The remainder of today's visit was spent discussing the options in this situation.  Also, I educated the patient about how common thyroid nodules are, and the fact that there is actually a low probability of this being a malignancy.  We also discussed the importance of follow up due to the possibility of false negatives with  testing.      Risks and complications or RIGHT thyroid lobectomy, frozens, possible total were discussed including anesthesia, bleeding, carotid or other major vascular injury, infection, injury to the recurrent laryngeal nerve and resulting hoarseness or change in voice, calcium metabolism problems, scar formation: hypertrophic or keloid, benign versus malignant pathology and need for further surgery.   I told Maria Teresa and her  she may need a second surgery depending on the final pathology for completion thyroidectomy  All questions were answered and wishes are proceed with surgery.    Thyroid anatomy was discussed, and thyroid surgery was briefly discussed.  At this point we need more information to decide whether or not surgery may be necessary.    Tinnitus education was provided.  Tinnitus is widely considered a disorder of cental auditory processing.    Hearing preservation was reinforced  I also cautioned the patient against investing in any oral supplements advertised to cure tinnitus.     I have also recommended yearly audiograms, masking devices or apps.  For worsening symptoms, I recommend online or in person cognitive behavioral therapy (CBT) referral.     The patient will follow up as necessary for worsening symptoms or changes in symptoms.          Damaris Russo D.O.  Otolaryngology/Head and Neck Surgery  Allergy

## 2022-12-07 ENCOUNTER — OFFICE VISIT (OUTPATIENT)
Dept: OTOLARYNGOLOGY | Facility: OTHER | Age: 77
End: 2022-12-07
Attending: OTOLARYNGOLOGY
Payer: COMMERCIAL

## 2022-12-07 VITALS
HEART RATE: 81 BPM | TEMPERATURE: 97.1 F | OXYGEN SATURATION: 98 % | DIASTOLIC BLOOD PRESSURE: 78 MMHG | BODY MASS INDEX: 21.25 KG/M2 | HEIGHT: 63 IN | SYSTOLIC BLOOD PRESSURE: 131 MMHG | WEIGHT: 119.93 LBS

## 2022-12-07 DIAGNOSIS — H93.13 TINNITUS, BILATERAL: ICD-10-CM

## 2022-12-07 DIAGNOSIS — E04.1 RIGHT THYROID NODULE: Primary | ICD-10-CM

## 2022-12-07 PROCEDURE — 99214 OFFICE O/P EST MOD 30 MIN: CPT | Mod: 25 | Performed by: OTOLARYNGOLOGY

## 2022-12-07 PROCEDURE — G0463 HOSPITAL OUTPT CLINIC VISIT: HCPCS | Mod: 25 | Performed by: COUNSELOR

## 2022-12-07 PROCEDURE — 31575 DIAGNOSTIC LARYNGOSCOPY: CPT | Performed by: OTOLARYNGOLOGY

## 2022-12-07 ASSESSMENT — PAIN SCALES - GENERAL: PAINLEVEL: NO PAIN (0)

## 2022-12-07 NOTE — PATIENT INSTRUCTIONS
Thank you for allowing Dr. Russo and our ENT team to participate in your care.  If your medications are too expensive, please give the nurse a call.  We can possibly change this medication.  If you have a scheduling or an appointment question please contact our Health Unit Coordinator at their direct line [phone].   ALL nursing questions or concerns can be directed to your ENT nurse at: [phone] - Cat    Complete Neck MRI

## 2022-12-07 NOTE — LETTER
2022         RE: Maria Teresa Aburto  [address]  [place] [address]        Dear Colleague,    Thank you for referring your patient, Maria Teresa Aburto, [facility]. Please see a copy of my visit note below.      Otolaryngology Consultation    Patient: Maria Teresa Aburto  : 1945    Patient presents with:  Consult: Thyroid Nodule; Referred by Dr. Silverman      HPI:  Maria Teresa Aburto is a 77 year old female seen today at the request of Luz Alcala  for evaluation of a thyroid nodule.    This nodule was found incidentally in a work up for anemia.   She is seeing Dr. Youngblood for anemia.  PET scan was ordered to rule myelomatous lesion and showed a 1.5 cm right thyroid nodule SUV 25.8.  No concerning hypermetabolism or cervical lymphadenopathy in the neck      The patient denies dysphonia, dysphagia or any other compressive symptoms.    There is no family history of thyroid cancer, and no personal history of head or neck irradiation.    There is a significant family history of thyroid disease including a daughter with Graves'    The thyroid ultrasound was dated 10/20/2022 was reviewed.  The dominant nodule(s) measures 1.3 cm RIGHT lower lobe nodule  The nodule is densely calcified and bilobed.  Radiologist felt the follow-up ultrasound was of little benefit to follow the calcified nodule and recommended follow-up MRI of the neck  The nodule is calcified and was not able to be adequately biopsied on attempted fine-needle aspiration at [facility] on 22 was benign colloid nodule.  However, Dr. Marion in radiology felt the biopsy may have been from adjacent thyroid tissue and not representative of the nodule due to severity of calcifications.    CT neck 10/5/22 negative for cervical lymphadenopathy      CT neck dated 10/5/2022 shows a calcified lobulated right thyroid mass measuring 1.3 cm in the setting of a right multinodular goiter  No substernal mass  no cartilage  invasion in the trachea is grossly midline there is no cervical lymphadenopathy or mediastinal lymphadenopathy    PET scan 11/2/22     Labs 12/5/22:  TSH 2.83.    Calcium 9.2    The patient denies tremor, hair loss or weight loss.     She notes bilateral tinnitus.  This is not overly bothersome.  She denies hearing loss fluctuating hearing loss or chronic vertigo    Accompanied by Golden,     Hematology impression, Dr. Silverman:  1.  Monoclonal gammopathy with elevated kappa free light chains.  Bone marrow aspiration biopsy was negative for myeloma or amyloid.  Findings consistent with anemia of chronic disease  2.  Elevated ferritin in the setting of elevated LFTs.  Rule out hemochromatosis.  MRI abdomen attention liver.  Liver biopsy pending    Current Outpatient Rx   Medication Sig Dispense Refill     ASPIRIN CAPS 81 MG OR one capsule by mouth daily 100 3     atorvastatin (LIPITOR) 40 MG tablet TAKE 1 TABLET (40 MG) BY MOUTH AT BEDTIME 90 tablet 1     Calcium Carbonate-Vitamin D (CALCIUM + D PO)        Cyanocobalamin (VITAMIN B 12 PO) Injection [id number]       losartan (COZAAR) 50 MG tablet Take 1 tablet (50 mg) by mouth daily 90 tablet 3     multivitamin w/minerals (THERA-VIT-M) tablet Take 1 tablet by mouth daily       omeprazole (PRILOSEC) 40 MG DR capsule TAKE 1 CAPSULE BY MOUTH DAILY BY MOUTH 90 capsule 3     pramipexole (MIRAPEX) 0.25 MG tablet TAKE 1-2 TABLETS (0.25-0.5 MG) BY MOUTH AT BEDTIME 90 tablet 1     Vitamin D, Cholecalciferol, 25 MCG (1000 UT) TABS          Allergies: Nitrofurantoin and Nitrofurantoin macrocrystal     Past Medical History:   Diagnosis Date     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     Bowden's esophagus 10/11/2011     Constipation 10/09/2012     GERD (gastroesophageal reflux disease)[530.81] 05/11/2003     Hypertension      NSTEMI (non-ST elevated myocardial infarction) (H) 09/28/2020    With Takotsubo cardiomyopathy Cardiac cath, small lesion of diagonal not requiring stenting St  Dr Crystal Sarabia Vakil     Osteopenia 10/11/2011    dexa scans odd years starting in      Precordial pain 2003    negative stress test, negative pulmonary evaluatio     Restless legs syndrome (RLS) 10/11/2011     Squamous Cell Carcinoma 10/11/2011    left leg x2     Takotsubo cardiomyopathy 2020    Episode of high BP noted on exam with no other symptoms Elevated troponin Cardiac cath, small lesion of  small diagonal, not requiring stenting Crystal Sandoval, cardiology     Urge incontinence 10/09/2012       Past Surgical History:   Procedure Laterality Date     BONE MARROW BIOPSY, BONE SPECIMEN, NEEDLE/TROCAR N/A 10/31/2022    Procedure: BIOPSY, BONE MARROW WITH ASPIRATION;  Surgeon: Carlos Recinos DO;  Location: [facility] OR     Breast Biospy  2000    LEFT > Fibrocystic Disease > Outcome: Fibroadenoma     COLONOSCOPY       COLONOSCOPY  2009    Normal, Repeat      DEXA Scan       EGD       EGD       EGD       ENDOSCOPY UPPER, COLONOSCOPY, COMBINED N/A 2018    Procedure: COMBINED ENDOSCOPY UPPER, COLONOSCOPY;  UPPER ENDOSCOPY WITH BIOPSIES AND COLONOSCOPY WITH BIOPSIES;  Surgeon: Minh Interiano DO;  Location: [facility] OR     ESOPHAGOSCOPY, GASTROSCOPY, DUODENOSCOPY (EGD), COMBINED N/A 10/29/2021    Procedure: Upper endoscopy with biopsies;  Surgeon: Panfilo Arcos MD;  Location: [facility] OR     Knee Replacement       Oophorectomy      Ectopic Pregnancy     ROTATOR CUFF REPAIR RT/LT Right     with acromioplasty for complete rotator cuff tear, Dr Cherry     TONSILLECTOMY         ENT family history reviewed    Social History     Tobacco Use     Smoking status: Former     Packs/day: 1.00     Years: 4.00     Pack years: 4.00     Types: Cigarettes     Start date:      Quit date:      Years since quittin.9     Smokeless tobacco: Never   Substance Use Topics     Alcohol use: Yes     Drug use: Never       Review of Systems  ROS: 10 point ROS neg other  "than the symptoms noted above in the HPI and heartburn, tinnitus, occasional weakness    Physical Exam  /78 (BP Location: Left arm, Patient Position: Sitting, Cuff Size: Adult Regular)   Pulse 81   Temp 97.1  F (36.2  C) (Tympanic)   Ht 1.6 m (5' 3\")   Wt 54.4 kg (119 lb 14.9 oz)   SpO2 98%   BMI 21.24 kg/m      General - The patient is well nourished and well developed, and appears to have good nutritional status.  Alert and oriented to person and place, answers questions and cooperates with examination appropriately.   Head and Face - Normocephalic and atraumatic, with no gross asymmetry noted.  The facial nerve is intact, with strong symmetric movements.  Voice and Breathing - The patient was breathing comfortably without the use of accessory muscles. There was no wheezing, stridor, or stertor.  The patients voice was clear and strong, and had appropriate pitch and quality.  Ears -The external auditory canals are patent, the tympanic membranes are intact without effusion, retraction or mass.  Bony landmarks are intact.  Eyes - Extraocular movements intact, and the pupils were reactive to light.  Sclera were not icteric or injected, conjunctiva were pink and moist.  Mouth - Examination of the oral cavity showed pink, healthy oral mucosa. No lesions or ulcerations noted.  The tongue was mobile and midline, and the dentition were in good condition.    Throat - The walls of the oropharynx were smooth, pink, moist, symmetric, and had no lesions or ulcerations.  The tonsillar pillars and soft palate were symmetric.  The uvula was midline on elevation.  Fossa clear   neck - Normal midline excursion of the laryngotracheal complex during swallowing.  Full range of motion on passive movement.  Palpation of the occipital, submental, submandibular, internal jugular chain, and supraclavicular nodes did not demonstrate any abnormal lymph nodes or masses.  There is a firm 2 cm right thyroid nodule.  Mildly tender to " palpation and grossly normal.  The trachea was mobile and midline.  Nose - External contour is symmetric, no gross deflection or scars.  Nasal mucosa is pink and moist with no abnormal mucus.  The septum was intact, turbinates of normal size and position.  No polyps, masses, or purulence noted on examination.    Attempts at mirror laryngoscopy were not possible due to gag reflex.  Therefore I proceeded with a fiberoptic examination after informed consent.  First I sprayed both sides of the nose with a mixture of lidocaine and neosynephrine.  I then passed the scope through the nasal cavity.  The nasal cavity was unremarkable.  The nasopharynx was mucosally covered and symmetric.  The eustachian tube openings were unobstructed.  Going further, the base of tongue was free of lesions, and the vallecula was open.  The epiglottis was smooth and mucosally covered.  The supraglottic larynx was then clearly visualized.  The vocal cords moved smoothly and symmetrically.  The pyriform sinuses were open and without juliette mass or pooling of secretions, and the limited view of the postcricoid region did not show any lesions.  The patient tolerated the procedure well.      Impression and Plan- Maria Teresa Aburto is a 77 year old female with:    ICD-10-CM    1. Right thyroid nodule  E04.1 MR Soft Tissue Neck w/o & w Contrast      2. Tinnitus, bilateral  H93.13         Options of surveillance vs right lobectomy for diagnostic purposes discussed.    Fine-needle aspiration is difficult in heavily calcified nodules.  I discussed that there is a high likelihood that the recent FNA was actually from adjacent thyroid tissue and not the nodule itself.    MRI neck to follow right hypermetabolic thyroid nodule. Scheduled in May after they return from [place].  See Chen or Leyla after    Consider endocrine consult [facility]/virtual, they decline at this point      The remainder of today's visit was spent discussing the options in this situation.   Also, I educated the patient about how common thyroid nodules are, and the fact that there is actually a low probability of this being a malignancy.  We also discussed the importance of follow up due to the possibility of false negatives with testing.      Risks and complications or RIGHT thyroid lobectomy were discussed including anesthesia, bleeding, carotid or other major vascular injury, infection, injury to the recurrent laryngeal nerve and resulting hoarseness or change in voice, calcium metabolism problems, scar formation: hypertrophic or keloid, benign versus malignant pathology and need for further surgery.   I told Maria Teresa and her  she may need a second surgery depending on the final pathology for completion thyroidectomy  All questions were answered and wishes are proceed with surgery.    Thyroid anatomy was discussed, and thyroid surgery was briefly discussed.  At this point we need more information to decide whether or not surgery may be necessary.    Tinnitus education was provided.  Tinnitus is widely considered a disorder of cental auditory processing.    Hearing preservation was reinforced  I also cautioned the patient against investing in any oral supplements advertised to cure tinnitus.     I have also recommended yearly audiograms, masking devices or apps.  For worsening symptoms, I recommend online or in person cognitive behavioral therapy (CBT) referral.     The patient will follow up as necessary for worsening symptoms or changes in symptoms.          Damaris Russo D.O.  Otolaryngology/Head and Neck Surgery  Allergy            Again, thank you for allowing me to participate in the care of your patient.        Sincerely,        Damaris Russo MD

## 2022-12-08 ENCOUNTER — VIRTUAL VISIT (OUTPATIENT)
Dept: PULMONOLOGY | Facility: OTHER | Age: 77
End: 2022-12-08
Attending: FAMILY MEDICINE
Payer: COMMERCIAL

## 2022-12-08 VITALS — HEIGHT: 63 IN | WEIGHT: 119 LBS | BODY MASS INDEX: 21.09 KG/M2

## 2022-12-08 DIAGNOSIS — I21.4 NSTEMI (NON-ST ELEVATED MYOCARDIAL INFARCTION) (H): ICD-10-CM

## 2022-12-08 DIAGNOSIS — G47.33 OSA (OBSTRUCTIVE SLEEP APNEA): Primary | ICD-10-CM

## 2022-12-08 DIAGNOSIS — G25.81 RESTLESS LEGS SYNDROME (RLS): ICD-10-CM

## 2022-12-08 PROCEDURE — 99443 PR PHYSICIAN TELEPHONE EVALUATION 21-30 MIN: CPT | Performed by: FAMILY MEDICINE

## 2022-12-08 ASSESSMENT — SLEEP AND FATIGUE QUESTIONNAIRES
HOW LIKELY ARE YOU TO NOD OFF OR FALL ASLEEP WHILE SITTING AND READING: HIGH CHANCE OF DOZING
HOW LIKELY ARE YOU TO NOD OFF OR FALL ASLEEP WHILE LYING DOWN TO REST IN THE AFTERNOON WHEN CIRCUMSTANCES PERMIT: MODERATE CHANCE OF DOZING
HOW LIKELY ARE YOU TO NOD OFF OR FALL ASLEEP WHEN YOU ARE A PASSENGER IN A CAR FOR AN HOUR WITHOUT A BREAK: WOULD NEVER DOZE
HOW LIKELY ARE YOU TO NOD OFF OR FALL ASLEEP WHILE WATCHING TV: HIGH CHANCE OF DOZING
HOW LIKELY ARE YOU TO NOD OFF OR FALL ASLEEP WHILE SITTING QUIETLY AFTER LUNCH WITHOUT ALCOHOL: SLIGHT CHANCE OF DOZING
HOW LIKELY ARE YOU TO NOD OFF OR FALL ASLEEP WHILE SITTING AND TALKING TO SOMEONE: WOULD NEVER DOZE
HOW LIKELY ARE YOU TO NOD OFF OR FALL ASLEEP WHILE SITTING INACTIVE IN A PUBLIC PLACE: SLIGHT CHANCE OF DOZING
HOW LIKELY ARE YOU TO NOD OFF OR FALL ASLEEP IN A CAR, WHILE STOPPED FOR A FEW MINUTES IN TRAFFIC: WOULD NEVER DOZE

## 2022-12-08 NOTE — Clinical Note
Damian Alcala and Dr. Silverman,  On Maria Teresa's home sleep test, she did have having borderline moderate obstructive sleep apnea without any significant hypoxemia.  Overall, given the lack of hypoxemia, my concerns that this level of sleep apnea would not have a significant cardiovascular risk or impact on her elevated ferritin levels during work-up for hereditary hemochromatosis.  If you have a higher clinical concern, please let me know and I will speak with her in regards to starting treatment.  Otherwise we did not start a specific sleep apnea treatment at this time.  Wesley Garcia MD  Sleep Medicine [facility] [place], [place] Main Office: [phone] [facility] [address], [address] Schedule visits: [phone] Main Office: [phone] Fax: [phone]

## 2022-12-09 DIAGNOSIS — E83.19 IRON OVERLOAD: ICD-10-CM

## 2022-12-09 DIAGNOSIS — D64.9 ANEMIA, UNSPECIFIED TYPE: Primary | ICD-10-CM

## 2022-12-14 ENCOUNTER — LAB (OUTPATIENT)
Dept: LAB | Facility: OTHER | Age: 77
End: 2022-12-14
Attending: INTERNAL MEDICINE
Payer: COMMERCIAL

## 2022-12-14 ENCOUNTER — HOSPITAL ENCOUNTER (OUTPATIENT)
Dept: MRI IMAGING | Facility: HOSPITAL | Age: 77
Discharge: HOME | End: 2022-12-14
Attending: INTERNAL MEDICINE
Payer: COMMERCIAL

## 2022-12-14 DIAGNOSIS — D64.9 ANEMIA, UNSPECIFIED TYPE: ICD-10-CM

## 2022-12-14 DIAGNOSIS — E83.19 IRON OVERLOAD: ICD-10-CM

## 2022-12-14 LAB
ALBUMIN SERPL BCG-MCNC: 4 G/DL (ref 3.5–5.2)
ALP SERPL-CCNC: 120 U/L (ref 35–104)
ALT SERPL W P-5'-P-CCNC: 43 U/L (ref 10–35)
ANION GAP SERPL CALCULATED.3IONS-SCNC: 10 MMOL/L (ref 7–15)
AST SERPL W P-5'-P-CCNC: 44 U/L (ref 10–35)
BASOPHILS # BLD AUTO: 0 10E3/UL (ref 0–0.2)
BASOPHILS NFR BLD AUTO: 1 %
BILIRUB SERPL-MCNC: 0.4 MG/DL
BUN SERPL-MCNC: 22.3 MG/DL (ref 8–23)
CALCIUM SERPL-MCNC: 8.9 MG/DL (ref 8.8–10.2)
CHLORIDE SERPL-SCNC: 95 MMOL/L (ref 98–107)
CREAT SERPL-MCNC: 1.04 MG/DL (ref 0.51–0.95)
DEPRECATED HCO3 PLAS-SCNC: 23 MMOL/L (ref 22–29)
EOSINOPHIL # BLD AUTO: 0.4 10E3/UL (ref 0–0.7)
EOSINOPHIL NFR BLD AUTO: 8 %
ERYTHROCYTE [DISTWIDTH] IN BLOOD BY AUTOMATED COUNT: 11.4 % (ref 10–15)
FERRITIN SERPL-MCNC: 493 NG/ML (ref 11–328)
GFR SERPL CREATININE-BSD FRML MDRD: 55 ML/MIN/1.73M2
GLUCOSE SERPL-MCNC: 65 MG/DL (ref 70–99)
HCT VFR BLD AUTO: 32.9 % (ref 35–47)
HGB BLD-MCNC: 11.7 G/DL (ref 11.7–15.7)
IMM GRANULOCYTES # BLD: 0 10E3/UL
IMM GRANULOCYTES NFR BLD: 0 %
IRON BINDING CAPACITY (ROCHE): 221 UG/DL (ref 240–430)
IRON SATN MFR SERPL: 45 % (ref 15–46)
IRON SERPL-MCNC: 100 UG/DL (ref 37–145)
LDH SERPL L TO P-CCNC: 228 U/L (ref 0–250)
LYMPHOCYTES # BLD AUTO: 1 10E3/UL (ref 0.8–5.3)
LYMPHOCYTES NFR BLD AUTO: 20 %
MCH RBC QN AUTO: 31.1 PG (ref 26.5–33)
MCHC RBC AUTO-ENTMCNC: 35.6 G/DL (ref 31.5–36.5)
MCV RBC AUTO: 88 FL (ref 78–100)
MONOCYTES # BLD AUTO: 0.8 10E3/UL (ref 0–1.3)
MONOCYTES NFR BLD AUTO: 15 %
NEUTROPHILS # BLD AUTO: 2.9 10E3/UL (ref 1.6–8.3)
NEUTROPHILS NFR BLD AUTO: 56 %
NRBC # BLD AUTO: 0 10E3/UL
NRBC BLD AUTO-RTO: 0 /100
PLATELET # BLD AUTO: 238 10E3/UL (ref 150–450)
POTASSIUM SERPL-SCNC: 4.4 MMOL/L (ref 3.4–5.3)
PROT SERPL-MCNC: 7.2 G/DL (ref 6.4–8.3)
RBC # BLD AUTO: 3.76 10E6/UL (ref 3.8–5.2)
SODIUM SERPL-SCNC: 128 MMOL/L (ref 136–145)
WBC # BLD AUTO: 5.1 10E3/UL (ref 4–11)

## 2022-12-14 PROCEDURE — 83550 IRON BINDING TEST: CPT | Mod: ZL

## 2022-12-14 PROCEDURE — A9585 GADOBUTROL INJECTION: HCPCS | Performed by: RADIOLOGY

## 2022-12-14 PROCEDURE — 74183 MRI ABD W/O CNTR FLWD CNTR: CPT

## 2022-12-14 PROCEDURE — 85025 COMPLETE CBC W/AUTO DIFF WBC: CPT | Mod: ZL

## 2022-12-14 PROCEDURE — 36415 COLL VENOUS BLD VENIPUNCTURE: CPT | Mod: ZL

## 2022-12-14 PROCEDURE — 255N000002 HC RX 255 OP 636: Performed by: RADIOLOGY

## 2022-12-14 PROCEDURE — 83615 LACTATE (LD) (LDH) ENZYME: CPT | Mod: ZL

## 2022-12-14 PROCEDURE — 82728 ASSAY OF FERRITIN: CPT | Mod: ZL

## 2022-12-14 PROCEDURE — 84238 ASSAY NONENDOCRINE RECEPTOR: CPT | Mod: ZL

## 2022-12-14 PROCEDURE — 80053 COMPREHEN METABOLIC PANEL: CPT | Mod: ZL

## 2022-12-14 RX ORDER — GADOBUTROL 604.72 MG/ML
7.5 INJECTION INTRAVENOUS ONCE
Status: COMPLETED | OUTPATIENT
Start: 2022-12-14 | End: 2022-12-14

## 2022-12-14 RX ADMIN — GADOBUTROL 7.5 ML: 604.72 INJECTION INTRAVENOUS at 09:57

## 2022-12-16 LAB — STFR SERPL-MCNC: 2.2 MG/L

## 2022-12-19 ENCOUNTER — ONCOLOGY VISIT (OUTPATIENT)
Dept: ONCOLOGY | Facility: OTHER | Age: 77
End: 2022-12-19
Attending: INTERNAL MEDICINE
Payer: COMMERCIAL

## 2022-12-19 VITALS
SYSTOLIC BLOOD PRESSURE: 120 MMHG | TEMPERATURE: 96.9 F | DIASTOLIC BLOOD PRESSURE: 80 MMHG | WEIGHT: 118.39 LBS | BODY MASS INDEX: 20.98 KG/M2 | RESPIRATION RATE: 20 BRPM | OXYGEN SATURATION: 96 % | HEIGHT: 63 IN | HEART RATE: 80 BPM

## 2022-12-19 DIAGNOSIS — E83.119 HEMOCHROMATOSIS, UNSPECIFIED HEMOCHROMATOSIS TYPE: Primary | ICD-10-CM

## 2022-12-19 PROCEDURE — G0463 HOSPITAL OUTPT CLINIC VISIT: HCPCS

## 2022-12-19 PROCEDURE — 99417 PROLNG OP E/M EACH 15 MIN: CPT | Performed by: INTERNAL MEDICINE

## 2022-12-19 PROCEDURE — 99215 OFFICE O/P EST HI 40 MIN: CPT | Performed by: INTERNAL MEDICINE

## 2022-12-19 ASSESSMENT — PAIN SCALES - GENERAL: PAINLEVEL: NO PAIN (0)

## 2022-12-19 NOTE — NURSING NOTE
"Oncology Rooming Note    December 19, 2022 9:37 AM   Maria Teresa Aburto is a 77 year old female who presents for:    Chief Complaint   Patient presents with     Oncology Clinic Visit     Hematology     Follow up [place]     Initial Vitals: /80   Pulse 80   Temp 96.9  F (36.1  C) (Tympanic)   Resp 20   Ht 1.6 m (5' 3\")   Wt 53.7 kg (118 lb 6.2 oz)   SpO2 96%   BMI 20.97 kg/m   Estimated body mass index is 20.97 kg/m  as calculated from the following:    Height as of this encounter: 1.6 m (5' 3\").    Weight as of this encounter: 53.7 kg (118 lb 6.2 oz). Body surface area is 1.54 meters squared.  No Pain (0) Comment: Data Unavailable   No LMP recorded. Patient is postmenopausal.  Allergies reviewed: Yes  Medications reviewed: Yes    Medications: Medication refills not needed today.  Pharmacy name entered into [employer] [id number] - [place] - [phone]            "

## 2022-12-19 NOTE — PROGRESS NOTES
Visit Date: 12/19/2022    HEMATOLOGY/ONCOLOGY CLINIC NOTE     HISTORY OF PRESENT ILLNESS:  Mrs. Aburto returns for followup of anemia, elevated ferritin, thyroid nodule, MGUS. We had seen the patient in consultation on 09/26/2022 at the request of Dr. Luz Alcala. At that time, she was a 77-year-old white female with history of coronary artery disease, Bowden's esophagus, hypertension, whom we were asked to evaluate concerning diagnosis of anemia in setting of elevated ferritin.  The patient most recently had been evaluated by the emergency room for hypertension. The patient most recently had been seen by Dr. Luz Alcala who increased her losartan dose.  As part of the workup, labs were drawn and CBC revealed white count 6.7, H and H 10.3 and 29.6, MCV 91, platelet count 206.  Peripheral blood smear was ordered and revealed the patient likely had anemia of chronic disease.  Iron studies were obtained. Ferritin was elevated at 440.  TSH was 2.51.  Vitamin B12 was 800. Sed rate was elevated at 34.  SERAFIN was negative.  Her major complaint was related to the fact she had increased fatigue throughout the day.  She also had dyspnea on exertion.  She did have heartburn symptoms.  Omeprazole was not helping.  She was diagnosed with Bowden's esophagus without dysplasia.  EGD was performed by Dr. Panfilo Arcos approximately a year prior.  There was no family history of hematologic malignancy.  She was a nonsmoker, nondrinker.  She also described lightheadedness.  When she stood up at time, she had to lie down to help alleviate her symptoms.  She said she had COVID-19 back in 06/2021.  She had a history of basal cell carcinoma as well as squamous cell carcinoma of the skin.  She had a Mohs procedure done for basal cell carcinoma of the temple.  She was on Efudex cream.  There was evidence of iron overload.  We wanted to rule out hemochromatosis by obtaining hemochromatosis gene mutation which was performed and it was  read that the patient had normal C282Y and H63D but was heterozygous for the S65C mutation, which is not usually associated with iron overload hemochromatosis unless there is a concomitant mutation.  Given her elevated ferritin, we wanted to rule out occult malignancy by obtaining CT neck, which was read as a multinodular thyroid measuring less than 1.5 cm.  There was infrahilar right segmental focus measuring 11 x 6.5 mm.  CT chest, abdomen and pelvis revealed lung nodules bilaterally, largest being 7 mm in the right upper lobe.  She also had an MRI of the brain, which was read as normal brain for age.  We also wanted to rule out underlying monoclonal paraproteinemia and this came back positive.  The patient had an elevated kappa free light chain and kappa-lambda ratio of 2.0, which was elevated.  Beta 2 microglobulin was elevated at 2.5.  The patient was having ongoing fatigue, especially after climbing stairs, for which she developed spastic neck pain after going up stairs, fatigue associated.  She also has been seen by Sleep Medicine to rule out sleep apnea.  She denied tobacco exposure.  She may have been exposed to asbestos as a child.  She was also exposed to DDT as [place].  There were no reports of bright red blood per rectum, hematemesis, heartburn.  When we saw the patient, we felt the patient had anemia with elevated ferritin.  No obvious evidence of iron overload. Given her monoclonal gammopathy with elevated kappa free light chains, we wanted to rule out myeloma or amyloid.  We proceeded with bone marrow aspiration biopsy, which was performed on 10/31/2022.  The patient was found to have a normocellular bone marrow of 25% with maturing trilineage hematopoiesis, with no morphologic evidence of plasma cell neoplasm, plasma cell dyscrasia or myelodysplastic syndrome.  There was increased storage iron.  [place] red stain was performed and there was no amyloid deposition noted.  PET scan  revealed that the patient had a right thyroid nodule measuring 1.5 cm in dimension with an SUV of 25.8, which was consistent with possible neoplasm.  Otherwise, no other evidence of hypermetabolism in the neck, chest, abdomen and pelvis.  She did have a thyroid ultrasound on 10/20/2022.  This was read as a 1.3 cm right lower lobe thyroid nodule.  Given these findings, we elected to proceed with FNA of the thyroid nodule that was performed on 12/02/20[facility] at [facility] by Dr. Osman Camilo.  Apparently this nodule was difficult to biopsy as it was heavily calcified.  According Dr. Camilo, the area adjacent to the nodule was biopsied.  Apparently, the patient was frustrated that the biopsy could not be performed adequately and was willing to see ENT to assess the thyroid nodule.  We referred the patient to Dr. Russo, who felt this was probably difficult to biopsy and the only option would be a hemithyroidectomy.  Plan was to obtain an MRI of the neck when she returned from [place] in May and then consider biopsy at that time.     When we saw her last, her ferritin was climbing and it was up to 513.  We elected to absolutely rule out hemochromatosis by obtaining an MRI of the abdomen with attention to the liver and this was read as diffusely decreased signal in the liver in phase imaging, compatible with diffuse iron deposition consistent with hemochromatosis.  Given these findings, the patient likely has hemochromatosis and likely has, in addition to the XS65C gene mutation, may have another mutation as well that is not known; therefore, she would be a candidate for phlebotomy.  She plans to leave for [place] on Friday.  She has established care with Ellie Gustafson, nurse practitioner, in [place], [place].  Otherwise, her major complaint is related to chronic fatigue.  No shortness of breath, chest pain, abdominal pain, fevers, night sweats, weight loss.    PHYSICAL EXAMINATION:     GENERAL:  She is a well-developed, well-nourished elderly white female in no acute distress. ECOG performance status is 0.  VITAL SIGNS:  Blood pressure 120/80, pulse 80, respirations 20, temperature 96.9.  HEENT:  Atraumatic, normocephalic.  Oropharynx nonerythematous.  NECK:  Supple.  LUNGS:  Clear to auscultation and percussion.  HEART:  Regular rhythm, S1, S2 normal.  ABDOMEN:  Soft.  There is minimal tenderness in the right upper quadrant on palpation.  No rebound.  LYMPHATICS:  No cervical, supraclavicular, axillary or inguinal nodes.  EXTREMITIES:  No edema.   NEUROLOGIC:  Nonfocal.    LABORATORY DATA:  CBC with white count 5.1, H and H is 11.7 and 32.9, platelet count 238.  BUN 22.3, creatinine 1.04, alkaline phosphatase 120, AST 44, ALT 43.  LDH is 228.  Ferritin is 493.    IMPRESSION:    1.  Newly diagnosed hemochromatosis based on MRI of the liver with significant diffuse iron deposition in the setting of a XS65C heterozygous mutation.  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  We will therefore treat with weekly phlebotomies to keep ferritin less than or equal to 100 while maintaining hemoglobin greater than or equal to 10.5.  She plans to hopefully continue phlebotomies in [place].  She has a primary care physician there, Ellie Gustafson, nurse practitioner.  We will carbon copy her a note.  Otherwise, she plans to return in May for followup and we will repeat iron studies at that point.  2.  Hypermetabolic right lower lobe thyroid nodule.  FNA of this thyroid lobe nodule was difficult to obtain. Pathology revealed a benign colloid nodule.  The patient was seen by Dr. Russo.  The plan is to repeat MRI of the neck in May and then see the patient for possible hemithyroidectomy versus biopsy.  3.  MGUS.  We will obtain myeloma labs when she returns in May.    Seventy minutes was spent on this patient.  Time was spent reviewing multiple physician provider  notes, lab results, imaging results, performing history and physical, documenting history and physical, ordering followup labs and scans.    Sheldon Silverman MD        D: 2022   T: 2022   MT: TRINA    Name:     VINICIUS SORIANO  MRN:      [id number]        Account:    [id number]   :      1945           Visit Date: 2022     Document: [id number]

## 2022-12-20 ENCOUNTER — MYC MEDICAL ADVICE (OUTPATIENT)
Dept: FAMILY MEDICINE | Facility: OTHER | Age: 77
End: 2022-12-20

## 2022-12-22 ENCOUNTER — HOSPITAL ENCOUNTER (EMERGENCY)
Facility: HOSPITAL | Age: 77
Discharge: HOME | End: 2022-12-22
Attending: STUDENT IN AN ORGANIZED HEALTH CARE EDUCATION/TRAINING PROGRAM | Admitting: STUDENT IN AN ORGANIZED HEALTH CARE EDUCATION/TRAINING PROGRAM
Payer: COMMERCIAL

## 2022-12-22 ENCOUNTER — LAB (OUTPATIENT)
Dept: LAB | Facility: OTHER | Age: 77
End: 2022-12-22
Payer: COMMERCIAL

## 2022-12-22 VITALS
TEMPERATURE: 97 F | RESPIRATION RATE: 19 BRPM | DIASTOLIC BLOOD PRESSURE: 43 MMHG | SYSTOLIC BLOOD PRESSURE: 79 MMHG | HEART RATE: 74 BPM

## 2022-12-22 VITALS
HEIGHT: 63 IN | SYSTOLIC BLOOD PRESSURE: 135 MMHG | HEART RATE: 76 BPM | RESPIRATION RATE: 16 BRPM | BODY MASS INDEX: 20.91 KG/M2 | DIASTOLIC BLOOD PRESSURE: 57 MMHG | TEMPERATURE: 98.2 F | OXYGEN SATURATION: 98 % | WEIGHT: 118 LBS

## 2022-12-22 DIAGNOSIS — E83.119 HEMOCHROMATOSIS, UNSPECIFIED HEMOCHROMATOSIS TYPE: ICD-10-CM

## 2022-12-22 DIAGNOSIS — R55 VASOVAGAL SYNCOPE: ICD-10-CM

## 2022-12-22 LAB
ANION GAP SERPL CALCULATED.3IONS-SCNC: 10 MMOL/L (ref 7–15)
ANION GAP SERPL CALCULATED.3IONS-SCNC: 15 MMOL/L (ref 7–15)
BASOPHILS # BLD AUTO: 0 10E3/UL (ref 0–0.2)
BASOPHILS NFR BLD AUTO: 1 %
BLOOD COLLECTION: NORMAL
BUN SERPL-MCNC: 17.3 MG/DL (ref 8–23)
BUN SERPL-MCNC: 17.4 MG/DL (ref 8–23)
CALCIUM SERPL-MCNC: 7.8 MG/DL (ref 8.8–10.2)
CALCIUM SERPL-MCNC: 8.7 MG/DL (ref 8.8–10.2)
CHLORIDE SERPL-SCNC: 93 MMOL/L (ref 98–107)
CHLORIDE SERPL-SCNC: 96 MMOL/L (ref 98–107)
CHOLEST SERPL-MCNC: 84 MG/DL
CREAT SERPL-MCNC: 0.94 MG/DL (ref 0.51–0.95)
CREAT SERPL-MCNC: 0.94 MG/DL (ref 0.51–0.95)
DEPRECATED HCO3 PLAS-SCNC: 14 MMOL/L (ref 22–29)
DEPRECATED HCO3 PLAS-SCNC: 19 MMOL/L (ref 22–29)
EOSINOPHIL # BLD AUTO: 0.1 10E3/UL (ref 0–0.7)
EOSINOPHIL NFR BLD AUTO: 1 %
ERYTHROCYTE [DISTWIDTH] IN BLOOD BY AUTOMATED COUNT: 11.5 % (ref 10–15)
GFR SERPL CREATININE-BSD FRML MDRD: 62 ML/MIN/1.73M2
GFR SERPL CREATININE-BSD FRML MDRD: 62 ML/MIN/1.73M2
GLUCOSE BLDC GLUCOMTR-MCNC: 102 MG/DL (ref 70–99)
GLUCOSE SERPL-MCNC: 102 MG/DL (ref 70–99)
GLUCOSE SERPL-MCNC: 115 MG/DL (ref 70–99)
HCT VFR BLD AUTO: 32.9 % (ref 35–47)
HDLC SERPL-MCNC: 37 MG/DL
HGB BLD-MCNC: 11.6 G/DL (ref 11.7–15.7)
HOLD SPECIMEN: NORMAL
HOLD SPECIMEN: NORMAL
IMM GRANULOCYTES # BLD: 0 10E3/UL
IMM GRANULOCYTES NFR BLD: 0 %
LDLC SERPL CALC-MCNC: 37 MG/DL
LYMPHOCYTES # BLD AUTO: 1.3 10E3/UL (ref 0.8–5.3)
LYMPHOCYTES NFR BLD AUTO: 26 %
MAGNESIUM SERPL-MCNC: 2.1 MG/DL (ref 1.7–2.3)
MCH RBC QN AUTO: 30.9 PG (ref 26.5–33)
MCHC RBC AUTO-ENTMCNC: 35.3 G/DL (ref 31.5–36.5)
MCV RBC AUTO: 88 FL (ref 78–100)
MONOCYTES # BLD AUTO: 0.9 10E3/UL (ref 0–1.3)
MONOCYTES NFR BLD AUTO: 18 %
NEUTROPHILS # BLD AUTO: 2.6 10E3/UL (ref 1.6–8.3)
NEUTROPHILS NFR BLD AUTO: 54 %
NONHDLC SERPL-MCNC: 47 MG/DL
NRBC # BLD AUTO: 0 10E3/UL
NRBC BLD AUTO-RTO: 0 /100
PLATELET # BLD AUTO: 201 10E3/UL (ref 150–450)
POTASSIUM SERPL-SCNC: 3.5 MMOL/L (ref 3.4–5.3)
POTASSIUM SERPL-SCNC: 3.6 MMOL/L (ref 3.4–5.3)
RBC # BLD AUTO: 3.75 10E6/UL (ref 3.8–5.2)
SODIUM SERPL-SCNC: 122 MMOL/L (ref 136–145)
SODIUM SERPL-SCNC: 125 MMOL/L (ref 136–145)
TRIGL SERPL-MCNC: 50 MG/DL
TSH SERPL DL<=0.005 MIU/L-ACNC: 2.79 UIU/ML (ref 0.3–4.2)
WBC # BLD AUTO: 4.8 10E3/UL (ref 4–11)

## 2022-12-22 PROCEDURE — 83735 ASSAY OF MAGNESIUM: CPT | Performed by: STUDENT IN AN ORGANIZED HEALTH CARE EDUCATION/TRAINING PROGRAM

## 2022-12-22 PROCEDURE — 99284 EMERGENCY DEPT VISIT MOD MDM: CPT | Mod: 25

## 2022-12-22 PROCEDURE — 85004 AUTOMATED DIFF WBC COUNT: CPT | Performed by: STUDENT IN AN ORGANIZED HEALTH CARE EDUCATION/TRAINING PROGRAM

## 2022-12-22 PROCEDURE — 82962 GLUCOSE BLOOD TEST: CPT | Mod: ZL

## 2022-12-22 PROCEDURE — 99195 PHLEBOTOMY: CPT

## 2022-12-22 PROCEDURE — 93005 ELECTROCARDIOGRAM TRACING: CPT

## 2022-12-22 PROCEDURE — 84443 ASSAY THYROID STIM HORMONE: CPT | Performed by: STUDENT IN AN ORGANIZED HEALTH CARE EDUCATION/TRAINING PROGRAM

## 2022-12-22 PROCEDURE — 80061 LIPID PANEL: CPT | Performed by: STUDENT IN AN ORGANIZED HEALTH CARE EDUCATION/TRAINING PROGRAM

## 2022-12-22 PROCEDURE — 36415 COLL VENOUS BLD VENIPUNCTURE: CPT | Performed by: STUDENT IN AN ORGANIZED HEALTH CARE EDUCATION/TRAINING PROGRAM

## 2022-12-22 PROCEDURE — 99284 EMERGENCY DEPT VISIT MOD MDM: CPT | Performed by: STUDENT IN AN ORGANIZED HEALTH CARE EDUCATION/TRAINING PROGRAM

## 2022-12-22 PROCEDURE — 93010 ELECTROCARDIOGRAM REPORT: CPT | Performed by: INTERNAL MEDICINE

## 2022-12-22 PROCEDURE — 258N000003 HC RX IP 258 OP 636: Performed by: STUDENT IN AN ORGANIZED HEALTH CARE EDUCATION/TRAINING PROGRAM

## 2022-12-22 PROCEDURE — 80048 BASIC METABOLIC PNL TOTAL CA: CPT | Performed by: STUDENT IN AN ORGANIZED HEALTH CARE EDUCATION/TRAINING PROGRAM

## 2022-12-22 PROCEDURE — 96360 HYDRATION IV INFUSION INIT: CPT

## 2022-12-22 RX ADMIN — SODIUM CHLORIDE 1000 ML: 9 INJECTION, SOLUTION INTRAVENOUS at 16:27

## 2022-12-22 ASSESSMENT — ACTIVITIES OF DAILY LIVING (ADL): ADLS_ACUITY_SCORE: 37

## 2022-12-22 NOTE — ED PROVIDER NOTES
"  History     Chief Complaint   Patient presents with     Syncope     HPI  Maria Teresa Aburto is a 77 year old female with hemachromatosis who was brought to ED after syncopal episode while receiving medical phlebotomy for hemochromatosis. She notes that she felt \"whoozy\" during and they had to sit her back. She then developed hypotension at BP high 60s/40s with blood glucose in low 100's. She was assessed by ED physician and discussed R/B of returning home vs full assessment in ED. She chose to come to ED. She noted no other pertinent medical conditions and no blood thinners. She also denied any recent vomiting, blood in stool, or in urine.    In ED, she notes to no longer feeling \"whoozy\". The only other heart conditions she notes of is stress cardiomyopathy 2 years are, but no recent issues. She does note that she has felt an increased lightheadedness when standing since the spring. She drinks about 10 cups of liquid a day and a good amount of salt intake overall. She does use losartan and takes her BP 3 times a day. She will take either 25 or 50mg a day depending on blood pressure, which she has OK'd with her primary.    Previous EKG done 10/25 and Echocardiogram 9/12 were unremarkable.      Allergies:  Allergies   Allergen Reactions     Nitrofurantoin Other (See Comments) and Nausea     Macrobid  \"Chills and Fevers\"     Nitrofurantoin Macrocrystal Other (See Comments) and Nausea     Macrobid  \"Chills and Fevers\"         Problem List:    Patient Active Problem List    Diagnosis Date Noted     MARISSA (obstructive sleep apnea) 12/08/2022     Priority: Medium     Moderate MARISSA (AHI 17) without sleep-associated hypoxemia    HOME SLEEP STUDY INTERPRETATION           Patient: Maria Teresa Aburto  MRN: [id number]  YOB: 1945  Study Date: 11/22/2022  PCP/Referring Provider: Luz Alcala;   Ordering Provider: Wesley Garcia MD, MD           Indications for Home Study: Maria Teresa Aburto is a 77 year old " "female with a history of vitamin B-12 deficiency, HLD, HTN, NSTEMI, takotsubo cardiomyopathy, RLS, squamous cell carcinoma, normochromic normocytic anemia with elevated ferritin, elevated sed rate and normal TIBC who presents with symptoms suggestive of obstructive sleep apnea.     Estimated body mass index is 21.1 kg/m  as calculated from the following:    Height as of 22: 1.6 m (5' 3\").    Weight as of 22: 54 kg (119 lb 1.6 oz).  Total score - [place]: 8 (10/4/2022  3:09 PM)  STOP-BANGSTOP-BAN/8           Data: A full night home sleep study was performed recording the standard physiologic parameters including body position, movement, sound, nasal pressure, thermal oral airflow, chest and abdominal movements with respiratory inductance plethysmography, and oxygen saturation by pulse oximetry. Pulse rate was estimated by oximetry recording. This study was considered adequate based on > 4 hours of quality oximetry and respiratory recording. As specified by the AASM Manual for the Scoring of Sleep and Associated events, version 2.3, Rule VIII.D 1B, 4% oxygen desaturation scoring for hypopneas is used as a standard of care on all home sleep apnea testing.     Analysis Time:  500.7 minutes     Respiration:   Sleep Associated Hypoxemia: sustained hypoxemia was not present. Average oxygen saturation was 95%.  Time with saturation less than or equal to 88% was 1.9 minutes. The lowest oxygen saturation was 83%.   Snoring: Snoring was present.  Respiratory events: The home study revealed a presence of 87 obstructive apneas and 4 mixed and central apneas. There were 50 hypopneas resulting in a combined apnea/hypopnea index [AHI] of 17 events per hour.  AHI was 18.2 per hour supine, - per hour prone, 11 per hour on left side, and 0 per hour on right side.   Pattern: Excluding events noted above, respiratory rate and pattern was Normal.     Position: Percent of time spent: supine - 81.5%, prone - 0.1%, on left - " 17.5%, on right - 0%.     Heart Rate: By pulse oximetry normal rate was noted.         Assessment:     Moderate obstructive sleep apnea.    Sleep associated hypoxemia was not present.     Recommendations:    Consider auto-CPAP at 5-15 cmH2O, oral appliance therapy or polysomnography with full night PAP titration.    Suggest optimizing sleep hygiene and avoiding sleep deprivation.    Weight management.           Diagnosis Code(s): Obstructive Sleep Apnea G47.33     Wesley Garcia MD, MD, November 30, 2022   Diplomate, American Board of Family Medicine, Sleep Medicine       Hyperlipidemia LDL goal <70 05/04/2021     Priority: Medium     ACE-inhibitor cough 11/03/2020     Priority: Medium     NSTEMI (non-ST elevated myocardial infarction) (H) 09/28/2020     Priority: Medium     With Takotsubo cardiomyopathy  Cardiac cath, small lesion of diagonal not requiring stenting  Dr Crystal Sandoval Vak[place]       Takotsubo cardiomyopathy 09/22/2020     Priority: Medium     Episode of high BP noted on exam with no other symptoms  Elevated troponin  Cardiac cath, small lesion of  small diagonal, not requiring stenting  Crystal Sandoval, cardiology       S/P right rotator cuff repair 09/16/2019     Priority: Medium     Vitamin B12 deficiency (non anemic) 09/16/2019     Priority: Medium     Nontraumatic incomplete tear of right rotator cuff 07/31/2019     Priority: Medium     Added automatically from request for surgery [id number]       Nontraumatic complete tear of right rotator cuff 07/31/2019     Priority: Medium     Added automatically from request for surgery [id number]       Actinic keratosis 07/17/2012     Priority: Medium     History of malignant neoplasm of skin 07/17/2012     Priority: Medium     Atypical nevus 07/17/2012     Priority: Medium     History of nonmelanoma skin cancer 07/17/2012     Priority: Medium     Overview:   SCC right leg 2001       Medial meniscus tear 02/13/2012     Priority: Medium     Overview:    IMO Update 10/11       Restless legs syndrome (RLS) 10/11/2011     Priority: Medium     Bowden's esophagus 10/11/2011     Priority: Medium     B-complex deficiency 10/11/2011     Priority: Medium     Problem list name updated by automated process. Provider to review       Disorder of bone and cartilage 10/11/2011     Priority: Medium     dexa scans odd years starting in 2003  Problem list name updated by automated process. Provider to review       Squamous Cell Carcinoma 10/11/2011     Priority: Medium     left leg x2       GERD (gastroesophageal reflux disease)[530.81] 05/11/2003     Priority: Medium        Past Medical History:    Past Medical History:   Diagnosis Date     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     Bowden's esophagus 10/11/2011     Constipation 10/09/2012     GERD (gastroesophageal reflux disease)[530.81] 05/11/2003     Hypertension      NSTEMI (non-ST elevated myocardial infarction) (H) 09/28/2020     MARISSA (obstructive sleep apnea) 12/8/2022     Osteopenia 10/11/2011     Precordial pain 04/28/2003     Restless legs syndrome (RLS) 10/11/2011     Squamous Cell Carcinoma 10/11/2011     Takotsubo cardiomyopathy 09/22/2020     Urge incontinence 10/09/2012       Past Surgical History:    Past Surgical History:   Procedure Laterality Date     BONE MARROW BIOPSY, BONE SPECIMEN, NEEDLE/TROCAR N/A 10/31/2022    Procedure: BIOPSY, BONE MARROW WITH ASPIRATION;  Surgeon: Carlos Recinos DO;  Location: [facility] OR     Breast Biospy  11/11/2000    LEFT > Fibrocystic Disease > Outcome: Fibroadenoma     COLONOSCOPY  2000     COLONOSCOPY  8-7-2009    Normal, Repeat 2015     DEXA Scan  2009     EGD       EGD  2008     EGD  2003     ENDOSCOPY UPPER, COLONOSCOPY, COMBINED N/A 9/6/2018    Procedure: COMBINED ENDOSCOPY UPPER, COLONOSCOPY;  UPPER ENDOSCOPY WITH BIOPSIES AND COLONOSCOPY WITH BIOPSIES;  Surgeon: Minh Interiano DO;  Location: [facility] OR     ESOPHAGOSCOPY, GASTROSCOPY, DUODENOSCOPY (EGD), COMBINED N/A 10/29/2021     Procedure: Upper endoscopy with biopsies;  Surgeon: Panfilo Arcos MD;  Location: [facility] OR     Knee Replacement       Oophorectomy      Ectopic Pregnancy     ROTATOR CUFF REPAIR RT/LT Right     with acromioplasty for complete rotator cuff tear, Dr Cherry     TONSILLECTOMY         Family History:    Family History   Problem Relation Age of Onset     Uterine Cancer Mother      Lung Cancer Mother      Osteoarthritis Mother      Thyroid Disease Father      Coronary Artery Disease Father      Thyroid Disease Brother      Hyperlipidemia Son      Coronary Artery Disease Son      Heart Surgery Son      Thyroid Disease Son      Thyroid Disease Daughter      Endometrial Cancer Other         Family Hx     Osteoporosis Other         Family Hx     Parkinsonism Other         Family Hx     Multiple Sclerosis Maternal Aunt      Anuerysm Paternal Aunt      Unknown/Adopted No family hx of      Hypertension No family hx of      Breast Cancer No family hx of      Cerebrovascular Disease No family hx of      Colon Cancer No family hx of      Prostate Cancer No family hx of      Anesthesia Reaction No family hx of      Asthma No family hx of      Genetic Disorder No family hx of        Social History:  Marital Status:   [2]  Social History     Tobacco Use     Smoking status: Former     Packs/day: 1.00     Years: 4.00     Pack years: 4.00     Types: Cigarettes     Start date:      Quit date:      Years since quittin.0     Smokeless tobacco: Never   Substance Use Topics     Alcohol use: Yes     Drug use: Never        Medications:    ASPIRIN CAPS 81 MG OR  atorvastatin (LIPITOR) 40 MG tablet  Calcium Carbonate-Vitamin D (CALCIUM + D PO)  Cyanocobalamin (VITAMIN B 12 PO)  losartan (COZAAR) 50 MG tablet  multivitamin w/minerals (THERA-VIT-M) tablet  omeprazole (PRILOSEC) 40 MG DR capsule  pramipexole (MIRAPEX) 0.25 MG tablet  Vitamin D, Cholecalciferol, 25 MCG (1000 UT) TABS          Review of Systems  10 point  "ROS otherwise negative    Physical Exam   BP: 162/62  Pulse: 74  Temp: 98.2  F (36.8  C)  Resp: 18  Height: 160 cm (5' 3\")  Weight: 53.5 kg (118 lb)  SpO2: 100 %      Physical Exam  GEN: Healthy appearing, well-developed, NAD.  PSYCH: Good Judgment. AOx3. Normal memory, mood, and affect.  HEENT  -Head: NC/AT;  -Eyes: PERRL, EOMI. No discharge or redness;  -Ears: External ears are normal. Normal TMs.  -Nose: Normal nares.  -Mouth and throat: MMM. Normal gums, mucosa, palate,.  NECK: Supple, with no masses.  CV: RRR, no m/r/g.  LUNGS: CTAB, no w/r/c.  ABD: Soft and ND  : N/A  SKIN: Warm, well perfused. No skin rashes or abnormal lesions.  MSK: No deformities or signs of scoliosis. Normal gait.  EXT: No clubbing, cyanosis, or edema.  NEURO:  Normal muscle strength and tone. No focal deficits.    ED Course              ED Course as of 12/22/22 1826   u Dec 22, 2022   1646 Maria Teresa Aburto is a 77 year old female presenting with transient loss of consciousness.   Vitals wnl (initially hypotensive and bradycardic after phlebotomy)  Exam reassuring, non focal, improving with HR and BP recovery   Differential includes but is not limited to syncope, hypoglycemia, seizure, SAH/ICH, TIA, MI/ACS, arrythmia (WPW, brugada, Long QT, HOCM), aortic dissection, ruptured AAA, aortic stenosis, toxins/drugs, infection/sepsis, PE, GI bleed/hypovolemia, and orthostatic hypotension. No clinical history consistent with seizure.     Arrhythmia is unlikely.  EKG shows NSR, no interval abnormalities (eg QT prolongation/WPW).  No findings to suggest Brugada.  Tele reveals no tachycardia or bradycardic dysrhythmia.  HOCM was considered but there are no clear historical elements pointing toward this.  EKG not suggestive.  The QRS voltage is not extremely large and no suggestive Q waves.     BG normal. Patient does not complain of headache so SAH unlikely. Neuro exam unremarkable, low suspicion for acute intracranial bleed or ischemia. EKG " with normal intervals and no evidence of ischemia. No tearing pain to back and no chest/abdominal pain so dissection less likely. Hemodynamically stable so less consistent with ruptured AAA. Aortic stenosis possible but less likely given lack of AS hx, patient's age;  No murmur appreciated. PE less likely as patient does not have pleuritic pain, SOB, or ongoing symptoms to suggest massive/submassive PE capable of causing cerebral hypoperfusion and syncope    Labs reassuring and stable. Imaging nt indicated. No evidence of sepsis. Most likely etiology of syncope is vagal.     Patient is appropriate for further outpatient management. Discharged in stable condition with all questions answered and return precautions given. Follow up with PCP in 1-2 weeks.     Procedures            Results for orders placed or performed during the hospital encounter of 12/22/22 (from the past 24 hour(s))   CBC with platelets differential    Narrative    The following orders were created for panel order CBC with platelets differential.  Procedure                               Abnormality         Status                     ---------                               -----------         ------                     CBC with platelets and d...[[id number]]  Abnormal            Final result                 Please view results for these tests on the individual orders.   Basic metabolic panel   Result Value Ref Range    Sodium 122 (L) 136 - 145 mmol/L    Potassium 3.6 3.4 - 5.3 mmol/L    Chloride 93 (L) 98 - 107 mmol/L    Carbon Dioxide (CO2) 14 (L) 22 - 29 mmol/L    Anion Gap 15 7 - 15 mmol/L    Urea Nitrogen 17.4 8.0 - 23.0 mg/dL    Creatinine 0.94 0.51 - 0.95 mg/dL    Calcium 8.7 (L) 8.8 - 10.2 mg/dL    Glucose 115 (H) 70 - 99 mg/dL    GFR Estimate 62 >60 mL/min/1.73m2   Magnesium   Result Value Ref Range    Magnesium 2.1 1.7 - 2.3 mg/dL   CBC with platelets and differential   Result Value Ref Range    WBC Count 4.8 4.0 - 11.0 10e3/uL    RBC Count  3.75 (L) 3.80 - 5.20 10e6/uL    Hemoglobin 11.6 (L) 11.7 - 15.7 g/dL    Hematocrit 32.9 (L) 35.0 - 47.0 %    MCV 88 78 - 100 fL    MCH 30.9 26.5 - 33.0 pg    MCHC 35.3 31.5 - 36.5 g/dL    RDW 11.5 10.0 - 15.0 %    Platelet Count 201 150 - 450 10e3/uL    % Neutrophils 54 %    % Lymphocytes 26 %    % Monocytes 18 %    % Eosinophils 1 %    % Basophils 1 %    % Immature Granulocytes 0 %    NRBCs per 100 WBC 0 <1 /100    Absolute Neutrophils 2.6 1.6 - 8.3 10e3/uL    Absolute Lymphocytes 1.3 0.8 - 5.3 10e3/uL    Absolute Monocytes 0.9 0.0 - 1.3 10e3/uL    Absolute Eosinophils 0.1 0.0 - 0.7 10e3/uL    Absolute Basophils 0.0 0.0 - 0.2 10e3/uL    Absolute Immature Granulocytes 0.0 <=0.4 10e3/uL    Absolute NRBCs 0.0 10e3/uL   Extra Tube ([place] Draw)    Narrative    The following orders were created for panel order Extra Tube ([place] Draw).  Procedure                               Abnormality         Status                     ---------                               -----------         ------                     Extra Blue Top Tube[[id number]]                              Final result                 Please view results for these tests on the individual orders.   Extra Blue Top Tube   Result Value Ref Range    Hold Specimen JIC    Extra Tube *Canceled*    Narrative    The following orders were created for panel order Extra Tube.  Procedure                               Abnormality         Status                     ---------                               -----------         ------                     Extra Red Top Tube[[id number]]                                                            Please view results for these tests on the individual orders.   Basic metabolic panel   Result Value Ref Range    Sodium 125 (L) 136 - 145 mmol/L    Potassium 3.5 3.4 - 5.3 mmol/L    Chloride 96 (L) 98 - 107 mmol/L    Carbon Dioxide (CO2) 19 (L) 22 - 29 mmol/L    Anion Gap 10 7 - 15 mmol/L    Urea Nitrogen 17.3 8.0 - 23.0 mg/dL     Creatinine 0.94 0.51 - 0.95 mg/dL    Calcium 7.8 (L) 8.8 - 10.2 mg/dL    Glucose 102 (H) 70 - 99 mg/dL    GFR Estimate 62 >60 mL/min/1.73m2   Extra Tube    Narrative    The following orders were created for panel order Extra Tube.  Procedure                               Abnormality         Status                     ---------                               -----------         ------                     Extra Purple Top Tube[[id number]]                            In process                   Please view results for these tests on the individual orders.   TSH with free T4 reflex   Result Value Ref Range    TSH 2.79 0.30 - 4.20 uIU/mL       Medications   0.9% sodium chloride BOLUS (1,000 mLs Intravenous New Bag 12/22/22 1539)       Assessments & Plan (with Medical Decision Making)   Patient notes to be doing well now in ED with no other episodes. Physical exam unremarkable. She was given 1L of fluids. BP have been 140/60's on consistent readings. Concern for orthostatic hypotension vs syncope vs GI bleed vs AAA vs aortic stenosis. EKG unremarkable as compared to previous. First BMP showed decreased sodium as compared to baseline and second showed upwards trend. Follow up BMP showed near baseline labs, rapidly improved. Unclear if lab error or a transient shift, potentially related to signifncant phelbotomy. Pt has PCP and [place] where she will travel in a few days and can follow up there. Doubt SIADH, CHF, Cirrhosis, nephrotic syndrome, no hx of n/v or diarrhea. Most likely vasovagal syncope as this event was witnessed during phlebotomy. Discussed follow up with PCP in next week or so, especially before next phlebotomy appointment. She is comfortable with plan.    I have reviewed the nursing notes.    I have reviewed the findings, diagnosis, plan and need for follow up with the patient.  New Prescriptions    No medications on file       Final diagnoses:   Vasovagal syncope       12/22/2022   [facility]      Yoav Mckeon MD  12/22/22 1827

## 2022-12-22 NOTE — ED TRIAGE NOTES
Patient had near syncopal episode while at chemo infusion with blood letting procedure.  Patient was initially hypotensive and bradycardic.  Patient had emesis while arriving to ED but now is Alert oriented and vitally stable.

## 2022-12-23 NOTE — DISCHARGE INSTRUCTIONS
Follow-up with PCP next 7 days, especially before next phlebotomy visit due to your hemochromatosis.  Return to ED if symptoms worsen.

## 2022-12-30 ENCOUNTER — TELEPHONE (OUTPATIENT)
Dept: ONCOLOGY | Facility: OTHER | Age: 77
End: 2022-12-30

## 2022-12-30 NOTE — TELEPHONE ENCOUNTER
----- Message from Sheldon Silverman MD sent at 12/30/2022  1:31 AM CST -----  She should see her primary before resuming phlebotomies as well as a cardiologist  ----- Message -----  From: Niyah Vieira RN  Sent: 12/29/2022   9:48 AM CST  To: MD Julia Dunn-    Patient was here last week for phlebotomy, she was inadvertently phlebotomized 600 ml rather than the 500 ml. I guess it was the scale that messed up. She had an adverse reaction, passed out x 2, became hypotensive, a rapid response was called and she was brought to the ER for monitoring. She is in [place] now however patient and spouse would like to know your thoughts about further phlebotomies. You ordered weekly phlebotomy if ferritin is greater than 100 and hgb is greater than 10.5.    Thanks Niyah

## 2022-12-30 NOTE — TELEPHONE ENCOUNTER
TC to patient to inform of recommendations from Dr. Silverman. She states that she has been in contact with her PCP in [place] and they are getting her set up with hematology. She is reluctant to see cardiology since she had an EKG in the ER and it was fine, as well as her most recent echo.

## 2023-01-13 NOTE — IP AVS SNAPSHOT
[facility] [address] Operating Room    [address] [place] [id number]    Phone:  [phone]                                       After Visit Summary   9/6/2018    Maria Teresa Aburto    MRN: [id number]           After Visit Summary Signature Page     I have received my discharge instructions, and my questions have been answered. I have discussed any challenges I see with this plan with the nurse or doctor.    ..........................................................................................................................................  Patient/Patient Representative Signature      ..........................................................................................................................................  Patient Representative Print Name and Relationship to Patient    ..................................................               ................................................  Date                                            Time    ..........................................................................................................................................  Reviewed by Signature/Title    ...................................................              ..............................................  Date                                                            Time          22EPIC Rev 08/18         Goal Outcome Evaluation:  Plan of Care Reviewed With: patient        Progress: improving  Outcome Evaluation: Pt alert and oriented x4, c/o pain prn meds given as ordered. Vss. family at bedside, Continue to monitor.

## 2023-01-23 ENCOUNTER — MYC MEDICAL ADVICE (OUTPATIENT)
Dept: FAMILY MEDICINE | Facility: OTHER | Age: 78
End: 2023-01-23

## 2023-01-23 DIAGNOSIS — I10 HYPERTENSION, UNSPECIFIED TYPE: Primary | ICD-10-CM

## 2023-01-23 RX ORDER — LOSARTAN POTASSIUM 25 MG/1
25 TABLET ORAL DAILY
Qty: 90 TABLET | Refills: 0 | Status: SHIPPED | OUTPATIENT
Start: 2023-01-23 | End: 2023-05-16

## 2023-05-03 DIAGNOSIS — E83.119 HEMOCHROMATOSIS, UNSPECIFIED HEMOCHROMATOSIS TYPE: ICD-10-CM

## 2023-05-03 DIAGNOSIS — D64.9 ANEMIA, UNSPECIFIED TYPE: Primary | ICD-10-CM

## 2023-05-05 DIAGNOSIS — G25.81 RESTLESS LEGS SYNDROME (RLS): ICD-10-CM

## 2023-05-05 DIAGNOSIS — I21.4 NSTEMI (NON-ST ELEVATED MYOCARDIAL INFARCTION) (H): ICD-10-CM

## 2023-05-05 RX ORDER — ATORVASTATIN CALCIUM 40 MG/1
40 TABLET, FILM COATED ORAL AT BEDTIME
Qty: 90 TABLET | Refills: 0 | OUTPATIENT
Start: 2023-05-05

## 2023-05-05 RX ORDER — PRAMIPEXOLE DIHYDROCHLORIDE 0.25 MG/1
.25-.5 TABLET ORAL AT BEDTIME
Qty: 90 TABLET | Refills: 0 | Status: SHIPPED | OUTPATIENT
Start: 2023-05-05 | End: 2023-08-09

## 2023-05-07 DIAGNOSIS — I21.4 NSTEMI (NON-ST ELEVATED MYOCARDIAL INFARCTION) (H): ICD-10-CM

## 2023-05-08 ENCOUNTER — ANCILLARY PROCEDURE (OUTPATIENT)
Dept: MAMMOGRAPHY | Facility: OTHER | Age: 78
End: 2023-05-08
Attending: FAMILY MEDICINE
Payer: COMMERCIAL

## 2023-05-08 ENCOUNTER — TELEPHONE (OUTPATIENT)
Dept: MAMMOGRAPHY | Facility: OTHER | Age: 78
End: 2023-05-08

## 2023-05-08 ENCOUNTER — ALLIED HEALTH/NURSE VISIT (OUTPATIENT)
Dept: FAMILY MEDICINE | Facility: OTHER | Age: 78
End: 2023-05-08
Attending: FAMILY MEDICINE
Payer: COMMERCIAL

## 2023-05-08 ENCOUNTER — HOSPITAL ENCOUNTER (OUTPATIENT)
Dept: BONE DENSITY | Facility: HOSPITAL | Age: 78
Discharge: HOME | End: 2023-05-08
Attending: FAMILY MEDICINE
Payer: COMMERCIAL

## 2023-05-08 DIAGNOSIS — E53.8 VITAMIN B12 DEFICIENCY (NON ANEMIC): Primary | ICD-10-CM

## 2023-05-08 DIAGNOSIS — Z12.31 ENCOUNTER FOR SCREENING MAMMOGRAM FOR BREAST CANCER: ICD-10-CM

## 2023-05-08 DIAGNOSIS — Z78.0 ASYMPTOMATIC MENOPAUSE: ICD-10-CM

## 2023-05-08 PROCEDURE — 96372 THER/PROPH/DIAG INJ SC/IM: CPT

## 2023-05-08 PROCEDURE — 250N000011 HC RX IP 250 OP 636

## 2023-05-08 PROCEDURE — 77067 SCR MAMMO BI INCL CAD: CPT | Mod: TC

## 2023-05-08 PROCEDURE — 77080 DXA BONE DENSITY AXIAL: CPT

## 2023-05-08 RX ORDER — CYANOCOBALAMIN 1000 UG/ML
1000 INJECTION, SOLUTION INTRAMUSCULAR; SUBCUTANEOUS
Status: ACTIVE | OUTPATIENT
Start: 2023-05-08 | End: 2024-05-02

## 2023-05-08 RX ORDER — ATORVASTATIN CALCIUM 40 MG/1
TABLET, FILM COATED ORAL
Qty: 90 TABLET | Refills: 1 | Status: SHIPPED | OUTPATIENT
Start: 2023-05-08 | End: 2023-10-02

## 2023-05-08 RX ADMIN — CYANOCOBALAMIN 1000 MCG: 1000 INJECTION, SOLUTION INTRAMUSCULAR; SUBCUTANEOUS at 09:36

## 2023-05-15 ENCOUNTER — LAB (OUTPATIENT)
Dept: LAB | Facility: OTHER | Age: 78
End: 2023-05-15
Payer: COMMERCIAL

## 2023-05-15 DIAGNOSIS — I10 HYPERTENSION, UNSPECIFIED TYPE: ICD-10-CM

## 2023-05-15 DIAGNOSIS — E83.119 HEMOCHROMATOSIS, UNSPECIFIED HEMOCHROMATOSIS TYPE: ICD-10-CM

## 2023-05-15 DIAGNOSIS — D64.9 ANEMIA, UNSPECIFIED TYPE: ICD-10-CM

## 2023-05-15 LAB
ALBUMIN SERPL BCG-MCNC: 4 G/DL (ref 3.5–5.2)
ALP SERPL-CCNC: 140 U/L (ref 35–104)
ALT SERPL W P-5'-P-CCNC: 39 U/L (ref 10–35)
ANION GAP SERPL CALCULATED.3IONS-SCNC: 13 MMOL/L (ref 7–15)
AST SERPL W P-5'-P-CCNC: 46 U/L (ref 10–35)
BASOPHILS # BLD AUTO: 0 10E3/UL (ref 0–0.2)
BASOPHILS NFR BLD AUTO: 0 %
BILIRUB SERPL-MCNC: 0.3 MG/DL
BUN SERPL-MCNC: 20.1 MG/DL (ref 8–23)
CALCIUM SERPL-MCNC: 9 MG/DL (ref 8.8–10.2)
CHLORIDE SERPL-SCNC: 103 MMOL/L (ref 98–107)
CREAT SERPL-MCNC: 0.97 MG/DL (ref 0.51–0.95)
DEPRECATED HCO3 PLAS-SCNC: 15 MMOL/L (ref 22–29)
EOSINOPHIL # BLD AUTO: 0.3 10E3/UL (ref 0–0.7)
EOSINOPHIL NFR BLD AUTO: 6 %
ERYTHROCYTE [DISTWIDTH] IN BLOOD BY AUTOMATED COUNT: 12.7 % (ref 10–15)
FERRITIN SERPL-MCNC: 218 NG/ML (ref 11–328)
GFR SERPL CREATININE-BSD FRML MDRD: 60 ML/MIN/1.73M2
GLUCOSE SERPL-MCNC: 88 MG/DL (ref 70–99)
HCT VFR BLD AUTO: 33.4 % (ref 35–47)
HGB BLD-MCNC: 11.6 G/DL (ref 11.7–15.7)
IMM GRANULOCYTES # BLD: 0 10E3/UL
IMM GRANULOCYTES NFR BLD: 0 %
IRON BINDING CAPACITY (ROCHE): 241 UG/DL (ref 240–430)
IRON SATN MFR SERPL: 26 % (ref 15–46)
IRON SERPL-MCNC: 62 UG/DL (ref 37–145)
LDH SERPL L TO P-CCNC: 242 U/L (ref 0–250)
LYMPHOCYTES # BLD AUTO: 0.8 10E3/UL (ref 0.8–5.3)
LYMPHOCYTES NFR BLD AUTO: 16 %
MCH RBC QN AUTO: 31 PG (ref 26.5–33)
MCHC RBC AUTO-ENTMCNC: 34.7 G/DL (ref 31.5–36.5)
MCV RBC AUTO: 89 FL (ref 78–100)
MONOCYTES # BLD AUTO: 0.6 10E3/UL (ref 0–1.3)
MONOCYTES NFR BLD AUTO: 13 %
NEUTROPHILS # BLD AUTO: 3.2 10E3/UL (ref 1.6–8.3)
NEUTROPHILS NFR BLD AUTO: 65 %
NRBC # BLD AUTO: 0 10E3/UL
NRBC BLD AUTO-RTO: 0 /100
PLATELET # BLD AUTO: 210 10E3/UL (ref 150–450)
POTASSIUM SERPL-SCNC: 4.3 MMOL/L (ref 3.4–5.3)
PROT SERPL-MCNC: 7 G/DL (ref 6.4–8.3)
RBC # BLD AUTO: 3.74 10E6/UL (ref 3.8–5.2)
SODIUM SERPL-SCNC: 131 MMOL/L (ref 136–145)
WBC # BLD AUTO: 4.9 10E3/UL (ref 4–11)

## 2023-05-15 PROCEDURE — 86334 IMMUNOFIX E-PHORESIS SERUM: CPT | Mod: ZL | Performed by: PATHOLOGY

## 2023-05-15 PROCEDURE — 80053 COMPREHEN METABOLIC PANEL: CPT | Mod: ZL

## 2023-05-15 PROCEDURE — 82784 ASSAY IGA/IGD/IGG/IGM EACH: CPT | Mod: ZL

## 2023-05-15 PROCEDURE — 83615 LACTATE (LD) (LDH) ENZYME: CPT | Mod: ZL

## 2023-05-15 PROCEDURE — 85025 COMPLETE CBC W/AUTO DIFF WBC: CPT | Mod: ZL

## 2023-05-15 PROCEDURE — 82728 ASSAY OF FERRITIN: CPT | Mod: ZL

## 2023-05-15 PROCEDURE — 83550 IRON BINDING TEST: CPT | Mod: ZL

## 2023-05-15 PROCEDURE — 82232 ASSAY OF BETA-2 PROTEIN: CPT | Mod: ZL

## 2023-05-15 PROCEDURE — 36415 COLL VENOUS BLD VENIPUNCTURE: CPT | Mod: ZL

## 2023-05-15 PROCEDURE — 86334 IMMUNOFIX E-PHORESIS SERUM: CPT | Mod: 26 | Performed by: PATHOLOGY

## 2023-05-15 PROCEDURE — 84165 PROTEIN E-PHORESIS SERUM: CPT | Mod: 26 | Performed by: PATHOLOGY

## 2023-05-15 PROCEDURE — 84155 ASSAY OF PROTEIN SERUM: CPT | Mod: ZL,XU

## 2023-05-15 PROCEDURE — 85810 BLOOD VISCOSITY EXAMINATION: CPT | Mod: ZL

## 2023-05-15 PROCEDURE — 83521 IG LIGHT CHAINS FREE EACH: CPT | Mod: ZL

## 2023-05-15 PROCEDURE — 84165 PROTEIN E-PHORESIS SERUM: CPT | Mod: ZL | Performed by: PATHOLOGY

## 2023-05-16 ENCOUNTER — VIRTUAL VISIT (OUTPATIENT)
Dept: FAMILY MEDICINE | Facility: OTHER | Age: 78
End: 2023-05-16
Attending: FAMILY MEDICINE
Payer: COMMERCIAL

## 2023-05-16 DIAGNOSIS — K22.70 BARRETT'S ESOPHAGUS WITHOUT DYSPLASIA: ICD-10-CM

## 2023-05-16 DIAGNOSIS — M81.0 AGE-RELATED OSTEOPOROSIS WITHOUT CURRENT PATHOLOGICAL FRACTURE: Primary | ICD-10-CM

## 2023-05-16 DIAGNOSIS — E53.8 VITAMIN B12 DEFICIENCY (NON ANEMIC): ICD-10-CM

## 2023-05-16 DIAGNOSIS — K21.9 GASTROESOPHAGEAL REFLUX DISEASE WITHOUT ESOPHAGITIS: ICD-10-CM

## 2023-05-16 LAB
B2 MICROGLOB TUMOR MARKER SER-MCNC: 3.2 MG/L
IGA SERPL-MCNC: 403 MG/DL (ref 84–499)
IGG SERPL-MCNC: 1072 MG/DL (ref 610–1616)
IGM SERPL-MCNC: 116 MG/DL (ref 35–242)
KAPPA LC FREE SER-MCNC: 4.2 MG/DL (ref 0.33–1.94)
KAPPA LC FREE/LAMBDA FREE SER NEPH: 1.86 {RATIO} (ref 0.26–1.65)
LAMBDA LC FREE SERPL-MCNC: 2.26 MG/DL (ref 0.57–2.63)
TOTAL PROTEIN SERUM FOR ELP: 6.8 G/DL (ref 6.4–8.3)
VISC SER: 1.6 CP (ref 1.4–1.8)

## 2023-05-16 PROCEDURE — 99441 PR PHYSICIAN TELEPHONE EVALUATION 5-10 MIN: CPT | Performed by: FAMILY MEDICINE

## 2023-05-16 RX ORDER — CYANOCOBALAMIN 1000 UG/ML
1000 INJECTION, SOLUTION INTRAMUSCULAR; SUBCUTANEOUS
Status: ACTIVE | OUTPATIENT
Start: 2023-05-16 | End: 2024-05-10

## 2023-05-16 RX ORDER — LOSARTAN POTASSIUM 25 MG/1
TABLET ORAL
Qty: 90 TABLET | Refills: 0 | Status: SHIPPED | OUTPATIENT
Start: 2023-05-16 | End: 2023-07-10

## 2023-05-16 NOTE — TELEPHONE ENCOUNTER
Cozaar      Last Written Prescription Date:  1.23.23  Last Fill Quantity: #90,   # refills: 0  Last Office Visit: 5.16.23  Future Office visit:    Next 5 appointments (look out 90 days)    May 22, 2023  1:30 PM  (Arrive by 1:15 PM)  Return Visit with Sheldon Silverman MD  [facility] ([facility] [address] [address]  [phone]           Routing refill request to provider for review/approval because:

## 2023-05-16 NOTE — PROGRESS NOTES
Maria Teresa is a 77 year old who is being evaluated via a billable telephone visit.      What phone number would you like to be contacted at? [phone]  How would you like to obtain your AVS? MyChart    Distant Location (provider location):  On-site    Assessment & Plan     Age-related osteoporosis without current pathological fracture / Bowden's esophagus without dysplasia / Gastroesophageal reflux disease without esophagitis  Fosamax in the past, however, with increase GI sx, would recommend consideration of reclast or prolia. Pt does have endo appt upcoming with [facility]. She would like to review with them first. Will follow up at our next appt in person     15 minutes spent by me on the date of the encounter doing chart review, review of test results, interpretation of tests, patient visit and documentation     No follow-ups on file.    Luz Alcala MD  [facility] - [place]    Subjective   Maria Teresa is a 77 year old, presenting for the following health issues:  Results         View : No data to display.              HPI     Concern - Dexa results  Onset: 5/8/23  Description: Dexa  Intensity: moderate  Progression of Symptoms:  worsening  Accompanying Signs & Symptoms: NA  Previous history of similar problem: Yes  Precipitating factors:        Worsened by: NA  Alleviating factors:        Improved by: NA  Therapies tried and outcome:  none     PROCEDURE: DX HIP/PELVIS/SPINE 5/8/2023 10:45 AM  HISTORY: Asymptomatic menopause  COMPARISONS: 2015  TECHNIQUE: DEXA bone mineral density study of the left hip and lumbar  spine  FINDINGS: Bone mineral density in the left hip measured 0.692 g/sq cm  with a T score of -2.1. There has been a 15.9% decline from 2015 which  is statistically significant. Bone mineral density in the femoral neck  measured 0.637 g/sq cm with a T score of -1.9. Bone mineral density of  the lumbar spine L1-L4 measured 0.822 g/sq cm with a T score of -2.0.  There has been a 14.3%  decline from 2015 which is statistically  significant. The 10 year major osteoporotic fracture risk is 13%. The  10 year hip fracture risk is 3.6%                                                             IMPRESSION: Patient is osteopenic and fracture risk is moderate     ROBERT ROGER MD     Review of Systems   Constitutional, HEENT, cardiovascular, pulmonary, gi and gu systems are negative, except as otherwise noted.      Objective         Vitals:  No vitals were obtained today due to virtual visit.    Physical Exam   healthy, alert and no distress  PSYCH: Alert and oriented times 3; coherent speech, normal   rate and volume, able to articulate logical thoughts, able   to abstract reason, no tangential thoughts, no hallucinations   or delusions  Her affect is normal  RESP: No cough, no audible wheezing, able to talk in full sentences  Remainder of exam unable to be completed due to telephone visits    No results found for any visits on 05/16/23.    Phone call duration: 12 minutes

## 2023-05-17 LAB
ALBUMIN SERPL ELPH-MCNC: 4 G/DL (ref 3.7–5.1)
ALPHA1 GLOB SERPL ELPH-MCNC: 0.3 G/DL (ref 0.2–0.4)
ALPHA2 GLOB SERPL ELPH-MCNC: 0.6 G/DL (ref 0.5–0.9)
B-GLOBULIN SERPL ELPH-MCNC: 0.8 G/DL (ref 0.6–1)
GAMMA GLOB SERPL ELPH-MCNC: 1.1 G/DL (ref 0.7–1.6)
M PROTEIN SERPL ELPH-MCNC: 0 G/DL
PROT PATTERN SERPL ELPH-IMP: NORMAL
PROT PATTERN SERPL IFE-IMP: NORMAL

## 2023-05-22 ENCOUNTER — ONCOLOGY VISIT (OUTPATIENT)
Dept: ONCOLOGY | Facility: OTHER | Age: 78
End: 2023-05-22
Attending: INTERNAL MEDICINE
Payer: COMMERCIAL

## 2023-05-22 VITALS
RESPIRATION RATE: 20 BRPM | DIASTOLIC BLOOD PRESSURE: 62 MMHG | HEIGHT: 63 IN | WEIGHT: 119.71 LBS | OXYGEN SATURATION: 99 % | SYSTOLIC BLOOD PRESSURE: 118 MMHG | BODY MASS INDEX: 21.21 KG/M2 | HEART RATE: 86 BPM | TEMPERATURE: 97.9 F

## 2023-05-22 DIAGNOSIS — D47.2 MONOCLONAL GAMMOPATHY: ICD-10-CM

## 2023-05-22 DIAGNOSIS — E83.119 HEMOCHROMATOSIS, UNSPECIFIED HEMOCHROMATOSIS TYPE: ICD-10-CM

## 2023-05-22 DIAGNOSIS — D64.9 ANEMIA, UNSPECIFIED TYPE: ICD-10-CM

## 2023-05-22 DIAGNOSIS — R06.02 SOB (SHORTNESS OF BREATH): Primary | ICD-10-CM

## 2023-05-22 LAB — D DIMER PPP FEU-MCNC: 0.45 UG/ML FEU (ref 0–0.5)

## 2023-05-22 PROCEDURE — 85379 FIBRIN DEGRADATION QUANT: CPT | Mod: ZL | Performed by: INTERNAL MEDICINE

## 2023-05-22 PROCEDURE — 36415 COLL VENOUS BLD VENIPUNCTURE: CPT | Mod: ZL | Performed by: INTERNAL MEDICINE

## 2023-05-22 PROCEDURE — G0463 HOSPITAL OUTPT CLINIC VISIT: HCPCS

## 2023-05-22 PROCEDURE — 99417 PROLNG OP E/M EACH 15 MIN: CPT | Performed by: INTERNAL MEDICINE

## 2023-05-22 PROCEDURE — 99215 OFFICE O/P EST HI 40 MIN: CPT | Performed by: INTERNAL MEDICINE

## 2023-05-22 ASSESSMENT — PAIN SCALES - GENERAL: PAINLEVEL: NO PAIN (0)

## 2023-05-22 NOTE — PATIENT INSTRUCTIONS
We will see you back in July. We will cancel phlebotomies for now. We will call with the results for the D-Dimer

## 2023-05-22 NOTE — NURSING NOTE
"Oncology Rooming Note    May 22, 2023 1:37 PM   Maria Teresa Aburto is a 77 year old female who presents for:    Chief Complaint   Patient presents with     Hematology     Follow up Anemia and hemochromatosis      Initial Vitals: /62   Pulse 86   Temp 97.9  F (36.6  C) (Tympanic)   Resp 20   Ht 1.6 m (5' 3\")   Wt 54.3 kg (119 lb 11.4 oz)   SpO2 99%   BMI 21.21 kg/m   Estimated body mass index is 21.21 kg/m  as calculated from the following:    Height as of this encounter: 1.6 m (5' 3\").    Weight as of this encounter: 54.3 kg (119 lb 11.4 oz). Body surface area is 1.55 meters squared.  No Pain (0) Comment: Data Unavailable   No LMP recorded. Patient is postmenopausal.  Allergies reviewed: Yes  Medications reviewed: Yes    Medications: Medication refills not needed today.  Pharmacy name entered into EPIC:    [id number] - [place], [place] - [address]  [id number] - [place], [place] - [address]    Concerns: Question about diagnosis and phlebotomy's. Will be going to the [place] June 1 and June 2, 2023 for Cardiology - heart MRI and EKG and labs, and will also see Endocrinology.          Gege Magaña LPN            "

## 2023-05-23 NOTE — PROGRESS NOTES
Visit Date: 05/22/2023    HEMATOLOGY ONCOLOGY CLINIC NOTE    HISTORY OF PRESENT ILLNESS:  The patient returns for followup of anemia, elevated ferritin, thyroid nodule, and MGUS.  We saw the patient in consultation at the request of Dr. Lzu Alcala on 09/26/2022.  At that time, she was a 77-year-old white female with history of coronary artery disease, Bowden esophagus, hypertension. We were asked to evaluate concerning diagnosis of anemia in the setting of elevated ferritin.  The patient had been evaluated in the emergency room for hypertension.  The patient most recently had been seen by Dr. Luz Alcala, who increased her losartan dose.  As part of the workup, labs were drawn and CBC revealed white count 6.7, H and H 10.3 and 39.6, MCV 91, platelet count is 206,000.  Peripheral blood smear was ordered and revealed the patient likely had anemia of chronic disease.  Iron studies were obtained. Ferritin was elevated at 440.  TSH was 2.51.  Vitamin B12 was 800.  Sed rate was elevated at 34.  SERAFIN was negative.  Her major complaint was related to the fact she had increased fatigue throughout the day.  She also had dyspnea on exertion.  She did have heartburn symptoms.  Omeprazole was not helping.  She was diagnosed with Bowden esophagus without dysplasia.  EGD was performed by Dr. Panfilo Arcos approximately a year prior.  There is no family history of hematologic malignancy.  She was a nonsmoker, nondrinker.  She also described lightheadedness.  When she stood up at a time, she had to lie down to alleviate her symptoms.  She said she had COVID-19 back in 06/2021. She had a history of basal cell carcinoma as well as squamous cell carcinoma of skin.  She had a Mohs procedure done for basal cell carcinoma of the temple.  She was on Efudex cream.  There was evidence of iron overload.  We wanted to rule hemochromatosis by obtaining hemochromatosis gene mutation was performed and was read that the patient had normal  C282Y and H63D but was heterozygous for the S65C mutation, which is not usually associated with iron overload hemochromatosis unless there is a concomitant mutation.  Given her elevated ferritin, wanted to rule out occult malignancy by obtaining CT neck, which was read as a multinodular thyroid measuring less than 1.5 cm.  There was infrahilar right segmental focus measuring 11 mm x 6.5 mm.  CT chest, abdomen and pelvis revealed lung nodules bilaterally, largest being 7 mm in the right upper lobe.  She also had an MRI of the brain, which was read as normal brain for age.  We also wanted to rule out underlying monoclonal paraproteinemia and this came back positive.  The patient had an elevated kappa free light chain, kappa lambda ratio 2.0, which was elevated.  Beta 2 microglobulin was elevated at 2.5.  The patient was having ongoing fatigue, especially after climbing stairs for which she developed spastic neck pain after going up stairs with fatigue associated.  She also had been seen by sleep medicine to rule out sleep apnea.  She denied tobacco exposure.  She may have been exposed to asbestos as a child.  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  There were no reports of bright red blood per rectum, hematemesis, heartburn.  When we saw the patient, we felt the patient had anemia with elevated ferritin.  No obvious evidence of iron overload.  Given her monoclonal gammopathy with elevated kappa free light chains, we wanted to rule out myeloma or amyloid.  We proceeded with bone marrow aspiration biopsy, which was performed on 10/31/2022.  This was found to be a normocellular bone marrow, 25% maturing trilineage hematopoiesis.  There was no morphologic evidence of plasma cell neoplasm, plasma cell dyscrasia or myelodysplastic syndrome.  There was increased storage iron.  [place] red stain was performed.  There was no amyloid deposition noted.  PET scan revealed the patient had a right thyroid nodule  measuring 1.5 cm in dimension with an SUV of 25.8, consistent with possible neoplasm.  Otherwise, no other evidence of hypermetabolism in neck, chest, abdomen and pelvis.  She did have a thyroid ultrasound on 10/20/2022.  It was read as a 1.3 cm right lower lobe thyroid nodule.  Given these findings, we elected to proceed with FNA of the thyroid nodule that was performed 12/02/2022 at [facility] by Dr. Osman Camilo.  Apparently, this nodule was difficult to biopsy as it was heavily calcified.  According to Dr. Camilo, the area adjacent to the nodule was biopsied.  Apparently, the patient was frustrated that the biopsy had not been performed adequately and was willing to see ENT to assess the thyroid nodule.  We referred the patient to Dr. Russo, who felt this was probably difficult to biopsy and the only option would be a hemithyroidectomy.  Plan was to obtain an MRI of the neck when she returned from [place] in May and then consider biopsy at that time.      When we saw her subsequently, her ferritin was climbing, it up to 513.  We elected to absolutely rule out hemochromatosis by obtaining a MRI of the abdomen with attention to the liver and this was read as diffusely decreased signal in the liver, compatible with diffuse iron deposition consistent with hemochromatosis.  Given these findings, the patient likely had hemochromatosis and likely in addition to the S65C mutation, she may have another mutation that was read as Could not be adequately determined.  We therefore elected to proceed with phlebotomy to maintain ferritin greater than or equal to 100 and hemoglobin greater than or equal to 10.5.  She apparently moved down, was in [place] during the winter months and was seen by a hematologist there by the name of Neto Melo M.D. who continued with phlebotomies.  She had at least 2 phlebotomies with 350 mL of blood taken off. Her ferritin apparently dropped from 513.  Her  initial phlebotomy done here had caused hypotension after removal of 600 mL of blood.  Therefore, it was decided to phlebotomize 350 mL of blood. Apparently Dr. Melo raised the possibility of a cardiac MRI to evaluate for iron deposition, so therefore she has scheduled an appointment to be seen by a cardiologist at the [place] as well as endocrinologist as she is concerned about her thyroid nodule.  She said that phlebotomies did not help her fatigue.  Still has chronic fatigue where she gets not necessarily short of breath, but just wiped out when she goes up stairs or does any activities that is more than walking.  She denies easy bruisability, gingival bleeding, epistaxis.    PHYSICAL EXAMINATION:  GENERAL:  She is an elderly white female in no acute distress.  Cancer status ECOG performance status is a 1.  VITAL SIGNS:  Reveal blood pressure 118/62, pulse 86, respirations 20, temperature 97.9.  HEENT:  Atraumatic, normocephalic.  Oropharynx nonerythematous.  NECK:  No thyromegaly.  Neck is supple.  LUNGS:  Clear to auscultation and percussion.  HEART:  Regular rhythm.  S1, S2 normal.  ABDOMEN:  Soft, normoactive bowel sounds.  No mass, nontender.  LYMPHATICS:  No supraclavicular nodes.  EXTREMITIES:  Without edema.  NEUROLOGIC:  Nonfocal.    LABORATORY DATA:  Reveal CBC -- white count 4.9, H and H 11.6 and 33.4, platelet count is 210.  Ferritin is 218.  Beta 2 microglobulin is 3.2.  Serum protein electrophoresis reveals an M-spike of 0 with no monoclonal protein seen.  Kappa lambda ratio is 1.86.  Kappa free light chains were 4.2.  Serum iron 62, percent saturation 26%.  D-dimer is 0.45.    IMPRESSION:   1.  Hemochromatosis diagnosed based on MRI of the liver, revealing significant diffuse iron deposition in the setting of elevated ferritin and a heterozygous S65C mutation.  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  We recommended phlebotomies to maintain  ferritin greater than 100 as well as hemoglobin greater than or equal to 10.5.  She received 1 phlebotomy here.  Apparently she became hypotensive after removal of 600 mL of blood.  She received 2 more phlebotomies in [place], 350 mL each and her ferritin now has essentially dropped significantly from 513 down to 218.  She still is anemic and fatigued.  She plans to get a second opinion at the [facility].  She is seeing a cardiologist there with plans to perform a cardiac MRI to rule out iron deposition.  We also would recommend she get a second opinion with Dr. Ney Massey at the [facility] Hematology Department to further evaluate this anomalous presentation of hemochromatosis.  2.  Thyroid nodule.  The patient does not want to see Dr. Russo at this point of ENT or have a MRI.  She plans to go to the [facility] to evaluate her thyroid. We will therefore hold off on any phlebotomies for now and follow up in approximately 2 months.  3.  Monoclonal gammopathy of undetermined significance (MGUS).  There is no M-spike.  Kappa light chains have improved.  Bone marrow was negative for myeloma.    Ninety minutes were spent on this patient.  Time was spent reviewing multiple physician provider notes, reviewing labs with the patient, reviewing notes from [place] from Dr. Melo.    Sheldon Silverman MD        D: 2023   T: 2023   MT: [email or web address]    Name:     VINICIUS SORIANO  MRN:      [id number]        Account:    [id number]   :      1945           Visit Date: 2023     Document: [id number]    cc:  Luz Alcala MD

## 2023-06-06 ENCOUNTER — MYC MEDICAL ADVICE (OUTPATIENT)
Dept: FAMILY MEDICINE | Facility: OTHER | Age: 78
End: 2023-06-06

## 2023-06-06 DIAGNOSIS — I10 HYPERTENSION, UNSPECIFIED TYPE: Primary | ICD-10-CM

## 2023-06-06 DIAGNOSIS — R03.0 ELEVATED BLOOD-PRESSURE READING, WITHOUT DIAGNOSIS OF HYPERTENSION: ICD-10-CM

## 2023-06-07 NOTE — TELEPHONE ENCOUNTER
Pt requesting a 24 hour blood pressure monitor as they had no monitors when down at [place] when the MD ordered it.       Pended partial order for 24 hour blood pressure monitor.  Not sure who this will go to, or if we can even do this here.    Pended partial hematology referral.  See MCM. Please advise.    Thanks!

## 2023-06-12 ENCOUNTER — HOSPITAL ENCOUNTER (OUTPATIENT)
Dept: CARDIOLOGY | Facility: HOSPITAL | Age: 78
Discharge: HOME | End: 2023-06-12
Attending: FAMILY MEDICINE | Admitting: FAMILY MEDICINE
Payer: COMMERCIAL

## 2023-06-12 DIAGNOSIS — I10 HYPERTENSION, UNSPECIFIED TYPE: ICD-10-CM

## 2023-06-12 DIAGNOSIS — R03.0 ELEVATED BLOOD-PRESSURE READING, WITHOUT DIAGNOSIS OF HYPERTENSION: ICD-10-CM

## 2023-06-12 PROCEDURE — 93786 AMBL BP MNTR W/SW REC ONLY: CPT

## 2023-06-14 ENCOUNTER — MYC MEDICAL ADVICE (OUTPATIENT)
Dept: FAMILY MEDICINE | Facility: OTHER | Age: 78
End: 2023-06-14

## 2023-06-26 DIAGNOSIS — D64.9 ANEMIA, UNSPECIFIED TYPE: Primary | ICD-10-CM

## 2023-06-30 ENCOUNTER — LAB (OUTPATIENT)
Dept: LAB | Facility: OTHER | Age: 78
End: 2023-06-30
Payer: COMMERCIAL

## 2023-06-30 DIAGNOSIS — D47.2 MONOCLONAL GAMMOPATHY: ICD-10-CM

## 2023-06-30 DIAGNOSIS — D64.9 ANEMIA, UNSPECIFIED TYPE: ICD-10-CM

## 2023-06-30 DIAGNOSIS — E83.119 HEMOCHROMATOSIS, UNSPECIFIED HEMOCHROMATOSIS TYPE: ICD-10-CM

## 2023-06-30 LAB
ALBUMIN SERPL BCG-MCNC: 3.8 G/DL (ref 3.5–5.2)
ALP SERPL-CCNC: 152 U/L (ref 35–104)
ALT SERPL W P-5'-P-CCNC: 37 U/L (ref 0–50)
ANION GAP SERPL CALCULATED.3IONS-SCNC: 11 MMOL/L (ref 7–15)
AST SERPL W P-5'-P-CCNC: 37 U/L (ref 0–45)
BASOPHILS # BLD AUTO: 0 10E3/UL (ref 0–0.2)
BASOPHILS NFR BLD AUTO: 1 %
BILIRUB SERPL-MCNC: 0.2 MG/DL
BUN SERPL-MCNC: 21.3 MG/DL (ref 8–23)
CALCIUM SERPL-MCNC: 8.8 MG/DL (ref 8.8–10.2)
CHLORIDE SERPL-SCNC: 104 MMOL/L (ref 98–107)
CREAT SERPL-MCNC: 0.96 MG/DL (ref 0.51–0.95)
DEPRECATED HCO3 PLAS-SCNC: 18 MMOL/L (ref 22–29)
EOSINOPHIL # BLD AUTO: 0.2 10E3/UL (ref 0–0.7)
EOSINOPHIL NFR BLD AUTO: 4 %
ERYTHROCYTE [DISTWIDTH] IN BLOOD BY AUTOMATED COUNT: 12.4 % (ref 10–15)
FERRITIN SERPL-MCNC: 169 NG/ML (ref 11–328)
GFR SERPL CREATININE-BSD FRML MDRD: 60 ML/MIN/1.73M2
GLUCOSE SERPL-MCNC: 92 MG/DL (ref 70–99)
HCT VFR BLD AUTO: 31.6 % (ref 35–47)
HGB BLD-MCNC: 11 G/DL (ref 11.7–15.7)
IMM GRANULOCYTES # BLD: 0 10E3/UL
IMM GRANULOCYTES NFR BLD: 0 %
IRON BINDING CAPACITY (ROCHE): 221 UG/DL (ref 240–430)
IRON SATN MFR SERPL: 29 % (ref 15–46)
IRON SERPL-MCNC: 63 UG/DL (ref 37–145)
LDH SERPL L TO P-CCNC: 219 U/L (ref 0–250)
LYMPHOCYTES # BLD AUTO: 1.1 10E3/UL (ref 0.8–5.3)
LYMPHOCYTES NFR BLD AUTO: 21 %
MCH RBC QN AUTO: 30.9 PG (ref 26.5–33)
MCHC RBC AUTO-ENTMCNC: 34.8 G/DL (ref 31.5–36.5)
MCV RBC AUTO: 89 FL (ref 78–100)
MONOCYTES # BLD AUTO: 0.6 10E3/UL (ref 0–1.3)
MONOCYTES NFR BLD AUTO: 12 %
NEUTROPHILS # BLD AUTO: 3.2 10E3/UL (ref 1.6–8.3)
NEUTROPHILS NFR BLD AUTO: 62 %
NRBC # BLD AUTO: 0 10E3/UL
NRBC BLD AUTO-RTO: 0 /100
PLATELET # BLD AUTO: 205 10E3/UL (ref 150–450)
POTASSIUM SERPL-SCNC: 4.3 MMOL/L (ref 3.4–5.3)
PROT SERPL-MCNC: 6.7 G/DL (ref 6.4–8.3)
RBC # BLD AUTO: 3.56 10E6/UL (ref 3.8–5.2)
SODIUM SERPL-SCNC: 133 MMOL/L (ref 136–145)
TOTAL PROTEIN SERUM FOR ELP: 6.5 G/DL (ref 6.4–8.3)
WBC # BLD AUTO: 5.2 10E3/UL (ref 4–11)

## 2023-06-30 PROCEDURE — 86334 IMMUNOFIX E-PHORESIS SERUM: CPT | Performed by: PATHOLOGY

## 2023-06-30 PROCEDURE — 84155 ASSAY OF PROTEIN SERUM: CPT | Mod: ZL,XU

## 2023-06-30 PROCEDURE — 84165 PROTEIN E-PHORESIS SERUM: CPT | Mod: ZL | Performed by: PATHOLOGY

## 2023-06-30 PROCEDURE — 85810 BLOOD VISCOSITY EXAMINATION: CPT | Mod: ZL

## 2023-06-30 PROCEDURE — 82232 ASSAY OF BETA-2 PROTEIN: CPT | Mod: ZL

## 2023-06-30 PROCEDURE — 83521 IG LIGHT CHAINS FREE EACH: CPT | Mod: ZL,91

## 2023-06-30 PROCEDURE — 83615 LACTATE (LD) (LDH) ENZYME: CPT | Mod: ZL

## 2023-06-30 PROCEDURE — 86334 IMMUNOFIX E-PHORESIS SERUM: CPT | Mod: ZL | Performed by: PATHOLOGY

## 2023-06-30 PROCEDURE — 82784 ASSAY IGA/IGD/IGG/IGM EACH: CPT | Mod: ZL

## 2023-06-30 PROCEDURE — 36415 COLL VENOUS BLD VENIPUNCTURE: CPT | Mod: ZL

## 2023-06-30 PROCEDURE — 84165 PROTEIN E-PHORESIS SERUM: CPT | Mod: 26 | Performed by: PATHOLOGY

## 2023-06-30 PROCEDURE — 85025 COMPLETE CBC W/AUTO DIFF WBC: CPT | Mod: ZL

## 2023-06-30 PROCEDURE — 82728 ASSAY OF FERRITIN: CPT | Mod: ZL

## 2023-06-30 PROCEDURE — 83550 IRON BINDING TEST: CPT | Mod: ZL

## 2023-06-30 PROCEDURE — 80053 COMPREHEN METABOLIC PANEL: CPT | Mod: ZL

## 2023-07-03 LAB
ALBUMIN SERPL ELPH-MCNC: 3.9 G/DL (ref 3.7–5.1)
ALPHA1 GLOB SERPL ELPH-MCNC: 0.3 G/DL (ref 0.2–0.4)
ALPHA2 GLOB SERPL ELPH-MCNC: 0.6 G/DL (ref 0.5–0.9)
B-GLOBULIN SERPL ELPH-MCNC: 0.7 G/DL (ref 0.6–1)
B2 MICROGLOB TUMOR MARKER SER-MCNC: 2.9 MG/L
GAMMA GLOB SERPL ELPH-MCNC: 1.1 G/DL (ref 0.7–1.6)
IGA SERPL-MCNC: 363 MG/DL (ref 84–499)
IGG SERPL-MCNC: 1013 MG/DL (ref 610–1616)
IGM SERPL-MCNC: 106 MG/DL (ref 35–242)
KAPPA LC FREE SER-MCNC: 3.88 MG/DL (ref 0.33–1.94)
KAPPA LC FREE/LAMBDA FREE SER NEPH: 1.9 {RATIO} (ref 0.26–1.65)
LAMBDA LC FREE SERPL-MCNC: 2.04 MG/DL (ref 0.57–2.63)
M PROTEIN SERPL ELPH-MCNC: 0 G/DL
PROT PATTERN SERPL ELPH-IMP: NORMAL
PROT PATTERN SERPL IFE-IMP: NORMAL
VISC SER: 1.5 CP (ref 1.4–1.8)

## 2023-07-05 ENCOUNTER — APPOINTMENT (OUTPATIENT)
Dept: LAB | Facility: OTHER | Age: 78
End: 2023-07-05
Payer: COMMERCIAL

## 2023-07-10 ENCOUNTER — ONCOLOGY VISIT (OUTPATIENT)
Dept: ONCOLOGY | Facility: OTHER | Age: 78
End: 2023-07-10
Attending: INTERNAL MEDICINE
Payer: COMMERCIAL

## 2023-07-10 ENCOUNTER — APPOINTMENT (OUTPATIENT)
Dept: LAB | Facility: OTHER | Age: 78
End: 2023-07-10
Payer: COMMERCIAL

## 2023-07-10 ENCOUNTER — MYC MEDICAL ADVICE (OUTPATIENT)
Dept: FAMILY MEDICINE | Facility: OTHER | Age: 78
End: 2023-07-10

## 2023-07-10 VITALS
RESPIRATION RATE: 20 BRPM | WEIGHT: 119.27 LBS | DIASTOLIC BLOOD PRESSURE: 64 MMHG | OXYGEN SATURATION: 100 % | HEART RATE: 73 BPM | HEIGHT: 63 IN | TEMPERATURE: 96.6 F | SYSTOLIC BLOOD PRESSURE: 118 MMHG | BODY MASS INDEX: 21.13 KG/M2

## 2023-07-10 DIAGNOSIS — E83.119 HEMOCHROMATOSIS, UNSPECIFIED HEMOCHROMATOSIS TYPE: ICD-10-CM

## 2023-07-10 DIAGNOSIS — D47.2 MONOCLONAL GAMMOPATHY: ICD-10-CM

## 2023-07-10 DIAGNOSIS — D64.9 ANEMIA, UNSPECIFIED TYPE: Primary | ICD-10-CM

## 2023-07-10 LAB
ALBUMIN SERPL BCG-MCNC: 3.9 G/DL (ref 3.5–5.2)
ALP SERPL-CCNC: 140 U/L (ref 35–104)
ALT SERPL W P-5'-P-CCNC: 47 U/L (ref 0–50)
ANION GAP SERPL CALCULATED.3IONS-SCNC: 11 MMOL/L (ref 7–15)
AST SERPL W P-5'-P-CCNC: 55 U/L (ref 0–45)
BASOPHILS # BLD AUTO: 0.1 10E3/UL (ref 0–0.2)
BASOPHILS NFR BLD AUTO: 1 %
BILIRUB SERPL-MCNC: 0.3 MG/DL
BUN SERPL-MCNC: 14.6 MG/DL (ref 8–23)
CALCIUM SERPL-MCNC: 9 MG/DL (ref 8.8–10.2)
CHLORIDE SERPL-SCNC: 100 MMOL/L (ref 98–107)
CREAT SERPL-MCNC: 0.98 MG/DL (ref 0.51–0.95)
DEPRECATED HCO3 PLAS-SCNC: 20 MMOL/L (ref 22–29)
EOSINOPHIL # BLD AUTO: 0.3 10E3/UL (ref 0–0.7)
EOSINOPHIL NFR BLD AUTO: 4 %
ERYTHROCYTE [DISTWIDTH] IN BLOOD BY AUTOMATED COUNT: 12.4 % (ref 10–15)
FERRITIN SERPL-MCNC: 224 NG/ML (ref 11–328)
FOLATE SERPL-MCNC: 12.5 NG/ML (ref 4.6–34.8)
GFR SERPL CREATININE-BSD FRML MDRD: 58 ML/MIN/1.73M2
GLUCOSE SERPL-MCNC: 98 MG/DL (ref 70–99)
HCT VFR BLD AUTO: 34.4 % (ref 35–47)
HGB BLD-MCNC: 11.9 G/DL (ref 11.7–15.7)
IMM GRANULOCYTES # BLD: 0 10E3/UL
IMM GRANULOCYTES NFR BLD: 0 %
IRON BINDING CAPACITY (ROCHE): 232 UG/DL (ref 240–430)
IRON SATN MFR SERPL: 28 % (ref 15–46)
IRON SERPL-MCNC: 65 UG/DL (ref 37–145)
LDH SERPL L TO P-CCNC: 239 U/L (ref 0–250)
LYMPHOCYTES # BLD AUTO: 1 10E3/UL (ref 0.8–5.3)
LYMPHOCYTES NFR BLD AUTO: 13 %
MCH RBC QN AUTO: 30.9 PG (ref 26.5–33)
MCHC RBC AUTO-ENTMCNC: 34.6 G/DL (ref 31.5–36.5)
MCV RBC AUTO: 89 FL (ref 78–100)
MONOCYTES # BLD AUTO: 0.9 10E3/UL (ref 0–1.3)
MONOCYTES NFR BLD AUTO: 12 %
NEUTROPHILS # BLD AUTO: 5.4 10E3/UL (ref 1.6–8.3)
NEUTROPHILS NFR BLD AUTO: 70 %
NRBC # BLD AUTO: 0 10E3/UL
NRBC BLD AUTO-RTO: 0 /100
PLATELET # BLD AUTO: 198 10E3/UL (ref 150–450)
POTASSIUM SERPL-SCNC: 4.3 MMOL/L (ref 3.4–5.3)
PROT SERPL-MCNC: 6.8 G/DL (ref 6.4–8.3)
RBC # BLD AUTO: 3.85 10E6/UL (ref 3.8–5.2)
SODIUM SERPL-SCNC: 131 MMOL/L (ref 136–145)
WBC # BLD AUTO: 7.6 10E3/UL (ref 4–11)

## 2023-07-10 PROCEDURE — 82746 ASSAY OF FOLIC ACID SERUM: CPT | Mod: ZL | Performed by: INTERNAL MEDICINE

## 2023-07-10 PROCEDURE — 80053 COMPREHEN METABOLIC PANEL: CPT | Mod: ZL | Performed by: INTERNAL MEDICINE

## 2023-07-10 PROCEDURE — 99215 OFFICE O/P EST HI 40 MIN: CPT | Performed by: INTERNAL MEDICINE

## 2023-07-10 PROCEDURE — G0463 HOSPITAL OUTPT CLINIC VISIT: HCPCS

## 2023-07-10 PROCEDURE — 83615 LACTATE (LD) (LDH) ENZYME: CPT | Mod: ZL | Performed by: INTERNAL MEDICINE

## 2023-07-10 PROCEDURE — 36415 COLL VENOUS BLD VENIPUNCTURE: CPT | Mod: ZL | Performed by: INTERNAL MEDICINE

## 2023-07-10 PROCEDURE — 82728 ASSAY OF FERRITIN: CPT | Mod: ZL | Performed by: INTERNAL MEDICINE

## 2023-07-10 PROCEDURE — 96372 THER/PROPH/DIAG INJ SC/IM: CPT | Performed by: INTERNAL MEDICINE

## 2023-07-10 PROCEDURE — 250N000011 HC RX IP 250 OP 636: Mod: JZ | Performed by: INTERNAL MEDICINE

## 2023-07-10 PROCEDURE — 83550 IRON BINDING TEST: CPT | Mod: ZL | Performed by: INTERNAL MEDICINE

## 2023-07-10 PROCEDURE — G0463 HOSPITAL OUTPT CLINIC VISIT: HCPCS | Mod: 25

## 2023-07-10 PROCEDURE — 85025 COMPLETE CBC W/AUTO DIFF WBC: CPT | Mod: ZL | Performed by: INTERNAL MEDICINE

## 2023-07-10 RX ORDER — CYANOCOBALAMIN 1000 UG/ML
1000 INJECTION, SOLUTION INTRAMUSCULAR; SUBCUTANEOUS
Status: DISCONTINUED | OUTPATIENT
Start: 2023-07-10 | End: 2023-07-10 | Stop reason: HOSPADM

## 2023-07-10 RX ADMIN — CYANOCOBALAMIN 1000 MCG: 1000 INJECTION, SOLUTION INTRAMUSCULAR at 08:55

## 2023-07-10 ASSESSMENT — PAIN SCALES - GENERAL: PAINLEVEL: NO PAIN (0)

## 2023-07-10 NOTE — PATIENT INSTRUCTIONS
Please go to the clinic lab today for some additional labwork    We will see you back after the [facility] visit.    Plan for labs the last week of September, then we will see you after that for results

## 2023-07-10 NOTE — PROGRESS NOTES
Visit Date: 07/10/2023    HISTORY OF PRESENT ILLNESS:  Mrs. Aburto returns for followup of anemia, elevated ferritin, thyroid nodule, and MGUS.  We saw the patient in consultation at the request of Dr. Luz Alcala on 09/26/2022.  At that time, she was a 77-year-old white female with history of coronary artery disease, Bowden esophagus, hypertension who we were asked to evaluate concerning diagnosis of anemia in the setting of elevated ferritin.  The patient had been evaluated in the emergency room for hypertension.  The patient had been seen by Dr. Luz Alcala, who increased her losartan dose.  As part of the workup, labs were drawn and CBC revealed white count 6.7, H and H was 10.3 and 39.6, MCV 91, platelet count was 206,000.  Peripheral blood smear was ordered and revealed the patient likely had anemia of chronic disease.  Iron studies were obtained.  Ferritin was elevated at 440.  TSH was 2.51.  Vitamin B12 was 800.  Sed rate was elevated at 34.  SERAFIN was negative.  Her major complaint was related to the fact she had increased fatigue throughout the day.  She also had dyspnea on exertion.  She did have occasional reflux symptoms.  Omeprazole was not helping.  She was diagnosed with Bowden esophagus without dysplasia.  EGD was performed by Dr. Panfilo Arcos approximately a year prior.  There is no family history of hematologic malignancy.  She was a nonsmoker, nondrinker.  She also described lightheadedness when she stood up and at times, she would have to lie down to alleviate her symptoms.  She said she had COVID-19 back in 06/2021.  She had a history of basal cell carcinoma as well as squamous cell carcinoma of skin.  She had a Mohs procedure done for basal cell carcinoma of the temple.  She was on Efudex cream.  There was evidence of iron overload.  We wanted to rule hemochromatosis by obtaining hemochromatosis gene mutation.  The patient was found to be heterozygous for the S65C mutation, but had  normal C282Y and H63D.  The S65C mutation is usually not associated with iron overload or hemochromatosis unless there is a concomitant mutation.  Given her elevated ferritin, wanted to rule out occult malignancy by obtaining CT neck, which was read as a multinodular thyroid measuring less than 1.5 cm.  There was infrahilar right segmental focus measuring 11 mm x 6.5 mm.  CT chest, abdomen and pelvis revealed lung nodules bilaterally, largest being 7 mm in the right upper lobe.  She also had an MRI of the brain, which was read as normal brain for age.  We also wanted to rule out underlying monoclonal paraproteinemia and this came back positive.  The patient had an elevated kappa free light chain.  Kappa lambda ratio was 2.0, which was elevated.  Beta 2 microglobulin was elevated at 2.5.  The patient was having ongoing fatigue, especially after climbing stairs, for which she developed spastic neck pain after going up stairs with fatigue being associated.  She also had been seen by Sleep Medicine to rule out sleep apnea.  She denied tobacco exposure.  She may have been exposed to asbestos as a child.  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  There were no reports of bright red blood per rectum, hematemesis, or heartburn.  When we saw the patient, we felt the patient had anemia with elevated ferritin.  No obvious evidence of iron overload.  Given her monoclonal gammopathy with elevated kappa free light chains, we wanted to rule out myeloma or amyloid.  We proceeded with bone marrow aspiration biopsy, which was performed on 10/31/2022.  This was found to be a normocellular bone marrow with maturing trilineage hematopoiesis.  There was no morphologic evidence of plasma cell neoplasm, plasma cell dyscrasia or myelodysplastic syndrome.  There was increased storage iron.  Congo red stain was performed.  There was no amyloid deposition noted.  PET scan revealed the patient had a right thyroid nodule measuring  1.5 cm in dimension with an SUV of 25.8, consistent with possible neoplasm.  Otherwise, no other evidence of hypermetabolism in neck, chest, abdomen and pelvis.  She did have a thyroid ultrasound on 10/20/2022.  It was read as a 1.3 cm right lower lobe thyroid nodule.  Given these findings, we elected to proceed with FNA of the thyroid nodule that was performed 12/02/2022 at [facility] by Dr. Osman Camilo.  Apparently, this nodule was difficult to biopsy.  It was heavily calcified.  According to Dr. Camilo, the area adjacent to the nodule was biopsied.  Apparently, the patient was frustrated that the biopsy had not been performed adequately and was willing to see ENT to assess the thyroid nodule.  We referred the patient to Dr. Russo, who felt this was probably difficult to biopsy and the only option would be a hemithyroidectomy.  Plan was to obtain an MRI of the neck when she returned from [place] in May and then consider biopsy at that time.     When we saw her subsequently, her ferritin was climbing, it was up to 513.  We elected to absolutely rule out hemochromatosis by obtaining a MRI of the abdomen with attention to the liver and this was read as diffusely decreased signal in the liver, compatible with diffuse iron deposition consistent with hemochromatosis.  Given these findings, the patient likely had hemochromatosis and likely in addition to the S65C mutation.  She may have another mutation that was not readily examable on the current lab testing for hemochromatosis daily mutation testing.  We therefore elected to proceed with phlebotomy to maintain ferritin greater than or equal to 100 and hemoglobin greater than or equal to 10.5.  She apparently moved down to [place] during the winter months and was seen by a hematologist there by the name of Neto Melo M.D. who continued with phlebotomies.  She had at least 2 phlebotomies with 350 mL of blood taken off.  Her ferritin  apparently dropped.  Her initial phlebotomy done here had caused hypotension after removal of 600 mL of blood.  Therefore, it was decided to phlebotomize 350 mL of blood.  Apparently Dr. Melo raised the possibility of a cardiac MRI to evaluate for iron deposition, so therefore she was scheduled to see a cardiologist at the [place] as well as endocrinologist as she was concerned about her thyroid nodule.  She said that the phlebotomies did not necessarily help her fatigue.  She still had chronic fatigue.  She gets not necessarily short of breath, but just wiped out when she goes up stairs or does any activities that is more than walking.     When we saw the patient last, the plan was to follow up with the [facility] [place] [facility] for further evaluation of her hemochromatosis.  She recently had seen a hepatologist at the [facility] [place] [facility] by the name of Ky Schmidt MD at who felt the patient's recent ferritin level was down and switched the phlebotomies to 215, therefore, the patient did not require phlebotomies.  He ordered further genetic testing that was drawn.  This testing was causing [employer] gene panel looking for other genetic causes for iron overload syndrome.  Also MRI of the liver was scheduled for 2 months.  She will follow up with Dr. uX Valdes.  She also saw an endocrinologist by the name of Woody Lebron.  FNA was performed of the thyroid nodule that came back benign.  She also saw Dr. Schmidt of Cardiology who recommended she stop the losartan.  Of note, the patient has been on atorvastatin and Lipitor, which certainly can cause fatigue or brain fog, which she admits to.  According to the cardiologist, her lipid profile was excellent.  This was drawn on 06/01/2023 and her total cholesterol was 131.  Her LDL cholesterol was 61.  She is scheduled to have a B12 injection that was ordered by Dr. Alcala.  Otherwise, other than fatigue, she denies any fevers, night sweats, weight loss, shortness of breath.  No bone  pain.  She says that she has been diagnosed with osteopenia.  Plan is to start Prolia in the near future.  She also plans to follow up with Dr. Alcala in the fall.  She usually goes to [place] in December every year.    PHYSICAL EXAMINATION:    GENERAL:  She is an elderly white female in no acute distress.  ECOG performance status is 0.  VITAL SIGNS:  Reveal blood pressure is 118/64, pulse 73, respirations 20, temperature 96.6.  HEENT:  Atraumatic, normocephalic.  Oropharynx is nonerythematous.  NECK:  Supple.  LUNGS:  Clear to auscultation and percussion.  HEART:  Regular rhythm.  S1, S2 normal.  ABDOMEN:  Soft, normoactive bowel sounds and no masses.  There is mild tenderness over the right upper quadrant.  No rebound.  LYMPHATICS:  No cervical, supraclavicular or inguinal nodes.  EXTREMITIES:  No edema.  NEUROLOGIC:  Nonfocal.    LABORATORY DATA:  Reveal from today, her CBC white count is 7.6, H and H 11.9 and 34.4, platelet count is 198.  Labs from 06/30/2023 revealed beta 2 microglobulin of 2.9.  Serum iron is 63, percent saturation 29%.  Kappa lambda ratio is 1.9.  LDH is 219.  Serum viscosity is 1.5.  Serum protein electrophoresis reveals an M-spike of 0.  There was no monoclonal protein seen on immunofixation.  Ferritin is 169.    IMPRESSION:    1.  Hemochromatosis diagnosed based on MRI of the liver, as well as revealing significant diffuse iron deposition in the setting of elevated ferritin with heterozygous CS65C mutation.  We felt the patient likely had a concomitant rare mutation, which predisposes hemochromatosis.  We recommended phlebotomies to maintain ferritin greater than or equal to 100 as well as hemoglobin greater than or equal to 10.5.  She received 1 phlebotomy.  Apparently, she became hypotensive after removal of 600 mL of blood.  She received 2 more phlebotomies, 350 mL, and her ferritin dropped to 218 and currently is 169.  [facility] [place] [facility]at[facility] recommended the [employer] gene panel ordered  to rule out a rare genetic mutation that could predispose the patient to iron overload.  Currently, she does not have iron overload; therefore, phlebotomies are not recommended.  2.  Thyroid nodule.  Recent FNA at the [facility] was negative for malignancy.  3.  MGUS.  The kappa lambda ratio is more or less stable.  Bone marrow was negative for myeloma.  Plan is to continue surveillance.  We will see the patient in approximately 10 weeks, repeat myeloma labs as well as CBC, CMP, LDH, ferritin, iron, TIBC.  4.  Anemia, resolved.  5.  Fatigue, possibly due to statin drugs.  Her lipid profile was excellent.  We have recommended a trial of stopping the atorvastatin or Lipitor for 1 week and see if her symptoms improve.  If not, she should go back on the Lipitor as per Dr. Alcala.  We will defer to Dr. Alcala in terms of further management of her cholesterol.    Eighty-two minutes were spent on this patient.  Time was spent reviewing multiple physician provider notes from [facility] including Endocrinology, Cardiology as well as Hepatology, performing history and physical, documenting history and physical, discussing further workup as per Hematology, ordering further lab work and followup.    Sheldon Silverman MD        D: 07/10/2023   T: 07/10/2023   MT: [id number]    Name:     VINICIUS SORIANO  MRN:      [id number]        Account:    [id number]   :      1945           Visit Date: 07/10/2023     Document: [id number]    cc:  MD Xu Camara MD

## 2023-07-10 NOTE — NURSING NOTE
"Oncology Rooming Note    July 10, 2023 8:42 AM   Maria Teresa Aburto is a 78 year old female who presents for:    Chief Complaint   Patient presents with     Oncology Clinic Visit     Follow up Anemia     Initial Vitals: /64   Pulse 73   Temp (!) 96.6  F (35.9  C) (Tympanic)   Resp 20   Ht 1.6 m (5' 3\")   Wt 54.1 kg (119 lb 4.3 oz)   SpO2 100%   BMI 21.13 kg/m   Estimated body mass index is 21.13 kg/m  as calculated from the following:    Height as of this encounter: 1.6 m (5' 3\").    Weight as of this encounter: 54.1 kg (119 lb 4.3 oz). Body surface area is 1.55 meters squared.  No Pain (0) Comment: Data Unavailable   No LMP recorded. Patient is postmenopausal.  Allergies reviewed: Yes  Medications reviewed: Yes    Medications: Medication refills not needed today.  Pharmacy name entered into EPIC:    [id number] - [place], [place] - [id number] [facility] [address]          Gege Magaña LPN            "

## 2023-07-10 NOTE — NURSING NOTE
Patient is 78 years old, here today for injection of B12.          Patient denies questions nor concerns regarding medication, administration site, side effects, nor aftercare.  Patient identified with two identifiers, order verified, and verbal consent for today's injection obtained from patient.   Patient education provided on s/s of injection site infection, and/or medication specific side effects, and when to call a provider.  Patient instructed to report any adverse effects.     Medication administered per protocol in right deltoid at 90 degree angle.  Site covered with sterile bandage.  Patient tolerated injection well, no verbal nor non-verbal signs of discomfort noted.  No adverse effects noted at this time.     Patient instructed to call with further questions or concerns.  Patient states understanding and is in agreement with this plan.  Patient discharged.

## 2023-07-11 NOTE — TELEPHONE ENCOUNTER
Pt updated on new orders below. She is wondering if she can just go off of it cold turkey without weaning?    Please advise.    Alecia VELÁSQUEZ RN

## 2023-08-07 DIAGNOSIS — G25.81 RESTLESS LEGS SYNDROME (RLS): ICD-10-CM

## 2023-08-09 RX ORDER — PRAMIPEXOLE DIHYDROCHLORIDE 0.25 MG/1
.25-.5 TABLET ORAL AT BEDTIME
Qty: 90 TABLET | Refills: 0 | Status: SHIPPED | OUTPATIENT
Start: 2023-08-09 | End: 2023-11-03

## 2023-08-10 ENCOUNTER — ALLIED HEALTH/NURSE VISIT (OUTPATIENT)
Dept: FAMILY MEDICINE | Facility: OTHER | Age: 78
End: 2023-08-10
Attending: FAMILY MEDICINE
Payer: COMMERCIAL

## 2023-08-10 DIAGNOSIS — E53.8 VITAMIN B12 DEFICIENCY (NON ANEMIC): Primary | ICD-10-CM

## 2023-08-10 PROCEDURE — 250N000011 HC RX IP 250 OP 636: Mod: JZ

## 2023-08-10 PROCEDURE — 96372 THER/PROPH/DIAG INJ SC/IM: CPT

## 2023-08-10 RX ADMIN — CYANOCOBALAMIN 1000 MCG: 1000 INJECTION, SOLUTION INTRAMUSCULAR; SUBCUTANEOUS at 09:57

## 2023-09-07 DIAGNOSIS — Z87.19 HISTORY OF BARRETT'S ESOPHAGUS: ICD-10-CM

## 2023-09-08 RX ORDER — OMEPRAZOLE 40 MG/1
CAPSULE, DELAYED RELEASE ORAL DAILY
Qty: 90 CAPSULE | Refills: 2 | Status: SHIPPED | OUTPATIENT
Start: 2023-09-08 | End: 2024-04-29

## 2023-09-08 NOTE — TELEPHONE ENCOUNTER
Prilosec      Last Written Prescription Date:  08/22/22  Last Fill Quantity: 90,   # refills: 3  Last Office Visit: 05/16/23  Future Office visit:    Next 5 appointments (look out 90 days)      Oct 02, 2023 10:00 AM  (Arrive by 9:45 AM)  Return Visit with Sheldon Silverman MD  [facility] ([facility] ) [address] [address]  [phone]     Nov 21, 2023  9:00 AM  (Arrive by 8:45 AM)  PHYSICAL with Luz Alcala MD  [facility] ([facility] ) [address] [address]  [phone]

## 2023-09-11 ENCOUNTER — MYC MEDICAL ADVICE (OUTPATIENT)
Dept: ONCOLOGY | Facility: OTHER | Age: 78
End: 2023-09-11

## 2023-09-11 DIAGNOSIS — E83.119 HEMOCHROMATOSIS, UNSPECIFIED HEMOCHROMATOSIS TYPE: Primary | ICD-10-CM

## 2023-09-18 ENCOUNTER — ALLIED HEALTH/NURSE VISIT (OUTPATIENT)
Dept: ALLERGY | Facility: OTHER | Age: 78
End: 2023-09-18
Attending: FAMILY MEDICINE
Payer: COMMERCIAL

## 2023-09-18 DIAGNOSIS — E53.8 VITAMIN B12 DEFICIENCY: Primary | ICD-10-CM

## 2023-09-18 PROCEDURE — 96372 THER/PROPH/DIAG INJ SC/IM: CPT | Performed by: FAMILY MEDICINE

## 2023-09-18 PROCEDURE — 250N000011 HC RX IP 250 OP 636: Mod: JZ | Performed by: FAMILY MEDICINE

## 2023-09-18 RX ADMIN — CYANOCOBALAMIN 1000 MCG: 1000 INJECTION, SOLUTION INTRAMUSCULAR; SUBCUTANEOUS at 15:02

## 2023-09-18 NOTE — PROGRESS NOTES
Clinic Administered Medication Documentation      Injectable Medication Documentation    Is there an active order (written within the past 365 days, with administrations remaining, not ) in the chart? Yes.     Patient was given Cyanocobalamin (B-12). Prior to medication administration, verified patient's identity using patient s name and date of birth. Please see MAR and medication order for additional information. Patient instructed to report any adverse reaction to staff immediately.    Vial/Syringe: Single dose vial. Was entire vial of medication used? Yes  Was this medication supplied by the patient? No  Is this a medication the patient will need to receive again? Yes. Verified that the patient has refills remaining in their prescription.  Left Deltoid  EFREN OSORIO LPN

## 2023-09-25 ENCOUNTER — LAB (OUTPATIENT)
Dept: LAB | Facility: OTHER | Age: 78
End: 2023-09-25
Payer: COMMERCIAL

## 2023-09-25 DIAGNOSIS — D47.2 MONOCLONAL GAMMOPATHY: ICD-10-CM

## 2023-09-25 DIAGNOSIS — E83.119 HEMOCHROMATOSIS, UNSPECIFIED HEMOCHROMATOSIS TYPE: ICD-10-CM

## 2023-09-25 DIAGNOSIS — D64.9 ANEMIA, UNSPECIFIED TYPE: ICD-10-CM

## 2023-09-25 LAB
ALBUMIN SERPL BCG-MCNC: 4.1 G/DL (ref 3.5–5.2)
ALP SERPL-CCNC: 101 U/L (ref 35–104)
ALT SERPL W P-5'-P-CCNC: 19 U/L (ref 0–50)
ANION GAP SERPL CALCULATED.3IONS-SCNC: 10 MMOL/L (ref 7–15)
AST SERPL W P-5'-P-CCNC: 30 U/L (ref 0–45)
BASOPHILS # BLD AUTO: 0.1 10E3/UL (ref 0–0.2)
BASOPHILS NFR BLD AUTO: 1 %
BILIRUB SERPL-MCNC: 0.2 MG/DL
BUN SERPL-MCNC: 21.2 MG/DL (ref 8–23)
CALCIUM SERPL-MCNC: 8.8 MG/DL (ref 8.8–10.2)
CHLORIDE SERPL-SCNC: 100 MMOL/L (ref 98–107)
CREAT SERPL-MCNC: 0.93 MG/DL (ref 0.51–0.95)
DEPRECATED HCO3 PLAS-SCNC: 21 MMOL/L (ref 22–29)
EGFRCR SERPLBLD CKD-EPI 2021: 62 ML/MIN/1.73M2
EOSINOPHIL # BLD AUTO: 0.3 10E3/UL (ref 0–0.7)
EOSINOPHIL NFR BLD AUTO: 4 %
ERYTHROCYTE [DISTWIDTH] IN BLOOD BY AUTOMATED COUNT: 12.5 % (ref 10–15)
FERRITIN SERPL-MCNC: 200 NG/ML (ref 11–328)
GLUCOSE SERPL-MCNC: 93 MG/DL (ref 70–99)
HCT VFR BLD AUTO: 35 % (ref 35–47)
HGB BLD-MCNC: 11.9 G/DL (ref 11.7–15.7)
IMM GRANULOCYTES # BLD: 0 10E3/UL
IMM GRANULOCYTES NFR BLD: 0 %
IRON BINDING CAPACITY (ROCHE): 229 UG/DL (ref 240–430)
IRON SATN MFR SERPL: 40 % (ref 15–46)
IRON SERPL-MCNC: 91 UG/DL (ref 37–145)
LDH SERPL L TO P-CCNC: 213 U/L (ref 0–250)
LYMPHOCYTES # BLD AUTO: 1 10E3/UL (ref 0.8–5.3)
LYMPHOCYTES NFR BLD AUTO: 17 %
MCH RBC QN AUTO: 30.4 PG (ref 26.5–33)
MCHC RBC AUTO-ENTMCNC: 34 G/DL (ref 31.5–36.5)
MCV RBC AUTO: 90 FL (ref 78–100)
MONOCYTES # BLD AUTO: 0.7 10E3/UL (ref 0–1.3)
MONOCYTES NFR BLD AUTO: 12 %
NEUTROPHILS # BLD AUTO: 4 10E3/UL (ref 1.6–8.3)
NEUTROPHILS NFR BLD AUTO: 66 %
NRBC # BLD AUTO: 0 10E3/UL
NRBC BLD AUTO-RTO: 0 /100
PLATELET # BLD AUTO: 215 10E3/UL (ref 150–450)
POTASSIUM SERPL-SCNC: 4.1 MMOL/L (ref 3.4–5.3)
PROT SERPL-MCNC: 6.6 G/DL (ref 6.4–8.3)
RBC # BLD AUTO: 3.91 10E6/UL (ref 3.8–5.2)
SODIUM SERPL-SCNC: 131 MMOL/L (ref 136–145)
WBC # BLD AUTO: 6.1 10E3/UL (ref 4–11)

## 2023-09-25 PROCEDURE — 82784 ASSAY IGA/IGD/IGG/IGM EACH: CPT | Mod: ZL

## 2023-09-25 PROCEDURE — 84155 ASSAY OF PROTEIN SERUM: CPT | Mod: ZL

## 2023-09-25 PROCEDURE — 83521 IG LIGHT CHAINS FREE EACH: CPT | Mod: ZL

## 2023-09-25 PROCEDURE — 36415 COLL VENOUS BLD VENIPUNCTURE: CPT | Mod: ZL

## 2023-09-25 PROCEDURE — 80053 COMPREHEN METABOLIC PANEL: CPT | Mod: ZL

## 2023-09-25 PROCEDURE — 82607 VITAMIN B-12: CPT | Mod: ZL

## 2023-09-25 PROCEDURE — 86334 IMMUNOFIX E-PHORESIS SERUM: CPT | Mod: ZL | Performed by: STUDENT IN AN ORGANIZED HEALTH CARE EDUCATION/TRAINING PROGRAM

## 2023-09-25 PROCEDURE — 83615 LACTATE (LD) (LDH) ENZYME: CPT | Mod: ZL

## 2023-09-25 PROCEDURE — 82728 ASSAY OF FERRITIN: CPT | Mod: ZL

## 2023-09-25 PROCEDURE — 84165 PROTEIN E-PHORESIS SERUM: CPT | Mod: ZL | Performed by: STUDENT IN AN ORGANIZED HEALTH CARE EDUCATION/TRAINING PROGRAM

## 2023-09-25 PROCEDURE — 82746 ASSAY OF FOLIC ACID SERUM: CPT | Mod: ZL

## 2023-09-25 PROCEDURE — 85025 COMPLETE CBC W/AUTO DIFF WBC: CPT | Mod: ZL

## 2023-09-25 PROCEDURE — 83550 IRON BINDING TEST: CPT | Mod: ZL

## 2023-09-25 PROCEDURE — 82232 ASSAY OF BETA-2 PROTEIN: CPT | Mod: ZL

## 2023-09-25 PROCEDURE — 85810 BLOOD VISCOSITY EXAMINATION: CPT | Mod: ZL

## 2023-09-25 PROCEDURE — 86381 MITOCHONDRIAL ANTIBODY EACH: CPT | Mod: ZL

## 2023-09-26 LAB
FOLATE SERPL-MCNC: 13.4 NG/ML (ref 4.6–34.8)
TOTAL PROTEIN SERUM FOR ELP: 6.5 G/DL (ref 6.4–8.3)
VIT B12 SERPL-MCNC: 748 PG/ML (ref 232–1245)

## 2023-09-27 LAB
ALBUMIN SERPL ELPH-MCNC: 4 G/DL (ref 3.7–5.1)
ALPHA1 GLOB SERPL ELPH-MCNC: 0.3 G/DL (ref 0.2–0.4)
ALPHA2 GLOB SERPL ELPH-MCNC: 0.5 G/DL (ref 0.5–0.9)
B-GLOBULIN SERPL ELPH-MCNC: 0.7 G/DL (ref 0.6–1)
B2 MICROGLOB TUMOR MARKER SER-MCNC: 2.8 MG/L
GAMMA GLOB SERPL ELPH-MCNC: 1 G/DL (ref 0.7–1.6)
IGA SERPL-MCNC: 372 MG/DL (ref 84–499)
IGG SERPL-MCNC: 1029 MG/DL (ref 610–1616)
IGM SERPL-MCNC: 118 MG/DL (ref 35–242)
KAPPA LC FREE SER-MCNC: 3.08 MG/DL (ref 0.33–1.94)
KAPPA LC FREE/LAMBDA FREE SER NEPH: 1.77 {RATIO} (ref 0.26–1.65)
LAMBDA LC FREE SERPL-MCNC: 1.74 MG/DL (ref 0.57–2.63)
M PROTEIN SERPL ELPH-MCNC: 0 G/DL
PROT PATTERN SERPL ELPH-IMP: NORMAL
PROT PATTERN SERPL IFE-IMP: NORMAL
VISC SER: 1.5 CP (ref 1.4–1.8)

## 2023-09-27 PROCEDURE — 84165 PROTEIN E-PHORESIS SERUM: CPT | Mod: 26 | Performed by: PATHOLOGY

## 2023-09-27 PROCEDURE — 86334 IMMUNOFIX E-PHORESIS SERUM: CPT | Mod: 26 | Performed by: PATHOLOGY

## 2023-09-29 LAB — MITOCHONDRIA M2 IGG SER-ACNC: 1.3 U/ML

## 2023-10-02 ENCOUNTER — ONCOLOGY VISIT (OUTPATIENT)
Dept: ONCOLOGY | Facility: OTHER | Age: 78
End: 2023-10-02
Attending: NURSE PRACTITIONER
Payer: COMMERCIAL

## 2023-10-02 VITALS
SYSTOLIC BLOOD PRESSURE: 124 MMHG | WEIGHT: 119.05 LBS | DIASTOLIC BLOOD PRESSURE: 60 MMHG | HEIGHT: 63 IN | BODY MASS INDEX: 21.09 KG/M2 | HEART RATE: 83 BPM | OXYGEN SATURATION: 100 % | TEMPERATURE: 97.6 F

## 2023-10-02 DIAGNOSIS — R03.0 ELEVATED BP WITHOUT DIAGNOSIS OF HYPERTENSION: ICD-10-CM

## 2023-10-02 DIAGNOSIS — D47.2 MONOCLONAL GAMMOPATHY: ICD-10-CM

## 2023-10-02 DIAGNOSIS — E83.119 HEMOCHROMATOSIS, UNSPECIFIED HEMOCHROMATOSIS TYPE: Primary | ICD-10-CM

## 2023-10-02 PROCEDURE — 99215 OFFICE O/P EST HI 40 MIN: CPT | Performed by: NURSE PRACTITIONER

## 2023-10-02 PROCEDURE — G0463 HOSPITAL OUTPT CLINIC VISIT: HCPCS

## 2023-10-02 ASSESSMENT — PAIN SCALES - GENERAL: PAINLEVEL: NO PAIN (0)

## 2023-10-02 NOTE — PROGRESS NOTES
Hematology Follow-up Visit    Reason for Visit:  Maria Teresa is a 78 year old woman with a diagnosis of hemachromatosis as well as MGUS, who presents to the clinic today for routine follow-up.    Nursing Note and documentation reviewed: Yes    Interval History: Maria Teresa notes that she is doing and feeling well. States that her energy levels have improved since she last saw Dr. Silverman. Has remained active. No infectious symptoms. No bleeding concerns. Appetite is good. She denies any adenopathy. No abdominal pain or discomfort. Overall, doing well.     History of Present Illness: Patient has been a patient of Dr. Silverman and followed with him since Sept 2022. For complete history, please refer to his prior notes.       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    Bowden's esophagus 10/11/2011    Constipation 10/09/2012    GERD (gastroesophageal reflux disease)[530.81] 05/11/2003    Hypertension     NSTEMI (non-ST elevated myocardial infarction) (H) 09/28/2020    With Takotsubo cardiomyopathy Cardiac cath, small lesion of diagonal not requiring stenting Dr Crystal Sandoval V[place]    MARISSA (obstructive sleep apnea) 12/8/2022    Osteopenia 10/11/2011    dexa scans odd years starting in 2003    Precordial pain 04/28/2003    negative stress test, negative pulmonary evaluatio    Restless legs syndrome (RLS) 10/11/2011    Squamous Cell Carcinoma 10/11/2011    left leg x2    Takotsubo cardiomyopathy 09/22/2020    Episode of high BP noted on exam with no other symptoms Elevated troponin Cardiac cath, small lesion of  small diagonal, not requiring stenting Crystal Sandoval, cardiology    Urge incontinence 10/09/2012       Social History     Socioeconomic History    Marital status:      Spouse name: Not on file    Number of children: Not on file    Years of education: Not on file    Highest education level: Not on file   Occupational History    Not on file   Tobacco Use    Smoking status: Former      Packs/day: 1.00     Years: 4.00     Pack years: 4.00     Types: Cigarettes     Start date:      Quit date:      Years since quittin.7    Smokeless tobacco: Never   Substance and Sexual Activity    Alcohol use: Yes    Drug use: Never    Sexual activity: Not Currently   Other Topics Concern     Service Not Asked    Blood Transfusions Not Asked    Caffeine Concern Yes     Comment: Coffee    Occupational Exposure Not Asked    Hobby Hazards Not Asked    Sleep Concern Not Asked    Stress Concern Not Asked    Weight Concern Not Asked    Special Diet Not Asked    Back Care Not Asked    Exercise Not Asked    Bike Helmet Not Asked    Seat Belt Not Asked    Self-Exams Not Asked    Parent/sibling w/ CABG, MI or angioplasty before 65F 55M? Yes     Comment: son   Social History Narrative    Not on file     Social Determinants of Health     Financial Resource Strain: Not on file   Food Insecurity: Not on file   Transportation Needs: Not on file   Physical Activity: Not on file   Stress: Not on file   Social Connections: Not on file   Interpersonal Safety: Not on file   Housing Stability: Not on file       Past Surgical History:   Procedure Laterality Date    BONE MARROW BIOPSY, BONE SPECIMEN, NEEDLE/TROCAR N/A 10/31/2022    Procedure: BIOPSY, BONE MARROW WITH ASPIRATION;  Surgeon: Carlos Recinos DO;  Location: [facility]    Breast Biospy  2000    LEFT > Fibrocystic Disease > Outcome: Fibroadenoma    COLONOSCOPY      COLONOSCOPY  2009    Normal, Repeat     DEXA Scan      EGD      EGD      EGD      ENDOSCOPY UPPER, COLONOSCOPY, COMBINED N/A 2018    Procedure: COMBINED ENDOSCOPY UPPER, COLONOSCOPY;  UPPER ENDOSCOPY WITH BIOPSIES AND COLONOSCOPY WITH BIOPSIES;  Surgeon: Minh Interiano DO;  Location: [facility] OR    ESOPHAGOSCOPY, GASTROSCOPY, DUODENOSCOPY (EGD), COMBINED N/A 10/29/2021    Procedure: Upper endoscopy with biopsies;  Surgeon: Panfilo Arcos MD;  Location: [facility]  OR    Knee Replacement  2012    Oophorectomy      Ectopic Pregnancy    ROTATOR CUFF REPAIR RT/LT Right     with acromioplasty for complete rotator cuff tear, Dr Cherry    TONSILLECTOMY         Family History   Problem Relation Age of Onset    Uterine Cancer Mother     Lung Cancer Mother     Osteoarthritis Mother     Thyroid Disease Father     Coronary Artery Disease Father     Thyroid Disease Brother     Hyperlipidemia Son     Coronary Artery Disease Son     Heart Surgery Son     Thyroid Disease Son     Thyroid Disease Daughter     Endometrial Cancer Other         Family Hx    Osteoporosis Other         Family Hx    Parkinsonism Other         Family Hx    Multiple Sclerosis Maternal Aunt     Anuerysm Paternal Aunt     Unknown/Adopted No family hx of     Hypertension No family hx of     Breast Cancer No family hx of     Cerebrovascular Disease No family hx of     Colon Cancer No family hx of     Prostate Cancer No family hx of     Anesthesia Reaction No family hx of     Asthma No family hx of     Genetic Disorder No family hx of        Allergies:  Allergies as of 10/02/2023 - Reviewed 10/02/2023   Allergen Reaction Noted    Nitrofurantoin Other (See Comments) and Nausea 04/08/2013    Nitrofurantoin macrocrystal Other (See Comments) and Nausea 04/08/2013       Current Medications:  Current Outpatient Medications   Medication Sig Dispense Refill    ASPIRIN CAPS 81 MG OR one capsule by mouth daily 100 3    Calcium Carbonate-Vitamin D (CALCIUM + D PO)       Cyanocobalamin (VITAMIN B 12 PO) Injection [id number]      omeprazole (PRILOSEC) 40 MG DR capsule TAKE ONE TABLET BY MOUTH DAILY 90 capsule 2    pramipexole (MIRAPEX) 0.25 MG tablet TAKE 1-2 TABLETS (0.25-0.5 MG) BY MOUTH AT BEDTIME 90 tablet 0    Vitamin D, Cholecalciferol, 25 MCG (1000 UT) TABS           Review Of Systems:  A complete review of systems is negative except for the above mentioned items in the interval history.     Physical Exam:  /60   Pulse 83   " Temp 97.6  F (36.4  C) (Tympanic)   Ht 1.6 m (5' 3\")   Wt 54 kg (119 lb 0.8 oz)   SpO2 100%   BMI 21.09 kg/m    GENERAL APPEARANCE: Healthy, alert and in no acute distress.  HEENT: Eyes appear normal without scleral icterus. Extraocular movements intact.   NECK:   Supple with normal range of motion. No asymmetry or masses.  LYMPHATICS: No palpable cervical, supraclavicular nodes.  RESP: Lungs clear to auscultation bilaterally, respirations regular and easy.  CARDIOVASCULAR: Regular rate and rhythm. Normal S1, S2; no murmur, gallop, or rub.  ABDOMEN: Soft, non-tender, non-distended. Bowel sounds auscultated all 4 quadrants. No palpable organomegaly or masses.  MUSCULOSKELETAL: Extremities without gross deformities noted. No edema of bilateral lower extremities.  SKIN: No suspicious lesions or rashes.  NEURO: Alert and oriented x 3.  Gait steady.  PSYCHIATRIC: Mentation and affect appear normal.  Mood appropriate.    Laboratory/Imaging Studies:  Component      Latest Ref [facility] 9/25/2023  11:39 AM   WBC      4.0 - 11.0 10e3/uL 6.1    RBC Count      3.80 - 5.20 10e6/uL 3.91    Hemoglobin      11.7 - 15.7 g/dL 11.9    Hematocrit      35.0 - 47.0 % 35.0    MCV      78 - 100 fL 90    MCH      26.5 - 33.0 pg 30.4    MCHC      31.5 - 36.5 g/dL 34.0    RDW      10.0 - 15.0 % 12.5    Platelet Count      150 - 450 10e3/uL 215    % Neutrophils      % 66    % Lymphocytes      % 17    % Monocytes      % 12    % Eosinophils      % 4    % Basophils      % 1    % Immature Granulocytes      % 0    NRBCs per 100 WBC      <1 /100 0    Absolute Neutrophils      1.6 - 8.3 10e3/uL 4.0    Absolute Lymphocytes      0.8 - 5.3 10e3/uL 1.0    Absolute Monocytes      0.0 - 1.3 10e3/uL 0.7    Absolute Eosinophils      0.0 - 0.7 10e3/uL 0.3    Absolute Basophils      0.0 - 0.2 10e3/uL 0.1    Absolute Immature Granulocytes      <=0.4 10e3/uL 0.0    Absolute NRBCs      10e3/uL 0.0    Sodium      136 - 145 mmol/L 131 (L)    Potassium      3.4 - " 5.3 mmol/L 4.1    Carbon Dioxide (CO2)      22 - 29 mmol/L 21 (L)    Anion Gap      7 - 15 mmol/L 10    Urea Nitrogen      8.0 - 23.0 mg/dL 21.2    Creatinine      0.51 - 0.95 mg/dL 0.93    GFR Estimate      >60 mL/min/1.73m2 62    Calcium      8.8 - 10.2 mg/dL 8.8    Chloride      98 - 107 mmol/L 100    Glucose      70 - 99 mg/dL 93    Alkaline Phosphatase      35 - 104 U/L 101    AST      0 - 45 U/L 30    ALT      0 - 50 U/L 19    Protein Total      6.4 - 8.3 g/dL 6.6    Albumin      3.5 - 5.2 g/dL 4.1    Bilirubin Total      <=1.2 mg/dL 0.2    Albumin Fraction      3.7 - 5.1 g/dL 4.0    Alpha 1 Fraction      0.2 - 0.4 g/dL 0.3    Alpha 2 Fraction      0.5 - 0.9 g/dL 0.5    Beta Fraction      0.6 - 1.0 g/dL 0.7    Gamma Fraction      0.7 - 1.6 g/dL 1.0    Monoclonal Peak      <=0.0 g/dL 0.0    ELP Interpretation: Essentially normal electrophoretic pattern. No obvious monoclonal proteins seen. Pathologic significance requires clinical correlation. Lala Samayoa M.D., Ph.D.    Kappa Free Lt Chain      0.33 - 1.94 mg/dL 3.08 (H)    Lambda Free Lt Chain      0.57 - 2.63 mg/dL 1.74    Kappa Lambda Ratio      0.26 - 1.65  1.77 (H)    Iron      37 - 145 ug/dL 91    Iron Binding Capacity      240 - 430 ug/dL 229 (L)    Iron Sat Index      15 - 46 % 40    IGG      610 - 1,616 mg/dL 1,029    IGA      84 - 499 mg/dL 372    IGM      35 - 242 mg/dL 118    Immunofixation ELP No monoclonal protein seen on immunofixation. Pathologic significance requires clinical correlation. Lala Samayoa M.D., Ph.D.    Vitamin B12      232 - 1,245 pg/mL 748    Viscosity Index      1.4 - 1.8  1.5    Folate      4.6 - 34.8 ng/mL 13.4    Ferritin      11 - 328 ng/mL 200    Beta-2-Microglobulin      <2.3 mg/L 2.8 (H)    Lactate Dehydrogenase      0 - 250 U/L 213    Total Protein Serum for ELP      6.4 - 8.3 g/dL 6.5       Legend:  (L) Low  (H) High       ASSESSMENT/PLAN:  #1 Hemochromatosis diagnosed based on MRI of the liver, as well as revealing  significant diffuse iron deposition in the setting of elevated ferritin with heterozygous CS65C mutation.  Dr. Silverman felt the patient likely had a concomitant rare mutation, which predisposes hemochromatosis.  He recommended phlebotomies to maintain ferritin greater than or equal to 100 as well as hemoglobin greater than or equal to 10.5.  She received 1 phlebotomy.  Apparently, she became hypotensive after removal of 600 mL of blood.  She received 2 more phlebotomies taking on 350 ml.     She was recently seen by [facility] 09/06 and was noted to be doing well. They recommended against phlebotomies at this time seeing as her Ferritin is normal. Her labs are stable. No evidence of iron overload. Her liver enzymes have actually improved. Patient will be leaving for [place] in just under 3 months. She will be returning from [place] in about 7 months. I think best to follow-up prior to leaving to get everything checked. Patient in agreement with this plan. We will see you back in about 2 months with labs prior, sooner with concerns.     #2 MGUS  Bone marrow was negative for myeloma. Labs obtained last week showing no evidence of monoclonal protein, light chains stable. Plan is to continue surveillance.     #3 Elevated BP Patient states that he BP has been elevated in the AMs with a Systolic in the 140s/150s. Advised that she bring this up with Dr. Alcala when she sees her next month. Should go in I she develops headaches, vision changes, etc.     Patient in agreement with plan and verbalizes understanding. Agrees to call with any questions or concerns.    42 minutes spent in the patient's encounter today with time spent in review of patient's chart along with chart preparation and review of the treatment plan and signing of treatment plan.  Time was also spent with the patient in obtaining a review of systems and performing a physical exam along with detailed review of all test results. Time was also spent in discussing plan for  future follow-up and relating instructions for follow-up and in placing future orders.    ROXANN Iglesias, FNP-BC

## 2023-10-02 NOTE — NURSING NOTE
"Oncology Rooming Note    October 2, 2023 10:01 AM   Maria Teresa Aburto is a 78 year old female who presents for:    Chief Complaint   Patient presents with    Hematology     Follow up. Anemia, Elevated ferritin      Initial Vitals: /60   Pulse 83   Temp 97.6  F (36.4  C) (Tympanic)   Ht 1.6 m (5' 3\")   Wt 54 kg (119 lb 0.8 oz)   SpO2 100%   BMI 21.09 kg/m   Estimated body mass index is 21.09 kg/m  as calculated from the following:    Height as of this encounter: 1.6 m (5' 3\").    Weight as of this encounter: 54 kg (119 lb 0.8 oz). Body surface area is 1.55 meters squared.  No Pain (0) Comment: Data Unavailable   No LMP recorded. Patient is postmenopausal.  Allergies reviewed: Yes  Medications reviewed: Yes    Medications: Medication refills not needed today.  Pharmacy name entered into EPIC:    [id number] - [place], [place] - [id number] - [facility] [address]    Clinical concerns: none       Kelly Knox LPN              "

## 2023-10-02 NOTE — PATIENT INSTRUCTIONS
Thank you for allowing me to be a part of your care today.    I would like you to follow-up with Dr. Silverman Mid December with labs the week prior, sooner with concerns.     If you have any questions please call [phone]    Other instructions:      None

## 2023-10-18 ENCOUNTER — ALLIED HEALTH/NURSE VISIT (OUTPATIENT)
Dept: ALLERGY | Facility: OTHER | Age: 78
End: 2023-10-18
Attending: FAMILY MEDICINE
Payer: COMMERCIAL

## 2023-10-18 DIAGNOSIS — E53.8 VITAMIN B12 DEFICIENCY: Primary | ICD-10-CM

## 2023-10-18 PROCEDURE — 96372 THER/PROPH/DIAG INJ SC/IM: CPT | Performed by: FAMILY MEDICINE

## 2023-10-18 PROCEDURE — 250N000011 HC RX IP 250 OP 636: Mod: JZ | Performed by: FAMILY MEDICINE

## 2023-10-18 RX ADMIN — CYANOCOBALAMIN 1000 MCG: 1000 INJECTION, SOLUTION INTRAMUSCULAR; SUBCUTANEOUS at 11:53

## 2023-10-18 NOTE — PROGRESS NOTES
Clinic Administered Medication Documentation      Injectable Medication Documentation    Is there an active order (written within the past 365 days, with administrations remaining, not ) in the chart? Yes.     Patient was given Cyanocobalamin (B-12). Prior to medication administration, verified patient's identity using patient s name and date of birth. Please see MAR and medication order for additional information. Patient instructed to report any adverse reaction to staff immediately.    Vial/Syringe: Single dose vial. Was entire vial of medication used? Yes  Was this medication supplied by the patient? No  Is this a medication the patient will need to receive again? Yes. Verified that the patient has refills remaining in their prescription.  Right Deltoid  EFREN OSORIO LPN

## 2023-11-03 DIAGNOSIS — G25.81 RESTLESS LEGS SYNDROME (RLS): ICD-10-CM

## 2023-11-03 RX ORDER — PRAMIPEXOLE DIHYDROCHLORIDE 0.25 MG/1
.25-.5 TABLET ORAL AT BEDTIME
Qty: 90 TABLET | Refills: 0 | Status: SHIPPED | OUTPATIENT
Start: 2023-11-03 | End: 2024-01-22

## 2023-11-03 NOTE — TELEPHONE ENCOUNTER
Pramipexole (MIRAPEX) 0.25 mg tab       Last Written Prescription Date:  8/9/23  Last Fill Quantity: 90,   # refills: 0  Last Office Visit: 5/16/23 virtual   Future Office visit:    Next 5 appointments (look out 90 days)      Nov 21, 2023  9:00 AM  (Arrive by 8:45 AM)  PHYSICAL with Luz Alcala MD  [facility] ([facility] ) [address] [address]  [phone]     Dec 18, 2023  2:00 PM  (Arrive by 1:45 PM)  Return Visit with Sheldon Silverman MD  [facility] ([facility] [address] [facility] [address]  [phone]

## 2023-11-09 ENCOUNTER — MYC MEDICAL ADVICE (OUTPATIENT)
Dept: FAMILY MEDICINE | Facility: OTHER | Age: 78
End: 2023-11-09

## 2023-11-09 NOTE — TELEPHONE ENCOUNTER
Please advise.    Pt has appointment with PCP on 11/21/2023.    Wait to discuss at upcoming appointment?

## 2023-11-15 ENCOUNTER — ALLIED HEALTH/NURSE VISIT (OUTPATIENT)
Dept: FAMILY MEDICINE | Facility: OTHER | Age: 78
End: 2023-11-15
Attending: FAMILY MEDICINE
Payer: COMMERCIAL

## 2023-11-15 DIAGNOSIS — E53.8 VITAMIN B12 DEFICIENCY (NON ANEMIC): ICD-10-CM

## 2023-11-15 DIAGNOSIS — Z23 ENCOUNTER FOR IMMUNIZATION: Primary | ICD-10-CM

## 2023-11-15 PROCEDURE — 96372 THER/PROPH/DIAG INJ SC/IM: CPT | Performed by: FAMILY MEDICINE

## 2023-11-15 PROCEDURE — 250N000011 HC RX IP 250 OP 636: Performed by: FAMILY MEDICINE

## 2023-11-15 PROCEDURE — G0008 ADMIN INFLUENZA VIRUS VAC: HCPCS

## 2023-11-15 RX ADMIN — CYANOCOBALAMIN 1000 MCG: 1000 INJECTION, SOLUTION INTRAMUSCULAR; SUBCUTANEOUS at 09:22

## 2023-11-20 ENCOUNTER — MYC MEDICAL ADVICE (OUTPATIENT)
Dept: FAMILY MEDICINE | Facility: OTHER | Age: 78
End: 2023-11-20

## 2023-11-20 DIAGNOSIS — R30.0 DYSURIA: Primary | ICD-10-CM

## 2023-11-20 DIAGNOSIS — R31.9 HEMATURIA: ICD-10-CM

## 2023-11-20 NOTE — TELEPHONE ENCOUNTER
11/20/2023 3:54 PM  Writer called pt regarding my chart message.     Pt reports mild lightheadedness no changes, pt reports present for months, no changes. Pt up ambulating denies any concerns with gait.     Urinary sx pt reports scant amount of blood in urine, dysuria. Denies Fever. Denies any other sx or concerns.     Pt has physical scheduled tomorrow with PCP.     Ok to do UA prior to appt? Order pended.     Future Appointments 11/20/2023 - 5/18/2024        Date Visit Type Length Department Provider     11/21/2023  9:00 AM PHYSICAL 30 min [facility] FAMILY PRACTICE Luz Alcala MD    Location Instructions:     From [facility] ([place], [place]): Take [address]. Turn left at [address]. Turn left at the first stoplight on [address]. At the first stop sign, take a right onto [address]. The upper level parking lot will be on the left. East Entrance Door number 10.   From [place]: Take [address]. Take a right at [address]. At the first stop sign, take a right onto [address]. The upper level parking lot will be on the left. East Entrance Door number 10.   From [place]: Take [address]. Take the [address] ramp towards [place]. Turn left onto [address]. Take a slight right onto [address]. Turn left at the first stoplight on [address]. At the first stop sign, take a right onto [address]. The upper level parking lot will be on the left. East Entrance Door number 10.              12/12/2023 10:30 AM LAB 15 min HC LABORATORY HC LAB    Location Instructions:     From [facility] ([place], [place]): Take [address]. Turn left at [address]. Turn left at the first stoplight on [address]. At the first stop sign, take a right onto [address]. The upper level parking lot will be on the left. East Entrance Door number 10.   From [place]: Take [address]. Take a right at [address]. At the first stop sign, take a right  onto [address]. The upper level parking lot will be on the left. East Entrance Door number 10.               12/15/2023 10:30 AM NURSE ONLY 15 min [facility] FAMILY PRACTICE HC FP NURSE    Location Instructions:     From [facility]: Take [address]. Turn left at [address]. Turn left at the first stoplight on [address]. At the first stop sign, take a right onto [address]. The upper level parking lot will be on the left. East Entrance Door number 10.   From [place]: Take [address]. Take a right at [address]. At the first stop sign, take a right onto [address]. The upper level parking lot will be on the left. East Entrance Door number 10.   From [place]: Take [address]. Take the [address] ramp towards [place]. Turn left onto [address]. Take a slight right onto [address]. Turn left at the first stoplight on [address]. At the first stop sign, take a right onto [address]. The upper level parking lot will be on the left. East Entrance Door number 10.              12/18/2023  2:00 PM RETURN 30 min [facility] ONCOL[facility]Y CLINIC Sheldon Silverman MD    Location Instructions:     From [facility]: Take [address]. Turn left at [address]. Turn left at the first stoplight on [address]. At the first stop sign, take a right onto [address]. Take a left into the parking lot and continue through until you reach the [facility] entrance of the building, door number 19.   From [place]: Take [address]. Take the [address] ramp towards [place]. Turn left onto [address]. Take a slight right onto [address]. Turn left at the first stoplight on [address]. At the first stop sign, take a right onto [address]. Take a left into the parking lot and continue through until you reach the [facility] entrance of the building, door number 19.   From [place]:  Take [address]. Take a right at [address]. At the first stop sign, take a right onto [address]. Take a left into the parking lot and continue through until you reach the [facility] entrance of the building, door number 19 .

## 2023-11-21 ENCOUNTER — OFFICE VISIT (OUTPATIENT)
Dept: FAMILY MEDICINE | Facility: OTHER | Age: 78
End: 2023-11-21
Attending: FAMILY MEDICINE
Payer: COMMERCIAL

## 2023-11-21 ENCOUNTER — MYC MEDICAL ADVICE (OUTPATIENT)
Dept: FAMILY MEDICINE | Facility: OTHER | Age: 78
End: 2023-11-21

## 2023-11-21 ENCOUNTER — HOSPITAL ENCOUNTER (OUTPATIENT)
Dept: CARDIOLOGY | Facility: HOSPITAL | Age: 78
Discharge: HOME | End: 2023-11-21
Attending: FAMILY MEDICINE
Payer: COMMERCIAL

## 2023-11-21 ENCOUNTER — LAB (OUTPATIENT)
Dept: LAB | Facility: OTHER | Age: 78
End: 2023-11-21
Payer: COMMERCIAL

## 2023-11-21 VITALS
WEIGHT: 119 LBS | HEART RATE: 77 BPM | HEIGHT: 63 IN | OXYGEN SATURATION: 99 % | RESPIRATION RATE: 16 BRPM | SYSTOLIC BLOOD PRESSURE: 112 MMHG | BODY MASS INDEX: 21.09 KG/M2 | TEMPERATURE: 98.4 F | DIASTOLIC BLOOD PRESSURE: 58 MMHG

## 2023-11-21 DIAGNOSIS — R30.0 DYSURIA: ICD-10-CM

## 2023-11-21 DIAGNOSIS — R42 DIZZINESS: ICD-10-CM

## 2023-11-21 DIAGNOSIS — N39.0 URINARY TRACT INFECTION: Primary | ICD-10-CM

## 2023-11-21 DIAGNOSIS — M89.9 DISORDER OF BONE AND CARTILAGE: ICD-10-CM

## 2023-11-21 DIAGNOSIS — Z00.00 ENCOUNTER FOR MEDICARE ANNUAL WELLNESS EXAM: Primary | ICD-10-CM

## 2023-11-21 DIAGNOSIS — E78.5 HYPERLIPIDEMIA LDL GOAL <70: ICD-10-CM

## 2023-11-21 DIAGNOSIS — E83.119 HEMOCHROMATOSIS, UNSPECIFIED HEMOCHROMATOSIS TYPE: ICD-10-CM

## 2023-11-21 DIAGNOSIS — K22.70 BARRETT'S ESOPHAGUS WITHOUT DYSPLASIA: ICD-10-CM

## 2023-11-21 DIAGNOSIS — M85.80 OSTEOPENIA: ICD-10-CM

## 2023-11-21 DIAGNOSIS — M94.9 DISORDER OF BONE AND CARTILAGE: ICD-10-CM

## 2023-11-21 PROBLEM — D47.2 MONOCLONAL GAMMOPATHY OF UNKNOWN SIGNIFICANCE (MGUS): Status: ACTIVE | Noted: 2023-06-02

## 2023-11-21 LAB
ALBUMIN UR-MCNC: 30 MG/DL
APPEARANCE UR: ABNORMAL
BILIRUB UR QL STRIP: NEGATIVE
COLOR UR AUTO: ABNORMAL
FERRITIN SERPL-MCNC: 203 NG/ML (ref 11–328)
GLUCOSE UR STRIP-MCNC: NEGATIVE MG/DL
HGB BLD-MCNC: 12.3 G/DL (ref 11.7–15.7)
HGB UR QL STRIP: ABNORMAL
IRON BINDING CAPACITY (ROCHE): 231 UG/DL (ref 240–430)
IRON SATN MFR SERPL: 32 % (ref 15–46)
IRON SERPL-MCNC: 74 UG/DL (ref 37–145)
KETONES UR STRIP-MCNC: NEGATIVE MG/DL
LEUKOCYTE ESTERASE UR QL STRIP: ABNORMAL
MUCOUS THREADS #/AREA URNS LPF: PRESENT /LPF
NITRATE UR QL: NEGATIVE
PH UR STRIP: 7 [PH] (ref 4.7–8)
RBC URINE: >182 /HPF
SP GR UR STRIP: 1.01 (ref 1–1.03)
SQUAMOUS EPITHELIAL: 0 /HPF
UROBILINOGEN UR STRIP-MCNC: NORMAL MG/DL
WBC URINE: >182 /HPF

## 2023-11-21 PROCEDURE — 81001 URINALYSIS AUTO W/SCOPE: CPT | Mod: ZL | Performed by: FAMILY MEDICINE

## 2023-11-21 PROCEDURE — 83550 IRON BINDING TEST: CPT | Mod: ZL

## 2023-11-21 PROCEDURE — G0439 PPPS, SUBSEQ VISIT: HCPCS | Performed by: FAMILY MEDICINE

## 2023-11-21 PROCEDURE — 93242 EXT ECG>48HR<7D RECORDING: CPT

## 2023-11-21 PROCEDURE — 82728 ASSAY OF FERRITIN: CPT | Mod: ZL

## 2023-11-21 PROCEDURE — G0463 HOSPITAL OUTPT CLINIC VISIT: HCPCS

## 2023-11-21 PROCEDURE — 36415 COLL VENOUS BLD VENIPUNCTURE: CPT | Mod: ZL

## 2023-11-21 PROCEDURE — 87186 SC STD MICRODIL/AGAR DIL: CPT | Mod: ZL | Performed by: FAMILY MEDICINE

## 2023-11-21 PROCEDURE — 85018 HEMOGLOBIN: CPT | Mod: ZL

## 2023-11-21 PROCEDURE — 93244 EXT ECG>48HR<7D REV&INTERPJ: CPT | Performed by: INTERNAL MEDICINE

## 2023-11-21 RX ORDER — RESPIRATORY SYNCYTIAL VIRUS VACCINE 120MCG/0.5
0.5 KIT INTRAMUSCULAR ONCE
Qty: 1 EACH | Refills: 0 | Status: CANCELLED | OUTPATIENT
Start: 2023-11-21 | End: 2023-11-21

## 2023-11-21 RX ORDER — CEPHALEXIN 500 MG/1
500 CAPSULE ORAL 2 TIMES DAILY
Qty: 14 CAPSULE | Refills: 0 | Status: SHIPPED | OUTPATIENT
Start: 2023-11-21 | End: 2023-11-28

## 2023-11-21 ASSESSMENT — ENCOUNTER SYMPTOMS
COUGH: 0
SHORTNESS OF BREATH: 0
WEAKNESS: 1
CONSTIPATION: 0
DIZZINESS: 1
NERVOUS/ANXIOUS: 0
DIARRHEA: 0
PARESTHESIAS: 0
MYALGIAS: 0
ABDOMINAL PAIN: 0
PALPITATIONS: 0
EYE PAIN: 0
FEVER: 0
FREQUENCY: 1
HEARTBURN: 0
SORE THROAT: 0
NAUSEA: 0
HEMATURIA: 1
HEMATOCHEZIA: 0
JOINT SWELLING: 0
DYSURIA: 1
ARTHRALGIAS: 0
CHILLS: 1
BREAST MASS: 0
HEADACHES: 0

## 2023-11-21 ASSESSMENT — ANXIETY QUESTIONNAIRES
1. FEELING NERVOUS, ANXIOUS, OR ON EDGE: NOT AT ALL
2. NOT BEING ABLE TO STOP OR CONTROL WORRYING: NOT AT ALL
4. TROUBLE RELAXING: NOT AT ALL
3. WORRYING TOO MUCH ABOUT DIFFERENT THINGS: NOT AT ALL
5. BEING SO RESTLESS THAT IT IS HARD TO SIT STILL: NOT AT ALL
GAD7 TOTAL SCORE: 0
6. BECOMING EASILY ANNOYED OR IRRITABLE: NOT AT ALL
GAD7 TOTAL SCORE: 0
7. FEELING AFRAID AS IF SOMETHING AWFUL MIGHT HAPPEN: NOT AT ALL

## 2023-11-21 ASSESSMENT — PATIENT HEALTH QUESTIONNAIRE - PHQ9
SUM OF ALL RESPONSES TO PHQ QUESTIONS 1-9: 0
SUM OF ALL RESPONSES TO PHQ QUESTIONS 1-9: 0

## 2023-11-21 ASSESSMENT — ACTIVITIES OF DAILY LIVING (ADL): CURRENT_FUNCTION: NO ASSISTANCE NEEDED

## 2023-11-21 ASSESSMENT — PAIN SCALES - GENERAL: PAINLEVEL: SEVERE PAIN (6)

## 2023-11-21 NOTE — PATIENT INSTRUCTIONS
Zio patch placed on patient in outpatient appointment today. Patient was instructed to wear patch for 3 days. Skin was prepped and patch was placed following [employer] guidelines.     Patient was instructed to push button when symptomatic and fill out diary. Patient was instructed not to submerse in water and no swimming, saunas, or hot tubs. Showers may be taken keeping back towards the water. If the area DOES become wet pat it dry with a cloth. If skin irritation occurs patient was instructed to remove patch and contact [employer].    Patient understands we do not receive results until they mail patch back inside the box. Patient was made aware phone number is required for enrollment which automatically enrolls patient into text messaging program and they may receive automated phone calls. Patient can opt out at anytime. Staff reminded patient of outside billing notice which was explained to patient during the scheduling process of this monitor. Patient also instructed to contact [employer] with any questions [phone].    Patient had no further questions and agreed with plan. Patient was sent home with the box, information pamphlet and booklet to adrianna any incidents in.

## 2023-11-21 NOTE — PROGRESS NOTES
"Answers submitted by the patient for this visit:  Patient Health Questionnaire (Submitted on 11/21/2023)  PHQ9 TOTAL SCORE: 0  RAKAN-7 (Submitted on 11/21/2023)  RAKAN 7 TOTAL SCORE: 0  SUBJECTIVE:   Maria Teresa is a 78 year old, presenting for the following:  Physical        11/21/2023     8:50 AM   Additional Questions   Roomed by Ellie alvarado     Are you in the first 12 months of your Medicare coverage?  No    Healthy Habits:     In general, how would you rate your overall health?  Good    Frequency of exercise:  6-7 days/week    Duration of exercise:  30-45 minutes    Do you usually eat at least 4 servings of fruit and vegetables a day, include whole grains    & fiber and avoid regularly eating high fat or \"junk\" foods?  Yes    Taking medications regularly:  Yes    Medication side effects:  None    Ability to successfully perform activities of daily living:  No assistance needed    Home Safety:  Throw rugs in the hallway and lack of grab bars in the bathroom    Hearing Impairment:  No hearing concerns    In the past 6 months, have you been bothered by leaking of urine? Yes    In general, how would you rate your overall mental or emotional health?  Good    Additional concerns today:  No    Have you ever done Advance Care Planning? (For example, a Health Directive, POLST, or a discussion with a medical provider or your loved ones about your wishes): Yes, patient states has an Advance Care Planning document and will bring a copy to the clinic.    Fall risk  Fallen 2 or more times in the past year?: No  Any fall with injury in the past year?: No    Cognitive Screening   1) Repeat 3 items (Leader, Season, Table)    2) Clock draw: NORMAL  3) 3 item recall: Recalls 3 objects  Results: 3 items recalled: COGNITIVE IMPAIRMENT LESS LIKELY    Mini-CogTM Copyright S [employer]. Licensed by the author for use in [facility]; reprinted with permission (amelia@.[email or web address]). All rights reserved.      Do you have sleep apnea, " excessive snoring or daytime drowsiness? : no    Reviewed and updated as needed this visit by clinical staff   Tobacco  Allergies  Meds  Problems  Med Hx  Surg Hx  Fam Hx          Reviewed and updated as needed this visit by Provider   Tobacco  Allergies  Meds  Problems  Med Hx  Surg Hx  Fam Hx         Social History     Tobacco Use     Smoking status: Former     Packs/day: 1.00     Years: 4.00     Additional pack years: 0.00     Total pack years: 4.00     Types: Cigarettes     Start date:      Quit date:      Years since quittin.9     Smokeless tobacco: Never   Substance Use Topics     Alcohol use: Yes         2023     9:04 AM   Alcohol Use   Prescreen: >3 drinks/day or >7 drinks/week? No     Do you have a current opioid prescription? No  Do you use any other controlled substances or medications that are not prescribed by a provider? None        Current providers sharing in care for this patient include:   Patient Care Team:  Luz Alcala MD as PCP - General (Family Medicine)  Luz Alcala MD as Assigned PCP  Sheldon Silverman MD as Assigned Cancer Care Provider  Wesley Garcia MD as Assigned Sleep Provider  Damaris Russo MD as Assigned Surgical Provider    The following health maintenance items are reviewed in Epic and correct as of today:  Health Maintenance   Topic Date Due     RSV VACCINE (Pregnancy & 60+) (1 - 1-dose 60+ series) Never done     COVID-19 Vaccine ( season) 2023     MEDICARE ANNUAL WELLNESS VISIT  2024     RAKAN ASSESSMENT  2024     FALL RISK ASSESSMENT  2024     PHQ-9  2024     LIPID  2027     COLORECTAL CANCER SCREENING  2028     ADVANCE CARE PLANNING  2028     DTAP/TDAP/TD IMMUNIZATION (4 - Td or Tdap) 2031     DEXA  2038     HEPATITIS C SCREENING  Completed     PHQ-2 (once per calendar year)  Completed     INFLUENZA VACCINE  Completed     Pneumococcal Vaccine: 65+  "Years  Completed     ZOSTER IMMUNIZATION  Completed     IPV IMMUNIZATION  Aged Out     HPV IMMUNIZATION  Aged Out     MENINGITIS IMMUNIZATION  Aged Out     RSV MONOCLONAL ANTIBODY  Aged Out     MAMMO SCREENING  Discontinued       Review of Systems   Constitutional:  Positive for chills. Negative for fever.   HENT:  Negative for congestion, ear pain, hearing loss and sore throat.    Eyes:  Negative for pain and visual disturbance.   Respiratory:  Negative for cough and shortness of breath.    Cardiovascular:  Positive for peripheral edema. Negative for chest pain and palpitations.   Gastrointestinal:  Negative for abdominal pain, constipation, diarrhea, heartburn, hematochezia and nausea.   Breasts:  Negative for tenderness, breast mass and discharge.   Genitourinary:  Positive for dysuria, frequency, hematuria and urgency. Negative for genital sores, pelvic pain, vaginal bleeding and vaginal discharge.   Musculoskeletal:  Negative for arthralgias, joint swelling and myalgias.   Skin:  Negative for rash.   Neurological:  Positive for dizziness and weakness. Negative for headaches and paresthesias.   Psychiatric/Behavioral:  Negative for mood changes. The patient is not nervous/anxious.      OBJECTIVE:   /58 (BP Location: Left arm, Patient Position: Sitting, Cuff Size: Adult Regular)   Pulse 77   Temp 98.4  F (36.9  C) (Tympanic)   Resp 16   Ht 1.6 m (5' 3\")   Wt 54 kg (119 lb)   SpO2 99%   BMI 21.08 kg/m   Estimated body mass index is 21.08 kg/m  as calculated from the following:    Height as of this encounter: 1.6 m (5' 3\").    Weight as of this encounter: 54 kg (119 lb).    Physical Exam  GENERAL: alert and no distress  EYES: Eyes grossly normal to inspection  HENT: ear canals and TM's normal, nose and mouth without ulcers or lesions  NECK: no adenopathy, no asymmetry, masses, or scars and thyroid normal to palpation  RESP: lungs clear to auscultation - no rales, rhonchi or wheezes  CV: regular rates " and rhythm, normal S1 S2, no S3 or S4, no murmur, click or rub, and no peripheral edema  ABDOMEN: soft, nontender, no hepatosplenomegaly, no masses and bowel sounds normal  MS: no gross musculoskeletal defects noted, no edema  SKIN: no suspicious lesions or rashes  NEURO: mentation intact, speech normal, and gait wnl  PSYCH: mentation appears normal, affect normal/bright    Diagnostic Test Results:  Labs reviewed in Epic  Results for orders placed or performed in visit on 11/21/23   UA Macroscopic with reflex to Microscopic and Culture     Status: Abnormal    Specimen: Urine, Midstream   Result Value Ref Range    Color Urine Light Red (A) Colorless, Straw, Light Yellow, Yellow    Appearance Urine Cloudy (A) Clear    Glucose Urine Negative Negative mg/dL    Bilirubin Urine Negative Negative    Ketones Urine Negative Negative mg/dL    Specific Gravity Urine 1.012 1.003 - 1.035    Blood Urine Large (A) Negative    pH Urine 7.0 4.7 - 8.0    Protein Albumin Urine 30 (A) Negative mg/dL    Urobilinogen Urine Normal Normal, 2.0 mg/dL    Nitrite Urine Negative Negative    Leukocyte Esterase Urine Large (A) Negative    Mucus Urine Present (A) None Seen /LPF    RBC Urine >182 (H) <=2 /HPF    WBC Urine >182 (H) <=5 /HPF    Squamous Epithelials Urine 0 <=1 /HPF    Narrative    Urine Culture ordered based on laboratory criteria   Results for orders placed or performed in visit on 11/21/23   Ferritin     Status: Normal   Result Value Ref Range    Ferritin 203 11 - 328 ng/mL   Hemoglobin     Status: Normal   Result Value Ref Range    Hemoglobin 12.3 11.7 - 15.7 g/dL   Iron and iron binding capacity     Status: Abnormal   Result Value Ref Range    Iron 74 37 - 145 ug/dL    Iron Binding Capacity 231 (L) 240 - 430 ug/dL    Iron Sat Index 32 15 - 46 %     ASSESSMENT / PLAN:   Maria Teresa was seen today for physical.    Diagnoses and all orders for this visit:    Encounter for Medicare annual wellness exam    Bowden's esophagus without  dysplasia  -     Adult GI  Referral - Procedure Only; Future    Hemochromatosis, unspecified hemochromatosis type  Stable, ferritin at goal today.     Dizziness  After meals and randomly occurring. Lasts about an hour. Going on for over a year. Did not really improved with cessation on bp medications and statin. Hgb wnl. Some floating, off balance stable. PT arcelia thompson added today, follow up schduled.   -     Leadless EKG Monitor 3 to 7 Days; Future  -     Physical Therapy Referral; Future  -     Iron and iron binding capacity; Future    Hyperlipidemia LDL goal <70  Off statin. Per epic, hx of cardiac disease - note, however, this was Takotsubo disease, no ischemic. Okay to hold off on adding back    Dysuria  -     UA Macroscopic with reflex to Microscopic and Culture  -     Urine Culture  -     cephALEXin (KEFLEX) 500 MG capsule; Take 1 capsule (500 mg) by mouth 2 times daily for 7 days    Other orders  -     Cancel: COVID-19 12+ (2023-24) (PFIZER)    Patient has been advised of split billing requirements and indicates understanding: Yes    COUNSELING:  Reviewed preventive health counseling, as reflected in patient instructions    She reports that she quit smoking about 57 years ago. Her smoking use included cigarettes. She started smoking about 61 years ago. She has a 4.00 pack-year smoking history. She has never used smokeless tobacco.      Appropriate preventive services were discussed with this patient, including applicable screening as appropriate for fall prevention, nutrition, physical activity, Tobacco-use cessation, weight loss and cognition.  Checklist reviewing preventive services available has been given to the patient.    Reviewed patients plan of care and provided an AVS. The Basic Care Plan (routine screening as documented in Health Maintenance) for Maria Teresa meets the Care Plan requirement. This Care Plan has been established and reviewed with the Patient.    Luz Alcala MD  [place]  [facility]    Identified Health Risks:  I have reviewed Opioid Use Disorder and Substance Use Disorder risk factors and made any needed referrals. Information on urinary incontinence and treatment options given to patient.

## 2023-11-21 NOTE — PATIENT INSTRUCTIONS
Patient Education   Personalized Prevention Plan  You are due for the preventive services outlined below.  Your care team is available to assist you in scheduling these services.  If you have already completed any of these items, please share that information with your care team to update in your medical record.  Health Maintenance Due   Topic Date Due     RSV VACCINE (Pregnancy & 60+) (1 - 1-dose 60+ series) Never done     COVID-19 Vaccine (6 - 2023-24 season) 09/01/2023     Bladder Training: Care Instructions  Your Care Instructions     Bladder training is used to treat urge incontinence and stress incontinence. Urge incontinence means that the need to urinate comes on so fast that you can't get to a toilet in time. Stress incontinence means that you leak urine because of pressure on your bladder. For example, it may happen when you laugh, cough, or lift something heavy.  Bladder training can increase how long you can wait before you have to urinate. It can also help your bladder hold more urine. And it can give you better control over the urge to urinate.  It is important to remember that bladder training takes a few weeks to a few months to make a difference. You may not see results right away, but don't give up.  Follow-up care is a key part of your treatment and safety. Be sure to make and go to all appointments, and call your doctor if you are having problems. It's also a good idea to know your test results and keep a list of the medicines you take.  How can you care for yourself at home?  Work with your doctor to come up with a bladder training program that is right for you. You may use one or more of the following methods.  Delayed urination  In the beginning, try to keep from urinating for 5 minutes after you first feel the need to go.  While you wait, take deep, slow breaths to relax. Kegel exercises can also help you delay the need to go to the bathroom.  After some practice, when you can easily wait 5  "minutes to urinate, try to wait 10 minutes before you urinate.  Slowly increase the waiting period until you are able to control when you have to urinate.  Scheduled urination  Empty your bladder when you first wake up in the morning.  Schedule times throughout the day when you will urinate.  Start by going to the bathroom every hour, even if you don't need to go.  Slowly increase the time between trips to the bathroom.  When you have found a schedule that works well for you, keep doing it.  If you wake up during the night and have to urinate, do it. Apply your schedule to waking hours only.  Kegel exercises  These tighten and strengthen pelvic muscles, which can help you control the flow of urine. (If doing these exercises causes pain, stop doing them and talk with your doctor.) To do Kegel exercises:  Squeeze your muscles as if you were trying not to pass gas. Or squeeze your muscles as if you were stopping the flow of urine. Your belly, legs, and buttocks shouldn't move.  Hold the squeeze for 3 seconds, then relax for 5 to 10 seconds.  Start with 3 seconds, then add 1 second each week until you are able to squeeze for 10 seconds.  Repeat the exercise 10 times a session. Do 3 to 8 sessions a day.  When should you call for help?  Watch closely for changes in your health, and be sure to contact your doctor if:    Your incontinence is getting worse.     You do not get better as expected.   Where can you learn more?  Go to [email or web address]  Enter V684 in the search box to learn more about \"Bladder Training: Care Instructions.\"  Current as of: February 28, 2023               Content Version: 13.8    9110-6673 [employer].   Care instructions adapted under license by your healthcare professional. If you have questions about a medical condition or this instruction, always ask your healthcare professional. [employer] disclaims any warranty or liability for your use of this " information.

## 2023-11-22 RX ORDER — ZOLEDRONIC ACID 5 MG/100ML
5 INJECTION, SOLUTION INTRAVENOUS ONCE
Status: CANCELLED
Start: 2023-11-29

## 2023-11-22 RX ORDER — METHYLPREDNISOLONE SODIUM SUCCINATE 125 MG/2ML
125 INJECTION, POWDER, LYOPHILIZED, FOR SOLUTION INTRAMUSCULAR; INTRAVENOUS
Status: CANCELLED
Start: 2023-11-29

## 2023-11-22 RX ORDER — ACETAMINOPHEN 325 MG/1
650 TABLET ORAL
Status: CANCELLED | OUTPATIENT
Start: 2023-11-29

## 2023-11-22 RX ORDER — ALBUTEROL SULFATE 90 UG/1
1-2 AEROSOL, METERED RESPIRATORY (INHALATION)
Status: CANCELLED
Start: 2023-11-29

## 2023-11-22 RX ORDER — HEPARIN SODIUM,PORCINE 10 UNIT/ML
5-20 VIAL (ML) INTRAVENOUS DAILY PRN
Status: CANCELLED | OUTPATIENT
Start: 2023-11-29

## 2023-11-22 RX ORDER — HEPARIN SODIUM (PORCINE) LOCK FLUSH IV SOLN 100 UNIT/ML 100 UNIT/ML
5 SOLUTION INTRAVENOUS
Status: CANCELLED | OUTPATIENT
Start: 2023-11-29

## 2023-11-22 RX ORDER — EPINEPHRINE 1 MG/ML
0.3 INJECTION, SOLUTION INTRAMUSCULAR; SUBCUTANEOUS EVERY 5 MIN PRN
Status: CANCELLED | OUTPATIENT
Start: 2023-11-29

## 2023-11-22 RX ORDER — MEPERIDINE HYDROCHLORIDE 25 MG/ML
25 INJECTION INTRAMUSCULAR; INTRAVENOUS; SUBCUTANEOUS EVERY 30 MIN PRN
Status: CANCELLED | OUTPATIENT
Start: 2023-11-29

## 2023-11-22 RX ORDER — ALBUTEROL SULFATE 0.83 MG/ML
2.5 SOLUTION RESPIRATORY (INHALATION)
Status: CANCELLED | OUTPATIENT
Start: 2023-11-29

## 2023-11-22 RX ORDER — DIPHENHYDRAMINE HYDROCHLORIDE 50 MG/ML
50 INJECTION INTRAMUSCULAR; INTRAVENOUS
Status: CANCELLED
Start: 2023-11-29

## 2023-11-22 RX ORDER — ZOLEDRONIC ACID 5 MG/100ML
5 INJECTION, SOLUTION INTRAVENOUS ONCE
Qty: 100 ML | Refills: 0 | Status: CANCELLED | OUTPATIENT
Start: 2023-11-22 | End: 2023-11-22

## 2023-11-22 NOTE — TELEPHONE ENCOUNTER
Pt picked up cephalexin yesterday. Spoke with pt will do UA on 12/12/23.    Pt stated she forgot to ask about plan on her dexa scan. What will be the tx. Pt reports she is ok doing reclast if you advise to do so.

## 2023-11-22 NOTE — TELEPHONE ENCOUNTER
Okay, based on our last conversation, I think she was going to discuss with Endocrinology at [facility].     If we are still managing, the reclast infusion plan is in and can be scheduled. Just let infusion know

## 2023-11-24 ENCOUNTER — TELEPHONE (OUTPATIENT)
Dept: FAMILY MEDICINE | Facility: OTHER | Age: 78
End: 2023-11-24

## 2023-11-24 LAB — BACTERIA UR CULT: ABNORMAL

## 2023-11-24 RX ORDER — GRANULES FOR ORAL 3 G/1
3 POWDER ORAL ONCE
Qty: 1 PACKET | Refills: 0 | Status: SHIPPED | OUTPATIENT
Start: 2023-11-24 | End: 2023-11-24

## 2023-11-24 NOTE — TELEPHONE ENCOUNTER
2:55 PM    Reason for Call: Phone Call    Description: pt returning a call to someone from Dr. Alcala care team, possible something to do with lab results and medication please call pt    Was an appointment offered for this call? No  If yes : Appointment type              Date    Preferred method for responding to this message: Telephone Call  What is your phone number ? [phone]    If we cannot reach you directly, may we leave a detailed response at the number you provided? Yes    Can this message wait until your PCP/provider returns, if available today? Radha Colon

## 2023-11-25 ENCOUNTER — MYC MEDICAL ADVICE (OUTPATIENT)
Dept: FAMILY MEDICINE | Facility: OTHER | Age: 78
End: 2023-11-25

## 2023-11-27 NOTE — TELEPHONE ENCOUNTER
Patient notified of results, agree to stop current abx and take the 1 time abx, will recheck labs in 2 weeks.  Ellie Davila LPN

## 2023-11-28 NOTE — TELEPHONE ENCOUNTER
11/28/2023 4:04 PM  Writer called pt. Pt notified and agrees to plan. Pt given 's number as well.   Kelly Spivey RN

## 2023-11-29 ENCOUNTER — PREP FOR PROCEDURE (OUTPATIENT)
Dept: SURGERY | Facility: OTHER | Age: 78
End: 2023-11-29

## 2023-11-29 ENCOUNTER — OFFICE VISIT (OUTPATIENT)
Dept: SURGERY | Facility: OTHER | Age: 78
End: 2023-11-29
Attending: NURSE PRACTITIONER
Payer: COMMERCIAL

## 2023-11-29 VITALS
HEART RATE: 83 BPM | DIASTOLIC BLOOD PRESSURE: 58 MMHG | HEIGHT: 64 IN | RESPIRATION RATE: 18 BRPM | BODY MASS INDEX: 20.32 KG/M2 | TEMPERATURE: 96.3 F | WEIGHT: 119 LBS | SYSTOLIC BLOOD PRESSURE: 122 MMHG | OXYGEN SATURATION: 99 %

## 2023-11-29 DIAGNOSIS — K22.70 BARRETT'S ESOPHAGUS WITHOUT DYSPLASIA: ICD-10-CM

## 2023-11-29 DIAGNOSIS — M81.0 OSTEOPOROSIS: Primary | ICD-10-CM

## 2023-11-29 DIAGNOSIS — K21.9 GASTROESOPHAGEAL REFLUX DISEASE, UNSPECIFIED WHETHER ESOPHAGITIS PRESENT: ICD-10-CM

## 2023-11-29 DIAGNOSIS — K21.9 GASTROESOPHAGEAL REFLUX DISEASE WITHOUT ESOPHAGITIS: Primary | ICD-10-CM

## 2023-11-29 DIAGNOSIS — K22.70 BARRETT ESOPHAGUS: Primary | ICD-10-CM

## 2023-11-29 PROCEDURE — G0463 HOSPITAL OUTPT CLINIC VISIT: HCPCS

## 2023-11-29 PROCEDURE — 99213 OFFICE O/P EST LOW 20 MIN: CPT | Performed by: NURSE PRACTITIONER

## 2023-11-29 ASSESSMENT — PAIN SCALES - GENERAL: PAINLEVEL: NO PAIN (0)

## 2023-11-29 NOTE — PROGRESS NOTES
CLINIC NOTE - CONSULT  11/29/2023    Patient : Maria Teresa Aburto    Referring Physician : Luz Alcala    Reason for Referral : History of GERD with Bowden's esophagus    This is a 78 year old female with a history of GERD with Bowden's esophagus.  Patient is in need of an EGD.      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    Bowden's esophagus 10/11/2011    Constipation 10/09/2012    GERD (gastroesophageal reflux disease)[530.81] 05/11/2003    Hypertension     NSTEMI (non-ST elevated myocardial infarction) (H) 09/28/2020    With Takotsubo cardiomyopathy Cardiac cath, small lesion of diagonal not requiring stenting Dr Crystal Sandoval [place]    MARISSA (obstructive sleep apnea) 12/8/2022    Osteopenia 10/11/2011    dexa scans odd years starting in 2003    Precordial pain 04/28/2003    negative stress test, negative pulmonary evaluatio    Restless legs syndrome (RLS) 10/11/2011    Squamous Cell Carcinoma 10/11/2011    left leg x2    Takotsubo cardiomyopathy 09/22/2020    Episode of high BP noted on exam with no other symptoms Elevated troponin Cardiac cath, small lesion of  small diagonal, not requiring stenting Crystal Sandoval, cardiology    Urge incontinence 10/09/2012       Past Surgical History:  Past Surgical History:   Procedure Laterality Date    BONE MARROW BIOPSY, BONE SPECIMEN, NEEDLE/TROCAR N/A 10/31/2022    Procedure: BIOPSY, BONE MARROW WITH ASPIRATION;  Surgeon: Carlos Recinos DO;  Location: [facility] OR    Breast Biospy  11/11/2000    LEFT > Fibrocystic Disease > Outcome: Fibroadenoma    COLONOSCOPY  2000    COLONOSCOPY  8-7-2009    Normal, Repeat 2015    DEXA Scan  2009    EGD      EGD  2008    EGD  2003    ENDOSCOPY UPPER, COLONOSCOPY, COMBINED N/A 9/6/2018    Procedure: COMBINED ENDOSCOPY UPPER, COLONOSCOPY;  UPPER ENDOSCOPY WITH  BIOPSIES AND COLONOSCOPY WITH BIOPSIES;  Surgeon: Minh Interiano DO;  Location: [facility] OR    ESOPHAGOSCOPY, GASTROSCOPY, DUODENOSCOPY (EGD), COMBINED N/A 10/29/2021    Procedure: Upper endoscopy with biopsies;  Surgeon: Panfilo Arcos MD;  Location: [facility] OR    Knee Replacement  2012    Oophorectomy      Ectopic Pregnancy    ROTATOR CUFF REPAIR RT/LT Right     with acromioplasty for complete rotator cuff tear, Dr Cherry    TONSILLECTOMY         Family History History:  Family History   Problem Relation Age of Onset    Uterine Cancer Mother     Lung Cancer Mother     Osteoarthritis Mother     Thyroid Disease Father     Coronary Artery Disease Father     Thyroid Disease Brother     Hyperlipidemia Son     Coronary Artery Disease Son     Heart Surgery Son     Thyroid Disease Son     Thyroid Disease Daughter     Endometrial Cancer Other         Family Hx    Osteoporosis Other         Family Hx    Parkinsonism Other         Family Hx    Multiple Sclerosis Maternal Aunt     Anuerysm Paternal Aunt     Unknown/Adopted No family hx of     Hypertension No family hx of     Breast Cancer No family hx of     Cerebrovascular Disease No family hx of     Colon Cancer No family hx of     Prostate Cancer No family hx of     Anesthesia Reaction No family hx of     Asthma No family hx of     Genetic Disorder No family hx of        History of Tobacco Use:  History   Smoking Status    Former    Packs/day: 1.00    Years: 4.00    Types: Cigarettes    Start date: 1962    Quit date: 1966   Smokeless Tobacco    Never       Current Medications:  Current Outpatient Medications   Medication Sig Dispense Refill    ASPIRIN CAPS 81 MG OR one capsule by mouth daily 100 3    Calcium Carbonate-Vitamin D (CALCIUM + D PO)       Cyanocobalamin (VITAMIN B 12 PO) Injection q60xvmb      omeprazole (PRILOSEC) 40 MG DR capsule TAKE ONE TABLET BY MOUTH DAILY 90 capsule 2    pramipexole (MIRAPEX) 0.25 MG tablet TAKE 1-2 TABLETS (0.25-0.5 MG) BY MOUTH AT  "BEDTIME 90 tablet 0    Vitamin D, Cholecalciferol, 25 MCG (1000 UT) TABS          Allergies:  Allergies   Allergen Reactions    Nitrofurantoin Other (See Comments) and Nausea     Macrobid  \"Chills and Fevers\"    Nitrofurantoin Macrocrystal Other (See Comments) and Nausea     Macrobid  \"Chills and Fevers\"         ROS:  Constitutional: negative  Eyes: negative  Ears, nose, mouth, throat, and face: negative  Respiratory: negative  Cardiovascular: positive for history of NSTEMI  Gastrointestinal: positive for history of Bowden's, GERD  Genitourinary:positive for UTI--just completed treatment for  Integument/breast: negative  Hematologic/lymphatic: negative  Musculoskeletal: negative  Neurological: positive for dizziness  Behavioral/Psych: negative  Endocrine: negative  Allergic/Immunologic: negative    PHYSICAL EXAM:     Vital signs: /58 (BP Location: Right arm, Patient Position: Sitting, Cuff Size: Adult Regular)   Pulse 83   Temp (!) 96.3  F (35.7  C) (Tympanic)   Resp 18   Ht 1.626 m (5' 4\")   Wt 54 kg (119 lb)   SpO2 99%   BMI 20.43 kg/m     BMI: Body mass index is 20.43 kg/m .   General: Normal, healthy, cooperative, in no acute distress, alert   Skin: no rashes   Lungs: respirations are non-labored   Abdominal: non-distended   Extremities: No cyanosis, clubbing or edema noted bilaterally in Upper and Lower Extremities   Neurological: without deficit    Assessment:   78 year old female with a history of GERD with Bowden's esophagus without dysplasia.  Patient is in need of an upper endoscopy to assess.    Plan:   Will schedule an esophagogastroduodenoscopy.  The procedures with their risks, benefits and alternatives were explained.  Risks include but are not limited to bleeding, perforation, missing lesions, need for additional procedures, reaction to anesthesia.  All the patients questions were answered.  The patient consents to proceed.  The procedure will be scheduled.       Patient will need " pre-operative clearance for this procedure.

## 2023-11-30 ENCOUNTER — TELEPHONE (OUTPATIENT)
Dept: FAMILY MEDICINE | Facility: OTHER | Age: 78
End: 2023-11-30

## 2023-11-30 DIAGNOSIS — M81.0 AGE-RELATED OSTEOPOROSIS WITHOUT CURRENT PATHOLOGICAL FRACTURE: Primary | ICD-10-CM

## 2023-11-30 NOTE — TELEPHONE ENCOUNTER
"----- Message from Jazmyn Patel sent at 11/29/2023 12:16 PM CST -----  Regarding: reclast  Hello, I was working on securing coverage for Maria Teresa's upcoming infusion. Per her insurance policy she must have a confirmed diagnosis of \"osteoporosis in postmenopausal women\" to meet criteria. Please let me know how you would like to proceed. Her appointment is 12.04.2023.   Thank you!  Jazmyn Patel  Central Prior Authorization  [facility]  Office: [phone]  Fax: [phone]  [email or web address]       "

## 2023-12-04 ENCOUNTER — INFUSION THERAPY VISIT (OUTPATIENT)
Dept: INFUSION THERAPY | Facility: OTHER | Age: 78
End: 2023-12-04
Attending: INTERNAL MEDICINE
Payer: COMMERCIAL

## 2023-12-04 ENCOUNTER — LAB (OUTPATIENT)
Dept: LAB | Facility: OTHER | Age: 78
End: 2023-12-04
Payer: COMMERCIAL

## 2023-12-04 ENCOUNTER — THERAPY VISIT (OUTPATIENT)
Dept: PHYSICAL THERAPY | Facility: HOSPITAL | Age: 78
End: 2023-12-04
Attending: FAMILY MEDICINE
Payer: COMMERCIAL

## 2023-12-04 VITALS
HEIGHT: 64 IN | OXYGEN SATURATION: 99 % | BODY MASS INDEX: 20.32 KG/M2 | HEART RATE: 78 BPM | DIASTOLIC BLOOD PRESSURE: 57 MMHG | TEMPERATURE: 97.5 F | SYSTOLIC BLOOD PRESSURE: 127 MMHG | WEIGHT: 119 LBS

## 2023-12-04 DIAGNOSIS — M81.0 OSTEOPOROSIS: ICD-10-CM

## 2023-12-04 DIAGNOSIS — M81.0 AGE-RELATED OSTEOPOROSIS WITHOUT CURRENT PATHOLOGICAL FRACTURE: ICD-10-CM

## 2023-12-04 DIAGNOSIS — R42 DIZZINESS: ICD-10-CM

## 2023-12-04 DIAGNOSIS — M94.9 DISORDER OF BONE AND CARTILAGE: Primary | ICD-10-CM

## 2023-12-04 DIAGNOSIS — M89.9 DISORDER OF BONE AND CARTILAGE: Primary | ICD-10-CM

## 2023-12-04 LAB
CALCIUM SERPL-MCNC: 9.2 MG/DL (ref 8.8–10.2)
CREAT SERPL-MCNC: 0.9 MG/DL (ref 0.51–0.95)
EGFRCR SERPLBLD CKD-EPI 2021: 65 ML/MIN/1.73M2

## 2023-12-04 PROCEDURE — 96365 THER/PROPH/DIAG IV INF INIT: CPT

## 2023-12-04 PROCEDURE — 82310 ASSAY OF CALCIUM: CPT | Mod: ZL

## 2023-12-04 PROCEDURE — 97112 NEUROMUSCULAR REEDUCATION: CPT | Mod: GP | Performed by: PHYSICAL THERAPIST

## 2023-12-04 PROCEDURE — 82565 ASSAY OF CREATININE: CPT | Mod: ZL

## 2023-12-04 PROCEDURE — 97161 PT EVAL LOW COMPLEX 20 MIN: CPT | Mod: GP | Performed by: PHYSICAL THERAPIST

## 2023-12-04 PROCEDURE — 250N000011 HC RX IP 250 OP 636: Mod: JZ | Performed by: FAMILY MEDICINE

## 2023-12-04 PROCEDURE — 36415 COLL VENOUS BLD VENIPUNCTURE: CPT | Mod: ZL

## 2023-12-04 RX ORDER — ZOLEDRONIC ACID 5 MG/100ML
5 INJECTION, SOLUTION INTRAVENOUS ONCE
Start: 2024-12-03

## 2023-12-04 RX ORDER — ZOLEDRONIC ACID 5 MG/100ML
5 INJECTION, SOLUTION INTRAVENOUS ONCE
Status: COMPLETED | OUTPATIENT
Start: 2023-12-04 | End: 2023-12-04

## 2023-12-04 RX ORDER — METHYLPREDNISOLONE SODIUM SUCCINATE 125 MG/2ML
125 INJECTION, POWDER, LYOPHILIZED, FOR SOLUTION INTRAMUSCULAR; INTRAVENOUS
Start: 2024-12-03

## 2023-12-04 RX ORDER — DIPHENHYDRAMINE HYDROCHLORIDE 50 MG/ML
50 INJECTION INTRAMUSCULAR; INTRAVENOUS
Start: 2024-12-03

## 2023-12-04 RX ORDER — ACETAMINOPHEN 325 MG/1
650 TABLET ORAL
OUTPATIENT
Start: 2024-12-03

## 2023-12-04 RX ORDER — HEPARIN SODIUM (PORCINE) LOCK FLUSH IV SOLN 100 UNIT/ML 100 UNIT/ML
5 SOLUTION INTRAVENOUS
OUTPATIENT
Start: 2024-12-03

## 2023-12-04 RX ORDER — ALBUTEROL SULFATE 90 UG/1
1-2 AEROSOL, METERED RESPIRATORY (INHALATION)
Start: 2024-12-03

## 2023-12-04 RX ORDER — EPINEPHRINE 1 MG/ML
0.3 INJECTION, SOLUTION, CONCENTRATE INTRAVENOUS EVERY 5 MIN PRN
OUTPATIENT
Start: 2024-12-03

## 2023-12-04 RX ORDER — HEPARIN SODIUM,PORCINE 10 UNIT/ML
5-20 VIAL (ML) INTRAVENOUS DAILY PRN
OUTPATIENT
Start: 2024-12-03

## 2023-12-04 RX ORDER — MEPERIDINE HYDROCHLORIDE 25 MG/ML
25 INJECTION INTRAMUSCULAR; INTRAVENOUS; SUBCUTANEOUS EVERY 30 MIN PRN
OUTPATIENT
Start: 2024-12-03

## 2023-12-04 RX ORDER — ALBUTEROL SULFATE 0.83 MG/ML
2.5 SOLUTION RESPIRATORY (INHALATION)
OUTPATIENT
Start: 2024-12-03

## 2023-12-04 RX ADMIN — Medication 250 ML: at 13:32

## 2023-12-04 RX ADMIN — ZOLEDRONIC ACID 5 MG: 5 INJECTION, SOLUTION INTRAVENOUS at 13:32

## 2023-12-04 ASSESSMENT — PAIN SCALES - GENERAL: PAINLEVEL: NO PAIN (0)

## 2023-12-04 NOTE — PROGRESS NOTES
Infusion Nursing Note:  Maria Teresa Aburto presents today for Reclast.    Patient seen by provider today: No   present during visit today: Not Applicable.    Intravenous Access:  Peripheral IV placed by Vicenta WELLS    Treatment Conditions:  Results reviewed, labs MET treatment parameters, ok to proceed with treatment.    Component      Latest Ref Rng 12/4/2023  12:23 PM   Creatinine      0.51 - 0.95 mg/dL 0.90    GFR Estimate      >60 mL/min/1.73m2 65    Calcium      8.8 - 10.2 mg/dL 9.2          Post Infusion Assessment:  Patient tolerated injection without incident.  No evidence of extravasations.  Access discontinued per protocol.       Discharge Plan:   Patient discharged in stable condition accompanied by: self.  Departure Mode: Ambulatory.      YUE Vanegas
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
## 2023-12-11 ENCOUNTER — ANESTHESIA EVENT (OUTPATIENT)
Dept: SURGERY | Facility: HOSPITAL | Age: 78
End: 2023-12-11
Payer: COMMERCIAL

## 2023-12-11 ASSESSMENT — LIFESTYLE VARIABLES: TOBACCO_USE: 1

## 2023-12-11 NOTE — ANESTHESIA PREPROCEDURE EVALUATION
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     Bowden's esophagus 10/11/2011     Constipation 10/09/2012     GERD (gastroesophageal reflux disease)[530.81] 2003     Hypertension      NSTEMI (non-ST elevated myocardial infarction) (H) 2020    With Takotsubo cardiomyopathy Cardiac cath, small lesion of diagonal not requiring stenting Dr Crystal Sandoval [place]     MARISSA (obstructive sleep apnea) 2022     Osteopenia 10/11/2011    dexa scans odd years starting in      Precordial pain 2003    negative stress test, negative pulmonary evaluatio     Restless legs syndrome (RLS) 10/11/2011     Squamous Cell Carcinoma 10/11/2011    left leg x2     Takotsubo cardiomyopathy 2020    Episode of high BP noted on exam with no other symptoms Elevated troponin Cardiac cath, small lesion of  small diagonal, not requiring stenting Crystal Snadoval, cardiology     Urge incontinence 10/09/2012      Past Surgical History:   Procedure Laterality Date     BONE MARROW BIOPSY, BONE SPECIMEN, NEEDLE/TROCAR N/A 10/31/2022    Procedure: BIOPSY, BONE MARROW WITH ASPIRATION;  Surgeon: Carlos Recinos DO;  Location: [facility]     Breast Biospy  2000    LEFT > Fibrocystic Disease > Outcome: Fibroadenoma     COLONOSCOPY       COLONOSCOPY  2009    Normal, Repeat      DEXA Scan       EGD       EGD       EGD       ENDOSCOPY UPPER, COLONOSCOPY, COMBINED N/A 2018    Procedure: COMBINED ENDOSCOPY UPPER, COLONOSCOPY;  UPPER ENDOSCOPY WITH BIOPSIES AND COLONOSCOPY WITH BIOPSIES;  Surgeon: Minh Interiano DO;  Location: [facility] OR     ESOPHAGOSCOPY, GASTROSCOPY, DUODENOSCOPY (EGD), COMBINED N/A 10/29/2021    Procedure: Upper endoscopy with biopsies;  Surgeon: Panfilo Arcos  "MD Anton;  Location: [facility]     Knee Replacement  2012     Oophorectomy      Ectopic Pregnancy     ROTATOR CUFF REPAIR RT/LT Right     with acromioplasty for complete rotator cuff tear, Dr Cherry     TONSILLECTOMY        Allergies   Allergen Reactions     Nitrofurantoin Other (See Comments) and Nausea     Macrobid  \"Chills and Fevers\"     Nitrofurantoin Macrocrystal Other (See Comments) and Nausea     Macrobid  \"Chills and Fevers\"        Social History     Tobacco Use     Smoking status: Former     Packs/day: 1.00     Years: 4.00     Additional pack years: 0.00     Total pack years: 4.00     Types: Cigarettes     Start date:      Quit date:      Years since quittin.9     Smokeless tobacco: Never   Substance Use Topics     Alcohol use: Yes      Wt Readings from Last 1 Encounters:   23 54 kg (119 lb)        Anesthesia Evaluation   Pt has had prior anesthetic. Type: MAC and General.    No history of anesthetic complications       ROS/MED HX  ENT/Pulmonary:     (+)                tobacco use, Past use,  4  Pack-Year Hx,                  (-) sleep apnea   Neurologic: Comment: RLS      Cardiovascular: Comment: Hx takotsubo cardiomyopathy 2020, resolved    (+) Dyslipidemia hypertension- -  CAD -  - -                                 Previous cardiac testing   Echo: Date: 22 Results:  Global and regional left ventricular function is normal with an EF of 55-60%.  Right ventricular function, chamber size, wall motion, and thickness are  normal.  Both atria appear normal.  Pulmonary artery systolic pressure is normal.  Small PFO noted by color doppler.  The inferior vena cava is normal.  No pericardial effusion is present.  There is no prior study for direct comparison.  ______________________________________________________________________________  Left Ventricle  Global and regional left ventricular function is normal with an EF of 55-60%.  Left ventricular wall thickness is normal. Left " ventricular size is normal.  Grade I or early diastolic dysfunction. No regional wall motion abnormalities  are seen.     Right Ventricle  Right ventricular function, chamber size, wall motion, and thickness are  normal.     Atria  Both atria appear normal. The atrial septum is intact as assessed by color  Doppler .     Mitral Valve  The mitral valve is normal.     Aortic Valve  Trileaflet aortic sclerosis without stenosis. The mean AoV pressure gradient  is 4.0 mmHg. The calculated aortic valve are is 1.7 cm^2.     Tricuspid Valve  The tricuspid valve is normal. Mild tricuspid insufficiency is present. The  right ventricular systolic pressure is approximated at 23.1 mmHg plus the  right atrial pressure. Pulmonary artery systolic pressure is normal.     Pulmonic Valve  The pulmonic valve is normal.     Vessels  The thoracic aorta is normal. The pulmonary artery and bifurcation cannot be  assessed. The inferior vena cava is normal.     Pericardium  No pericardial effusion is present.  Stress Test:  Date: Results:    ECG Reviewed:  Date: 10/25/22 Results:  nsr  Cath:  Date: 2020 Results:  See cardiology tab  (-) past MI and past MI   METS/Exercise Tolerance: 4 - Raking leaves, gardening    Hematologic:     (+)      anemia,          Musculoskeletal: Comment: osteopenia      GI/Hepatic: Comment: barretts esophagus    (+) GERD, Asymptomatic on medication,                  Renal/Genitourinary:       Endo: Comment: Monoclonal gammopathy of unknown significance    (+)          thyroid problem, Thyroid disease - Other nodules,           Psychiatric/Substance Use:  - neg psychiatric ROS     Infectious Disease:  - neg infectious disease ROS     Malignancy:   (+) Malignancy (SCC removed 1 month ago), History of Skin.Skin CA Remission status post Surgery.      Other:  - neg other ROS          Physical Exam    Airway        Mallampati: III   TM distance: > 3 FB   Neck ROM: full   Mouth opening: < 3 cm    Respiratory Devices and  "Support         Dental       (+) Modest Abnormalities - crowns, retainers, 1 or 2 missing teeth      Cardiovascular   cardiovascular exam normal       Rhythm and rate: regular and normal     Pulmonary   pulmonary exam normal        breath sounds clear to auscultation       OUTSIDE LABS:  CBC:   Lab Results   Component Value Date    WBC 6.1 09/25/2023    WBC 7.6 07/10/2023    HGB 12.3 11/21/2023    HGB 11.9 09/25/2023    HCT 35.0 09/25/2023    HCT 34.4 (L) 07/10/2023     09/25/2023     07/10/2023     BMP:   Lab Results   Component Value Date     (L) 09/25/2023     (L) 07/10/2023    POTASSIUM 4.1 09/25/2023    POTASSIUM 4.3 07/10/2023    CHLORIDE 100 09/25/2023    CHLORIDE 100 07/10/2023    CO2 21 (L) 09/25/2023    CO2 20 (L) 07/10/2023    BUN 21.2 09/25/2023    BUN 14.6 07/10/2023    CR 0.90 12/04/2023    CR 0.93 09/25/2023    GLC 93 09/25/2023    GLC 98 07/10/2023     COAGS: No results found for: \"PTT\", \"INR\", \"FIBR\"  POC: No results found for: \"BGM\", \"HCG\", \"HCGS\"  HEPATIC:   Lab Results   Component Value Date    ALBUMIN 4.1 09/25/2023    PROTTOTAL 6.6 09/25/2023    ALT 19 09/25/2023    AST 30 09/25/2023    ALKPHOS 101 09/25/2023    BILITOTAL 0.2 09/25/2023     OTHER:   Lab Results   Component Value Date    ALTAGRACIA 9.2 12/04/2023    MAG 2.1 12/22/2022    TSH 2.79 12/22/2022    CRP <2.9 09/16/2022    SED 25 09/26/2022       Anesthesia Plan    ASA Status:  3    NPO Status:  NPO Appropriate    Anesthesia Type: MAC.     - Reason for MAC: straight local not clinically adequate   Induction: Intravenous, Propofol.   Maintenance: Balanced.        Consents    Anesthesia Plan(s) and associated risks, benefits, and realistic alternatives discussed. Questions answered and patient/representative(s) expressed understanding.     - Discussed: Risks, Benefits and Alternatives for BOTH SEDATION and the PROCEDURE were discussed     - Discussed with:  Patient      - Extended Intubation/Ventilatory Support " Discussed: No.      - Patient is DNR/DNI Status: No     Use of blood products discussed: No .     Postoperative Care            Comments:               ROXANN De La Fuente CRNA    I have reviewed the pertinent notes and labs in the chart from the past 30 days and (re)examined the patient.  Any updates or changes from those notes are reflected in this note.

## 2023-12-11 NOTE — PROGRESS NOTES
[facility] - [place]  [address] [place] [address]  Phone: [phone]  Primary Provider: Luz Alcala  Pre-op Performing Provider: LOVE HORTON      PREOPERATIVE EVALUATION:  Today's date: 12/12/2023    Maria Teresa is a 78 year old, presenting for the following:  Pre-Op Exam        12/12/2023     2:08 PM   Additional Questions   Roomed by Maria Esther Hedrick   Accompanied by self         12/12/2023     2:08 PM   Patient Reported Additional Medications   Patient reports taking the following new medications none       Surgical Information:  Surgery/Procedure: EGD  Surgery Location: [facility]  Surgeon: Dr. Arcos  Surgery Date: 12/14  Time of Surgery: afternoon sometime   Where patient plans to recover: At home with family  Fax number for surgical facility: Note does not need to be faxed, will be available electronically in Epic.    Assessment & Plan     The proposed surgical procedure is considered LOW risk.    Preop general physical exam/Bowden's esophagus without dysplasia/Gastroesophageal reflux disease without esophagitis  Approval given to proceed. Pre-op scheduled 2 days prior to procedure. Has not stopped aspirin, have encouraged her to do so tomorrow. Ok to proceed given low risk procedure.   - CBC with platelets and differential  - Basic metabolic panel  - EKG 12-lead complete w/read - (Clinic Performed)    Age-related osteoporosis without current pathological fracture    Hyperlipidemia LDL goal <70  No longer on statin due to fatigue.    Vitamin B12 deficiency (non anemic)    Hemochromatosis, unspecified hemochromatosis type  S/p phlebotomy. Ferritin 222 today. Follows with oncology.     Dizziness  Chronic, greater than 1 year. Extensive work-up. Not progressing. Recent zio patch in November without concern. Now working with PT.    Takotsubo cardiomyopathy  Echo June 2023 with EF of 57%.     MGUS  Bone marrow negative for myeloma. Continue surveillance.        Risks and  Recommendations:  The patient has the following additional risks and recommendations for perioperative complications:   - No identified additional risk factors other than previously addressed    Antiplatelet or Anticoagulation Medication Instructions:   - aspirin: Discontinue aspirin as of tomorrow. Already took today and procedure is in 2 days.  It should be resumed postoperatively.     Additional Medication Instructions:   - Herbal medications and vitamins: HOLD 14 days prior to surgery.    RECOMMENDATION:  APPROVAL GIVEN to proceed with proposed procedure, without further diagnostic evaluation.    25 minutes spent by me on the date of the encounter doing chart review, history and exam, documentation and further activities per the note      Subjective       HPI related to upcoming procedure: M.A. is a 78-year-old female presenting for preoperative exam for upcoming EGD. Patient has history of GERD and Bowden's esophagus without dysplasia. She underwent EGD in October 2021 with long segment Bowden's. Recommended follow-up of 2 years.           12/12/2023     2:08 PM   Preop Questions   1. Have you ever had a heart attack or stroke? No   2. Have you ever had surgery on your heart or blood vessels, such as a stent placement, a coronary artery bypass, or surgery on an artery in your head, neck, heart, or legs? No   3. Do you have chest pain with activity? No   4. Do you have a history of  heart failure? No   5. Do you currently have a cold, bronchitis or symptoms of other infection? No   6. Do you have a cough, shortness of breath, or wheezing? No   7. Do you or anyone in your family have previous history of blood clots? No   8. Do you or does anyone in your family have a serious bleeding problem such as prolonged bleeding following surgeries or cuts? No   9. Have you ever had problems with anemia or been told to take iron pills? YES - not on oral iron currently.   10. Have you had any abnormal blood loss such as  black, tarry or bloody stools, or abnormal vaginal bleeding? No   11. Have you ever had a blood transfusion? No   12. Are you willing to have a blood transfusion if it is medically needed before, during, or after your surgery? Yes   13. Have you or any of your relatives ever had problems with anesthesia? No   14. Do you have sleep apnea, excessive snoring or daytime drowsiness? No   15. Do you have any artifical heart valves or other implanted medical devices like a pacemaker, defibrillator, or continuous glucose monitor? No   16. Do you have artificial joints? No   17. Are you allergic to latex? No     Health Care Directive:  Patient does not have a Health Care Directive or Living Will: Discussed advance care planning with patient; however, patient declined at this time.    Preoperative Review of :   reviewed - no record of controlled substances prescribed.      Status of Chronic Conditions:  See problem list for active medical problems.  Problems all longstanding and stable, except as noted/documented.  See ROS for pertinent symptoms related to these conditions.    Review of Systems  CONSTITUTIONAL: NEGATIVE for fever, chills, change in weight  INTEGUMENTARY/SKIN: NEGATIVE for worrisome rashes, moles or lesions  EYES: NEGATIVE for vision changes or irritation  ENT/MOUTH: NEGATIVE for ear, mouth and throat problems  RESP: NEGATIVE for significant cough or SOB  CV: NEGATIVE for chest pain, palpitations or peripheral edema  GI: NEGATIVE for nausea, abdominal pain, heartburn, or change in bowel habits  : NEGATIVE for frequency, dysuria, or hematuria  MUSCULOSKELETAL: NEGATIVE for significant arthralgias or myalgia  NEURO: NEGATIVE for weakness, dizziness or paresthesias  ENDOCRINE: NEGATIVE for temperature intolerance, skin/hair changes  HEME: NEGATIVE for bleeding problems  PSYCHIATRIC: NEGATIVE for changes in mood or affect    Patient Active Problem List    Diagnosis Date Noted    Dizziness 12/04/2023      "Priority: Medium    Age-related osteoporosis without current pathological fracture 2023     Priority: Medium    Monoclonal gammopathy of unknown significance (MGUS) 2023     Priority: Medium    Hemochromatosis 2023     Priority: Medium    MARISSA (obstructive sleep apnea) 2022     Priority: Medium     Moderate MARISSA (AHI 17) without sleep-associated hypoxemia    HOME SLEEP STUDY INTERPRETATION           Patient: Maria Teresa Aburto  MRN: [id number]  YOB: 1945  Study Date: 2022  PCP/Referring Provider: Luz Alcala;   Ordering Provider: Wesley Garcia MD, MD           Indications for Home Study: Maria Teresa Aburto is a 77 year old female with a history of vitamin B-12 deficiency, HLD, HTN, NSTEMI, takotsubo cardiomyopathy, RLS, squamous cell carcinoma, normochromic normocytic anemia with elevated ferritin, elevated sed rate and normal TIBC who presents with symptoms suggestive of obstructive sleep apnea.     Estimated body mass index is 21.1 kg/m  as calculated from the following:    Height as of 22: 1.6 m (5' 3\").    Weight as of 22: 54 kg (119 lb 1.6 oz).  Total score - [place]: 8 (10/4/2022  3:09 PM)  STOP-BANGSTOP-BAN/8           Data: A full night home sleep study was performed recording the standard physiologic parameters including body position, movement, sound, nasal pressure, thermal oral airflow, chest and abdominal movements with respiratory inductance plethysmography, and oxygen saturation by pulse oximetry. Pulse rate was estimated by oximetry recording. This study was considered adequate based on > 4 hours of quality oximetry and respiratory recording. As specified by the AASM Manual for the Scoring of Sleep and Associated events, version 2.3, Rule VIII.D 1B, 4% oxygen desaturation scoring for hypopneas is used as a standard of care on all home sleep apnea testing.     Analysis Time:  500.7 minutes     Respiration:   Sleep Associated " Hypoxemia: sustained hypoxemia was not present. Average oxygen saturation was 95%.  Time with saturation less than or equal to 88% was 1.9 minutes. The lowest oxygen saturation was 83%.   Snoring: Snoring was present.  Respiratory events: The home study revealed a presence of 87 obstructive apneas and 4 mixed and central apneas. There were 50 hypopneas resulting in a combined apnea/hypopnea index [AHI] of 17 events per hour.  AHI was 18.2 per hour supine, - per hour prone, 11 per hour on left side, and 0 per hour on right side.   Pattern: Excluding events noted above, respiratory rate and pattern was Normal.     Position: Percent of time spent: supine - 81.5%, prone - 0.1%, on left - 17.5%, on right - 0%.     Heart Rate: By pulse oximetry normal rate was noted.         Assessment:   Moderate obstructive sleep apnea.  Sleep associated hypoxemia was not present.     Recommendations:  Consider auto-CPAP at 5-15 cmH2O, oral appliance therapy or polysomnography with full night PAP titration.  Suggest optimizing sleep hygiene and avoiding sleep deprivation.  Weight management.           Diagnosis Code(s): Obstructive Sleep Apnea G47.33     Wesley Garcia MD, MD, November 30, 2022   Diplomate, American Board of Family Medicine, Sleep Medicine      Hyperlipidemia LDL goal <70 05/04/2021     Priority: Medium    ACE-inhibitor cough 11/03/2020     Priority: Medium    NSTEMI (non-ST elevated myocardial infarction) (H) 09/28/2020     Priority: Medium     With Takotsubo cardiomyopathy  Cardiac cath, small lesion of diagonal not requiring stenting  Dr Crystal Sandoval Vakil      Takotsubo cardiomyopathy 09/22/2020     Priority: Medium     Episode of high BP noted on exam with no other symptoms  Elevated troponin  Cardiac cath, small lesion of  small diagonal, not requiring stenting  Crsytal Sandoval, cardiology      S/P right rotator cuff repair 09/16/2019     Priority: Medium    Vitamin B12 deficiency (non anemic)  09/16/2019     Priority: Medium    Nontraumatic incomplete tear of right rotator cuff 07/31/2019     Priority: Medium     Added automatically from request for surgery [id number]      Nontraumatic complete tear of right rotator cuff 07/31/2019     Priority: Medium     Added automatically from request for surgery [id number]      Actinic keratosis 07/17/2012     Priority: Medium    History of malignant neoplasm of skin 07/17/2012     Priority: Medium    Atypical nevus 07/17/2012     Priority: Medium    History of nonmelanoma skin cancer 07/17/2012     Priority: Medium     Overview:   SCC right leg 2001      Medial meniscus tear 02/13/2012     Priority: Medium     Overview:   IMO Update 10/11      Restless legs syndrome (RLS) 10/11/2011     Priority: Medium    Bowden's esophagus 10/11/2011     Priority: Medium    B-complex deficiency 10/11/2011     Priority: Medium     Problem list name updated by automated process. Provider to review      Disorder of bone and cartilage 10/11/2011     Priority: Medium     dexa scans odd years starting in 2003  Problem list name updated by automated process. Provider to review      Squamous Cell Carcinoma 10/11/2011     Priority: Medium     left leg x2      GERD (gastroesophageal reflux disease)[530.81] 05/11/2003     Priority: Medium      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    Bowden's esophagus 10/11/2011    Constipation 10/09/2012    GERD (gastroesophageal reflux disease)[530.81] 05/11/2003    Hypertension     NSTEMI (non-ST elevated myocardial infarction) (H) 09/28/2020    With Takotsubo cardiomyopathy Cardiac cath, small lesion of diagonal not requiring stenting Dr Crystal Sandoval Vakil    MARISSA (obstructive sleep apnea) 12/8/2022    Osteopenia 10/11/2011    dexa scans odd years starting in 2003    Precordial pain 04/28/2003    negative stress test, negative pulmonary evaluatio    Restless legs syndrome (RLS) 10/11/2011    Squamous Cell Carcinoma  "10/11/2011    left leg x2    Takotsubo cardiomyopathy 09/22/2020    Episode of high BP noted on exam with no other symptoms Elevated troponin Cardiac cath, small lesion of  small diagonal, not requiring stenting Crystal Sandoval, cardiology    Urge incontinence 10/09/2012     Past Surgical History:   Procedure Laterality Date    BONE MARROW BIOPSY, BONE SPECIMEN, NEEDLE/TROCAR N/A 10/31/2022    Procedure: BIOPSY, BONE MARROW WITH ASPIRATION;  Surgeon: Carlos Recinos DO;  Location: [facility] OR    Breast Biospy  11/11/2000    LEFT > Fibrocystic Disease > Outcome: Fibroadenoma    COLONOSCOPY  2000    COLONOSCOPY  8-7-2009    Normal, Repeat 2015    DEXA Scan  2009    EGD      EGD  2008    EGD  2003    ENDOSCOPY UPPER, COLONOSCOPY, COMBINED N/A 9/6/2018    Procedure: COMBINED ENDOSCOPY UPPER, COLONOSCOPY;  UPPER ENDOSCOPY WITH BIOPSIES AND COLONOSCOPY WITH BIOPSIES;  Surgeon: Minh Interiano DO;  Location: [facility] OR    ESOPHAGOSCOPY, GASTROSCOPY, DUODENOSCOPY (EGD), COMBINED N/A 10/29/2021    Procedure: Upper endoscopy with biopsies;  Surgeon: Panfilo Arcos MD;  Location: [facility] OR    Knee Replacement  2012    Oophorectomy      Ectopic Pregnancy    ROTATOR CUFF REPAIR RT/LT Right     with acromioplasty for complete rotator cuff tear, Dr Cherry    TONSILLECTOMY       Current Outpatient Medications   Medication Sig Dispense Refill    ASPIRIN CAPS 81 MG OR one capsule by mouth daily 100 3    Calcium Carbonate-Vitamin D (CALCIUM + D PO)       Cyanocobalamin (VITAMIN B 12 PO) Injection [id number]      omeprazole (PRILOSEC) 40 MG DR capsule TAKE ONE TABLET BY MOUTH DAILY 90 capsule 2    pramipexole (MIRAPEX) 0.25 MG tablet TAKE 1-2 TABLETS (0.25-0.5 MG) BY MOUTH AT BEDTIME 90 tablet 0    Vitamin D, Cholecalciferol, 25 MCG (1000 UT) TABS          Allergies   Allergen Reactions    Nitrofurantoin Other (See Comments) and Nausea     Macrobid  \"Chills and Fevers\"    Nitrofurantoin Macrocrystal Other (See Comments) and Nausea " "    Macrobid  \"Chills and Fevers\"          Social History     Tobacco Use    Smoking status: Former     Packs/day: 1.00     Years: 4.00     Additional pack years: 0.00     Total pack years: 4.00     Types: Cigarettes     Start date:      Quit date:      Years since quittin.9    Smokeless tobacco: Never   Substance Use Topics    Alcohol use: Yes     Family History   Problem Relation Age of Onset    Uterine Cancer Mother     Lung Cancer Mother     Osteoarthritis Mother     Thyroid Disease Father     Coronary Artery Disease Father     Thyroid Disease Brother     Hyperlipidemia Son     Coronary Artery Disease Son     Heart Surgery Son     Thyroid Disease Son     Thyroid Disease Daughter     Endometrial Cancer Other         Family Hx    Osteoporosis Other         Family Hx    Parkinsonism Other         Family Hx    Multiple Sclerosis Maternal Aunt     Anuerysm Paternal Aunt     Unknown/Adopted No family hx of     Hypertension No family hx of     Breast Cancer No family hx of     Cerebrovascular Disease No family hx of     Colon Cancer No family hx of     Prostate Cancer No family hx of     Anesthesia Reaction No family hx of     Asthma No family hx of     Genetic Disorder No family hx of      History   Drug Use Unknown         Objective     /58   Pulse 73   Temp 97.4  F (36.3  C) (Tympanic)   Resp 17   Ht 1.626 m (5' 4\")   Wt 54.6 kg (120 lb 4.8 oz)   SpO2 99%   BMI 20.65 kg/m      Physical Exam    GENERAL APPEARANCE: healthy, alert and no distress     EYES: EOMI, PERRL     HENT: ear canals and TM's normal and nose and mouth without ulcers or lesions     NECK: no adenopathy, no asymmetry, masses, or scars and thyroid normal to palpation     RESP: lungs clear to auscultation - no rales, rhonchi or wheezes     CV: regular rates and rhythm, normal S1 S2, no S3 or S4 and no murmur, click or rub     ABDOMEN:  soft, nontender, no HSM or masses and bowel sounds normal     MS: extremities normal- no " gross deformities noted, no evidence of inflammation in joints, FROM in all extremities.     SKIN: no suspicious lesions or rashes     NEURO: Normal strength and tone, sensory exam grossly normal, mentation intact and speech normal     PSYCH: mentation appears normal. and affect normal/bright     LYMPHATICS: No cervical adenopathy    Recent Labs   Lab Test 12/04/23  1223 11/21/23  0849 09/25/23  1139 07/10/23  1002   HGB  --  12.3 11.9 11.9   PLT  --   --  215 198   NA  --   --  131* 131*   POTASSIUM  --   --  4.1 4.3   CR 0.90  --  0.93 0.98*        Diagnostics:  Recent Results (from the past 24 hour(s))   Comprehensive metabolic panel    Collection Time: 12/12/23  7:40 AM   Result Value Ref Range    Sodium 131 (L) 135 - 145 mmol/L    Potassium 4.4 3.4 - 5.3 mmol/L    Carbon Dioxide (CO2) 19 (L) 22 - 29 mmol/L    Anion Gap 11 7 - 15 mmol/L    Urea Nitrogen 21.6 8.0 - 23.0 mg/dL    Creatinine 0.92 0.51 - 0.95 mg/dL    GFR Estimate 63 >60 mL/min/1.73m2    Calcium 9.0 8.8 - 10.2 mg/dL    Chloride 101 98 - 107 mmol/L    Glucose 83 70 - 99 mg/dL    Alkaline Phosphatase 103 40 - 150 U/L    AST 29 0 - 45 U/L    ALT 31 0 - 50 U/L    Protein Total 6.5 6.4 - 8.3 g/dL    Albumin 3.9 3.5 - 5.2 g/dL    Bilirubin Total 0.4 <=1.2 mg/dL   Lactate Dehydrogenase    Collection Time: 12/12/23  7:40 AM   Result Value Ref Range    Lactate Dehydrogenase 210 0 - 250 U/L   Ferritin    Collection Time: 12/12/23  7:40 AM   Result Value Ref Range    Ferritin 222 11 - 328 ng/mL   Iron & Iron Binding Capacity    Collection Time: 12/12/23  7:40 AM   Result Value Ref Range    Iron 79 37 - 145 ug/dL    Iron Binding Capacity 226 (L) 240 - 430 ug/dL    Iron Sat Index 35 15 - 46 %   UA Macroscopic with reflex to Microscopic and Culture    Collection Time: 12/12/23  7:40 AM    Specimen: Urine, Clean Catch   Result Value Ref Range    Color Urine Yellow Colorless, Straw, Light Yellow, Yellow    Appearance Urine Clear Clear    Glucose Urine Negative  Negative mg/dL    Bilirubin Urine Negative Negative    Ketones Urine Negative Negative mg/dL    Specific Gravity Urine 1.014 1.003 - 1.035    Blood Urine Negative Negative    pH Urine 7.0 4.7 - 8.0    Protein Albumin Urine Negative Negative mg/dL    Urobilinogen Urine Normal Normal, 2.0 mg/dL    Nitrite Urine Negative Negative    Leukocyte Esterase Urine Negative Negative    RBC Urine <1 <=2 /HPF    WBC Urine <1 <=5 /HPF    Squamous Epithelials Urine 0 <=1 /HPF   CBC with platelets and differential    Collection Time: 12/12/23  7:40 AM   Result Value Ref Range    WBC Count 4.8 4.0 - 11.0 10e3/uL    RBC Count 3.79 (L) 3.80 - 5.20 10e6/uL    Hemoglobin 11.8 11.7 - 15.7 g/dL    Hematocrit 33.0 (L) 35.0 - 47.0 %    MCV 87 78 - 100 fL    MCH 31.1 26.5 - 33.0 pg    MCHC 35.8 31.5 - 36.5 g/dL    RDW 12.6 10.0 - 15.0 %    Platelet Count 237 150 - 450 10e3/uL    % Neutrophils 57 %    % Lymphocytes 19 %    % Monocytes 15 %    % Eosinophils 8 %    % Basophils 1 %    % Immature Granulocytes 0 %    NRBCs per 100 WBC 0 <1 /100    Absolute Neutrophils 2.7 1.6 - 8.3 10e3/uL    Absolute Lymphocytes 0.9 0.8 - 5.3 10e3/uL    Absolute Monocytes 0.7 0.0 - 1.3 10e3/uL    Absolute Eosinophils 0.4 0.0 - 0.7 10e3/uL    Absolute Basophils 0.1 0.0 - 0.2 10e3/uL    Absolute Immature Granulocytes 0.0 <=0.4 10e3/uL    Absolute NRBCs 0.0 10e3/uL   EKG 12-lead complete w/read - (Clinic Performed)    Collection Time: 12/12/23  2:27 PM   Result Value Ref Range    Systolic Blood Pressure  mmHg    Diastolic Blood Pressure  mmHg    Ventricular Rate 75 BPM    Atrial Rate 75 BPM    AK Interval 192 ms    QRS Duration 88 ms     ms    QTc 410 ms    P Axis 70 degrees    R AXIS -4 degrees    T Axis 52 degrees    Interpretation ECG       Sinus rhythm  Normal ECG  No previous ECGs available        EKG: appears normal, NSR, unchanged from previous tracings    Revised Cardiac Risk Index (RCRI):  The patient has the following serious cardiovascular risks for  perioperative complications:   - No serious cardiac risks = 0 points     RCRI Interpretation: 0 points: Class I (very low risk - 0.4% complication rate)         Signed Electronically by: ROXANN Byrd CNP  Copy of this evaluation report is provided to requesting physician.

## 2023-12-12 ENCOUNTER — OFFICE VISIT (OUTPATIENT)
Dept: FAMILY MEDICINE | Facility: OTHER | Age: 78
End: 2023-12-12
Payer: COMMERCIAL

## 2023-12-12 ENCOUNTER — LAB (OUTPATIENT)
Dept: LAB | Facility: OTHER | Age: 78
End: 2023-12-12
Payer: COMMERCIAL

## 2023-12-12 ENCOUNTER — THERAPY VISIT (OUTPATIENT)
Dept: PHYSICAL THERAPY | Facility: HOSPITAL | Age: 78
End: 2023-12-12
Attending: FAMILY MEDICINE
Payer: COMMERCIAL

## 2023-12-12 VITALS
OXYGEN SATURATION: 99 % | TEMPERATURE: 97.4 F | HEART RATE: 73 BPM | RESPIRATION RATE: 17 BRPM | SYSTOLIC BLOOD PRESSURE: 132 MMHG | HEIGHT: 64 IN | DIASTOLIC BLOOD PRESSURE: 58 MMHG | WEIGHT: 120.3 LBS | BODY MASS INDEX: 20.54 KG/M2

## 2023-12-12 DIAGNOSIS — R42 DIZZINESS: Primary | ICD-10-CM

## 2023-12-12 DIAGNOSIS — N39.0 URINARY TRACT INFECTION: ICD-10-CM

## 2023-12-12 DIAGNOSIS — E53.8 VITAMIN B12 DEFICIENCY (NON ANEMIC): ICD-10-CM

## 2023-12-12 DIAGNOSIS — I51.81 TAKOTSUBO CARDIOMYOPATHY: ICD-10-CM

## 2023-12-12 DIAGNOSIS — Z01.818 PREOP GENERAL PHYSICAL EXAM: Primary | ICD-10-CM

## 2023-12-12 DIAGNOSIS — E83.119 HEMOCHROMATOSIS, UNSPECIFIED HEMOCHROMATOSIS TYPE: ICD-10-CM

## 2023-12-12 DIAGNOSIS — K22.70 BARRETT'S ESOPHAGUS WITHOUT DYSPLASIA: ICD-10-CM

## 2023-12-12 DIAGNOSIS — D47.2 MONOCLONAL GAMMOPATHY: ICD-10-CM

## 2023-12-12 DIAGNOSIS — R42 DIZZINESS: ICD-10-CM

## 2023-12-12 DIAGNOSIS — E78.5 HYPERLIPIDEMIA LDL GOAL <70: ICD-10-CM

## 2023-12-12 DIAGNOSIS — K21.9 GASTROESOPHAGEAL REFLUX DISEASE WITHOUT ESOPHAGITIS: ICD-10-CM

## 2023-12-12 DIAGNOSIS — M81.0 AGE-RELATED OSTEOPOROSIS WITHOUT CURRENT PATHOLOGICAL FRACTURE: ICD-10-CM

## 2023-12-12 DIAGNOSIS — D47.2 MGUS (MONOCLONAL GAMMOPATHY OF UNKNOWN SIGNIFICANCE): ICD-10-CM

## 2023-12-12 LAB
ALBUMIN SERPL BCG-MCNC: 3.9 G/DL (ref 3.5–5.2)
ALBUMIN UR-MCNC: NEGATIVE MG/DL
ALP SERPL-CCNC: 103 U/L (ref 40–150)
ALT SERPL W P-5'-P-CCNC: 31 U/L (ref 0–50)
ANION GAP SERPL CALCULATED.3IONS-SCNC: 11 MMOL/L (ref 7–15)
APPEARANCE UR: CLEAR
AST SERPL W P-5'-P-CCNC: 29 U/L (ref 0–45)
BASOPHILS # BLD AUTO: 0.1 10E3/UL (ref 0–0.2)
BASOPHILS NFR BLD AUTO: 1 %
BILIRUB SERPL-MCNC: 0.4 MG/DL
BILIRUB UR QL STRIP: NEGATIVE
BUN SERPL-MCNC: 21.6 MG/DL (ref 8–23)
CALCIUM SERPL-MCNC: 9 MG/DL (ref 8.8–10.2)
CHLORIDE SERPL-SCNC: 101 MMOL/L (ref 98–107)
COLOR UR AUTO: YELLOW
CREAT SERPL-MCNC: 0.92 MG/DL (ref 0.51–0.95)
DEPRECATED HCO3 PLAS-SCNC: 19 MMOL/L (ref 22–29)
EGFRCR SERPLBLD CKD-EPI 2021: 63 ML/MIN/1.73M2
EOSINOPHIL # BLD AUTO: 0.4 10E3/UL (ref 0–0.7)
EOSINOPHIL NFR BLD AUTO: 8 %
ERYTHROCYTE [DISTWIDTH] IN BLOOD BY AUTOMATED COUNT: 12.6 % (ref 10–15)
FERRITIN SERPL-MCNC: 222 NG/ML (ref 11–328)
GLUCOSE SERPL-MCNC: 83 MG/DL (ref 70–99)
GLUCOSE UR STRIP-MCNC: NEGATIVE MG/DL
HCT VFR BLD AUTO: 33 % (ref 35–47)
HGB BLD-MCNC: 11.8 G/DL (ref 11.7–15.7)
HGB UR QL STRIP: NEGATIVE
IMM GRANULOCYTES # BLD: 0 10E3/UL
IMM GRANULOCYTES NFR BLD: 0 %
IRON BINDING CAPACITY (ROCHE): 226 UG/DL (ref 240–430)
IRON SATN MFR SERPL: 35 % (ref 15–46)
IRON SERPL-MCNC: 79 UG/DL (ref 37–145)
KETONES UR STRIP-MCNC: NEGATIVE MG/DL
LDH SERPL L TO P-CCNC: 210 U/L (ref 0–250)
LEUKOCYTE ESTERASE UR QL STRIP: NEGATIVE
LYMPHOCYTES # BLD AUTO: 0.9 10E3/UL (ref 0.8–5.3)
LYMPHOCYTES NFR BLD AUTO: 19 %
MCH RBC QN AUTO: 31.1 PG (ref 26.5–33)
MCHC RBC AUTO-ENTMCNC: 35.8 G/DL (ref 31.5–36.5)
MCV RBC AUTO: 87 FL (ref 78–100)
MONOCYTES # BLD AUTO: 0.7 10E3/UL (ref 0–1.3)
MONOCYTES NFR BLD AUTO: 15 %
NEUTROPHILS # BLD AUTO: 2.7 10E3/UL (ref 1.6–8.3)
NEUTROPHILS NFR BLD AUTO: 57 %
NITRATE UR QL: NEGATIVE
NRBC # BLD AUTO: 0 10E3/UL
NRBC BLD AUTO-RTO: 0 /100
PH UR STRIP: 7 [PH] (ref 4.7–8)
PLATELET # BLD AUTO: 237 10E3/UL (ref 150–450)
POTASSIUM SERPL-SCNC: 4.4 MMOL/L (ref 3.4–5.3)
PROT SERPL-MCNC: 6.5 G/DL (ref 6.4–8.3)
RBC # BLD AUTO: 3.79 10E6/UL (ref 3.8–5.2)
RBC URINE: <1 /HPF
SODIUM SERPL-SCNC: 131 MMOL/L (ref 135–145)
SP GR UR STRIP: 1.01 (ref 1–1.03)
SQUAMOUS EPITHELIAL: 0 /HPF
UROBILINOGEN UR STRIP-MCNC: NORMAL MG/DL
WBC # BLD AUTO: 4.8 10E3/UL (ref 4–11)
WBC URINE: <1 /HPF

## 2023-12-12 PROCEDURE — 83615 LACTATE (LD) (LDH) ENZYME: CPT | Mod: ZL

## 2023-12-12 PROCEDURE — 93005 ELECTROCARDIOGRAM TRACING: CPT

## 2023-12-12 PROCEDURE — 83521 IG LIGHT CHAINS FREE EACH: CPT | Mod: ZL

## 2023-12-12 PROCEDURE — 85025 COMPLETE CBC W/AUTO DIFF WBC: CPT | Mod: ZL

## 2023-12-12 PROCEDURE — 80053 COMPREHEN METABOLIC PANEL: CPT | Mod: ZL

## 2023-12-12 PROCEDURE — G0463 HOSPITAL OUTPT CLINIC VISIT: HCPCS | Mod: 25

## 2023-12-12 PROCEDURE — 82607 VITAMIN B-12: CPT | Mod: ZL

## 2023-12-12 PROCEDURE — 84155 ASSAY OF PROTEIN SERUM: CPT | Mod: ZL

## 2023-12-12 PROCEDURE — 97112 NEUROMUSCULAR REEDUCATION: CPT | Mod: GP | Performed by: PHYSICAL THERAPIST

## 2023-12-12 PROCEDURE — 84165 PROTEIN E-PHORESIS SERUM: CPT | Mod: ZL | Performed by: STUDENT IN AN ORGANIZED HEALTH CARE EDUCATION/TRAINING PROGRAM

## 2023-12-12 PROCEDURE — 82728 ASSAY OF FERRITIN: CPT | Mod: ZL

## 2023-12-12 PROCEDURE — 81001 URINALYSIS AUTO W/SCOPE: CPT | Mod: ZL

## 2023-12-12 PROCEDURE — 93010 ELECTROCARDIOGRAM REPORT: CPT | Mod: 77 | Performed by: INTERNAL MEDICINE

## 2023-12-12 PROCEDURE — 84165 PROTEIN E-PHORESIS SERUM: CPT | Mod: 26 | Performed by: PATHOLOGY

## 2023-12-12 PROCEDURE — G0463 HOSPITAL OUTPT CLINIC VISIT: HCPCS

## 2023-12-12 PROCEDURE — 83550 IRON BINDING TEST: CPT | Mod: ZL

## 2023-12-12 PROCEDURE — 99214 OFFICE O/P EST MOD 30 MIN: CPT

## 2023-12-12 PROCEDURE — 82784 ASSAY IGA/IGD/IGG/IGM EACH: CPT | Mod: ZL

## 2023-12-12 PROCEDURE — 36415 COLL VENOUS BLD VENIPUNCTURE: CPT | Mod: ZL

## 2023-12-12 PROCEDURE — 86334 IMMUNOFIX E-PHORESIS SERUM: CPT | Mod: ZL | Performed by: STUDENT IN AN ORGANIZED HEALTH CARE EDUCATION/TRAINING PROGRAM

## 2023-12-12 PROCEDURE — 86334 IMMUNOFIX E-PHORESIS SERUM: CPT | Mod: 26 | Performed by: PATHOLOGY

## 2023-12-12 ASSESSMENT — PAIN SCALES - GENERAL: PAINLEVEL: NO PAIN (0)

## 2023-12-12 NOTE — PATIENT INSTRUCTIONS
Stop your aspirin    Preparing for Your Surgery  Getting started  A nurse will call you to review your health history and instructions. They will give you an arrival time based on your scheduled surgery time. Please be ready to share:  Your doctor's clinic name and phone number  Your medical, surgical, and anesthesia history  A list of allergies and sensitivities  A list of medicines, including herbal treatments and over-the-counter drugs  Whether the patient has a legal guardian (ask how to send us the papers in advance)  Please tell us if you're pregnant--or if there's any chance you might be pregnant. Some surgeries may injure a fetus (unborn baby), so they require a pregnancy test. Surgeries that are safe for a fetus don't always need a test, and you can choose whether to have one.   If you have a child who's having surgery, please ask for a copy of Preparing for Your Child's Surgery.    Preparing for surgery  Within 10 to 30 days of surgery: Have a pre-op exam (sometimes called an H&P, or History and Physical). This can be done at a clinic or pre-operative center.  If you're having a , you may not need this exam. Talk to your care team.  At your pre-op exam, talk to your care team about all medicines you take. If you need to stop any medicines before surgery, ask when to start taking them again.  We do this for your safety. Many medicines can make you bleed too much during surgery. Some change how well surgery (anesthesia) drugs work.  Call your insurance company to let them know you're having surgery. (If you don't have insurance, call [phone].)  Call your clinic if there's any change in your health. This includes signs of a cold or flu (sore throat, runny nose, cough, rash, fever). It also includes a scrape or scratch near the surgery site.  If you have questions on the day of surgery, call your hospital or surgery center.  Eating and drinking guidelines  For your safety: Unless your surgeon  tells you otherwise, follow the guidelines below.  Eat and drink as usual until 8 hours before you arrive for surgery. After that, no food or milk.  Drink clear liquids until 2 hours before you arrive. These are liquids you can see through, like water, Gatorade, and Propel Water. They also include plain black coffee and tea (no cream or milk), candy, and breath mints. You can spit out gum when you arrive.  If you drink alcohol: Stop drinking it the night before surgery.  If your care team tells you to take medicine on the morning of surgery, it's okay to take it with a sip of water.  Preventing infection  Shower or bathe the night before and morning of your surgery. Follow the instructions your clinic gave you. (If no instructions, use regular soap.)  Don't shave or clip hair near your surgery site. We'll remove the hair if needed.  Don't smoke or vape the morning of surgery. You may chew nicotine gum up to 2 hours before surgery. A nicotine patch is okay.  Note: Some surgeries require you to completely quit smoking and nicotine. Check with your surgeon.  Your care team will make every effort to keep you safe from infection. We will:  Clean our hands often with soap and water (or an alcohol-based hand rub).  Clean the skin at your surgery site with a special soap that kills germs.  Give you a special gown to keep you warm. (Cold raises the risk of infection.)  Wear special hair covers, masks, gowns and gloves during surgery.  Give antibiotic medicine, if prescribed. Not all surgeries need antibiotics.  What to bring on the day of surgery  Photo ID and insurance card  Copy of your health care directive, if you have one  Glasses and hearing aids (bring cases)  You can't wear contacts during surgery  Inhaler and eye drops, if you use them (tell us about these when you arrive)  CPAP machine or breathing device, if you use them  A few personal items, if spending the night  If you have . . .  A pacemaker, ICD (cardiac  defibrillator) or other implant: Bring the ID card.  An implanted stimulator: Bring the remote control.  A legal guardian: Bring a copy of the certified (court-stamped) guardianship papers.  Please remove any jewelry, including body piercings. Leave jewelry and other valuables at home.  If you're going home the day of surgery  You must have a responsible adult drive you home. They should stay with you overnight as well.  If you don't have someone to stay with you, and you aren't safe to go home alone, we may keep you overnight. Insurance often won't pay for this.  After surgery  If it's hard to control your pain or you need more pain medicine, please call your surgeon's office.  Questions?   If you have any questions for your care team, list them here: _________________________________________________________________________________________________________________________________________________________________________ ____________________________________ ____________________________________ ____________________________________  For informational purposes only. Not to replace the advice of your health care provider. Copyright   2003, 2019 [facility]. All rights reserved. Clinically reviewed by Lawanda Vila MD. [employer] 500644 - REV 12/22.

## 2023-12-13 LAB
ALBUMIN SERPL ELPH-MCNC: 3.9 G/DL (ref 3.7–5.1)
ALPHA1 GLOB SERPL ELPH-MCNC: 0.3 G/DL (ref 0.2–0.4)
ALPHA2 GLOB SERPL ELPH-MCNC: 0.6 G/DL (ref 0.5–0.9)
ATRIAL RATE - MUSE: 75 BPM
B-GLOBULIN SERPL ELPH-MCNC: 0.8 G/DL (ref 0.6–1)
DIASTOLIC BLOOD PRESSURE - MUSE: NORMAL MMHG
GAMMA GLOB SERPL ELPH-MCNC: 0.9 G/DL (ref 0.7–1.6)
IGA SERPL-MCNC: 351 MG/DL (ref 84–499)
IGG SERPL-MCNC: 993 MG/DL (ref 610–1616)
IGM SERPL-MCNC: 108 MG/DL (ref 35–242)
INTERPRETATION ECG - MUSE: NORMAL
KAPPA LC FREE SER-MCNC: 3.75 MG/DL (ref 0.33–1.94)
KAPPA LC FREE/LAMBDA FREE SER NEPH: 1.71 {RATIO} (ref 0.26–1.65)
LAMBDA LC FREE SERPL-MCNC: 2.19 MG/DL (ref 0.57–2.63)
M PROTEIN SERPL ELPH-MCNC: 0 G/DL
P AXIS - MUSE: 70 DEGREES
PR INTERVAL - MUSE: 192 MS
PROT PATTERN SERPL ELPH-IMP: NORMAL
PROT PATTERN SERPL IFE-IMP: NORMAL
QRS DURATION - MUSE: 88 MS
QT - MUSE: 368 MS
QTC - MUSE: 410 MS
R AXIS - MUSE: -4 DEGREES
SYSTOLIC BLOOD PRESSURE - MUSE: NORMAL MMHG
T AXIS - MUSE: 52 DEGREES
TOTAL PROTEIN SERUM FOR ELP: 6.5 G/DL (ref 6.4–8.3)
VENTRICULAR RATE- MUSE: 75 BPM
VIT B12 SERPL-MCNC: 673 PG/ML (ref 232–1245)

## 2023-12-14 ENCOUNTER — ANESTHESIA (OUTPATIENT)
Dept: SURGERY | Facility: HOSPITAL | Age: 78
End: 2023-12-14
Payer: COMMERCIAL

## 2023-12-14 ENCOUNTER — HOSPITAL ENCOUNTER (OUTPATIENT)
Facility: HOSPITAL | Age: 78
Discharge: HOME | End: 2023-12-14
Attending: SURGERY | Admitting: SURGERY
Payer: COMMERCIAL

## 2023-12-14 VITALS
OXYGEN SATURATION: 100 % | SYSTOLIC BLOOD PRESSURE: 139 MMHG | TEMPERATURE: 97.1 F | HEIGHT: 64 IN | BODY MASS INDEX: 20.49 KG/M2 | RESPIRATION RATE: 16 BRPM | HEART RATE: 79 BPM | WEIGHT: 120 LBS | DIASTOLIC BLOOD PRESSURE: 66 MMHG

## 2023-12-14 PROCEDURE — 710N000012 HC RECOVERY PHASE 2, PER MINUTE: Performed by: SURGERY

## 2023-12-14 PROCEDURE — 88305 TISSUE EXAM BY PATHOLOGIST: CPT | Mod: TC | Performed by: SURGERY

## 2023-12-14 PROCEDURE — 360N000075 HC SURGERY LEVEL 2, PER MIN: Performed by: SURGERY

## 2023-12-14 PROCEDURE — 250N000009 HC RX 250: Performed by: NURSE ANESTHETIST, CERTIFIED REGISTERED

## 2023-12-14 PROCEDURE — 999N000141 HC STATISTIC PRE-PROCEDURE NURSING ASSESSMENT: Performed by: SURGERY

## 2023-12-14 PROCEDURE — 258N000003 HC RX IP 258 OP 636: Performed by: NURSE ANESTHETIST, CERTIFIED REGISTERED

## 2023-12-14 PROCEDURE — 99100 ANES PT EXTEME AGE<1 YR&>70: CPT | Performed by: NURSE ANESTHETIST, CERTIFIED REGISTERED

## 2023-12-14 PROCEDURE — 272N000001 HC OR GENERAL SUPPLY STERILE: Performed by: SURGERY

## 2023-12-14 PROCEDURE — 250N000011 HC RX IP 250 OP 636: Performed by: NURSE ANESTHETIST, CERTIFIED REGISTERED

## 2023-12-14 PROCEDURE — 88305 TISSUE EXAM BY PATHOLOGIST: CPT | Mod: 26 | Performed by: PATHOLOGY

## 2023-12-14 PROCEDURE — 43239 EGD BIOPSY SINGLE/MULTIPLE: CPT | Performed by: NURSE ANESTHETIST, CERTIFIED REGISTERED

## 2023-12-14 PROCEDURE — 43239 EGD BIOPSY SINGLE/MULTIPLE: CPT | Performed by: SURGERY

## 2023-12-14 PROCEDURE — 370N000017 HC ANESTHESIA TECHNICAL FEE, PER MIN: Performed by: SURGERY

## 2023-12-14 RX ORDER — OXYCODONE HYDROCHLORIDE 5 MG/1
5 TABLET ORAL
Status: CANCELLED | OUTPATIENT
Start: 2023-12-14

## 2023-12-14 RX ORDER — LIDOCAINE 40 MG/G
CREAM TOPICAL
Status: DISCONTINUED | OUTPATIENT
Start: 2023-12-14 | End: 2023-12-14 | Stop reason: HOSPADM

## 2023-12-14 RX ORDER — HYDRALAZINE HYDROCHLORIDE 20 MG/ML
2.5-5 INJECTION INTRAMUSCULAR; INTRAVENOUS EVERY 10 MIN PRN
Status: DISCONTINUED | OUTPATIENT
Start: 2023-12-14 | End: 2023-12-14 | Stop reason: HOSPADM

## 2023-12-14 RX ORDER — NALOXONE HYDROCHLORIDE 0.4 MG/ML
0.2 INJECTION, SOLUTION INTRAMUSCULAR; INTRAVENOUS; SUBCUTANEOUS
Status: DISCONTINUED | OUTPATIENT
Start: 2023-12-14 | End: 2023-12-14 | Stop reason: HOSPADM

## 2023-12-14 RX ORDER — SODIUM CHLORIDE, SODIUM LACTATE, POTASSIUM CHLORIDE, CALCIUM CHLORIDE 600; 310; 30; 20 MG/100ML; MG/100ML; MG/100ML; MG/100ML
INJECTION, SOLUTION INTRAVENOUS CONTINUOUS
Status: DISCONTINUED | OUTPATIENT
Start: 2023-12-14 | End: 2023-12-14 | Stop reason: HOSPADM

## 2023-12-14 RX ORDER — PROPOFOL 10 MG/ML
INJECTION, EMULSION INTRAVENOUS PRN
Status: DISCONTINUED | OUTPATIENT
Start: 2023-12-14 | End: 2023-12-15

## 2023-12-14 RX ORDER — NALOXONE HYDROCHLORIDE 0.4 MG/ML
0.4 INJECTION, SOLUTION INTRAMUSCULAR; INTRAVENOUS; SUBCUTANEOUS
Status: DISCONTINUED | OUTPATIENT
Start: 2023-12-14 | End: 2023-12-14 | Stop reason: HOSPADM

## 2023-12-14 RX ORDER — LABETALOL 20 MG/4 ML (5 MG/ML) INTRAVENOUS SYRINGE
10
Status: DISCONTINUED | OUTPATIENT
Start: 2023-12-14 | End: 2023-12-14 | Stop reason: HOSPADM

## 2023-12-14 RX ORDER — PROPOFOL 10 MG/ML
INJECTION, EMULSION INTRAVENOUS CONTINUOUS PRN
Status: DISCONTINUED | OUTPATIENT
Start: 2023-12-14 | End: 2023-12-15

## 2023-12-14 RX ORDER — FENTANYL CITRATE 50 UG/ML
50 INJECTION, SOLUTION INTRAMUSCULAR; INTRAVENOUS EVERY 5 MIN PRN
Status: DISCONTINUED | OUTPATIENT
Start: 2023-12-14 | End: 2023-12-14 | Stop reason: HOSPADM

## 2023-12-14 RX ORDER — ONDANSETRON 4 MG/1
4 TABLET, ORALLY DISINTEGRATING ORAL EVERY 30 MIN PRN
Status: DISCONTINUED | OUTPATIENT
Start: 2023-12-14 | End: 2023-12-14 | Stop reason: HOSPADM

## 2023-12-14 RX ORDER — ONDANSETRON 2 MG/ML
4 INJECTION INTRAMUSCULAR; INTRAVENOUS EVERY 30 MIN PRN
Status: DISCONTINUED | OUTPATIENT
Start: 2023-12-14 | End: 2023-12-14 | Stop reason: HOSPADM

## 2023-12-14 RX ADMIN — PROPOFOL 50 MG: 10 INJECTION, EMULSION INTRAVENOUS at 13:29

## 2023-12-14 RX ADMIN — LIDOCAINE HYDROCHLORIDE 40 MG: 10 INJECTION, SOLUTION EPIDURAL; INFILTRATION; INTRACAUDAL; PERINEURAL at 13:34

## 2023-12-14 RX ADMIN — PROPOFOL 200 MCG/KG/MIN: 10 INJECTION, EMULSION INTRAVENOUS at 13:29

## 2023-12-14 RX ADMIN — SODIUM CHLORIDE, POTASSIUM CHLORIDE, SODIUM LACTATE AND CALCIUM CHLORIDE: 600; 310; 30; 20 INJECTION, SOLUTION INTRAVENOUS at 12:33

## 2023-12-14 ASSESSMENT — ACTIVITIES OF DAILY LIVING (ADL): ADLS_ACUITY_SCORE: 35

## 2023-12-14 NOTE — DISCHARGE INSTRUCTIONS
UPPER ENDOSCOPY    AFTER THE PROCEDURE  You will return to Same Day Surgery to rest for about an hour before you go home.  The doctor will talk with you and your family.  A family member/friend may visit with you.  You may burp up any air remaining in your stomach.  You may feel dizzy or light-headed from the medicine.  Your nurse will go over the discharge instructions with you and your caregiver and answer any of your questions.    You will be contacted the next day to check on how you are doing.  If biopsies were taken, you will be contacted with the results usually within 7-10 days.  BACK AT HOME  Rest for an hour or two after you get home.  When your throat is no longer numb and you have a gag reflex, take a few sips of cool water.  If you can swallow comfortably, you may start eating again.  You may have a mild sore throat for the rest of the day.  You will be contacted with results by the surgeons office within a week. If not contacted in one week, call the surgeons office.  WHAT TO WATCH FOR:  Problems rarely occur after the exam, but it is important to be aware of the early signs of a complication.  Call your doctor immediately if you have:  Difficulty swallowing or breathing  Unusual pain in your stomach or chest  Vomiting blood or dark material that looks like coffee grounds  Black or tarry stools  Temperature over 101.5 degrees    MORE QUESTIONS?  Please ask your doctor or nurse before the exam begins  or call your doctor at the clinic.    IF YOU MUST CANCEL YOUR PROCEDURE THE EVENING/NIGHT BEFORE, PLEASE CALL HOSPITAL ADMITTING -[phone] OR TOLL FREE [phone], [phone]. [phone].    Phone Numbers:  [facility] - [phone]  [facility] - [phone]  Surgery Patient Education - [phone] or toll free [phone]    Post-Anesthesia Patient Instructions    IMMEDIATELY FOLLOWING SURGERY:  Do not drive or operate machinery for the first twenty four hours after surgery.  Do not  make any important decisions for twenty four hours after surgery or while taking narcotic pain medications or sedatives.  If you develop intractable nausea and vomiting or a severe headache please notify your doctor immediately.    FOLLOW-UP:  Please make an appointment with your surgeon as instructed. You do not need to follow up with anesthesia unless specifically instructed to do so.    WOUND CARE INSTRUCTIONS (if applicable):  Keep a dry clean dressing on the anesthesia/puncture wound site if there is drainage.  Once the wound has quit draining you may leave it open to air.  Generally you should leave the bandage intact for twenty four hours unless there is drainage.  If the epidural site drains for more than 36-48 hours please call the anesthesia department.    QUESTIONS?:  Please feel free to call your physician or the hospital  if you have any questions, and they will be happy to assist you.

## 2023-12-14 NOTE — ANESTHESIA CARE TRANSFER NOTE
Patient: Maria Teresa Aburto    Procedure: Procedure(s):  ESOPHAGOGASTRODUODENOSCOPY with biopsy       Diagnosis: Bowden esophagus [K22.70]  Gastroesophageal reflux disease, unspecified whether esophagitis present [K21.9]  Diagnosis Additional Information: No value filed.    Anesthesia Type:   MAC     Note:      Level of Consciousness: awake  Oxygen Supplementation: room air    Independent Airway: airway patency satisfactory and stable        Patient transferred to: Phase II    Handoff Report: Identifed the Patient, Identified the Reponsible Provider, Reviewed the pertinent medical history, Discussed the surgical course, Reviewed Intra-OP anesthesia mangement and issues during anesthesia, Set expectations for post-procedure period and Allowed opportunity for questions and acknowledgement of understanding      Vitals:  Vitals Value Taken Time   BP     Temp     Pulse     Resp     SpO2         Electronically Signed By: ROXANN Baum CRNA  December 14, 2023  1:40 PM

## 2023-12-14 NOTE — OP NOTE
REPORT OF OPERATION  DATE OF PROCEDURE: 12/14/2023    PATIENT: Maria Teresa Aburto    SURGERY PERFORMED: Esophagogastroduodenoscopy with biopsies     PREOPERATIVE DIAGNOSIS: History of Bowden's esophagus without dysplasia    POSTOPERATIVE DIAGNOSIS:    Same   Short segment Bowden's esophagus   Large hiatal hernia   Squamous columnar junction at 40    SURGEON: Panfilo Arcos MD    ASSISTANTS: None    ANESTHESIA: Monitored Anesthesia Care    COMPLICATIONS: None apparent    TRANSFUSIONS: None    TISSUE TO PATHOLOGY: Duodenal, Antral, and Distal Esophageal    FINDINGS: Hiatal Hernia and Short segment Bowden's esophagus    INDICATIONS: This is a 78 year old female with a history of Bowden's esophagus without dysplasia who is in need of an Esophagogastroduodenoscopy.  The patient will be taken to the endoscopy suite for that procedure.    DESCRIPTIONS OF PROCEDURE IN DETAIL: After consent was obtained the patient was taken to the operative suite and alec in the left lateral decubitus position.  The patient was identified and the correct patient was confirmed. Monitored Anesthesia Care was given by anesthesia. A time out was performed verifying the correct patient and the correct procedure.  The entire operative team was in agreement.  All necessary equipment and supplies were in the room.    The endoscope was inserted into the mouth and passed without difficulty to the third portion of the duodenum.  Duodenal biopsies were taken.  The endoscope was then withdrawn through the duodenum, the duodenal bulb and pyloric channel and no abnormalities were noted.  The endoscope was brought back into the stomach and antral biopsies were obtained.  The endoscope was then retroflexed and no lesions of the fundus body or antrum were seen.  The endoscope was straightened back out and brought into the distal esophagus and the squamocolumnar junction was identified at 40 cm. Short segment Bowden's esophagus was noted extending form  40 cm to 38 cm.  Four quadrant biopsies were taken.  The endoscope was slowly withdrawn through the remaining esophagus no other abnormalities are seen,  The endoscope was withdrawn from the patient the patient was then taken to the recovery room in stable condition tolerated the procedure well.

## 2023-12-15 ENCOUNTER — ALLIED HEALTH/NURSE VISIT (OUTPATIENT)
Dept: FAMILY MEDICINE | Facility: OTHER | Age: 78
End: 2023-12-15
Attending: FAMILY MEDICINE
Payer: COMMERCIAL

## 2023-12-15 DIAGNOSIS — E53.9 B-COMPLEX DEFICIENCY: Primary | ICD-10-CM

## 2023-12-15 PROCEDURE — 96372 THER/PROPH/DIAG INJ SC/IM: CPT | Performed by: FAMILY MEDICINE

## 2023-12-15 PROCEDURE — 250N000011 HC RX IP 250 OP 636: Mod: JZ | Performed by: FAMILY MEDICINE

## 2023-12-15 RX ADMIN — CYANOCOBALAMIN 1000 MCG: 1000 INJECTION, SOLUTION INTRAMUSCULAR; SUBCUTANEOUS at 10:32

## 2023-12-15 NOTE — ANESTHESIA POSTPROCEDURE EVALUATION
Patient: Maria Teresa Aburto    Procedure: Procedure(s):  ESOPHAGOGASTRODUODENOSCOPY with biopsy       Anesthesia Type:  MAC    Note:  Disposition: Outpatient   Postop Pain Control: Uneventful            Sign Out: Well controlled pain   PONV: No   Neuro/Psych: Uneventful            Sign Out: Acceptable/Baseline neuro status   Airway/Respiratory: Uneventful            Sign Out: Acceptable/Baseline resp. status   CV/Hemodynamics: Uneventful            Sign Out: Acceptable CV status; No obvious hypovolemia; No obvious fluid overload   Other NRE: NONE   DID A NON-ROUTINE EVENT OCCUR? No       Last vitals:  Vitals Value Taken Time   /60 12/14/23 1400   Temp 97.1  F (36.2  C) 12/14/23 1340   Pulse 81 12/14/23 1400   Resp 16 12/14/23 1345   SpO2 100 % 12/14/23 1401   Vitals shown include unfiled device data.    Electronically Signed By: ROXANN Aquino CRNA  December 15, 2023  8:37 AM

## 2023-12-18 ENCOUNTER — ONCOLOGY VISIT (OUTPATIENT)
Dept: ONCOLOGY | Facility: OTHER | Age: 78
End: 2023-12-18
Attending: INTERNAL MEDICINE
Payer: COMMERCIAL

## 2023-12-18 VITALS
RESPIRATION RATE: 20 BRPM | SYSTOLIC BLOOD PRESSURE: 110 MMHG | BODY MASS INDEX: 20.32 KG/M2 | WEIGHT: 119.05 LBS | DIASTOLIC BLOOD PRESSURE: 60 MMHG | OXYGEN SATURATION: 97 % | HEIGHT: 64 IN | TEMPERATURE: 96 F | HEART RATE: 82 BPM

## 2023-12-18 DIAGNOSIS — D47.2 MONOCLONAL GAMMOPATHY: Primary | ICD-10-CM

## 2023-12-18 LAB
PATH REPORT.COMMENTS IMP SPEC: NORMAL
PATH REPORT.FINAL DX SPEC: NORMAL
PATH REPORT.GROSS SPEC: NORMAL
PATH REPORT.MICROSCOPIC SPEC OTHER STN: NORMAL
PATH REPORT.RELEVANT HX SPEC: NORMAL
PHOTO IMAGE: NORMAL

## 2023-12-18 PROCEDURE — 99417 PROLNG OP E/M EACH 15 MIN: CPT | Performed by: INTERNAL MEDICINE

## 2023-12-18 PROCEDURE — G0463 HOSPITAL OUTPT CLINIC VISIT: HCPCS

## 2023-12-18 PROCEDURE — 99215 OFFICE O/P EST HI 40 MIN: CPT | Performed by: INTERNAL MEDICINE

## 2023-12-18 ASSESSMENT — PAIN SCALES - GENERAL: PAINLEVEL: NO PAIN (0)

## 2023-12-18 NOTE — NURSING NOTE
"Oncology Rooming Note    December 18, 2023 1:49 PM   Maria Teresa Aburto is a 78 year old female who presents for:    Chief Complaint   Patient presents with    Oncology Clinic Visit     Follow up Monoclonal gammopathy of unknown significance (MGUS)       Initial Vitals: /60   Pulse 82   Temp (!) 96  F (35.6  C) (Tympanic)   Resp 20   Ht 1.626 m (5' 4\")   Wt 54 kg (119 lb 0.8 oz)   SpO2 97%   BMI 20.43 kg/m   Estimated body mass index is 20.43 kg/m  as calculated from the following:    Height as of this encounter: 1.626 m (5' 4\").    Weight as of this encounter: 54 kg (119 lb 0.8 oz). Body surface area is 1.56 meters squared.  No Pain (0) Comment: Data Unavailable   No LMP recorded. Patient is postmenopausal.  Allergies reviewed: Yes  Medications reviewed: Yes    Medications: Medication refills not needed today.  Pharmacy name entered into EPIC:    [id number] - [place], [place] - [id number] - [place], [place] - [address] [facility] [address]          Gege Magaña LPN             "

## 2023-12-18 NOTE — PATIENT INSTRUCTIONS
We would like to see you back in May 2024. Please come 1 week  prior for lab work.    When you are in need of a refill of your medications, please call your pharmacy and they will send us the request. If you have any questions please call [phone]

## 2023-12-19 ENCOUNTER — THERAPY VISIT (OUTPATIENT)
Dept: PHYSICAL THERAPY | Facility: HOSPITAL | Age: 78
End: 2023-12-19
Attending: FAMILY MEDICINE
Payer: COMMERCIAL

## 2023-12-19 DIAGNOSIS — R42 DIZZINESS: Primary | ICD-10-CM

## 2023-12-19 PROCEDURE — 97112 NEUROMUSCULAR REEDUCATION: CPT | Mod: GP | Performed by: PHYSICAL THERAPIST

## 2023-12-19 NOTE — PROGRESS NOTES
[facility] Hematology and Oncology Progress Note    Patient: Maria Teresa Aburto  MRN: [id number]  Date of Service: Dec 18, 2023         Reason for Visit    Chief Complaint   Patient presents with    Oncology Clinic Visit     Follow up Monoclonal gammopathy of unknown significance (MGUS)         ECOG Performance Status  0      Encounter Diagnoses: #1 MGUS  2.  Hepatic iron overload          History of Present Illness    Ms. Maria Teresa Aburto returns for follow-up of MGUS and hepatic iron overload.  We saw the patient in consultation at the request of Dr. Luz Alcala on 09/26/2022.  At that time, she was a 77-year-old white female with history of coronary artery disease, Bowden esophagus, hypertension who we were asked to evaluate concerning diagnosis of anemia in the setting of elevated ferritin.  The patient had been evaluated in the emergency room for hypertension.  The patient had been seen by Dr. Luz Alcala, who increased her losartan dose.  As part of the workup, labs were drawn and CBC revealed white count 6.7, H and H was 10.3 and 39.6, MCV 91, platelet count was 206,000.  Peripheral blood smear was ordered and revealed the patient likely had anemia of chronic disease.  Iron studies were obtained.  Ferritin was elevated at 440.  TSH was 2.51.  Vitamin B12 was 800.  Sed rate was elevated at 34.  SERAFIN was negative.  Her major complaint was related to the fact she had increased fatigue throughout the day.  She also had dyspnea on exertion.  She did have occasional reflux symptoms.  Omeprazole was not helping.  She was diagnosed with Bowden esophagus without dysplasia.  EGD was performed by Dr. Panfilo Arcos approximately a year prior.  There is no family history of hematologic malignancy.  She was a nonsmoker, nondrinker.  She also described lightheadedness when she stood up and at times, she would have to lie down to alleviate her symptoms.  She said she had COVID-19 back in 06/2021.  She had a history  of basal cell carcinoma as well as squamous cell carcinoma of skin.  She had a Mohs procedure done for basal cell carcinoma of the temple.  She was on Efudex cream.  There was evidence of iron overload.  We wanted to rule hemochromatosis by obtaining hemochromatosis gene mutation.  The patient was found to be heterozygous for the S65C mutation, but had normal C282Y and H63D.  The S65C mutation is usually not associated with iron overload or hemochromatosis unless there is a concomitant mutation.  Given her elevated ferritin, wanted to rule out occult malignancy by obtaining CT neck, which was read as a multinodular thyroid measuring less than 1.5 cm.  There was infrahilar right segmental focus measuring 11 mm x 6.5 mm.  CT chest, abdomen and pelvis revealed lung nodules bilaterally, largest being 7 mm in the right upper lobe.  She also had an MRI of the brain, which was read as normal brain for age.  We also wanted to rule out underlying monoclonal paraproteinemia and this came back positive.  The patient had an elevated kappa free light chain.  Kappa lambda ratio was 2.0, which was elevated.  Beta 2 microglobulin was elevated at 2.5.  The patient was having ongoing fatigue, especially after climbing stairs, for which she developed spastic neck pain after going up stairs with fatigue being associated.  She also had been seen by Sleep Medicine to rule out sleep apnea.  She denied tobacco exposure.  She may have been exposed to asbestos as a child.  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  There were no reports of bright red blood per rectum, hematemesis, or heartburn.  When we saw the patient, we felt the patient had anemia with elevated ferritin.  No obvious evidence of iron overload.  Given her monoclonal gammopathy with elevated kappa free light chains, we wanted to rule out myeloma or amyloid.  We proceeded with bone marrow aspiration biopsy, which was performed on 10/31/2022.  This was found to be  a normocellular bone marrow with maturing trilineage hematopoiesis.  There was no morphologic evidence of plasma cell neoplasm, plasma cell dyscrasia or myelodysplastic syndrome.  There was increased storage iron.  Congo red stain was performed.  There was no amyloid deposition noted.  PET scan revealed the patient had a right thyroid nodule measuring 1.5 cm in dimension with an SUV of 25.8, consistent with possible neoplasm.  Otherwise, no other evidence of hypermetabolism in neck, chest, abdomen and pelvis.  She did have a thyroid ultrasound on 10/20/2022.  It was read as a 1.3 cm right lower lobe thyroid nodule.  Given these findings, we elected to proceed with FNA of the thyroid nodule that was performed 12/02/2022 at [facility] by Dr. Osman Camilo.  Apparently, this nodule was difficult to biopsy.  It was heavily calcified.  According to Dr. Camilo, the area adjacent to the nodule was biopsied.  Apparently, the patient was frustrated that the biopsy had not been performed adequately and was willing to see ENT to assess the thyroid nodule.  We referred the patient to Dr. Russo, who felt this was probably difficult to biopsy and the only option would be a hemithyroidectomy.  Plan was to obtain an MRI of the neck when she returned from [place] in May and then consider biopsy at that time.      When we saw her subsequently, her ferritin was climbing, it was up to 513.  We elected to absolutely rule out hemochromatosis by obtaining a MRI of the abdomen with attention to the liver and this was read as diffusely decreased signal in the liver, compatible with diffuse iron deposition consistent with hemochromatosis.  Given these findings, the patient likely had hemochromatosis and likely in addition to the S65C mutation.  She may have another mutation that was not readily examable on the current lab testing for hemochromatosis daily mutation testing.  We therefore elected to proceed  with phlebotomy to maintain ferritin greater than or equal to 100 and hemoglobin greater than or equal to 10.5.  She apparently moved down to [place] during the winter months and was seen by a hematologist there by the name of Neto Melo M.D. who continued with phlebotomies.  She had at least 2 phlebotomies with 350 mL of blood taken off.  Her ferritin apparently dropped.  Her initial phlebotomy done here had caused hypotension after removal of 600 mL of blood.  Therefore, it was decided to phlebotomize 350 mL of blood.  Apparently Dr. Melo raised the possibility of a cardiac MRI to evaluate for iron deposition, so therefore she was scheduled to see a cardiologist at the [place] as well as endocrinologist as she was concerned about her thyroid nodule.  She said that the phlebotomies did not necessarily help her fatigue.  She still had chronic fatigue.  She gets not necessarily short of breath, but just wiped out when she goes up stairs or does any activities that is more than walking.      When we saw the patient last, the plan was to follow up with the [facility] for further evaluation of her hemochromatosis.  She recently had seen a hepatologist at the [facility] by the name of Ky Schmidt MD at who felt the patient's recent ferritin level was down and switched the phlebotomies toprn, therefore, the patient did not require phlebotomies.  He ordered further genetic testing that was drawn.  This testing was causing [employer] gene panel looking for other genetic causes for iron overload syndrome.  Also MRI of the liver was scheduled for 2 months.  She will follow up with Dr. Xu Valdes.  She also saw an endocrinologist by the name of Woody Lebron.  FNA was perfformed of the thyroid nodule that came back benign.  She also saw Dr. Schmidt of Cardiology who recommended she stop the losartan.  Of note, the patient has been on atorvastatin and Lipitor, which certainly can cause fatigue or brain fog, which she  admits to.  According to the cardiologist, her lipid profile was excellent.  This was drawn on 06/01/2023 and her total cholesterol was 131.  Her LDL cholesterol was 61.  She is scheduled to have a B12 injection that was ordered by Dr. Alcala. She was seen by hepatology at [facility] in September 6 of this year.  Noted her last phlebotomy was performed in April 2023 and her ferritin level has been relatively normal since then.  He noted that the [employer] gene panel was negative for HFE genes.  He repeated the MRI of the abdomen and he noted that the hepatic iron content was mildly increased and improved compared to last MRI result the patient likely had mild hepatic iron overload.  Noted the patient did not have any evidence of iron overload at this time as well as her ferritin levels are relatively normal..  He recommended monitoring ferritin levels every 6 months if they are significantly elevated then will consider phlebotomy only if she was not anemic.  Patient returns today she is doing relatively well.  She is planning to drive to [place] in the coming weeks for the winter.  Denies any fevers, night sweats or weight loss denies any bone pain, jaundice.  She denies any shortness of breath cough or hemoptysis fatigue seems to have improved she continues with monthly B12 injections ordered by Dr. Jack.  She is here to discuss recent lab results.  She did have a recent EGD performed with Dr. Arcos for evaluation of Bowden's esophagus and this came back negative for dysplasia after biopsy of the EG junction.  It was noted that the patient had a hiatal hernia but she is asymptomatic on chronic omeprazole therapy.      ______________________________________________________________________________    Past History    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    Bowden's esophagus 10/11/2011    Constipation 10/09/2012    GERD (gastroesophageal reflux disease)[530.81] 05/11/2003     Hypertension     NSTEMI (non-ST elevated myocardial infarction) (H) 09/28/2020    With Takotsubo cardiomyopathy Cardiac cath, small lesion of diagonal not requiring stenting Dr Crystal Sandoval Vakil    MARISSA (obstructive sleep apnea) 12/8/2022    Osteopenia 10/11/2011    dexa scans odd years starting in 2003    Precordial pain 04/28/2003    negative stress test, negative pulmonary evaluatio    Restless legs syndrome (RLS) 10/11/2011    Squamous Cell Carcinoma 10/11/2011    left leg x2    Takotsubo cardiomyopathy 09/22/2020    Episode of high BP noted on exam with no other symptoms Elevated troponin Cardiac cath, small lesion of  small diagonal, not requiring stenting Crystal Sandoval, cardiology    Urge incontinence 10/09/2012       Past Surgical History:   Procedure Laterality Date    BONE MARROW BIOPSY, BONE SPECIMEN, NEEDLE/TROCAR N/A 10/31/2022    Procedure: BIOPSY, BONE MARROW WITH ASPIRATION;  Surgeon: Carlos Recinos DO;  Location: [facility] OR    Breast Biospy  11/11/2000    LEFT > Fibrocystic Disease > Outcome: Fibroadenoma    COLONOSCOPY  2000    COLONOSCOPY  8-7-2009    Normal, Repeat 2015    DEXA Scan  2009    EGD      EGD  2008    EGD  2003    ENDOSCOPY UPPER, COLONOSCOPY, COMBINED N/A 9/6/2018    Procedure: COMBINED ENDOSCOPY UPPER, COLONOSCOPY;  UPPER ENDOSCOPY WITH BIOPSIES AND COLONOSCOPY WITH BIOPSIES;  Surgeon: Minh Interiano DO;  Location: [facility] OR    ESOPHAGOSCOPY, GASTROSCOPY, DUODENOSCOPY (EGD), COMBINED N/A 10/29/2021    Procedure: Upper endoscopy with biopsies;  Surgeon: Panfilo Arcos MD;  Location: [facility] OR    ESOPHAGOSCOPY, GASTROSCOPY, DUODENOSCOPY (EGD), COMBINED N/A 12/14/2023    Procedure: ESOPHAGOGASTRODUODENOSCOPY with biopsy;  Surgeon: Panfilo Arcos MD;  Location: [facility] OR    Knee Replacement  2012    Oophorectomy      Ectopic Pregnancy    ROTATOR CUFF REPAIR RT/LT Right     with acromioplasty for complete rotator cuff tear, Dr Cherry    TONSILLECTOMY    "          Review of systems.  CNS: There are no headaches, no blurred vision, no change in mental status,   ENT: There is no hearing loss.  Respiratory: There is no dyspnea cough or hemoptysis  Cardiac: There is no chest pain, orthopnea, PND, or ankle edema.  GI: There is no bright red blood per rectum, no hematemesis, no reflux, no diarrhea or constipation  Musculoskeletal: There is no joint pains or myalgias  : There is no urinary frequency, hematuria.  Constitutional: There is no fevers, night sweats, weight loss.  Endocrine: There is mild fatigue  Neuro: There is no tingling or numbness in the hands or feet.  Hematologic: There is no gingival bleeding, epistaxis, or easy bruisability.  Dermatologic: There is no skin rash.  A 14 point review of systems is otherwise negative.          Physical Exam    /60   Pulse 82   Temp (!) 96  F (35.6  C) (Tympanic)   Resp 20   Ht 1.626 m (5' 4\")   Wt 54 kg (119 lb 0.8 oz)   SpO2 97%   BMI 20.43 kg/m        GENERAL: Alert and oriented to time place and person. Seated comfortably. In no distress.    HEAD: Atraumatic and normocephalic.    EYES: YOSEPH, EOMI.  No pallor.  No icterus.    Oral cavity: no mucosal lesion or tonsillar enlargement.    NECK: supple. JVP normal.  No thyroid enlargement.    LYMPH NODES: There are no palpable cervical, supraclavicular, axillary, or inguinal nodes.    LUNGS: clear to auscultation bilaterally.  Resonant to percussion throughout bilaterally.  Symmetrical breath movements bilaterally.    HEART: S1 and S2 are heard. Regular rate and rhythm.  No murmur or gallop or rub heard.  No peripheral edema.    ABDOMEN: Soft. Not tender. Not distended.  No palpable hepatomegaly or splenomegaly.  No other mass palpable.  Bowel sounds heard.    EXTREMITIES: There is no ankle edema.  SKIN: no rash, or bruising or purpura.    NEURO: Grossly non-focal.    Lab Results    Recent Results (from the past 240 hour(s))   Comprehensive metabolic panel    " Collection Time: 12/12/23  7:40 AM   Result Value Ref Range    Sodium 131 (L) 135 - 145 mmol/L    Potassium 4.4 3.4 - 5.3 mmol/L    Carbon Dioxide (CO2) 19 (L) 22 - 29 mmol/L    Anion Gap 11 7 - 15 mmol/L    Urea Nitrogen 21.6 8.0 - 23.0 mg/dL    Creatinine 0.92 0.51 - 0.95 mg/dL    GFR Estimate 63 >60 mL/min/1.73m2    Calcium 9.0 8.8 - 10.2 mg/dL    Chloride 101 98 - 107 mmol/L    Glucose 83 70 - 99 mg/dL    Alkaline Phosphatase 103 40 - 150 U/L    AST 29 0 - 45 U/L    ALT 31 0 - 50 U/L    Protein Total 6.5 6.4 - 8.3 g/dL    Albumin 3.9 3.5 - 5.2 g/dL    Bilirubin Total 0.4 <=1.2 mg/dL   Lactate Dehydrogenase    Collection Time: 12/12/23  7:40 AM   Result Value Ref Range    Lactate Dehydrogenase 210 0 - 250 U/L   Protein Immunofixation Serum    Collection Time: 12/12/23  7:40 AM   Result Value Ref Range    Immunofixation ELP       No monoclonal protein seen on immunofixation.  Pathologic significance requires clinical correlation.  Lala Samayoa M.D., Ph.D.   Corbin and lambda light chain    Collection Time: 12/12/23  7:40 AM   Result Value Ref Range    Kappa Free Light Chains 3.75 (H) 0.33 - 1.94 mg/dL    Lambda Free Light Chains 2.19 0.57 - 2.63 mg/dL    Kappa /Lambda Ratio 1.71 (H) 0.26 - 1.65   Immunoglobulins A G and M    Collection Time: 12/12/23  7:40 AM   Result Value Ref Range    Immunoglobulin G 993 610 - 1,616 mg/dL    Immunoglobulin A 351 84 - 499 mg/dL    Immunoglobulin M 108 35 - 242 mg/dL   Ferritin    Collection Time: 12/12/23  7:40 AM   Result Value Ref Range    Ferritin 222 11 - 328 ng/mL   Iron & Iron Binding Capacity    Collection Time: 12/12/23  7:40 AM   Result Value Ref Range    Iron 79 37 - 145 ug/dL    Iron Binding Capacity 226 (L) 240 - 430 ug/dL    Iron Sat Index 35 15 - 46 %   Vitamin B12    Collection Time: 12/12/23  7:40 AM   Result Value Ref Range    Vitamin B12 673 232 - 1,245 pg/mL   UA Macroscopic with reflex to Microscopic and Culture    Collection Time: 12/12/23  7:40 AM     Specimen: Urine, Clean Catch   Result Value Ref Range    Color Urine Yellow Colorless, Straw, Light Yellow, Yellow    Appearance Urine Clear Clear    Glucose Urine Negative Negative mg/dL    Bilirubin Urine Negative Negative    Ketones Urine Negative Negative mg/dL    Specific Gravity Urine 1.014 1.003 - 1.035    Blood Urine Negative Negative    pH Urine 7.0 4.7 - 8.0    Protein Albumin Urine Negative Negative mg/dL    Urobilinogen Urine Normal Normal, 2.0 mg/dL    Nitrite Urine Negative Negative    Leukocyte Esterase Urine Negative Negative    RBC Urine <1 <=2 /HPF    WBC Urine <1 <=5 /HPF    Squamous Epithelials Urine 0 <=1 /HPF   CBC with platelets and differential    Collection Time: 12/12/23  7:40 AM   Result Value Ref Range    WBC Count 4.8 4.0 - 11.0 10e3/uL    RBC Count 3.79 (L) 3.80 - 5.20 10e6/uL    Hemoglobin 11.8 11.7 - 15.7 g/dL    Hematocrit 33.0 (L) 35.0 - 47.0 %    MCV 87 78 - 100 fL    MCH 31.1 26.5 - 33.0 pg    MCHC 35.8 31.5 - 36.5 g/dL    RDW 12.6 10.0 - 15.0 %    Platelet Count 237 150 - 450 10e3/uL    % Neutrophils 57 %    % Lymphocytes 19 %    % Monocytes 15 %    % Eosinophils 8 %    % Basophils 1 %    % Immature Granulocytes 0 %    NRBCs per 100 WBC 0 <1 /100    Absolute Neutrophils 2.7 1.6 - 8.3 10e3/uL    Absolute Lymphocytes 0.9 0.8 - 5.3 10e3/uL    Absolute Monocytes 0.7 0.0 - 1.3 10e3/uL    Absolute Eosinophils 0.4 0.0 - 0.7 10e3/uL    Absolute Basophils 0.1 0.0 - 0.2 10e3/uL    Absolute Immature Granulocytes 0.0 <=0.4 10e3/uL    Absolute NRBCs 0.0 10e3/uL   Protein Electrophoresis Serum with Reflex    Collection Time: 12/12/23  7:40 AM   Result Value Ref Range    Albumin 3.9 3.7 - 5.1 g/dL    Alpha 1 0.3 0.2 - 0.4 g/dL    Alpha 2 0.6 0.5 - 0.9 g/dL    Beta Globulin 0.8 0.6 - 1.0 g/dL    Gamma Globulin 0.9 0.7 - 1.6 g/dL    Monoclonal Peak 0.0 <=0.0 g/dL    ELP Interpretation       Essentially normal electrophoretic pattern. No obvious monoclonal proteins seen. Pathologic significance  requires clinical correlation. Lala Samayoa M.D., Ph.D.   Total Protein, Serum for ELP    Collection Time: 12/12/23  7:40 AM   Result Value Ref Range    Total Protein Serum for ELP 6.5 6.4 - 8.3 g/dL   EKG 12-lead complete w/read - (Clinic Performed)    Collection Time: 12/12/23  2:27 PM   Result Value Ref Range    Systolic Blood Pressure  mmHg    Diastolic Blood Pressure  mmHg    Ventricular Rate 75 BPM    Atrial Rate 75 BPM    NM Interval 192 ms    QRS Duration 88 ms     ms    QTc 410 ms    P Axis 70 degrees    R AXIS -4 degrees    T Axis 52 degrees    Interpretation ECG       Sinus rhythm  Normal ECG  No previous ECGs available  Confirmed by MD Sophia, Moisés ([id number]) on 12/13/2023 12:09:26 PM     Surgical Pathology Exam    Collection Time: 12/14/23  1:22 PM   Result Value Ref Range    Case Report       Surgical Pathology Report                         Case: [id number]                                  Authorizing Provider:  Panfilo Arcos MD  Collected:           12/14/2023 01:22 PM          Ordering Location:     [facility] Main Operating Room     Received:            12/14/2023 02:15 PM          Pathologist:           Calros Recinos DO                                                         Specimens:   A) - Small Intestine, Duodenum                                                                      B) - Stomach, Antrum                                                                                C) - Esophagus, Distal                                                                     Final Diagnosis       A.  Duodenum, biopsy:  -Duodenal mucosa with no diagnostic abnormality.    B.  Stomach, antrum, biopsy:  -Oxyntic mucosa with no diagnostic abnormality.    C.  Esophagus, distal, biopsy:  -Squamocolumnar junction mucosa with mild reactive inflammatory changes.  -Negative for intestinal metaplasia or dysplasia.      Clinical Information       Procedure:  ESOPHAGOGASTRODUODENOSCOPY with  biopsy  Pre-op Diagnosis: Bowden esophagus [K22.70]  Gastroesophageal reflux disease, unspecified whether esophagitis present [K21.9]  Post-op Diagnosis: K22.70 - Bowden esophagus [ICD-10-CM]  K21.9 - Gastroesophageal reflux disease, unspecified whether esophagitis present [ICD-10-CM]      Gross Description       A(1). Small Intestine, Duodenum, :  Specimen is received in formalin labeled with patient's name, medical record number and designated duodenum biopsy.  It consists of a single piece of tan soft tissue measuring 2 x 2 mm.  All in 1 cassette  B(2). Stomach, Antrum, :  Specimen is received in formalin labeled with patient's name, medical record number and designated stomach antrum biopsy.  It consists of a single piece of tan soft tissue measuring 2 x 3 mm.  All in 1 cassette  C(3). Esophagus, Distal, :  Specimen is received in formalin labeled with the patient's name, medical record number and designated distal esophagus biopsy.  It consists of 2 pieces of tan soft tissue each measuring approximately 2 x 1 mm.  All in 1 cassette    LMS  12/14/2023      Microscopic Description       A microscopic examination was performed.      Case Images         Imaging    Leadless EKG Monitor 3 to 7 Days    Result Date: 11/30/2023  Zio XT patch report on Maria Teresa Aburto.  Ordered secondary to dizziness.  Worn for 3 days and 7 hr's.  After removing artifact, total time was 2 days and 22 hr's. Placed on November 21, 2023 at 12:51 PM and completed on November 24, 2020 at 7:37 PM.  Underlying rhythm was sinus.  Hrt rate ranged from 61 bpm, maximum heart rate of 131 bmp, averaging 81 bmp.  No significant bradycardia, pauses, Mobitz type II or 3rd degree heart block. No atrial fibrillation on this study.  x 4 triggered events and x 0 diary entries.  These corresponded to normal sinus rhythm.  x 0 runs of VT.   x 0 runs of SVT.  Rare, <1% of PAC's, atrial couplets, atrial triplets, and PVC's.  No episodes of ventricular  bigeminy. No episodes of ventricular trigeminy. Moisés Cortes, DO      Assessment and Plan: #1: Hemochromatosis based on hepatic iron overload noted on MRI as well as elevated ferritin and heterozygous C S6 5C mutation.  Patient was initially treated with phlebotomies with course complicated by hypotension requiring decreased volume of phlebotomy with drop in ferritin from 440 down to 218.  Patient was seen by hepatology at the [facility] who felt that there was no evidence of iron overload [employer] gene panel was negative for non-HFE genes.  MRI of the abdomen showed improved iron deposition.  The recommendation was to only phlebotomize if ferritin rises above 400 and only if patient is not anemic  .  #2 MGUS: Kappa lambda ratio is stable, aspiration biopsy was negative   #3 thyroid nodule: FNA biopsy was negative for malignancy patient will need a thyroid ultrasound repeated in 1 year.  4.  Anemia essentially resolved patient continues with B12 supplementation parenterally.  At this point to see the patient back in May when she returns from [place] we will repeat myeloma labs as well as CBC CMP LDH iron studies including ferritin TIBC and iron percent saturation.  Time spent: 78 minutes was spent on this patient visit: Spent time reviewing multiple vision provider notes including [facility] notes other physician prior notes discussing lab results with the patient performing history physical, document history physical and ordering follow-up labs and appointments.    Cancer Staging   No matching staging information was found for the patient.        Signed by: Sheldon Silverman MD    CC: Luz Alcala MD

## 2024-04-29 DIAGNOSIS — Z87.19 HISTORY OF BARRETT'S ESOPHAGUS: ICD-10-CM

## 2024-04-29 RX ORDER — OMEPRAZOLE 40 MG/1
40 CAPSULE, DELAYED RELEASE ORAL DAILY
Qty: 90 CAPSULE | Refills: 0 | Status: SHIPPED | OUTPATIENT
Start: 2024-04-29 | End: 2024-05-21

## 2024-05-13 ENCOUNTER — LAB (OUTPATIENT)
Dept: LAB | Facility: OTHER | Age: 79
End: 2024-05-13
Payer: COMMERCIAL

## 2024-05-13 DIAGNOSIS — D47.2 MONOCLONAL GAMMOPATHY: ICD-10-CM

## 2024-05-13 DIAGNOSIS — E83.19 IRON OVERLOAD: ICD-10-CM

## 2024-05-13 LAB
ALBUMIN SERPL BCG-MCNC: 4.1 G/DL (ref 3.5–5.2)
ALP SERPL-CCNC: 67 U/L (ref 40–150)
ALT SERPL W P-5'-P-CCNC: 19 U/L (ref 0–50)
ANION GAP SERPL CALCULATED.3IONS-SCNC: 9 MMOL/L (ref 7–15)
AST SERPL W P-5'-P-CCNC: 32 U/L (ref 0–45)
BILIRUB SERPL-MCNC: 0.4 MG/DL
BUN SERPL-MCNC: 23.6 MG/DL (ref 8–23)
CALCIUM SERPL-MCNC: 8.9 MG/DL (ref 8.8–10.2)
CHLORIDE SERPL-SCNC: 98 MMOL/L (ref 98–107)
CREAT SERPL-MCNC: 1.11 MG/DL (ref 0.51–0.95)
DEPRECATED HCO3 PLAS-SCNC: 23 MMOL/L (ref 22–29)
EGFRCR SERPLBLD CKD-EPI 2021: 50 ML/MIN/1.73M2
GLUCOSE SERPL-MCNC: 85 MG/DL (ref 70–99)
LDH SERPL L TO P-CCNC: 236 U/L (ref 0–250)
POTASSIUM SERPL-SCNC: 4.2 MMOL/L (ref 3.4–5.3)
PROT SERPL-MCNC: 7.3 G/DL (ref 6.4–8.3)
SODIUM SERPL-SCNC: 130 MMOL/L (ref 135–145)
TOTAL PROTEIN SERUM FOR ELP: 6.8 G/DL (ref 6.4–8.3)

## 2024-05-13 PROCEDURE — 82784 ASSAY IGA/IGD/IGG/IGM EACH: CPT | Mod: ZL

## 2024-05-13 PROCEDURE — 82232 ASSAY OF BETA-2 PROTEIN: CPT | Mod: ZL

## 2024-05-13 PROCEDURE — 36415 COLL VENOUS BLD VENIPUNCTURE: CPT | Mod: ZL

## 2024-05-13 PROCEDURE — 83550 IRON BINDING TEST: CPT | Mod: ZL

## 2024-05-13 PROCEDURE — 83521 IG LIGHT CHAINS FREE EACH: CPT | Mod: ZL

## 2024-05-13 PROCEDURE — 86334 IMMUNOFIX E-PHORESIS SERUM: CPT | Mod: ZL | Performed by: PATHOLOGY

## 2024-05-13 PROCEDURE — 83615 LACTATE (LD) (LDH) ENZYME: CPT | Mod: ZL

## 2024-05-13 PROCEDURE — 84155 ASSAY OF PROTEIN SERUM: CPT | Mod: ZL,XU

## 2024-05-13 PROCEDURE — 84165 PROTEIN E-PHORESIS SERUM: CPT | Mod: 26 | Performed by: PATHOLOGY

## 2024-05-13 PROCEDURE — 84165 PROTEIN E-PHORESIS SERUM: CPT | Mod: ZL | Performed by: PATHOLOGY

## 2024-05-13 PROCEDURE — 86334 IMMUNOFIX E-PHORESIS SERUM: CPT | Mod: 26 | Performed by: PATHOLOGY

## 2024-05-13 PROCEDURE — 82728 ASSAY OF FERRITIN: CPT | Mod: ZL

## 2024-05-13 PROCEDURE — 80053 COMPREHEN METABOLIC PANEL: CPT | Mod: ZL

## 2024-05-13 PROCEDURE — 85810 BLOOD VISCOSITY EXAMINATION: CPT | Mod: ZL

## 2024-05-14 LAB
ALBUMIN SERPL ELPH-MCNC: 4.1 G/DL (ref 3.7–5.1)
ALPHA1 GLOB SERPL ELPH-MCNC: 0.3 G/DL (ref 0.2–0.4)
ALPHA2 GLOB SERPL ELPH-MCNC: 0.6 G/DL (ref 0.5–0.9)
B-GLOBULIN SERPL ELPH-MCNC: 0.8 G/DL (ref 0.6–1)
B2 MICROGLOB TUMOR MARKER SER-MCNC: 3.6 MG/L
GAMMA GLOB SERPL ELPH-MCNC: 1 G/DL (ref 0.7–1.6)
IGA SERPL-MCNC: 354 MG/DL (ref 84–499)
IGG SERPL-MCNC: 1005 MG/DL (ref 610–1616)
IGM SERPL-MCNC: 103 MG/DL (ref 35–242)
KAPPA LC FREE SER-MCNC: 3.14 MG/DL (ref 0.33–1.94)
KAPPA LC FREE/LAMBDA FREE SER NEPH: 1.61 {RATIO} (ref 0.26–1.65)
LAMBDA LC FREE SERPL-MCNC: 1.95 MG/DL (ref 0.57–2.63)
M PROTEIN SERPL ELPH-MCNC: 0 G/DL
PROT PATTERN SERPL ELPH-IMP: NORMAL
PROT PATTERN SERPL IFE-IMP: NORMAL
VISC SER: 1.5 CP (ref 1.4–1.8)

## 2024-05-15 DIAGNOSIS — E83.19 IRON OVERLOAD: Primary | ICD-10-CM

## 2024-05-15 LAB
FERRITIN SERPL-MCNC: 151 NG/ML (ref 11–328)
IRON BINDING CAPACITY (ROCHE): 238 UG/DL (ref 240–430)
IRON SATN MFR SERPL: 30 % (ref 15–46)
IRON SERPL-MCNC: 71 UG/DL (ref 37–145)

## 2024-05-20 ENCOUNTER — ONCOLOGY VISIT (OUTPATIENT)
Dept: ONCOLOGY | Facility: OTHER | Age: 79
End: 2024-05-20
Attending: INTERNAL MEDICINE
Payer: COMMERCIAL

## 2024-05-20 VITALS
HEART RATE: 80 BPM | BODY MASS INDEX: 20.55 KG/M2 | TEMPERATURE: 96.9 F | WEIGHT: 119.71 LBS | SYSTOLIC BLOOD PRESSURE: 132 MMHG | OXYGEN SATURATION: 100 % | DIASTOLIC BLOOD PRESSURE: 52 MMHG

## 2024-05-20 DIAGNOSIS — D47.2 MONOCLONAL GAMMOPATHY: Primary | ICD-10-CM

## 2024-05-20 DIAGNOSIS — Z01.818 PREOP GENERAL PHYSICAL EXAM: ICD-10-CM

## 2024-05-20 DIAGNOSIS — E04.1 THYROID NODULE: ICD-10-CM

## 2024-05-20 DIAGNOSIS — E83.19 IRON OVERLOAD: ICD-10-CM

## 2024-05-20 DIAGNOSIS — E78.5 HYPERLIPIDEMIA LDL GOAL <70: ICD-10-CM

## 2024-05-20 LAB
ALBUMIN SERPL BCG-MCNC: 3.8 G/DL (ref 3.5–5.2)
ALP SERPL-CCNC: 61 U/L (ref 40–150)
ALT SERPL W P-5'-P-CCNC: 17 U/L (ref 0–50)
ANION GAP SERPL CALCULATED.3IONS-SCNC: 7 MMOL/L (ref 7–15)
AST SERPL W P-5'-P-CCNC: 25 U/L (ref 0–45)
BASOPHILS # BLD AUTO: 0.1 10E3/UL (ref 0–0.2)
BASOPHILS NFR BLD AUTO: 1 %
BILIRUB SERPL-MCNC: 0.3 MG/DL
BUN SERPL-MCNC: 17.2 MG/DL (ref 8–23)
CALCIUM SERPL-MCNC: 8.8 MG/DL (ref 8.8–10.2)
CHLORIDE SERPL-SCNC: 103 MMOL/L (ref 98–107)
CREAT SERPL-MCNC: 0.89 MG/DL (ref 0.51–0.95)
DEPRECATED HCO3 PLAS-SCNC: 22 MMOL/L (ref 22–29)
EGFRCR SERPLBLD CKD-EPI 2021: 66 ML/MIN/1.73M2
EOSINOPHIL # BLD AUTO: 0.4 10E3/UL (ref 0–0.7)
EOSINOPHIL NFR BLD AUTO: 8 %
ERYTHROCYTE [DISTWIDTH] IN BLOOD BY AUTOMATED COUNT: 12.3 % (ref 10–15)
FERRITIN SERPL-MCNC: 133 NG/ML (ref 11–328)
GLUCOSE SERPL-MCNC: 90 MG/DL (ref 70–99)
HCT VFR BLD AUTO: 33.9 % (ref 35–47)
HGB BLD-MCNC: 11.8 G/DL (ref 11.7–15.7)
IMM GRANULOCYTES # BLD: 0 10E3/UL
IMM GRANULOCYTES NFR BLD: 0 %
LYMPHOCYTES # BLD AUTO: 0.9 10E3/UL (ref 0.8–5.3)
LYMPHOCYTES NFR BLD AUTO: 20 %
MCH RBC QN AUTO: 30.7 PG (ref 26.5–33)
MCHC RBC AUTO-ENTMCNC: 34.8 G/DL (ref 31.5–36.5)
MCV RBC AUTO: 88 FL (ref 78–100)
MONOCYTES # BLD AUTO: 0.7 10E3/UL (ref 0–1.3)
MONOCYTES NFR BLD AUTO: 15 %
NEUTROPHILS # BLD AUTO: 2.7 10E3/UL (ref 1.6–8.3)
NEUTROPHILS NFR BLD AUTO: 57 %
NRBC # BLD AUTO: 0 10E3/UL
NRBC BLD AUTO-RTO: 0 /100
PLATELET # BLD AUTO: 202 10E3/UL (ref 150–450)
POTASSIUM SERPL-SCNC: 4.7 MMOL/L (ref 3.4–5.3)
PROT SERPL-MCNC: 6.5 G/DL (ref 6.4–8.3)
RBC # BLD AUTO: 3.84 10E6/UL (ref 3.8–5.2)
SODIUM SERPL-SCNC: 132 MMOL/L (ref 135–145)
TSH SERPL DL<=0.005 MIU/L-ACNC: 2.57 UIU/ML (ref 0.3–4.2)
WBC # BLD AUTO: 4.7 10E3/UL (ref 4–11)

## 2024-05-20 PROCEDURE — 82728 ASSAY OF FERRITIN: CPT | Mod: ZL | Performed by: INTERNAL MEDICINE

## 2024-05-20 PROCEDURE — 83718 ASSAY OF LIPOPROTEIN: CPT | Mod: ZL | Performed by: INTERNAL MEDICINE

## 2024-05-20 PROCEDURE — 85048 AUTOMATED LEUKOCYTE COUNT: CPT | Mod: ZL | Performed by: INTERNAL MEDICINE

## 2024-05-20 PROCEDURE — 99215 OFFICE O/P EST HI 40 MIN: CPT | Performed by: INTERNAL MEDICINE

## 2024-05-20 PROCEDURE — 84443 ASSAY THYROID STIM HORMONE: CPT | Mod: ZL | Performed by: INTERNAL MEDICINE

## 2024-05-20 PROCEDURE — 99417 PROLNG OP E/M EACH 15 MIN: CPT | Performed by: INTERNAL MEDICINE

## 2024-05-20 PROCEDURE — 82040 ASSAY OF SERUM ALBUMIN: CPT | Mod: ZL | Performed by: INTERNAL MEDICINE

## 2024-05-20 PROCEDURE — 36415 COLL VENOUS BLD VENIPUNCTURE: CPT | Mod: ZL | Performed by: INTERNAL MEDICINE

## 2024-05-20 PROCEDURE — G0463 HOSPITAL OUTPT CLINIC VISIT: HCPCS

## 2024-05-20 ASSESSMENT — PAIN SCALES - GENERAL: PAINLEVEL: NO PAIN (0)

## 2024-05-20 NOTE — NURSING NOTE
"Oncology Rooming Note    May 20, 2024 8:21 AM   Maria Teresa Aburto is a 78 year old female who presents for:    Chief Complaint   Patient presents with    Oncology Clinic Visit     Follow up MGUS      Initial Vitals: /52   Pulse 80   Temp 96.9  F (36.1  C) (Tympanic)   Wt 54.3 kg (119 lb 11.4 oz)   SpO2 100%   BMI 20.55 kg/m   Estimated body mass index is 20.55 kg/m  as calculated from the following:    Height as of 12/18/23: 1.626 m (5' 4\").    Weight as of this encounter: 54.3 kg (119 lb 11.4 oz). Body surface area is 1.57 meters squared.  No Pain (0) Comment: Data Unavailable   No LMP recorded. Patient is postmenopausal.  Allergies reviewed: Yes  Medications reviewed: Yes    Medications: Medication refills not needed today.  Pharmacy name entered into EPIC:    [id number] - [place], [place] - [address]  [id number] - [facility] [address]    Frailty Screening:   Is the patient here for a new oncology consult visit in cancer care? 2. No      Clinical concerns: none       Preeti Urbano LPN            "

## 2024-05-21 ENCOUNTER — OFFICE VISIT (OUTPATIENT)
Dept: FAMILY MEDICINE | Facility: OTHER | Age: 79
End: 2024-05-21
Attending: FAMILY MEDICINE
Payer: COMMERCIAL

## 2024-05-21 VITALS
BODY MASS INDEX: 20.32 KG/M2 | WEIGHT: 119 LBS | OXYGEN SATURATION: 100 % | RESPIRATION RATE: 16 BRPM | HEIGHT: 64 IN | TEMPERATURE: 97.1 F

## 2024-05-21 DIAGNOSIS — E78.5 HYPERLIPIDEMIA LDL GOAL <70: ICD-10-CM

## 2024-05-21 DIAGNOSIS — E83.119 HEMOCHROMATOSIS, UNSPECIFIED HEMOCHROMATOSIS TYPE: ICD-10-CM

## 2024-05-21 DIAGNOSIS — M81.0 AGE-RELATED OSTEOPOROSIS WITHOUT CURRENT PATHOLOGICAL FRACTURE: ICD-10-CM

## 2024-05-21 DIAGNOSIS — K22.70 BARRETT'S ESOPHAGUS WITHOUT DYSPLASIA: Primary | ICD-10-CM

## 2024-05-21 DIAGNOSIS — E53.8 VITAMIN B12 DEFICIENCY (NON ANEMIC): ICD-10-CM

## 2024-05-21 DIAGNOSIS — R42 DISEQUILIBRIUM: ICD-10-CM

## 2024-05-21 DIAGNOSIS — G25.81 RESTLESS LEGS SYNDROME (RLS): ICD-10-CM

## 2024-05-21 PROBLEM — I21.4 NSTEMI (NON-ST ELEVATED MYOCARDIAL INFARCTION) (H): Status: RESOLVED | Noted: 2020-09-28 | Resolved: 2024-05-21

## 2024-05-21 LAB
CHOLEST SERPL-MCNC: 183 MG/DL
HDLC SERPL-MCNC: 52 MG/DL
LDLC SERPL CALC-MCNC: 115 MG/DL
NONHDLC SERPL-MCNC: 131 MG/DL
TRIGL SERPL-MCNC: 79 MG/DL

## 2024-05-21 PROCEDURE — G0463 HOSPITAL OUTPT CLINIC VISIT: HCPCS | Mod: 25 | Performed by: FAMILY MEDICINE

## 2024-05-21 PROCEDURE — 96372 THER/PROPH/DIAG INJ SC/IM: CPT | Performed by: FAMILY MEDICINE

## 2024-05-21 PROCEDURE — 99214 OFFICE O/P EST MOD 30 MIN: CPT | Performed by: FAMILY MEDICINE

## 2024-05-21 PROCEDURE — 250N000011 HC RX IP 250 OP 636: Performed by: FAMILY MEDICINE

## 2024-05-21 RX ORDER — PRAMIPEXOLE DIHYDROCHLORIDE 0.25 MG/1
.25-.5 TABLET ORAL AT BEDTIME
Qty: 180 TABLET | Refills: 3 | Status: SHIPPED | OUTPATIENT
Start: 2024-05-21

## 2024-05-21 RX ORDER — OMEPRAZOLE 40 MG/1
40 CAPSULE, DELAYED RELEASE ORAL DAILY
Qty: 90 CAPSULE | Refills: 3 | Status: SHIPPED | OUTPATIENT
Start: 2024-05-21

## 2024-05-21 RX ORDER — CYANOCOBALAMIN 1000 UG/ML
1000 INJECTION, SOLUTION INTRAMUSCULAR; SUBCUTANEOUS
Status: DISPENSED | OUTPATIENT
Start: 2024-05-21 | End: 2025-05-16

## 2024-05-21 RX ADMIN — CYANOCOBALAMIN 1000 MCG: 1000 INJECTION, SOLUTION INTRAMUSCULAR; SUBCUTANEOUS at 08:43

## 2024-05-21 ASSESSMENT — PAIN SCALES - GENERAL: PAINLEVEL: NO PAIN (0)

## 2024-05-21 NOTE — PROGRESS NOTES
[facility] Hematology and Oncology Progress Note    Patient: Maria Teresa Aburto  MRN: [id number]  Date of Service: May 20, 2024         Reason for Visit    Chief Complaint   Patient presents with    Oncology Clinic Visit     Follow up MGUS        ECOG Performance Status: 0      Encounter Diagnoses: MGUS  Hepatic iron overload/hemochromatosis    History of Present Illness     Ms. Maria Teresa Aburto returns for follow-up of MGUS and hepatic iron overload/hemochromatosis.We saw the patient in consultation at the request of Dr. Luz Alcala on 09/26/2022.  At that time, she was a 77-year-old white female with history of coronary artery disease, Bowden esophagus, hypertension who we were asked to evaluate concerning diagnosis of anemia in the setting of elevated ferritin.  The patient had been evaluated in the emergency room for hypertension.  The patient had been seen by Dr. Luz Alcala, who increased her losartan dose.  As part of the workup, labs were drawn and CBC revealed white count 6.7, H and H was 10.3 and 39.6, MCV 91, platelet count was 206,000.  Peripheral blood smear was ordered and revealed the patient likely had anemia of chronic disease.  Iron studies were obtained.  Ferritin was elevated at 440.  TSH was 2.51.  Vitamin B12 was 800.  Sed rate was elevated at 34.  SERAFIN was negative.  Her major complaint was related to the fact she had increased fatigue throughout the day.  She also had dyspnea on exertion.  She did have occasional reflux symptoms.  Omeprazole was not helping.  She was diagnosed with Bowden esophagus without dysplasia.  EGD was performed by Dr. Panfilo Arcos approximately a year prior.  There is no family history of hematologic malignancy.  She was a nonsmoker, nondrinker.  She also described lightheadedness when she stood up and at times, she would have to lie down to alleviate her symptoms.  She said she had COVID-19 back in 06/2021.  She had a history of basal cell carcinoma as  well as squamous cell carcinoma of skin.  She had a Mohs procedure done for basal cell carcinoma of the temple.  She was on Efudex cream.  There was evidence of iron overload.  We wanted to rule hemochromatosis by obtaining hemochromatosis gene mutation.  The patient was found to be heterozygous for the S65C mutation, but had normal C282Y and H63D.  The S65C mutation is usually not associated with iron overload or hemochromatosis unless there is a concomitant mutation.  Given her elevated ferritin, wanted to rule out occult malignancy by obtaining CT neck, which was read as a multinodular thyroid measuring less than 1.5 cm.  There was infrahilar right segmental focus measuring 11 mm x 6.5 mm.  CT chest, abdomen and pelvis revealed lung nodules bilaterally, largest being 7 mm in the right upper lobe.  She also had an MRI of the brain, which was read as normal brain for age.  We also wanted to rule out underlying monoclonal paraproteinemia and this came back positive.  The patient had an elevated kappa free light chain.  Kappa lambda ratio was 2.0, which was elevated.  Beta 2 microglobulin was elevated at 2.5.  The patient was having ongoing fatigue, especially after climbing stairs, for which she developed spastic neck pain after going up stairs with fatigue being associated.  She also had been seen by Sleep Medicine to rule out sleep apnea.  She denied tobacco exposure.  She may have been exposed to asb bestos as a child.  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  There were no reports of bright red blood per rectum, hematemesis, or heartburn.  When we saw the patient, we felt the patient had anemia with elevated ferritin.  No obvious evidence of iron overload.  Given her monoclonal gammopathy with elevated kappa free light chains, we wanted to rule out myeloma or amyloid.  We proceeded with bone marrow aspiration biopsy, which was performed on 10/31/2022.  This was found to be a normocellular bone  marrow with maturing trilineage hematopoiesis.  There was no morphologic evidence of plasma cell neoplasm, plasma cell dyscrasia or myelodysplastic syndrome.  There was increased storage iron.  [place] red stain was performed.  There was no amyloid deposition noted.  PET scan revealed the patient had a right thyroid nodule measuring 1.5 cm in dimension with an SUV of 25.8, consistent with possible neoplasm.  Otherwise, no o  her evidence of hypermetabolism in neck, chest, abdomen and pelvis.  She did have a thyroid ultrasound on 10/20/2022.  It was read as a 1.3 cm right lower lobe thyroid nodule.  Given these findings, we elected to proceed with FNA of the thyroid nodule that was performed 12/02/2022 at [facility] by Dr. Osman Camilo.  Apparently, this nodule was difficult to biopsy.  It was heavily calcified.  According to Dr. Camilo, the area adjacent to the nodule was biopsied.  Apparently, the patient was frustrated that the biopsy had not been performed adequately and was willing to see ENT to assess the thyroid nodule.  We referred the patient to Dr. Russo, who felt this was probably difficult to biopsy and the only option would be a hemithyroidectomy.  Plan was to obtain an MRI of the neck when she returned from [place] in May and then consider biopsy at that time. absolutely rule out hemochromatosis by obtaining a MRI of the abdomen with attention to the liver and this was read as diffusely decreased signal in the liver, compatible with diffuse iron deposition consistent with hemochromatosis.  Given these findings, the patient likely had hemochromatosis and likely in addition to the S65C mutation.  She may have another mutation that was not readily examable on the current lab testing for hemochromatosis daily mutation testing.  We therefore elected to proceed with phlebotomy to maintain ferritin greater than or equal to 100 and hemoglobin greater than or equal to 10.5.  She  apparently moved down to [place] during the winter months and was seen by a hematologist there by the name of Neto Melo M.D. who continued with phlebotomies.  She had at least 2 phlebotomies with 350 mL of blood taken off.  Her ferritin apparently dropped.  Her initial phlebotomy done here had caused hypotension after removal of 600 mL of blood.  Therefore, it was decided to phlebotomize 350 mL of blood.  Apparently Dr. Melo raised the possibility of a cardiac MRI to evaluate for iron deposition, so therefore she was scheduled to see a cardiologist at the [facility] as well as endocrinologist as she was concerned about her thyroid nodule.  She said that the phlebotomies did not necessarily help her fatigue.  She still had chronic fatigue.  She gets not necessarily short of breath, but just wiped out when she goes up stairs or does any activities that is more than walking.      When we saw the patient last, the plan was to follow up with the [facility] for further evaluation of her hemochromatosis.  She recently had seen a hepatologist at the [facility] by the name of Ky Schmidt MD at who felt the patient's recent ferritin level was down and switched the phlebotomies toprn, therefore, the patient did not require phlebotomies.  He ordered further genetic testing that was drawn.  This testing was causing [employer] gene panel looking for other genetic causes for iron overload syndrome.  Also MRI of the liver was scheduled for 2 months.  She will follow up with Dr. Xu Valdes.  She also saw an endocrinologist by the name of Woody Lebron.  FNA was perfformed of the thyroid nodule that came back benign.  She also saw Dr. Schmidt of Cardiology who recommended she stop the losartan.  Of note, the patient has been on atorvastatin and Lipitor, which certainly can cause fatigue or brain fog, which she admits to.  According to the cardiologist, her lipid profile was excellent.  This was drawn on 06/01/2023 and her total  cholesterol was 131.  Her LDL cholesterol was 61.  She is scheduled to have a B12 injection that was ordered by Dr. Alcala. She was seen by hepatology at [facility] in September 6 of this year.  Noted her last phlebotomy was performed in April 2023 and her ferritin level has been relatively normal since then.  He noted that the [employer] gene panel was negative for HFE genes.  He repeated the MRI of the abdomen and he noted that the hepatic iron content was mildly increased and improved compared to last MRI result the patient likely had mild hepatic iron overload.  Noted the patient did not have any evidence of iron overload at this time as well as her ferritin levels are relatively normal..  He recommended monitoring ferritin levels every 6 months if they are significantly elevated then will consider phlebotomy only if she was not anemic.  Patient returns today she is doing relatively well.  She is planning to drive to [place] in the coming weeks for the winter.  Denies any fevers, night sweats or weight loss denies any bone pain, jaundice.  She denies any shortness of breath cough or hemoptysis fatigue seems to have improved she continues with monthly B12 injections ordered by Dr. Jack.  She is here to discuss recent lab results.  She did have a recent EGD performed with Dr. Arcos for evaluation of Bowden's esophagus and this came back negative for dysplasia after biopsy of the EG junction.  It was noted that the patient had a hiatal hernia but she is asymptomatic on chronic omeprazole therapy.The patient returns after wintering in [place].  She states she received 1 phlebotomy approximately 2 months ago in [place].  She had 350 cc removed due to history of hypertension in the past.  She comes in today she is doing relatively well she denies any fevers, night sweats or weight loss.  She denies jaundice abdominal pain change in bowel habits there is no reports of hematemesis melena or hematochezia.  There is no  shortness of breath cough or hemoptysis.  She does have occasional fatigue at night after a busy day.  She is turning 79 in July and attributes this to her age.  She is scheduled to have repeat thyroid ultrasound at the [facility] in September.         ______________________________________________________________________________    Past History    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    Bowden's esophagus 10/11/2011    Constipation 10/09/2012    GERD (gastroesophageal reflux disease)[530.81] 05/11/2003    Hypertension     NSTEMI (non-ST elevated myocardial infarction) (H) 09/28/2020    With Takotsubo cardiomyopathy Cardiac cath, small lesion of diagonal not requiring stenting Dr Crystal Sandoval Vakil    MARISSA (obstructive sleep apnea) 12/8/2022    Osteopenia 10/11/2011    dexa scans odd years starting in 2003    Precordial pain 04/28/2003    negative stress test, negative pulmonary evaluatio    Restless legs syndrome (RLS) 10/11/2011    Squamous Cell Carcinoma 10/11/2011    left leg x2    Takotsubo cardiomyopathy 09/22/2020    Episode of high BP noted on exam with no other symptoms Elevated troponin Cardiac cath, small lesion of  small diagonal, not requiring stenting Crystal Sandoval, cardiology    Urge incontinence 10/09/2012       Past Surgical History:   Procedure Laterality Date    BONE MARROW BIOPSY, BONE SPECIMEN, NEEDLE/TROCAR N/A 10/31/2022    Procedure: BIOPSY, BONE MARROW WITH ASPIRATION;  Surgeon: Carlos Recinos DO;  Location: [facility] OR    Breast Biospy  11/11/2000    LEFT > Fibrocystic Disease > Outcome: Fibroadenoma    COLONOSCOPY  2000    COLONOSCOPY  8-7-2009    Normal, Repeat 2015    DEXA Scan  2009    EGD      EGD  2008    EGD  2003    ENDOSCOPY UPPER, COLONOSCOPY, COMBINED N/A 9/6/2018    Procedure: COMBINED ENDOSCOPY UPPER, COLONOSCOPY;  UPPER ENDOSCOPY WITH BIOPSIES AND COLONOSCOPY WITH BIOPSIES;  Surgeon: Minh Interiano DO;  Location: [facility] OR     ESOPHAGOSCOPY, GASTROSCOPY, DUODENOSCOPY (EGD), COMBINED N/A 10/29/2021    Procedure: Upper endoscopy with biopsies;  Surgeon: Panfilo Arcos MD;  Location: [facility]    ESOPHAGOSCOPY, GASTROSCOPY, DUODENOSCOPY (EGD), COMBINED N/A 12/14/2023    Procedure: ESOPHAGOGASTRODUODENOSCOPY with biopsy;  Surgeon: Panfilo Arcos MD;  Location: [facility] OR    Knee Replacement  2012    Oophorectomy      Ectopic Pregnancy    ROTATOR CUFF REPAIR RT/LT Right     with acromioplasty for complete rotator cuff tear, Dr Cherry    TONSILLECTOMY             Review of systems.  CNS: There are no headaches, no blurred vision, no change in mental status,   ENT: There is no hearing loss.  Respiratory: There is no shortness of breath cough or hemoptysis  Cardiac: There is no chest pain, orthopnea, PND, or ankle edema.  GI: There is no bright red blood per rectum, no hematemesis, no reflux, no diarrhea or constipation  Musculoskeletal: There are no specific complaints of joint pains arthralgias  : There is no urinary frequency, hematuria.  Constitutional: There is no fevers, night sweats, weight loss.  Endocrine: There is mild fatigue  Neuro: There are no neuropathic symptoms  Hematologic: There is no gingival bleeding, epistaxis, or easy bruisability.  Dermatologic: There is no skin rash.  A 14 point review of systems is otherwise negative.          Physical Exam    /52   Pulse 80   Temp 96.9  F (36.1  C) (Tympanic)   Wt 54.3 kg (119 lb 11.4 oz)   SpO2 100%   BMI 20.55 kg/m        GENERAL: Alert and oriented to time place and person. Seated comfortably. In no distress.    HEAD: Atraumatic and normocephalic.    EYES: YOSEPH, EOMI.  No pallor.  No icterus.    Oral cavity: no mucosal lesion or tonsillar enlargement.    NECK: supple. JVP normal.  No thyroid enlargement.    LYMPH NODES: There are no palpable cervical, supraclavicular, axillary, or inguinal nodes.    LUNGS: clear to auscultation bilaterally.    HEART: S1 and S2  are heard. Regular rate and rhythm.  No murmur or gallop or rub heard.    ABDOMEN: Soft. Not tender. Not distended.  No palpable hepatomegaly or splenomegaly.  No other mass palpable.  Bowel sounds heard.    EXTREMITIES: No ankle edema    SKIN: no rash, or bruising or purpura.    NEURO: Grossly non-focal.    Lab Results    Recent Results (from the past 240 hour(s))   Beta 2 microglobulin    Collection Time: 05/13/24  8:04 AM   Result Value Ref Range    Beta-2-Microglobulin 3.6 (H) <2.3 mg/L   Comprehensive metabolic panel    Collection Time: 05/13/24  8:04 AM   Result Value Ref Range    Sodium 130 (L) 135 - 145 mmol/L    Potassium 4.2 3.4 - 5.3 mmol/L    Carbon Dioxide (CO2) 23 22 - 29 mmol/L    Anion Gap 9 7 - 15 mmol/L    Urea Nitrogen 23.6 (H) 8.0 - 23.0 mg/dL    Creatinine 1.11 (H) 0.51 - 0.95 mg/dL    GFR Estimate 50 (L) >60 mL/min/1.73m2    Calcium 8.9 8.8 - 10.2 mg/dL    Chloride 98 98 - 107 mmol/L    Glucose 85 70 - 99 mg/dL    Alkaline Phosphatase 67 40 - 150 U/L    AST 32 0 - 45 U/L    ALT 19 0 - 50 U/L    Protein Total 7.3 6.4 - 8.3 g/dL    Albumin 4.1 3.5 - 5.2 g/dL    Bilirubin Total 0.4 <=1.2 mg/dL   Immunoglobulins A G and M    Collection Time: 05/13/24  8:04 AM   Result Value Ref Range    Immunoglobulin G 1,005 610 - 1,616 mg/dL    Immunoglobulin A 354 84 - 499 mg/dL    Immunoglobulin M 103 35 - 242 mg/dL   Kappa and lambda light chain    Collection Time: 05/13/24  8:04 AM   Result Value Ref Range    Kappa Free Light Chains 3.14 (H) 0.33 - 1.94 mg/dL    Lambda Free Light Chains 1.95 0.57 - 2.63 mg/dL    Kappa /Lambda Ratio 1.61 0.26 - 1.65   Macroglobulins    Collection Time: 05/13/24  8:04 AM   Result Value Ref Range    Viscosity Index 1.5 1.4 - 1.8   Lactate Dehydrogenase    Collection Time: 05/13/24  8:04 AM   Result Value Ref Range    Lactate Dehydrogenase 236 0 - 250 U/L   Protein Immunofixation Serum    Collection Time: 05/13/24  8:04 AM   Result Value Ref Range    Immunofixation ELP       No  monoclonal protein seen on immunofixation. Pathologic significance requires clinical correlation. MELISSA Moses M.D., Ph.D., Pathologist   Protein Electrophoresis Serum with Reflex    Collection Time: 05/13/24  8:04 AM   Result Value Ref Range    Albumin 4.1 3.7 - 5.1 g/dL    Alpha 1 0.3 0.2 - 0.4 g/dL    Alpha 2 0.6 0.5 - 0.9 g/dL    Beta Globulin 0.8 0.6 - 1.0 g/dL    Gamma Globulin 1.0 0.7 - 1.6 g/dL    Monoclonal Peak 0.0 <=0.0 g/dL    ELP Interpretation       Essentially normal electrophoretic pattern. No obvious monoclonal proteins seen. Pathologic significance requires clinical correlation. MELISSA Moses M.D., Ph.D., Pathologist.   Total Protein, Serum for ELP    Collection Time: 05/13/24  8:04 AM   Result Value Ref Range    Total Protein Serum for ELP 6.8 6.4 - 8.3 g/dL   Ferritin    Collection Time: 05/13/24  8:04 AM   Result Value Ref Range    Ferritin 151 11 - 328 ng/mL   Iron & Iron Binding Capacity    Collection Time: 05/13/24  8:04 AM   Result Value Ref Range    Iron 71 37 - 145 ug/dL    Iron Binding Capacity 238 (L) 240 - 430 ug/dL    Iron Sat Index 30 15 - 46 %   Comprehensive metabolic panel    Collection Time: 05/20/24  8:43 AM   Result Value Ref Range    Sodium 132 (L) 135 - 145 mmol/L    Potassium 4.7 3.4 - 5.3 mmol/L    Carbon Dioxide (CO2) 22 22 - 29 mmol/L    Anion Gap 7 7 - 15 mmol/L    Urea Nitrogen 17.2 8.0 - 23.0 mg/dL    Creatinine 0.89 0.51 - 0.95 mg/dL    GFR Estimate 66 >60 mL/min/1.73m2    Calcium 8.8 8.8 - 10.2 mg/dL    Chloride 103 98 - 107 mmol/L    Glucose 90 70 - 99 mg/dL    Alkaline Phosphatase 61 40 - 150 U/L    AST 25 0 - 45 U/L    ALT 17 0 - 50 U/L    Protein Total 6.5 6.4 - 8.3 g/dL    Albumin 3.8 3.5 - 5.2 g/dL    Bilirubin Total 0.3 <=1.2 mg/dL   CBC with platelets and differential    Collection Time: 05/20/24  8:43 AM   Result Value Ref Range    WBC Count 4.7 4.0 - 11.0 10e3/uL    RBC Count 3.84 3.80 - 5.20 10e6/uL    Hemoglobin 11.8 11.7 - 15.7 g/dL    Hematocrit 33.9  (L) 35.0 - 47.0 %    MCV 88 78 - 100 fL    MCH 30.7 26.5 - 33.0 pg    MCHC 34.8 31.5 - 36.5 g/dL    RDW 12.3 10.0 - 15.0 %    Platelet Count 202 150 - 450 10e3/uL    % Neutrophils 57 %    % Lymphocytes 20 %    % Monocytes 15 %    % Eosinophils 8 %    % Basophils 1 %    % Immature Granulocytes 0 %    NRBCs per 100 WBC 0 <1 /100    Absolute Neutrophils 2.7 1.6 - 8.3 10e3/uL    Absolute Lymphocytes 0.9 0.8 - 5.3 10e3/uL    Absolute Monocytes 0.7 0.0 - 1.3 10e3/uL    Absolute Eosinophils 0.4 0.0 - 0.7 10e3/uL    Absolute Basophils 0.1 0.0 - 0.2 10e3/uL    Absolute Immature Granulocytes 0.0 <=0.4 10e3/uL    Absolute NRBCs 0.0 10e3/uL   Ferritin    Collection Time: 05/20/24  8:43 AM   Result Value Ref Range    Ferritin 133 11 - 328 ng/mL   TSH with free T4 reflex    Collection Time: 05/20/24  8:43 AM   Result Value Ref Range    TSH 2.57 0.30 - 4.20 uIU/mL       Imaging    No results found.    Assessment and Plan: #1: Hemochromatosis: Diagnosed on the basis of hepatic iron overload noted on MRI as well as elevated ferritin and heterozygous S65C mutation patient has been receiving monthly phlebotomies to maintain ferritin level less than or equal to 100 she will be due for phlebotomy this week with plans to 350 cc.  Otherwise we will continue monthly phlebotomies to maintain ferritin less than or equal to 100 while maintaining hemoglobin greater than equal to 10.5.  2.:  MGUS: [place] free light chains have decreased, kappa lambda ratio is normal there is no evidence of monoclonal M spike no evidence of endorgan damage plan is to continue surveillance we will see the patient 4 months repeat myeloma labs  3.  Anemia: Resolved we will continue parenteral B12 supplementation.  #4 thyroid nodule: FNA biopsy was negative for malignancy patient will follow-up with [facility] in September with repeat thyroid ultrasound.  4.  Hyponatremia/dehydration: Repeat sodium is improved nonetheless patient was encouraged to increase p.o.  fluid intake  Time spent: 82 minutes was spent on this patient visit: We spent a voluminous amount of time reviewing physician provider notes, we ordered repeat labs given her hyponatremia as well as complaints of fatigue, discussed lab results with patient, we obtained a history/physical, with documented history/physical, we ordered phlebotomies to be performed monthly, we ordered follow-up appointments and lab work.    Cancer Staging   No matching staging information was found for the patient.        Signed by: Sheldon Silverman MD    CC: Luz Alcala MD

## 2024-05-21 NOTE — PROGRESS NOTES
Assessment & Plan     Hyperlipidemia LDL goal <70  Not on statin. History of stress cardiomyopathy, no known ischemic disease, dm2, etc. Lipids, non fasting, added to yesterday's labs. Monitor  - Lipid Profile    Restless legs syndrome (RLS)  Chronic, nightly sx. Sometimes getting breakthrough at night. Ok to take 2nd tab at these times. Question if contributing to worsening dizziness, etc. See below  - pramipexole (MIRAPEX) 0.25 MG tablet  Dispense: 180 tablet; Refill: 3    Bowden's esophagus without dysplasia  Reviewed recent EGD. Sx controlled with rare breakthrough on higher dose ppi, continue for now.   - Aspirin 81 MG CAPS  Dispense: 90 capsule; Refill: 3    Disequilibrium  Reviewed PT notes. Intermittent, transient, worsens with head movement. Reviewed mirapex, sx clearly pre date that medication, but may be worse since using regularly. Will discuss if any better alternatives with pharmacy    Vitamin B12 deficiency (non anemic)  Due today  - cyanocobalamin injection 1,000 mcg    Hemochromatosis, unspecified hemochromatosis type  Reviewed oncology notes, plan for phlebotomy. Ferritin to be <100 and hgb to remain > 10.5    Age-related osteoporosis without current pathological fracture  Reclast tolerated well, due Dec. Dexa due next spring.     No follow-ups on file.    Subjective   Maria Teresa is a 78 year old, presenting for the following health issues:  Lipids and Gastrointestinal Problem        5/21/2024     7:48 AM   Additional Questions   Roomed by Ellie Davila     History of Present Illness       Reason for visit:  Lipids, Gerd    She eats 4 or more servings of fruits and vegetables daily.She consumes 0 sweetened beverage(s) daily.She exercises with enough effort to increase her heart rate 30 to 60 minutes per day.  She exercises with enough effort to increase her heart rate 7 days per week.   She is taking medications regularly.     Hyperlipidemia Follow-Up    Are you regularly taking any medication or  "supplement to lower your cholesterol?   No  Are you having muscle aches or other side effects that you think could be caused by your cholesterol lowering medication?  No    GERD/Heartburn  Onset/Duration: Chronic  Description: Acid reflex  Intensity: moderate  Progression of Symptoms: same  Accompanying Signs & Symptoms:  Does it feel like food gets stuck or trouble swallowing: YES  Nausea: No  Vomiting (bloody?): No  Abdominal Pain: No  Black-Tarry stools: No  Bloody stools: No  History:  Previous similar episodes: YES  Previous ulcers: No  Precipitating factors:   Caffeine use: Seldom  Alcohol use: No  NSAID/Aspirin use: YES  Tobacco use: No  Worse with fatty foods and spicy foods.  Alleviating factors: Avoid cerain food types  Therapies tried and outcome:             Lifestyle changes: Diet            Medications: Omeprazole (Prilosec)      Review of Systems  Constitutional, HEENT, cardiovascular, pulmonary, gi and gu systems are negative, except as otherwise noted.      Objective    Temp 97.1  F (36.2  C) (Tympanic)   Resp 16   Ht 1.626 m (5' 4\")   Wt 54 kg (119 lb)   SpO2 100%   BMI 20.43 kg/m    Body mass index is 20.43 kg/m .    Physical Exam     GENERAL: alert and no distress  EYES: Eyes grossly normal to inspection  HENT: ear canals and TM's normal, nose and mouth without ulcers or lesions  NECK: no adenopathy, no asymmetry, masses, or scars  RESP: lungs clear to auscultation - no rales, rhonchi or wheezes  CV: regular rate and rhythm, normal S1 S2, no S3 or S4, no murmur, click or rub, no peripheral edema  ABDOMEN: soft, nontender, no hepatosplenomegaly, no masses and bowel sounds normal  MS: no gross musculoskeletal defects noted, no edema  SKIN: no suspicious lesions or rashes  NEURO: Normal strength and tone, mentation intact, and speech normal    No results found for any visits on 05/21/24.        Signed Electronically by: Luz Alcala MD    "

## 2024-05-23 ENCOUNTER — TELEPHONE (OUTPATIENT)
Dept: FAMILY MEDICINE | Facility: OTHER | Age: 79
End: 2024-05-23

## 2024-05-23 NOTE — TELEPHONE ENCOUNTER
Patient notified. Would like a copy of information. Health information will be sending a copy through the mail to patient.  Carlito Lo LPN

## 2024-05-23 NOTE — TELEPHONE ENCOUNTER
Can we update patient as below?    Discussed with pharmacy -   Requip is an alternative medication for restless legs. This has slightly lower incident of dizziness and could be tried. However, the difference in incidence for both drugs is slight. .    Based on listed incidences,     Ropinirole (11%)   Pramipexole (12 to 25%) **med she is currently on  Gabapentin (17 to 28%)   Pregabalin (3 to 45%)     I would maybe try ropinirole next?     But I think gabapentin or Lyrica could be decent second choices.     Ricardo Desouza, PharmD   Medication Therapy Management Pharmacist   [facility]   Office phone: [phone]

## 2024-05-30 ENCOUNTER — LAB (OUTPATIENT)
Dept: LAB | Facility: OTHER | Age: 79
End: 2024-05-30
Payer: COMMERCIAL

## 2024-05-30 VITALS
TEMPERATURE: 97.6 F | RESPIRATION RATE: 16 BRPM | DIASTOLIC BLOOD PRESSURE: 61 MMHG | HEIGHT: 64 IN | SYSTOLIC BLOOD PRESSURE: 117 MMHG | BODY MASS INDEX: 20.32 KG/M2 | HEART RATE: 73 BPM | OXYGEN SATURATION: 98 % | WEIGHT: 119 LBS

## 2024-05-30 DIAGNOSIS — E83.19 IRON OVERLOAD: ICD-10-CM

## 2024-05-30 DIAGNOSIS — E83.119 HEMOCHROMATOSIS, UNSPECIFIED HEMOCHROMATOSIS TYPE: ICD-10-CM

## 2024-05-30 LAB
BLOOD COLLECTION: 346
FERRITIN SERPL-MCNC: 139 NG/ML (ref 11–328)
HGB BLD-MCNC: 12.4 G/DL (ref 11.7–15.7)

## 2024-05-30 PROCEDURE — 85018 HEMOGLOBIN: CPT | Mod: ZL

## 2024-05-30 PROCEDURE — 36415 COLL VENOUS BLD VENIPUNCTURE: CPT | Mod: ZL

## 2024-05-30 PROCEDURE — 82728 ASSAY OF FERRITIN: CPT | Mod: ZL

## 2024-05-30 PROCEDURE — 99195 PHLEBOTOMY: CPT

## 2024-05-30 ASSESSMENT — PAIN SCALES - GENERAL: PAINLEVEL: NO PAIN (0)

## 2024-05-30 NOTE — PROGRESS NOTES
Infusion Nursing Note:  Maria Teresa Aburto presents today for phlebotomy.    Patient seen by provider today: No   present during visit today: Not Applicable.    Note: Phlebotomy performed by lab personnel.     Treatment Conditions:  Results reviewed, labs MET treatment parameters, ok to proceed with treatment.    Post Infusion Assessment:  Tolerated phlebotomy without incident.     Discharge Plan:   Patient and/or family verbalized understanding of discharge instructions and all questions answered.

## 2024-06-13 ENCOUNTER — OFFICE VISIT (OUTPATIENT)
Dept: FAMILY MEDICINE | Facility: OTHER | Age: 79
End: 2024-06-13
Attending: FAMILY MEDICINE
Payer: COMMERCIAL

## 2024-06-13 ENCOUNTER — MYC MEDICAL ADVICE (OUTPATIENT)
Dept: FAMILY MEDICINE | Facility: OTHER | Age: 79
End: 2024-06-13

## 2024-06-13 VITALS
HEIGHT: 64 IN | WEIGHT: 122.7 LBS | HEART RATE: 84 BPM | RESPIRATION RATE: 16 BRPM | DIASTOLIC BLOOD PRESSURE: 72 MMHG | OXYGEN SATURATION: 96 % | SYSTOLIC BLOOD PRESSURE: 151 MMHG | TEMPERATURE: 97.5 F | BODY MASS INDEX: 20.95 KG/M2
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
## 2024-06-13 DIAGNOSIS — S70.362A TICK BITE OF LEFT THIGH, INITIAL ENCOUNTER: Primary | ICD-10-CM

## 2024-06-13 PROCEDURE — 99213 OFFICE O/P EST LOW 20 MIN: CPT | Performed by: FAMILY MEDICINE

## 2024-06-13 PROCEDURE — G0463 HOSPITAL OUTPT CLINIC VISIT: HCPCS

## 2024-06-13 RX ORDER — DOXYCYCLINE HYCLATE 100 MG
100 TABLET ORAL 2 TIMES DAILY
Qty: 20 TABLET | Refills: 0 | Status: SHIPPED | OUTPATIENT
Start: 2024-06-13 | End: 2024-06-23

## 2024-06-13 NOTE — PROGRESS NOTES
Assessment & Plan     Tick bite of left thigh, initial encounter  Had embedded tick for unclear duration.  Now developing a rash - unclear dermatitis vs cellulitis vs EM.  She has hemachromatosis which can make Doxy less effective - but first time for tick illnesses.  Lymes likely won't show up on labs yet.  Current plan is Doxy, get labs in about a month to make sure to Lymes having failed treatment.  - doxycycline hyclate (VIBRA-TABS) 100 MG tablet; Take 1 tablet (100 mg) by mouth 2 times daily for 10 days  - Lyme Disease Total Abs Bld with Reflex to Confirm CLIA; Future            Subjective   Maria Teresa is a 78 year old, presenting for the following health issues:  Insect Bites        6/13/2024     3:18 PM   Additional Questions   Roomed by lala hatfield   Accompanied by none         6/13/2024     3:18 PM   Patient Reported Additional Medications   Patient reports taking the following new medications none     History of Present Illness       Reason for visit:  Tick bite  Symptom onset:  3-7 days ago    She eats 4 or more servings of fruits and vegetables daily.She consumes 0 sweetened beverage(s) daily.She exercises with enough effort to increase her heart rate 30 to 60 minutes per day.  She exercises with enough effort to increase her heart rate 7 days per week.   She is taking medications regularly.       Concern - tick bite on left inner thigh  Onset: 6/5 - 7 days ago  Description: got tick off, has red ring around bite, notice ring show up a few days ago  Intensity: mild, moderate  Progression of Symptoms:  worsening  Accompanying Signs & Symptoms: none  Previous history of similar problem: none  Precipitating factors:        Worsened by: none  Alleviating factors:        Improved by: none  Therapies tried and outcome: None            Objective    BP (!) 151/72 (BP Location: Left arm, Patient Position: Sitting, Cuff Size: Adult Regular)   Pulse 84   Temp 97.5  F (36.4  C) (Tympanic)   Resp 16   Ht 1.626 m (5'  "4\")   Wt 55.7 kg (122 lb 11.2 oz)   SpO2 96%   BMI 21.06 kg/m    Body mass index is 21.06 kg/m .  Physical Exam  Constitutional:       General: She is not in acute distress.     Appearance: Normal appearance.   Cardiovascular:      Rate and Rhythm: Normal rate and regular rhythm.   Pulmonary:      Effort: Pulmonary effort is normal.   Skin:     Comments: Chaperone present.  Left inner thigh has a 0.5 cm dark scab with mild surrounding erythema.  Cleaned with alcohol scab, scab easily removed with #15 blade, trace purulence and what visibly appears to be an ingrown hair material.  Betadine applied.   Neurological:      Mental Status: She is alert and oriented to person, place, and time.                    Signed Electronically by: RICO MCCLELLAN DO    "

## 2024-06-24 ENCOUNTER — OFFICE VISIT (OUTPATIENT)
Dept: FAMILY MEDICINE | Facility: OTHER | Age: 79
End: 2024-06-24
Attending: FAMILY MEDICINE
Payer: COMMERCIAL

## 2024-06-24 VITALS
OXYGEN SATURATION: 100 % | RESPIRATION RATE: 16 BRPM | BODY MASS INDEX: 21 KG/M2 | DIASTOLIC BLOOD PRESSURE: 66 MMHG | HEIGHT: 64 IN | TEMPERATURE: 98.1 F | WEIGHT: 123 LBS | SYSTOLIC BLOOD PRESSURE: 136 MMHG | HEART RATE: 81 BPM

## 2024-06-24 DIAGNOSIS — M81.0 AGE-RELATED OSTEOPOROSIS WITHOUT CURRENT PATHOLOGICAL FRACTURE: ICD-10-CM

## 2024-06-24 DIAGNOSIS — E83.119 HEMOCHROMATOSIS, UNSPECIFIED HEMOCHROMATOSIS TYPE: ICD-10-CM

## 2024-06-24 DIAGNOSIS — H26.9 CATARACT OF BOTH EYES, UNSPECIFIED CATARACT TYPE: ICD-10-CM

## 2024-06-24 DIAGNOSIS — D47.2 MGUS (MONOCLONAL GAMMOPATHY OF UNKNOWN SIGNIFICANCE): ICD-10-CM

## 2024-06-24 DIAGNOSIS — E53.8 VITAMIN B12 DEFICIENCY (NON ANEMIC): ICD-10-CM

## 2024-06-24 DIAGNOSIS — I51.81 TAKOTSUBO CARDIOMYOPATHY: ICD-10-CM

## 2024-06-24 DIAGNOSIS — K21.9 GASTROESOPHAGEAL REFLUX DISEASE WITHOUT ESOPHAGITIS: ICD-10-CM

## 2024-06-24 DIAGNOSIS — G25.81 RESTLESS LEGS SYNDROME (RLS): ICD-10-CM

## 2024-06-24 DIAGNOSIS — Z01.818 PREOP GENERAL PHYSICAL EXAM: Primary | ICD-10-CM

## 2024-06-24 DIAGNOSIS — Z87.19 HISTORY OF BARRETT'S ESOPHAGUS: ICD-10-CM

## 2024-06-24 DIAGNOSIS — E78.5 HYPERLIPIDEMIA LDL GOAL <70: ICD-10-CM

## 2024-06-24 PROCEDURE — 99213 OFFICE O/P EST LOW 20 MIN: CPT

## 2024-06-24 PROCEDURE — G0463 HOSPITAL OUTPT CLINIC VISIT: HCPCS

## 2024-06-24 ASSESSMENT — PAIN SCALES - GENERAL: PAINLEVEL: NO PAIN (0)

## 2024-06-24 NOTE — PROGRESS NOTES
Preoperative Evaluation  [facility] [place]  [address]  [place] [address]  Phone: [phone]  Primary Provider: Luz Alcala MD  Pre-op Performing Provider: ROXANN Byrd CNP  Jun 24, 2024 6/24/2024   Surgical Information   What procedure is being done? cataract surgery   Facility or Hospital where procedure/surgery will be performed: [facility] [place]   Who is doing the procedure / surgery? dr alex mejia   Date of surgery / procedure: july 2   Time of surgery / procedure: july 2  time unknown   Where do you plan to recover after surgery? at home with family        Fax number for surgical facility:     Assessment & Plan     The proposed surgical procedure is considered LOW risk.    Preop general physical exam/Cataract of both eyes, unspecified cataract type  Approval given to proceed.     History of Bowden's esophagus/Gastroesophageal reflux disease without esophagitis  Stable.    Age-related osteoporosis without current pathological fracture    Hyperlipidemia LDL goal <70  Now off statin due to fatigue.  and total chol of 183.     Hemochromatosis, unspecified hemochromatosis type  Follows with onc/hematology. Continue monthly phlebotomies, goal of ferritin less than or equal to 100 and hemoglobin above 10.5. Hgb of 12.4 on 5/30.    Takotsubo cardiomyopathy  Follows with [place] [facility]. Echo June 2023. EF 57%.     Restless legs syndrome (RLS)    Vitamin B12 deficiency (non anemic)    MGUS (monoclonal gammopathy of unknown significance)  Continue surveillance.      Risks and Recommendations  The patient has the following additional risks and recommendations for perioperative complications:   - No identified additional risk factors other than previously addressed    Antiplatelet or Anticoagulation Medication Instructions   - aspirin: Discontinue aspirin 7-10 days prior to procedure to reduce bleeding risk. It should be resumed postoperatively.     Additional  Medication Instructions   - Herbal medications and vitamins: DO NOT TAKE 14 days prior to surgery.    Recommendation  Approval given to proceed with proposed procedure, without further diagnostic evaluation.    Meghan Morel is a 78 year old, presenting for the following:  Pre-Op Exam          6/24/2024     3:30 PM   Additional Questions   Roomed by Ellie Davila     HPI related to upcoming procedure: M.A. is a 78-year-old female presenting for preoperative exam.        6/24/2024   Pre-Op Questionnaire   Have you ever had a heart attack or stroke? No   Have you ever had surgery on your heart or blood vessels, such as a stent placement, a coronary artery bypass, or surgery on an artery in your head, neck, heart, or legs? No   Do you have chest pain with activity? No   Do you have a history of heart failure? No   Do you currently have a cold, bronchitis or symptoms of other infection? No   Do you have a cough, shortness of breath, or wheezing? No   Do you or anyone in your family have previous history of blood clots? No   Do you or does anyone in your family have a serious bleeding problem such as prolonged bleeding following surgeries or cuts? No   Have you ever had problems with anemia or been told to take iron pills? No   Have you had any abnormal blood loss such as black, tarry or bloody stools, or abnormal vaginal bleeding? No   Have you ever had a blood transfusion? No   Are you willing to have a blood transfusion if it is medically needed before, during, or after your surgery? Yes   Have you or any of your relatives ever had problems with anesthesia? No   Do you have sleep apnea, excessive snoring or daytime drowsiness? No   Do you have any artifical heart valves or other implanted medical devices like a pacemaker, defibrillator, or continuous glucose monitor? No   Do you have artificial joints? No   Are you allergic to latex? No        Health Care Directive  Patient does not have a Health Care  "Directive or Living Will: Patient states has Advance Directive and will bring in a copy to clinic.    Preoperative Review of    reviewed - no record of controlled substances prescribed.      Status of Chronic Conditions:  See problem list for active medical problems.  Problems all longstanding and stable, except as noted/documented.  See ROS for pertinent symptoms related to these conditions.    HYPERLIPIDEMIA - Patient has a long history of significant Hyperlipidemia requiring medication for treatment with recent fair control. Not currently on medication.    Patient Active Problem List    Diagnosis Date Noted    Dizziness 2023     Priority: Medium    Age-related osteoporosis without current pathological fracture 2023     Priority: Medium    Monoclonal gammopathy of unknown significance (MGUS) 2023     Priority: Medium    Hemochromatosis 2023     Priority: Medium    MARISSA (obstructive sleep apnea) 2022     Priority: Medium     Moderate MARISSA (AHI 17) without sleep-associated hypoxemia    HOME SLEEP STUDY INTERPRETATION           Patient: Maria Teresa Aburto  MRN: [id number]  YOB: 1945  Study Date: 2022  PCP/Referring Provider: Luz Alcala;   Ordering Provider: Wesley Garcia MD, MD           Indications for Home Study: Maria Teresa Aburto is a 77 year old female with a history of vitamin B-12 deficiency, HLD, HTN, NSTEMI, takotsubo cardiomyopathy, RLS, squamous cell carcinoma, normochromic normocytic anemia with elevated ferritin, elevated sed rate and normal TIBC who presents with symptoms suggestive of obstructive sleep apnea.     Estimated body mass index is 21.1 kg/m  as calculated from the following:    Height as of 22: 1.6 m (5' 3\").    Weight as of 22: 54 kg (119 lb 1.6 oz).  Total score - [place]: 8 (10/4/2022  3:09 PM)  STOP-BANGSTOP-BAN/8           Data: A full night home sleep study was performed recording the standard physiologic " parameters including body position, movement, sound, nasal pressure, thermal oral airflow, chest and abdominal movements with respiratory inductance plethysmography, and oxygen saturation by pulse oximetry. Pulse rate was estimated by oximetry recording. This study was considered adequate based on > 4 hours of quality oximetry and respiratory recording. As specified by the AASM Manual for the Scoring of Sleep and Associated events, version 2.3, Rule VIII.D 1B, 4% oxygen desaturation scoring for hypopneas is used as a standard of care on all home sleep apnea testing.     Analysis Time:  500.7 minutes     Respiration:   Sleep Associated Hypoxemia: sustained hypoxemia was not present. Average oxygen saturation was 95%.  Time with saturation less than or equal to 88% was 1.9 minutes. The lowest oxygen saturation was 83%.   Snoring: Snoring was present.  Respiratory events: The home study revealed a presence of 87 obstructive apneas and 4 mixed and central apneas. There were 50 hypopneas resulting in a combined apnea/hypopnea index [AHI] of 17 events per hour.  AHI was 18.2 per hour supine, - per hour prone, 11 per hour on left side, and 0 per hour on right side.   Pattern: Excluding events noted above, respiratory rate and pattern was Normal.     Position: Percent of time spent: supine - 81.5%, prone - 0.1%, on left - 17.5%, on right - 0%.     Heart Rate: By pulse oximetry normal rate was noted.         Assessment:   Moderate obstructive sleep apnea.  Sleep associated hypoxemia was not present.     Recommendations:  Consider auto-CPAP at 5-15 cmH2O, oral appliance therapy or polysomnography with full night PAP titration.  Suggest optimizing sleep hygiene and avoiding sleep deprivation.  Weight management.           Diagnosis Code(s): Obstructive Sleep Apnea G47.33     Wesley Garcia MD, MD, November 30, 2022   Diplomate, American Board of Family Medicine, Sleep Medicine      Hyperlipidemia LDL goal <70  05/04/2021     Priority: Medium    ACE-inhibitor cough 11/03/2020     Priority: Medium    Takotsubo cardiomyopathy 09/22/2020     Priority: Medium     Episode of high BP noted on exam with no other symptoms  Elevated troponin  Cardiac cath, small lesion of  small diagonal, not requiring stenting  Crystal Sandoval, cardiology      S/P right rotator cuff repair 09/16/2019     Priority: Medium    Vitamin B12 deficiency (non anemic) 09/16/2019     Priority: Medium    Nontraumatic incomplete tear of right rotator cuff 07/31/2019     Priority: Medium     Added automatically from request for surgery [id number]      Nontraumatic complete tear of right rotator cuff 07/31/2019     Priority: Medium     Added automatically from request for surgery [id number]      Actinic keratosis 07/17/2012     Priority: Medium    History of malignant neoplasm of skin 07/17/2012     Priority: Medium    Atypical nevus 07/17/2012     Priority: Medium    History of nonmelanoma skin cancer 07/17/2012     Priority: Medium     Overview:   SCC right leg 2001      Medial meniscus tear 02/13/2012     Priority: Medium     Overview:   IMO Update 10/11      Restless legs syndrome (RLS) 10/11/2011     Priority: Medium    Bowden's esophagus 10/11/2011     Priority: Medium    B-complex deficiency 10/11/2011     Priority: Medium     Problem list name updated by automated process. Provider to review      Disorder of bone and cartilage 10/11/2011     Priority: Medium     dexa scans odd years starting in 2003  Problem list name updated by automated process. Provider to review      Squamous Cell Carcinoma 10/11/2011     Priority: Medium     left leg x2      GERD (gastroesophageal reflux disease)[530.81] 05/11/2003     Priority: Medium      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    Bowden's esophagus 10/11/2011    Constipation 10/09/2012    GERD (gastroesophageal reflux disease)[530.81] 05/11/2003    Hypertension     NSTEMI (non-ST  elevated myocardial infarction) (H) 09/28/2020    With Takotsubo cardiomyopathy Cardiac cath, small lesion of diagonal not requiring stenting Dr Crystal Sandoval [place]    MARISSA (obstructive sleep apnea) 12/8/2022    Osteopenia 10/11/2011    dexa scans odd years starting in 2003    Precordial pain 04/28/2003    negative stress test, negative pulmonary evaluatio    Restless legs syndrome (RLS) 10/11/2011    Squamous Cell Carcinoma 10/11/2011    left leg x2    Takotsubo cardiomyopathy 09/22/2020    Episode of high BP noted on exam with no other symptoms Elevated troponin Cardiac cath, small lesion of  small diagonal, not requiring stenting Crystal Sandoval, cardiology    Urge incontinence 10/09/2012     Past Surgical History:   Procedure Laterality Date    BONE MARROW BIOPSY, BONE SPECIMEN, NEEDLE/TROCAR N/A 10/31/2022    Procedure: BIOPSY, BONE MARROW WITH ASPIRATION;  Surgeon: Carlos Recinos DO;  Location: [facility]    Breast Biospy  11/11/2000    LEFT > Fibrocystic Disease > Outcome: Fibroadenoma    COLONOSCOPY  2000    COLONOSCOPY  8-7-2009    Normal, Repeat 2015    DEXA Scan  2009    EGD      EGD  2008    EGD  2003    ENDOSCOPY UPPER, COLONOSCOPY, COMBINED N/A 9/6/2018    Procedure: COMBINED ENDOSCOPY UPPER, COLONOSCOPY;  UPPER ENDOSCOPY WITH BIOPSIES AND COLONOSCOPY WITH BIOPSIES;  Surgeon: Minh Interiano DO;  Location: [facility] OR    ESOPHAGOSCOPY, GASTROSCOPY, DUODENOSCOPY (EGD), COMBINED N/A 10/29/2021    Procedure: Upper endoscopy with biopsies;  Surgeon: Panfilo Arcos MD;  Location: [facility]    ESOPHAGOSCOPY, GASTROSCOPY, DUODENOSCOPY (EGD), COMBINED N/A 12/14/2023    Procedure: ESOPHAGOGASTRODUODENOSCOPY with biopsy;  Surgeon: Panfilo Arcos MD;  Location: [facility]    Knee Replacement  2012    Oophorectomy      Ectopic Pregnancy    ROTATOR CUFF REPAIR RT/LT Right     with acromioplasty for complete rotator cuff tear, Dr Cherry    TONSILLECTOMY       Current Outpatient Medications  "  Medication Sig Dispense Refill    Aspirin 81 MG CAPS Take 1 Capful by mouth daily 90 capsule 3    Calcium Carbonate-Vitamin D (CALCIUM + D PO)       Cyanocobalamin (VITAMIN B 12 PO) Injection x22cist      omeprazole (PRILOSEC) 40 MG DR capsule Take 1 capsule (40 mg) by mouth daily 90 capsule 3    pramipexole (MIRAPEX) 0.25 MG tablet Take 1-2 tablets (0.25-0.5 mg) by mouth at bedtime 180 tablet 3    Vitamin D, Cholecalciferol, 25 MCG (1000 UT) TABS          Allergies   Allergen Reactions    Nitrofurantoin Other (See Comments) and Nausea     Macrobid  \"Chills and Fevers\"    Nitrofurantoin Macrocrystal Other (See Comments) and Nausea     Macrobid  \"Chills and Fevers\"          Social History     Tobacco Use    Smoking status: Former     Current packs/day: 0.00     Average packs/day: 1 pack/day for 4.0 years (4.0 ttl pk-yrs)     Types: Cigarettes     Start date:      Quit date:      Years since quittin.5    Smokeless tobacco: Never   Substance Use Topics    Alcohol use: Yes     Family History   Problem Relation Age of Onset    Uterine Cancer Mother     Lung Cancer Mother     Osteoarthritis Mother     Thyroid Disease Father     Coronary Artery Disease Father     Thyroid Disease Brother     Hyperlipidemia Son     Coronary Artery Disease Son     Heart Surgery Son     Thyroid Disease Son     Thyroid Disease Daughter     Endometrial Cancer Other         Family Hx    Osteoporosis Other         Family Hx    Parkinsonism Other         Family Hx    Multiple Sclerosis Maternal Aunt     Anuerysm Paternal Aunt     Unknown/Adopted No family hx of     Hypertension No family hx of     Breast Cancer No family hx of     Cerebrovascular Disease No family hx of     Colon Cancer No family hx of     Prostate Cancer No family hx of     Anesthesia Reaction No family hx of     Asthma No family hx of     Genetic Disorder No family hx of      History   Drug Use Unknown         Review of Systems  CONSTITUTIONAL: NEGATIVE for fever, " "chills, change in weight  INTEGUMENTARY/SKIN: NEGATIVE for worrisome rashes, moles or lesions  EYES: NEGATIVE for vision changes or irritation  ENT/MOUTH: NEGATIVE for ear, mouth and throat problems  RESP: NEGATIVE for significant cough or SOB  BREAST: NEGATIVE for masses, tenderness or discharge  CV: NEGATIVE for chest pain, palpitations or peripheral edema  GI: NEGATIVE for nausea, abdominal pain, heartburn, or change in bowel habits  : NEGATIVE for frequency, dysuria, or hematuria  MUSCULOSKELETAL: NEGATIVE for significant arthralgias or myalgia  NEURO: NEGATIVE for weakness, dizziness or paresthesias  ENDOCRINE: NEGATIVE for temperature intolerance, skin/hair changes  HEME: NEGATIVE for bleeding problems  PSYCHIATRIC: NEGATIVE for changes in mood or affect    Objective    /66 (BP Location: Left arm, Patient Position: Sitting, Cuff Size: Adult Regular)   Pulse 81   Temp 98.1  F (36.7  C) (Tympanic)   Resp 16   Ht 1.626 m (5' 4\")   Wt 55.8 kg (123 lb)   SpO2 100%   BMI 21.11 kg/m     Estimated body mass index is 21.11 kg/m  as calculated from the following:    Height as of this encounter: 1.626 m (5' 4\").    Weight as of this encounter: 55.8 kg (123 lb).    Physical Exam  GENERAL: alert and no distress  EYES: Eyes grossly normal to inspection, PERRL and conjunctivae and sclerae normal  HENT: ear canals and TM's normal, nose and mouth without ulcers or lesions  NECK: no adenopathy, no asymmetry, masses, or scars  RESP: lungs clear to auscultation - no rales, rhonchi or wheezes  CV: regular rate and rhythm, normal S1 S2, no S3 or S4, no murmur, click or rub, no peripheral edema  ABDOMEN: soft, nontender, no hepatosplenomegaly, no masses and bowel sounds normal  MS: no gross musculoskeletal defects noted, no edema  SKIN: no suspicious lesions or rashes  NEURO: Normal strength and tone, mentation intact and speech normal  PSYCH: mentation appears normal, affect normal/bright    Recent Labs   Lab Test " 05/30/24  1016 05/20/24  0843 05/13/24  0804 12/12/23  0740   HGB 12.4 11.8  --  11.8   PLT  --  202  --  237   NA  --  132* 130* 131*   POTASSIUM  --  4.7 4.2 4.4   CR  --  0.89 1.11* 0.92        Diagnostics  No labs were ordered during this visit.   No EKG required for low risk surgery (cataract, skin procedure, breast biopsy, etc).    Revised Cardiac Risk Index (RCRI)  The patient has the following serious cardiovascular risks for perioperative complications:   - No serious cardiac risks = 0 points     RCRI Interpretation: 0 points: Class I (very low risk - 0.4% complication rate)         Signed Electronically by: ROXANN Byrd CNP  Copy of this evaluation report is provided to requesting physician.

## 2024-06-24 NOTE — PATIENT INSTRUCTIONS
Antiplatelet or Anticoagulation Medication Instructions   - aspirin: Discontinue aspirin 7-10 days prior to procedure to reduce bleeding risk. It should be resumed postoperatively.     Additional Medication Instructions   - Herbal medications and vitamins: DO NOT TAKE 14 days prior to surgery.  Patient Education   Preparing for Your Surgery  Getting started  A nurse will call you to review your health history and instructions. They will give you an arrival time based on your scheduled surgery time. Please be ready to share:  Your doctor's clinic name and phone number  Your medical, surgical, and anesthesia history  A list of allergies and sensitivities  A list of medicines, including herbal treatments and over-the-counter drugs  Whether the patient has a legal guardian (ask how to send us the papers in advance)  Please tell us if you're pregnant--or if there's any chance you might be pregnant. Some surgeries may injure a fetus (unborn baby), so they require a pregnancy test. Surgeries that are safe for a fetus don't always need a test, and you can choose whether to have one.   If you have a child who's having surgery, please ask for a copy of Preparing for Your Child's Surgery.    Preparing for surgery  Within 10 to 30 days of surgery: Have a pre-op exam (sometimes called an H&P, or History and Physical). This can be done at a clinic or pre-operative center.  If you're having a , you may not need this exam. Talk to your care team.  At your pre-op exam, talk to your care team about all medicines you take. If you need to stop any medicines before surgery, ask when to start taking them again.  We do this for your safety. Many medicines can make you bleed too much during surgery. Some change how well surgery (anesthesia) drugs work.  Call your insurance company to let them know you're having surgery. (If you don't have insurance, call [phone].)  Call your clinic if there's any change in your health. This  includes signs of a cold or flu (sore throat, runny nose, cough, rash, fever). It also includes a scrape or scratch near the surgery site.  If you have questions on the day of surgery, call your hospital or surgery center.  Eating and drinking guidelines  For your safety: Unless your surgeon tells you otherwise, follow the guidelines below.  Eat and drink as usual until 8 hours before you arrive for surgery. After that, no food or milk.  Drink clear liquids until 2 hours before you arrive. These are liquids you can see through, like water, Gatorade, and Propel Water. They also include plain black coffee and tea (no cream or milk), candy, and breath mints. You can spit out gum when you arrive.  If you drink alcohol: Stop drinking it the night before surgery.  If your care team tells you to take medicine on the morning of surgery, it's okay to take it with a sip of water.  Preventing infection  Shower or bathe the night before and morning of your surgery. Follow the instructions your clinic gave you. (If no instructions, use regular soap.)  Don't shave or clip hair near your surgery site. We'll remove the hair if needed.  Don't smoke or vape the morning of surgery. You may chew nicotine gum up to 2 hours before surgery. A nicotine patch is okay.  Note: Some surgeries require you to completely quit smoking and nicotine. Check with your surgeon.  Your care team will make every effort to keep you safe from infection. We will:  Clean our hands often with soap and water (or an alcohol-based hand rub).  Clean the skin at your surgery site with a special soap that kills germs.  Give you a special gown to keep you warm. (Cold raises the risk of infection.)  Wear special hair covers, masks, gowns and gloves during surgery.  Give antibiotic medicine, if prescribed. Not all surgeries need antibiotics.  What to bring on the day of surgery  Photo ID and insurance card  Copy of your health care directive, if you have one  Glasses  and hearing aids (bring cases)  You can't wear contacts during surgery  Inhaler and eye drops, if you use them (tell us about these when you arrive)  CPAP machine or breathing device, if you use them  A few personal items, if spending the night  If you have . . .  A pacemaker, ICD (cardiac defibrillator) or other implant: Bring the ID card.  An implanted stimulator: Bring the remote control.  A legal guardian: Bring a copy of the certified (court-stamped) guardianship papers.  Please remove any jewelry, including body piercings. Leave jewelry and other valuables at home.  If you're going home the day of surgery  You must have a responsible adult drive you home. They should stay with you overnight as well.  If you don't have someone to stay with you, and you aren't safe to go home alone, we may keep you overnight. Insurance often won't pay for this.  After surgery  If it's hard to control your pain or you need more pain medicine, please call your surgeon's office.  Questions?   If you have any questions for your care team, list them here: _________________________________________________________________________________________________________________________________________________________________________ ____________________________________ ____________________________________ ____________________________________  For informational purposes only. Not to replace the advice of your health care provider. Copyright   2003, 2019 [facility]. All rights reserved. Clinically reviewed by Lawanda Vila MD. [employer] [id number] - REV 12/22.

## 2024-06-25 ENCOUNTER — TELEPHONE (OUTPATIENT)
Dept: FAMILY MEDICINE | Facility: OTHER | Age: 79
End: 2024-06-25

## 2024-06-27 ENCOUNTER — TRANSFERRED RECORDS (OUTPATIENT)
Dept: HEALTH INFORMATION MANAGEMENT | Facility: CLINIC | Age: 79
End: 2024-06-27

## 2024-06-27 LAB — EJECTION FRACTION: 64 %

## 2024-07-01 ENCOUNTER — ALLIED HEALTH/NURSE VISIT (OUTPATIENT)
Dept: FAMILY MEDICINE | Facility: OTHER | Age: 79
End: 2024-07-01
Attending: FAMILY MEDICINE
Payer: COMMERCIAL

## 2024-07-01 DIAGNOSIS — D64.9 ANEMIA: Primary | ICD-10-CM

## 2024-07-01 PROCEDURE — 250N000011 HC RX IP 250 OP 636: Performed by: FAMILY MEDICINE

## 2024-07-01 PROCEDURE — 96372 THER/PROPH/DIAG INJ SC/IM: CPT | Performed by: FAMILY MEDICINE

## 2024-07-01 RX ADMIN — CYANOCOBALAMIN 1000 MCG: 1000 INJECTION, SOLUTION INTRAMUSCULAR; SUBCUTANEOUS at 08:13

## 2024-07-16 ENCOUNTER — TRANSFERRED RECORDS (OUTPATIENT)
Dept: HEALTH INFORMATION MANAGEMENT | Facility: CLINIC | Age: 79
End: 2024-07-16

## 2024-07-17 ENCOUNTER — LAB (OUTPATIENT)
Dept: LAB | Facility: OTHER | Age: 79
End: 2024-07-17
Payer: COMMERCIAL

## 2024-07-17 DIAGNOSIS — S70.362A TICK BITE OF LEFT THIGH, INITIAL ENCOUNTER: ICD-10-CM
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
## 2024-07-17 PROCEDURE — 86618 LYME DISEASE ANTIBODY: CPT | Mod: ZL

## 2024-07-17 PROCEDURE — 36415 COLL VENOUS BLD VENIPUNCTURE: CPT | Mod: ZL

## 2024-07-18 LAB — B BURGDOR IGG+IGM SER QL: 0.08

## 2024-07-19 NOTE — RESULT ENCOUNTER NOTE
Patient notified of results. Seen by patient Maria Teresa Aburto on 7/18/2024  5:09 PM  Tavia Padgett LPN

## 2024-07-25 ENCOUNTER — LAB (OUTPATIENT)
Dept: LAB | Facility: OTHER | Age: 79
End: 2024-07-25
Payer: COMMERCIAL

## 2024-07-25 VITALS
HEART RATE: 68 BPM | DIASTOLIC BLOOD PRESSURE: 69 MMHG | SYSTOLIC BLOOD PRESSURE: 159 MMHG | TEMPERATURE: 97 F | OXYGEN SATURATION: 100 % | RESPIRATION RATE: 16 BRPM

## 2024-07-25 DIAGNOSIS — E83.119 HEMOCHROMATOSIS, UNSPECIFIED HEMOCHROMATOSIS TYPE: ICD-10-CM

## 2024-07-25 LAB
FERRITIN SERPL-MCNC: 74 NG/ML (ref 11–328)
HGB BLD-MCNC: 11.4 G/DL (ref 11.7–15.7)

## 2024-07-25 PROCEDURE — 36415 COLL VENOUS BLD VENIPUNCTURE: CPT

## 2024-07-25 PROCEDURE — 82728 ASSAY OF FERRITIN: CPT | Mod: ZL

## 2024-07-25 PROCEDURE — 85018 HEMOGLOBIN: CPT | Mod: ZL

## 2024-07-25 ASSESSMENT — PAIN SCALES - GENERAL: PAINLEVEL: NO PAIN (0)

## 2024-08-06 ENCOUNTER — PATIENT OUTREACH (OUTPATIENT)
Dept: ONCOLOGY | Facility: OTHER | Age: 79
End: 2024-08-06

## 2024-08-06 ENCOUNTER — ANCILLARY PROCEDURE (OUTPATIENT)
Dept: MAMMOGRAPHY | Facility: OTHER | Age: 79
End: 2024-08-06
Attending: FAMILY MEDICINE
Payer: COMMERCIAL

## 2024-08-06 ENCOUNTER — TELEPHONE (OUTPATIENT)
Dept: MAMMOGRAPHY | Facility: HOSPITAL | Age: 79
End: 2024-08-06

## 2024-08-06 ENCOUNTER — MYC MEDICAL ADVICE (OUTPATIENT)
Dept: ONCOLOGY | Facility: OTHER | Age: 79
End: 2024-08-06

## 2024-08-06 DIAGNOSIS — Z12.31 VISIT FOR SCREENING MAMMOGRAM: ICD-10-CM

## 2024-08-06 DIAGNOSIS — E83.119 HEMOCHROMATOSIS: Primary | ICD-10-CM

## 2024-08-06 PROCEDURE — 77063 BREAST TOMOSYNTHESIS BI: CPT | Mod: TC

## 2024-08-06 NOTE — PROGRESS NOTES
[facility]: Cancer Care                                                                                        TC to patient to discuss patients plan of care. TORB from Dr. Silverman to see patient earlier and check labs ordered but add B12 and folate. He would like parameters changed for phlebotomy to phlebotomize 350 if ferritin is greater than 100 and Hgb is greater than 11. Patient called and informed and agrees with the plan of care.       Signature:  Niyah Esparza RN

## 2024-08-12 ENCOUNTER — LAB (OUTPATIENT)
Dept: LAB | Facility: OTHER | Age: 79
End: 2024-08-12
Attending: FAMILY MEDICINE
Payer: COMMERCIAL

## 2024-08-12 ENCOUNTER — ALLIED HEALTH/NURSE VISIT (OUTPATIENT)
Dept: FAMILY MEDICINE | Facility: OTHER | Age: 79
End: 2024-08-12
Attending: FAMILY MEDICINE
Payer: COMMERCIAL

## 2024-08-12 DIAGNOSIS — E53.8 VITAMIN B12 DEFICIENCY (NON ANEMIC): Primary | ICD-10-CM

## 2024-08-12 DIAGNOSIS — E83.119 HEMOCHROMATOSIS: ICD-10-CM

## 2024-08-12 DIAGNOSIS — D47.2 MONOCLONAL GAMMOPATHY: ICD-10-CM

## 2024-08-12 DIAGNOSIS — E83.19 IRON OVERLOAD: ICD-10-CM

## 2024-08-12 LAB
ALBUMIN SERPL BCG-MCNC: 4 G/DL (ref 3.5–5.2)
ALP SERPL-CCNC: 71 U/L (ref 40–150)
ALT SERPL W P-5'-P-CCNC: 23 U/L (ref 0–50)
ANION GAP SERPL CALCULATED.3IONS-SCNC: 11 MMOL/L (ref 7–15)
AST SERPL W P-5'-P-CCNC: 30 U/L (ref 0–45)
BASOPHILS # BLD AUTO: 0 10E3/UL (ref 0–0.2)
BASOPHILS NFR BLD AUTO: 1 %
BILIRUB SERPL-MCNC: 0.3 MG/DL
BUN SERPL-MCNC: 20.3 MG/DL (ref 8–23)
CALCIUM SERPL-MCNC: 8.6 MG/DL (ref 8.8–10.4)
CHLORIDE SERPL-SCNC: 97 MMOL/L (ref 98–107)
CREAT SERPL-MCNC: 0.93 MG/DL (ref 0.51–0.95)
EGFRCR SERPLBLD CKD-EPI 2021: 62 ML/MIN/1.73M2
EOSINOPHIL # BLD AUTO: 0.2 10E3/UL (ref 0–0.7)
EOSINOPHIL NFR BLD AUTO: 4 %
ERYTHROCYTE [DISTWIDTH] IN BLOOD BY AUTOMATED COUNT: 12.1 % (ref 10–15)
FERRITIN SERPL-MCNC: 81 NG/ML (ref 11–328)
FOLATE SERPL-MCNC: 15.5 NG/ML (ref 4.6–34.8)
GLUCOSE SERPL-MCNC: 91 MG/DL (ref 70–99)
HCO3 SERPL-SCNC: 21 MMOL/L (ref 22–29)
HCT VFR BLD AUTO: 32.1 % (ref 35–47)
HGB BLD-MCNC: 11.4 G/DL (ref 11.7–15.7)
IMM GRANULOCYTES # BLD: 0 10E3/UL
IMM GRANULOCYTES NFR BLD: 0 %
IRON BINDING CAPACITY (ROCHE): 249 UG/DL (ref 240–430)
IRON SATN MFR SERPL: 32 % (ref 15–46)
IRON SERPL-MCNC: 80 UG/DL (ref 37–145)
LDH SERPL L TO P-CCNC: 210 U/L (ref 0–250)
LYMPHOCYTES # BLD AUTO: 1.1 10E3/UL (ref 0.8–5.3)
LYMPHOCYTES NFR BLD AUTO: 20 %
MCH RBC QN AUTO: 30.7 PG (ref 26.5–33)
MCHC RBC AUTO-ENTMCNC: 35.5 G/DL (ref 31.5–36.5)
MCV RBC AUTO: 87 FL (ref 78–100)
MONOCYTES # BLD AUTO: 0.7 10E3/UL (ref 0–1.3)
MONOCYTES NFR BLD AUTO: 14 %
NEUTROPHILS # BLD AUTO: 3.3 10E3/UL (ref 1.6–8.3)
NEUTROPHILS NFR BLD AUTO: 62 %
NRBC # BLD AUTO: 0 10E3/UL
NRBC BLD AUTO-RTO: 0 /100
PLATELET # BLD AUTO: 199 10E3/UL (ref 150–450)
POTASSIUM SERPL-SCNC: 4.5 MMOL/L (ref 3.4–5.3)
PROT SERPL-MCNC: 6.4 G/DL (ref 6.4–8.3)
RBC # BLD AUTO: 3.71 10E6/UL (ref 3.8–5.2)
SODIUM SERPL-SCNC: 129 MMOL/L (ref 135–145)
TOTAL PROTEIN SERUM FOR ELP: 6.3 G/DL (ref 6.4–8.3)
WBC # BLD AUTO: 5.4 10E3/UL (ref 4–11)

## 2024-08-12 PROCEDURE — 84165 PROTEIN E-PHORESIS SERUM: CPT | Mod: ZL | Performed by: STUDENT IN AN ORGANIZED HEALTH CARE EDUCATION/TRAINING PROGRAM

## 2024-08-12 PROCEDURE — 83521 IG LIGHT CHAINS FREE EACH: CPT | Mod: ZL

## 2024-08-12 PROCEDURE — 84165 PROTEIN E-PHORESIS SERUM: CPT | Mod: 26 | Performed by: PATHOLOGY

## 2024-08-12 PROCEDURE — 85810 BLOOD VISCOSITY EXAMINATION: CPT | Mod: ZL

## 2024-08-12 PROCEDURE — 84155 ASSAY OF PROTEIN SERUM: CPT | Mod: ZL

## 2024-08-12 PROCEDURE — 86334 IMMUNOFIX E-PHORESIS SERUM: CPT | Mod: ZL | Performed by: STUDENT IN AN ORGANIZED HEALTH CARE EDUCATION/TRAINING PROGRAM

## 2024-08-12 PROCEDURE — 82746 ASSAY OF FOLIC ACID SERUM: CPT | Mod: ZL

## 2024-08-12 PROCEDURE — 250N000011 HC RX IP 250 OP 636: Performed by: FAMILY MEDICINE

## 2024-08-12 PROCEDURE — 83550 IRON BINDING TEST: CPT | Mod: ZL

## 2024-08-12 PROCEDURE — 36415 COLL VENOUS BLD VENIPUNCTURE: CPT | Mod: ZL

## 2024-08-12 PROCEDURE — 86334 IMMUNOFIX E-PHORESIS SERUM: CPT | Mod: 26 | Performed by: PATHOLOGY

## 2024-08-12 PROCEDURE — 82728 ASSAY OF FERRITIN: CPT | Mod: ZL

## 2024-08-12 PROCEDURE — 82232 ASSAY OF BETA-2 PROTEIN: CPT | Mod: ZL

## 2024-08-12 PROCEDURE — 83615 LACTATE (LD) (LDH) ENZYME: CPT | Mod: ZL

## 2024-08-12 PROCEDURE — 96372 THER/PROPH/DIAG INJ SC/IM: CPT | Performed by: FAMILY MEDICINE

## 2024-08-12 PROCEDURE — 85025 COMPLETE CBC W/AUTO DIFF WBC: CPT | Mod: ZL

## 2024-08-12 PROCEDURE — 80053 COMPREHEN METABOLIC PANEL: CPT | Mod: ZL

## 2024-08-12 PROCEDURE — 82607 VITAMIN B-12: CPT | Mod: ZL

## 2024-08-12 RX ADMIN — CYANOCOBALAMIN 1000 MCG: 1000 INJECTION, SOLUTION INTRAMUSCULAR; SUBCUTANEOUS at 10:50

## 2024-08-13 LAB
ALBUMIN SERPL ELPH-MCNC: 3.9 G/DL (ref 3.7–5.1)
ALPHA1 GLOB SERPL ELPH-MCNC: 0.3 G/DL (ref 0.2–0.4)
ALPHA2 GLOB SERPL ELPH-MCNC: 0.5 G/DL (ref 0.5–0.9)
B-GLOBULIN SERPL ELPH-MCNC: 0.7 G/DL (ref 0.6–1)
B2 MICROGLOB TUMOR MARKER SER-MCNC: 2.6 MG/L
GAMMA GLOB SERPL ELPH-MCNC: 1 G/DL (ref 0.7–1.6)
KAPPA LC FREE SER-MCNC: 2.81 MG/DL (ref 0.33–1.94)
KAPPA LC FREE/LAMBDA FREE SER NEPH: 1.53 {RATIO} (ref 0.26–1.65)
LAMBDA LC FREE SERPL-MCNC: 1.84 MG/DL (ref 0.57–2.63)
LOCATION OF TASK: NORMAL
LOCATION OF TASK: NORMAL
M PROTEIN SERPL ELPH-MCNC: 0 G/DL
PROT PATTERN SERPL ELPH-IMP: NORMAL
PROT PATTERN SERPL IFE-IMP: NORMAL
VISC SER: 1.5 CP (ref 1.4–1.8)
VIT B12 SERPL-MCNC: 543 PG/ML (ref 232–1245)

## 2024-08-19 ENCOUNTER — LAB (OUTPATIENT)
Dept: LAB | Facility: OTHER | Age: 79
End: 2024-08-19
Payer: COMMERCIAL

## 2024-08-19 ENCOUNTER — ONCOLOGY VISIT (OUTPATIENT)
Dept: ONCOLOGY | Facility: OTHER | Age: 79
End: 2024-08-19
Attending: INTERNAL MEDICINE
Payer: COMMERCIAL

## 2024-08-19 VITALS
TEMPERATURE: 98.6 F | HEART RATE: 83 BPM | WEIGHT: 121.25 LBS | SYSTOLIC BLOOD PRESSURE: 130 MMHG | OXYGEN SATURATION: 99 % | BODY MASS INDEX: 20.81 KG/M2 | RESPIRATION RATE: 14 BRPM | DIASTOLIC BLOOD PRESSURE: 58 MMHG

## 2024-08-19 DIAGNOSIS — E83.19 IRON OVERLOAD: ICD-10-CM

## 2024-08-19 DIAGNOSIS — D47.2 MONOCLONAL GAMMOPATHY: ICD-10-CM

## 2024-08-19 LAB
ALBUMIN SERPL BCG-MCNC: 4.1 G/DL (ref 3.5–5.2)
ALP SERPL-CCNC: 73 U/L (ref 40–150)
ALT SERPL W P-5'-P-CCNC: 26 U/L (ref 0–50)
ANION GAP SERPL CALCULATED.3IONS-SCNC: 11 MMOL/L (ref 7–15)
AST SERPL W P-5'-P-CCNC: 32 U/L (ref 0–45)
BILIRUB SERPL-MCNC: 0.2 MG/DL
BUN SERPL-MCNC: 19.4 MG/DL (ref 8–23)
CA-I BLD-MCNC: 4.7 MG/DL (ref 4.4–5.2)
CALCIUM SERPL-MCNC: 9.2 MG/DL (ref 8.8–10.4)
CHLORIDE SERPL-SCNC: 99 MMOL/L (ref 98–107)
CREAT SERPL-MCNC: 0.93 MG/DL (ref 0.51–0.95)
EGFRCR SERPLBLD CKD-EPI 2021: 62 ML/MIN/1.73M2
GLUCOSE SERPL-MCNC: 92 MG/DL (ref 70–99)
HCO3 SERPL-SCNC: 22 MMOL/L (ref 22–29)
POTASSIUM SERPL-SCNC: 4.7 MMOL/L (ref 3.4–5.3)
PROT SERPL-MCNC: 6.8 G/DL (ref 6.4–8.3)
SODIUM SERPL-SCNC: 132 MMOL/L (ref 135–145)

## 2024-08-19 PROCEDURE — 82040 ASSAY OF SERUM ALBUMIN: CPT | Mod: ZL

## 2024-08-19 PROCEDURE — 82330 ASSAY OF CALCIUM: CPT | Mod: ZL

## 2024-08-19 PROCEDURE — 99215 OFFICE O/P EST HI 40 MIN: CPT | Performed by: INTERNAL MEDICINE

## 2024-08-19 PROCEDURE — G2211 COMPLEX E/M VISIT ADD ON: HCPCS | Performed by: INTERNAL MEDICINE

## 2024-08-19 PROCEDURE — 99417 PROLNG OP E/M EACH 15 MIN: CPT | Performed by: INTERNAL MEDICINE

## 2024-08-19 PROCEDURE — 36415 COLL VENOUS BLD VENIPUNCTURE: CPT | Mod: ZL

## 2024-08-19 PROCEDURE — G0463 HOSPITAL OUTPT CLINIC VISIT: HCPCS

## 2024-08-19 RX ORDER — LOSARTAN POTASSIUM 25 MG/1
10 TABLET ORAL DAILY
COMMUNITY

## 2024-08-19 ASSESSMENT — ENCOUNTER SYMPTOMS
EYES NEGATIVE: 1
GASTROINTESTINAL NEGATIVE: 1
HEMATOLOGIC/LYMPHATIC NEGATIVE: 1
RESPIRATORY NEGATIVE: 1
CONSTITUTIONAL NEGATIVE: 1
NEUROLOGICAL NEGATIVE: 1
ALLERGIC/IMMUNOLOGIC NEGATIVE: 1
PSYCHIATRIC NEGATIVE: 1
ENDOCRINE NEGATIVE: 1
CARDIOVASCULAR NEGATIVE: 1
MUSCULOSKELETAL NEGATIVE: 1

## 2024-08-19 ASSESSMENT — PAIN SCALES - GENERAL: PAINLEVEL: NO PAIN (0)

## 2024-08-19 NOTE — NURSING NOTE
"Oncology Rooming Note    August 19, 2024 1:41 PM   Maria Teresa Aburto is a 79 year old female who presents for:    Chief Complaint   Patient presents with    Oncology Clinic Visit     Follow up MGUS     Initial Vitals: /58   Pulse 83   Temp 98.6  F (37  C) (Tympanic)   Resp 14   Wt 55 kg (121 lb 4.1 oz)   SpO2 99%   BMI 20.81 kg/m   Estimated body mass index is 20.81 kg/m  as calculated from the following:    Height as of 6/24/24: 1.626 m (5' 4\").    Weight as of this encounter: 55 kg (121 lb 4.1 oz). Body surface area is 1.58 meters squared.  No Pain (0) Comment: Data Unavailable   No LMP recorded. Patient is postmenopausal.  Allergies reviewed: Yes  Medications reviewed: Yes    Medications: Medication refills not needed today.  Pharmacy name entered into EPIC:    [id number] - [place], [place] - [address]  [id number] - [place], [place] - [address]    Frailty Screening:   Is the patient here for a new oncology consult visit in cancer care? 2. No      Clinical concerns: none       Preeti Urbano LPN             "

## 2024-08-19 NOTE — PROGRESS NOTES
[facility] Hematology and Oncology Progress Note    Patient: Maria Teresa Aburto  MRN: [id number]  Date of Service: Aug 19, 2024         Reason for Visit    Chief Complaint   Patient presents with    Oncology Clinic Visit     Follow up MGUS       ECOG Performance Status: 0      Encounter Diagnoses: Hepatic iron overload/hemochromatosis  MGUS      History of Present Illness:    Ms. Maria Teresa Aburto returns for follow-up of hepatic iron overload/hemochromatosis as well as MGUS.We saw the patient in consultation at the request of Dr. Luz Alcala on 09/26/2022.  At that time, she was a 77-year-old white female with history of coronary artery disease, Bowden esophagus, hypertension who we were asked to evaluate concerning diagnosis of anemia in the setting of elevated ferritin.  The patient had been evaluated in the emergency room for hypertension.  The patient had been seen by Dr. Luz Alcala, who increased her losartan dose.  As part of the workup, labs were drawn and CBC revealed white count 6.7, H and H was 10.3 and 39.6, MCV 91, platelet count was 206,000.  Peripheral blood smear was ordered and revealed the patient likely had anemia of chronic disease.  Iron studies were obtai ined.  Ferritin was elevated at 440.  TSH was 2.51.  Vitamin B12 was 800.  Sed rate was elevated at 34.  SERAFIN was negative.  Her major complaint was related to the fact she had increased fatigue throughout the day.  She also had dyspnea on exertion.  She did have occasional reflux symptoms.  Omeprazole was not helping.  She was diagnosed with Bowden esophagus without dysplasia.  EGD was performed by Dr. Panfilo Arcos approximately a year prior.  There is no family history of hematologic malignancy.  She was a nonsmoker, nondrinker.  She also described lightheadedness when she stood up and at times, she would have to lie down to alleviate her symptoms.  She said she had COVID-19 back in 06/2021.  She had a history of basal cell  carcinoma as well as squamous cell carcinoma of skin.  She had a Mohs procedure done for basal cell carcinoma of the temple.  She was on Efudex cream.  There was evidence of iron overload.  We wanted to rule hemochromatosis by obtaining hemochromatosis gene mutation.  The patient was found to be heterozygous for the S65C mutation, but had normal C282Y and H63D.  The S65C mutation is usually not associated with iron overload or hemochromatosis unless there is a concomitant mutation.  Given her elevated ferritin, wanted to rule out occult malignancy by obtaining CT neck, which was read as a multinodular thyroid measuring less than 1.5 cm.  There was infrahilar right segmental focus measuring 11 mm x 6.5 mm.  CT chest, abdomen and pelvis revealed lung nodules bilaterally, largest being 7 mm in the right upper lobe.  She also had an MRI of the brain, which was read as normal brain for age.  We also wanted to rule out underlying monoclonal paraproteinemia and this came back positive.  The patient had an elevated kappa free light chain.  Kappa lambda ratio was 2.0, which was elevated.  Beta 2 microglobulin was elevated at 2.5.  The patient was having ongoing fatigue, especially after climbing stairs, for which she developed spastic neck pain after going up stairs with fatigue being associated.  She also had been seen by Sleep Medicine to rule out sleep apnea.  She denied tobacco exposure.  She may have been exposed to asb bestos as a child.  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  There were no reports of bright red blood per rectum, hematemesis, or heartburn.  When we saw the patient, we felt the patient had anemia with elevated ferritin.  No obvious evidence of iron overload.  Given her monoclonal gammopathy with elevated kappa free light chains, we wanted to rule out myeloma or amyloid.  We proceeded with bone marrow aspiration biopsy, which was performed on 10/31/2022.  This was found to be a  normocellular bone marrow with maturing trilineage hematopoiesis.  There was no morphologic evidence of plasma cell neoplasm, plasma cell dyscrasia or myelodysplastic syndrome.  There was increased storage iron.  [place] red stain was performed.  There was no amyloid deposition noted.  PET scan revealed the patient had a right thyroid nodule measuring 1.5 cm in dimension with an SUV of 25.8, consistent with possible neoplasm.  Otherwise, no o  her evidence of hypermetabolism in neck, chest, abdomen and pelvis.  She did have a thyroid ultrasound on 10/20/2022.  It was read as a 1.3 cm right lower lobe thyroid nodule.  Given these findings, we elected to proceed with FNA of the thyroid nodule that was performed 12/02/2022 at [facility] by Dr. Osman Camilo.  Apparently, this nodule was difficult to biopsy.  It was heavily calcified.  According to Dr. Camilo, the area adjacent to the nodule was biopsied.  Apparently, the patient was frustrated that the biopsy had not been performed adequately and was willing to see ENT to assess the thyroid nodule.  We referred the patient to Dr. Russo, who felt this was probably difficult to biopsy and the only option would be a hemithyroidectomy.  Plan was to obtain an MRI of the neck when she returned from [place] in May and then consider biopsy at that time. absolutely rule out hemochromatosis by obtaining a MRI of the abdomen with attention to the liver and this was read as diffusely decreased signal in the liver, compatible with diffuse iron deposition consistent with hemochromatosis.  Given these findings, the patient likely had hemochromatosis and likely in addition to the S65C mutation.  She may have another mutation that was not readily examable on the current lab testing for hemochromatosis daily mutation testing.  We therefore elected to proceed with phlebotomy to maintain ferritin greater than or equal to 100 and hemoglobin greater than or  equal to 10.5.  She apparently moved down to [place] during the winter months and was seen by a hematologist there by the name of Neto Melo M.D. who continued with phlebotomies.  She had at least 2 phlebotomies with 350 mL of blood taken off.  Her ferritin apparently dropped.  Her initial phlebotomy done here had caused hypotension after removal of 600 mL of blood.  Therefore, it was decided to phlebotomize 350 mL of blood.  Apparently Dr. Melo raised the possibility of a cardiac MRI to evaluate for iron deposition, so therefore she was scheduled to see a cardiologist at the [facility] as well as endocrinologist as she was concerned about her thyroid nodule.  She said that the phlebotomies did not necessarily help her fatigue.  She still had chronic fatigue.  She gets not necessarily short of breath, but just wiped out when she goes up stairs or does any activities that is more than walking.   ClearWhen we saw the patient last, the plan was to follow up with the [facility] [place] [facility] for further evaluation of her hemochromatosis.  She recently had seen a hepatologist at the [facility] [place] [facility] by the name of Ky Schmidt MD at who felt the patient's recent ferritin level was down and switched the phlebotomies toprn, therefore, the patient did not require phlebotomies.  He ordered further genetic testing that was drawn.  This testing was causing [employer] gene panel looking for other genetic causes for iron overload syndrome.  Also MRI of the liver was scheduled for 2 months.  She will follow up with Dr. Xu Valdes.  She also saw an endocrinologist by the name of Woody Lebron.  FNA was perfformed of the thyroid nodule that came back benign.  She also saw Dr. Schmidt of Cardiology who recommended she stop the losartan.  Of note, the patient has been on atorvastatin and Lipitor, which certainly can cause fatigue or brain fog, which she admits to.  According to the cardiologist, her lipid profile was excellent.  This was drawn on  06/01/2023 and her total cholesterol was 131.  Her LDL cholesterol was 61.  She is scheduled to have a B12 injection that was ordered by Dr. Alcala. She was seen by hepatology at [facility] in September 6 of this year.  Noted her last phlebotomy was performed in April 2023 and her ferritin level has been relatively normal since then.  He noted that the [employer] gene panel was negative for HFE genes.  He repeated the MRI of the abdomen and he noted that the hepatic iron content was mildly increased and improved compared to last MRI result the patient likely had mild hepatic iron overload.  Noted the patient did not have any evidence of iron overload at this time as well as her ferritin levels are relatively normal..  He recommended monitoring ferritin levels every 6 months if they are significantly elevated then will consider phlebotomy only if she was not anemic.  Patient returns today she is doing relatively well.  She is planning to drive to [place] in the coming weeks for the winter.  Denies any fevers, night sweats or weight loss denies any bone pain, jaundice.  She denies any shortness of breath cough or hemoptysis fatigue seems to have improved she continues with monthly B12 injections ordered by Dr. Jack.  She is here to discuss recent lab results.  She did have a recent EGD performed with Dr. Arcos for evaluation of Bowden's esophagus and this came back negative for dysplasia after biopsy of the EG junction.  It was noted that the patient had a hiatal hernia but she is asymptomatic on chronic omeprazole therapy.The patient returns after wintering in [place].  She states she received 1 phlebotomy approximately 2 months ago in [place].  She had 350 cc removed due to history of hypertension in the past.  She comes in today she is doing relatively well she denies any fevers, night sweats or weight loss.  She denies jaundice abdominal pain change in bowel habits there is no reports of hematemesis melena or  hematochezia.  There is no shortness of breath cough or hemoptysis.  She does have occasional fatigue at night after a busy day.  She is turning 79 in July and attributes this to her age.  She is scheduled to have repeat thyroid ultrasound at the [facility] in October.She has continued with monthly phlebotomies and her last phlebotomy was on May 30, 2024.  She called us to be seen sooner as her ferritin had dropped to 74 and her hemoglobin dropped slightly.  Comes in today for evaluation of recent labs drawn.  She is feeling well otherwise she denies any fatigue or shortness of breath.  She denies change in mental status.  She denies shortness of breath cough hemoptysis.  She denies any significant bone pain.  She denies any change in bowel habits bright red blood per rectum hematemesis or melena.  She overall she is doing well.           ______________________________________________________________________________         ______________________________________________________________________________    Past History    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    Bowden's esophagus 10/11/2011    Constipation 10/09/2012    GERD (gastroesophageal reflux disease)[530.81] 05/11/2003    Hypertension     NSTEMI (non-ST elevated myocardial infarction) (H) 09/28/2020    With Takotsubo cardiomyopathy Cardiac cath, small lesion of diagonal not requiring stenting Dr Crystal Sandoval Vakil    MARISSA (obstructive sleep apnea) 12/8/2022    Osteopenia 10/11/2011    dexa scans odd years starting in 2003    Precordial pain 04/28/2003    negative stress test, negative pulmonary evaluatio    Restless legs syndrome (RLS) 10/11/2011    Squamous Cell Carcinoma 10/11/2011    left leg x2    Takotsubo cardiomyopathy 09/22/2020    Episode of high BP noted on exam with no other symptoms Elevated troponin Cardiac cath, small lesion of  small diagonal, not requiring stenting Crystal Sandoval, cardiology    Urge  incontinence 10/09/2012       Past Surgical History:   Procedure Laterality Date    BONE MARROW BIOPSY, BONE SPECIMEN, NEEDLE/TROCAR N/A 10/31/2022    Procedure: BIOPSY, BONE MARROW WITH ASPIRATION;  Surgeon: Carlos Recinos DO;  Location: [facility] OR    Breast Biospy  11/11/2000    LEFT > Fibrocystic Disease > Outcome: Fibroadenoma    COLONOSCOPY  2000    COLONOSCOPY  8-7-2009    Normal, Repeat 2015    DEXA Scan  2009    EGD      EGD  2008    EGD  2003    ENDOSCOPY UPPER, COLONOSCOPY, COMBINED N/A 9/6/2018    Procedure: COMBINED ENDOSCOPY UPPER, COLONOSCOPY;  UPPER ENDOSCOPY WITH BIOPSIES AND COLONOSCOPY WITH BIOPSIES;  Surgeon: Minh Interiano DO;  Location: [facility] OR    ESOPHAGOSCOPY, GASTROSCOPY, DUODENOSCOPY (EGD), COMBINED N/A 10/29/2021    Procedure: Upper endoscopy with biopsies;  Surgeon: Panfilo Arcos MD;  Location: [facility] OR    ESOPHAGOSCOPY, GASTROSCOPY, DUODENOSCOPY (EGD), COMBINED N/A 12/14/2023    Procedure: ESOPHAGOGASTRODUODENOSCOPY with biopsy;  Surgeon: Panfilo Arcos MD;  Location: [facility] OR    Knee Replacement  2012    Oophorectomy      Ectopic Pregnancy    ROTATOR CUFF REPAIR RT/LT Right     with acromioplasty for complete rotator cuff tear, Dr Cherry    TONSILLECTOMY         Review of Systems   Constitutional: Negative.    HENT: Negative.     Eyes: Negative.    Respiratory: Negative.     Cardiovascular: Negative.    Gastrointestinal: Negative.    Endocrine: Negative.    Genitourinary: Negative.    Musculoskeletal: Negative.    Skin: Negative.    Allergic/Immunologic: Negative.    Neurological: Negative.    Hematological: Negative.    Psychiatric/Behavioral: Negative.     All other systems reviewed and are negative.         Physical Exam    /58   Pulse 83   Temp 98.6  F (37  C) (Tympanic)   Resp 14   Wt 55 kg (121 lb 4.1 oz)   SpO2 99%   BMI 20.81 kg/m      Physical Exam  Vitals and nursing note reviewed.   Constitutional:       Appearance: Normal appearance. She is normal  weight.   HENT:      Head: Normocephalic and atraumatic.      Nose: Nose normal.      Mouth/Throat:      Mouth: Mucous membranes are moist.      Pharynx: Oropharynx is clear.   Eyes:      Extraocular Movements: Extraocular movements intact.      Conjunctiva/sclera: Conjunctivae normal.      Pupils: Pupils are equal, round, and reactive to light.   Cardiovascular:      Rate and Rhythm: Normal rate and regular rhythm.      Pulses: Normal pulses.      Heart sounds: Normal heart sounds. No murmur heard.  Pulmonary:      Effort: Pulmonary effort is normal.      Breath sounds: Normal breath sounds.   Abdominal:      General: Bowel sounds are normal. There is no distension.      Palpations: Abdomen is soft. There is no mass.      Tenderness: There is no abdominal tenderness.   Musculoskeletal:         General: Normal range of motion.      Cervical back: Normal range of motion and neck supple.      Comments: No ankle edema.   Lymphadenopathy:      Cervical: No cervical adenopathy.   Skin:     General: Skin is warm and dry.   Neurological:      General: No focal deficit present.      Mental Status: She is alert and oriented to person, place, and time.   Psychiatric:         Mood and Affect: Mood normal.         Behavior: Behavior normal.          Lab Results    Recent Results (from the past 240 hour(s))   Beta 2 microglobulin    Collection Time: 08/12/24 10:36 AM   Result Value Ref Range    Beta-2-Microglobulin 2.6 (H) <2.3 mg/L   Comprehensive metabolic panel    Collection Time: 08/12/24 10:36 AM   Result Value Ref Range    Sodium 129 (L) 135 - 145 mmol/L    Potassium 4.5 3.4 - 5.3 mmol/L    Carbon Dioxide (CO2) 21 (L) 22 - 29 mmol/L    Anion Gap 11 7 - 15 mmol/L    Urea Nitrogen 20.3 8.0 - 23.0 mg/dL    Creatinine 0.93 0.51 - 0.95 mg/dL    GFR Estimate 62 >60 mL/min/1.73m2    Calcium 8.6 (L) 8.8 - 10.4 mg/dL    Chloride 97 (L) 98 - 107 mmol/L    Glucose 91 70 - 99 mg/dL    Alkaline Phosphatase 71 40 - 150 U/L    AST 30 0 -  45 U/L    ALT 23 0 - 50 U/L    Protein Total 6.4 6.4 - 8.3 g/dL    Albumin 4.0 3.5 - 5.2 g/dL    Bilirubin Total 0.3 <=1.2 mg/dL   Ferritin    Collection Time: 08/12/24 10:36 AM   Result Value Ref Range    Ferritin 81 11 - 328 ng/mL   Iron & Iron Binding Capacity    Collection Time: 08/12/24 10:36 AM   Result Value Ref Range    Iron 80 37 - 145 ug/dL    Iron Binding Capacity 249 240 - 430 ug/dL    Iron Sat Index 32 15 - 46 %   Kappa and lambda light chain    Collection Time: 08/12/24 10:36 AM   Result Value Ref Range    Kappa Free Light Chains 2.81 (H) 0.33 - 1.94 mg/dL    Lambda Free Light Chains 1.84 0.57 - 2.63 mg/dL    Kappa /Lambda Ratio 1.53 0.26 - 1.65   Lactate Dehydrogenase    Collection Time: 08/12/24 10:36 AM   Result Value Ref Range    Lactate Dehydrogenase 210 0 - 250 U/L   Macroglobulins    Collection Time: 08/12/24 10:36 AM   Result Value Ref Range    Viscosity Index 1.5 1.4 - 1.8   Protein Immunofixation Serum    Collection Time: 08/12/24 10:36 AM   Result Value Ref Range    Immunofixation ELP       No monoclonal protein seen on immunofixation.  Pathologic significance requires clinical correlation.  Lala Samayoa M.D., Ph.D.    Signout Location if Remote [facility]    Vitamin B12    Collection Time: 08/12/24 10:36 AM   Result Value Ref Range    Vitamin B12 543 232 - 1,245 pg/mL   Folate    Collection Time: 08/12/24 10:36 AM   Result Value Ref Range    Folic Acid 15.5 4.6 - 34.8 ng/mL   CBC with platelets and differential    Collection Time: 08/12/24 10:36 AM   Result Value Ref Range    WBC Count 5.4 4.0 - 11.0 10e3/uL    RBC Count 3.71 (L) 3.80 - 5.20 10e6/uL    Hemoglobin 11.4 (L) 11.7 - 15.7 g/dL    Hematocrit 32.1 (L) 35.0 - 47.0 %    MCV 87 78 - 100 fL    MCH 30.7 26.5 - 33.0 pg    MCHC 35.5 31.5 - 36.5 g/dL    RDW 12.1 10.0 - 15.0 %    Platelet Count 199 150 - 450 10e3/uL    % Neutrophils 62 %    % Lymphocytes 20 %    % Monocytes 14 %    % Eosinophils 4 %    % Basophils 1 %    % Immature Granulocytes 0  %    NRBCs per 100 WBC 0 <1 /100    Absolute Neutrophils 3.3 1.6 - 8.3 10e3/uL    Absolute Lymphocytes 1.1 0.8 - 5.3 10e3/uL    Absolute Monocytes 0.7 0.0 - 1.3 10e3/uL    Absolute Eosinophils 0.2 0.0 - 0.7 10e3/uL    Absolute Basophils 0.0 0.0 - 0.2 10e3/uL    Absolute Immature Granulocytes 0.0 <=0.4 10e3/uL    Absolute NRBCs 0.0 10e3/uL   Protein Electrophoresis Serum with Reflex    Collection Time: 08/12/24 10:36 AM   Result Value Ref Range    Albumin 3.9 3.7 - 5.1 g/dL    Alpha 1 0.3 0.2 - 0.4 g/dL    Alpha 2 0.5 0.5 - 0.9 g/dL    Beta Globulin 0.7 0.6 - 1.0 g/dL    Gamma Globulin 1.0 0.7 - 1.6 g/dL    Monoclonal Peak 0.0 <=0.0 g/dL    ELP Interpretation       Essentially normal electrophoretic pattern. No obvious monoclonal proteins seen. Pathologic significance requires clinical correlation. Lala Samayoa M.D., Ph.D.    Signout Location if Remote [facility]    Total Protein, Serum for ELP    Collection Time: 08/12/24 10:36 AM   Result Value Ref Range    Total Protein Serum for ELP 6.3 (L) 6.4 - 8.3 g/dL     Component      Latest Ref [facility] 8/19/2024  2:43 PM   Sodium      135 - 145 mmol/L 132 (L)    Potassium      3.4 - 5.3 mmol/L 4.7    Carbon Dioxide (CO2)      22 - 29 mmol/L 22    Anion Gap      7 - 15 mmol/L 11    Urea Nitrogen      8.0 - 23.0 mg/dL 19.4    Creatinine      0.51 - 0.95 mg/dL 0.93    GFR Estimate      >60 mL/min/1.73m2 62    Calcium      8.8 - 10.4 mg/dL 9.2    Chloride      98 - 107 mmol/L 99    Glucose      70 - 99 mg/dL 92    Alkaline Phosphatase      40 - 150 U/L 73    AST      0 - 45 U/L 32    ALT      0 - 50 U/L 26    Protein Total      6.4 - 8.3 g/dL 6.8    Albumin      3.5 - 5.2 g/dL 4.1    Bilirubin Total      <=1.2 mg/dL 0.2    Ferritin      11 - 328 ng/mL    Calcium Ionized Whole Blood      4.4 - 5.2 mg/dL 4.7       Legend:  (L) Low  Imaging    MA Screen Bilateral w/[place]    Result Date: 8/6/2024  EXAM: MA SCREENING BILATERAL W/ CHULA, 8/6/2024 10:14 AM COMPARISONS: 5/8/2023 HISTORY: Visit for  screening mammogram FINDINGS: No new dominant masses or malignant calcifications are seen BREAST DENSITY: There are scattered areas of fibroglandular density..     IMPRESSION: BI-RADS CATEGORY: 2 - Benign. RECOMMENDED FOLLOW-UP: Routine yearly mammography beginning at age 40 or as discussed with your provider. The images were reviewed by R2 computer aided detection. ROBERT ROGER MD   SYSTEM ID:  [id number]     Assessment and Plan: #1 :.  Hemochromatosis:Diagnosed on the basis of hepatic iron normal preload as well as elevated ferritin and heterozygous S65C mutation status post monthly phlebotomies for ferritin greater then or equal to 100 while maintaining hemoglobin greater than 11.  She has responded well with the last 1 being in May.  With recent drop in ferritin suspect drop due to frequent phlebotomies.  Repeat ferritin indicates improvement.  We will therefore reduce her phlebotomy to every 2 months for ferritin greater then equal to 100 while maintaining hemoglobin greater then 11.  2.  MGUS: [place] free light chains have decreased to near normal range ar with normal kappa lambda ratio.  M spike is 0 with no evidence of monoclonal protein on serum evaluation.  There is no evidence of endorgan damage.  The plan is to see the patient 3 months and repeat myeloma labs  3..  Mild hypercalcemia : suspect secondary to hydration status.  Repeat calcium is now normal as well as normal ionized calcium that was drawn today.  4.  History of B12 deficiency with drop in B12 level despite parenteral B12 given monthly : we will increase B12 to 1000 mcg IM q. 14 days and monitor B12 levels.  4..  Hyponatremia: Repeat serum sodium today is up to 132 suspect again secondary to hydration status.  Will plan is to see the patient in 3 months repeat CBC CMP LDH iron TIBC ferritin.   Time spent: 68 minutes was spent on this patient visit: We spent a significant amount of time reviewing lab results with the patient, repeating labs  today, discussing decreasing phlebotomy requirements with the patient, performing history/physical, documenting history/physical, ordering phlebotomies, ordering B12 given parenterally on a q. 14-day basis and ordering follow-up labs and appointments.  The longitudinal plan of care for the diagnosis(es)/condition(s) as documented were addressed during this visit. Due to the added complexity in care, I will continue to support Maria Teresa in the subsequent management and with ongoing continuity of care.  Cancer Staging   No matching staging information was found for the patient.        Signed by: Sheldon Silverman MD    CC: Luz Alcala MD

## 2024-08-20 ENCOUNTER — TELEPHONE (OUTPATIENT)
Dept: ONCOLOGY | Facility: OTHER | Age: 79
End: 2024-08-20

## 2024-08-22 ENCOUNTER — PATIENT OUTREACH (OUTPATIENT)
Dept: ONCOLOGY | Facility: OTHER | Age: 79
End: 2024-08-22

## 2024-08-22 NOTE — PROGRESS NOTES
[facility]: Cancer Care                                                                                        Returned patients call on follow up on phlebotomy and b12 schedule. All questions answered.      Signature:  Niyah Esparza RN

## 2024-08-27 ENCOUNTER — ALLIED HEALTH/NURSE VISIT (OUTPATIENT)
Dept: FAMILY MEDICINE | Facility: OTHER | Age: 79
End: 2024-08-27
Attending: INTERNAL MEDICINE
Payer: COMMERCIAL

## 2024-08-27 DIAGNOSIS — E53.8 VITAMIN B12 DEFICIENCY (NON ANEMIC): Primary | ICD-10-CM

## 2024-08-27 PROCEDURE — 250N000011 HC RX IP 250 OP 636: Performed by: FAMILY MEDICINE

## 2024-08-27 PROCEDURE — 96372 THER/PROPH/DIAG INJ SC/IM: CPT | Performed by: FAMILY MEDICINE

## 2024-08-27 RX ADMIN — CYANOCOBALAMIN 1000 MCG: 1000 INJECTION, SOLUTION INTRAMUSCULAR; SUBCUTANEOUS at 10:01

## 2024-09-09 NOTE — TELEPHONE ENCOUNTER
"Ultrasound called for me to check manual blood pressures on this patient during her US KRISTAL.     She doctors in [place] and is about to establish care with Dr. Alcala. Manual blood pressures were as follows: 190/67, 184/78, 192/76. She has had some \"small\" headaches. She denies vision problems. She says she has been able to feel her bounding heart beats in her temples. She has had high blood pressures in the past and is on losartan (which she takes in the evening). She has had a cardiac workup and states the doctor told her she didn't need any further cardiology care.     Call placed to her PCP, spoke with triage nurse, who advised patient go to nearest ER to be evaluated.     Patient was taken to [facility] ER for evaluation. Patient ambulatory.   "
never true

## 2024-09-10 ENCOUNTER — ALLIED HEALTH/NURSE VISIT (OUTPATIENT)
Dept: FAMILY MEDICINE | Facility: OTHER | Age: 79
End: 2024-09-10
Attending: INTERNAL MEDICINE
Payer: COMMERCIAL

## 2024-09-10 DIAGNOSIS — E53.8 VITAMIN B12 DEFICIENCY (NON ANEMIC): Primary | ICD-10-CM

## 2024-09-10 PROCEDURE — 250N000011 HC RX IP 250 OP 636: Performed by: FAMILY MEDICINE

## 2024-09-10 PROCEDURE — 96372 THER/PROPH/DIAG INJ SC/IM: CPT | Performed by: FAMILY MEDICINE

## 2024-09-10 RX ADMIN — CYANOCOBALAMIN 1000 MCG: 1000 INJECTION, SOLUTION INTRAMUSCULAR; SUBCUTANEOUS at 09:02

## 2024-09-18 ENCOUNTER — TELEPHONE (OUTPATIENT)
Dept: INFUSION THERAPY | Facility: OTHER | Age: 79
End: 2024-09-18

## 2024-09-18 ENCOUNTER — LAB (OUTPATIENT)
Dept: LAB | Facility: OTHER | Age: 79
End: 2024-09-18
Payer: COMMERCIAL

## 2024-09-18 DIAGNOSIS — E83.119 HEMOCHROMATOSIS, UNSPECIFIED HEMOCHROMATOSIS TYPE: ICD-10-CM

## 2024-09-18 LAB
FERRITIN SERPL-MCNC: 80 NG/ML (ref 11–328)
HGB BLD-MCNC: 12.3 G/DL (ref 11.7–15.7)

## 2024-09-18 PROCEDURE — 85018 HEMOGLOBIN: CPT | Mod: ZL

## 2024-09-18 PROCEDURE — 82728 ASSAY OF FERRITIN: CPT | Mod: ZL

## 2024-09-18 PROCEDURE — 36415 COLL VENOUS BLD VENIPUNCTURE: CPT | Mod: ZL

## 2024-09-18 NOTE — PROGRESS NOTES
Call to patient to alert she does not meet parameters ordered for phlebotomy tomorrow (Ferritin AND HGB ordered to meet parameters concurrently, and Ferritin is too low). Patient verbalizes understanding. Message to PAC pool to cancel appt.

## 2024-09-23 ENCOUNTER — ALLIED HEALTH/NURSE VISIT (OUTPATIENT)
Dept: FAMILY MEDICINE | Facility: OTHER | Age: 79
End: 2024-09-23
Attending: INTERNAL MEDICINE
Payer: COMMERCIAL

## 2024-09-23 DIAGNOSIS — E53.8 VITAMIN B12 DEFICIENCY (NON ANEMIC): Primary | ICD-10-CM

## 2024-09-23 PROCEDURE — 250N000011 HC RX IP 250 OP 636: Performed by: FAMILY MEDICINE

## 2024-09-23 PROCEDURE — 96372 THER/PROPH/DIAG INJ SC/IM: CPT | Performed by: FAMILY MEDICINE

## 2024-09-23 RX ADMIN — CYANOCOBALAMIN 1000 MCG: 1000 INJECTION, SOLUTION INTRAMUSCULAR; SUBCUTANEOUS at 10:05

## 2024-10-08 ENCOUNTER — ALLIED HEALTH/NURSE VISIT (OUTPATIENT)
Dept: FAMILY MEDICINE | Facility: OTHER | Age: 79
End: 2024-10-08
Attending: INTERNAL MEDICINE
Payer: COMMERCIAL

## 2024-10-08 DIAGNOSIS — E53.8 VITAMIN B12 DEFICIENCY (NON ANEMIC): Primary | ICD-10-CM

## 2024-10-08 PROCEDURE — 96372 THER/PROPH/DIAG INJ SC/IM: CPT | Performed by: FAMILY MEDICINE

## 2024-10-08 PROCEDURE — 250N000011 HC RX IP 250 OP 636: Performed by: FAMILY MEDICINE

## 2024-10-08 RX ADMIN — CYANOCOBALAMIN 1000 MCG: 1000 INJECTION, SOLUTION INTRAMUSCULAR; SUBCUTANEOUS at 10:15

## 2024-10-09 ENCOUNTER — DOCUMENTATION ONLY (OUTPATIENT)
Dept: OTHER | Facility: CLINIC | Age: 79
End: 2024-10-09

## 2024-10-21 ENCOUNTER — TELEPHONE (OUTPATIENT)
Dept: FAMILY MEDICINE | Facility: OTHER | Age: 79
End: 2024-10-21

## 2024-10-21 DIAGNOSIS — E53.8 VITAMIN B12 DEFICIENCY (NON ANEMIC): ICD-10-CM

## 2024-10-21 RX ORDER — CYANOCOBALAMIN 1000 UG/ML
1000 INJECTION, SOLUTION INTRAMUSCULAR; SUBCUTANEOUS
Status: ACTIVE | OUTPATIENT
Start: 2024-11-01 | End: 2025-01-10

## 2024-10-21 NOTE — TELEPHONE ENCOUNTER
Patient B12 changed to every 14 days, can please have new order to reflect, as nurse only rescheduled pt today scheduled shot thinking it was to soon, pt is rescheduled for tomorrow. Please advise  Ellie Davila LPN      Assessment and Plan: #1 :.  Hemochromatosis:Diagnosed on the basis of hepatic iron normal preload as well as elevated ferritin and heterozygous S65C mutation status post monthly phlebotomies for ferritin greater then or equal to 100 while maintaining hemoglobin greater than 11.  She has responded well with the last 1 being in May.  With recent drop in ferritin suspect drop due to frequent phlebotomies.  Repeat ferritin indicates improvement.  We will therefore reduce her phlebotomy to every 2 months for ferritin greater then equal to 100 while maintaining hemoglobin greater then 11.  2.  MGUS: [place] free light chains have decreased to near normal range ar with normal kappa lambda ratio.  M spike is 0 with no evidence of monoclonal protein on serum evaluation.  There is no evidence of endorgan damage.  The plan is to see the patient 3 months and repeat myeloma labs  3..  Mild hypercalcemia : suspect secondary to hydration status.  Repeat calcium is now normal as well as normal ionized calcium that was drawn today.  4.  History of B12 deficiency with drop in B12 level despite parenteral B12 given monthly : we will increase B12 to 1000 mcg IM q. 14 days and monitor B12 levels.  4..  Hyponatremia: Repeat serum sodium today is up to 132 suspect again secondary to hydration status.  Will plan is to see the patient in 3 months repeat CBC CMP LDH iron TIBC ferritin.

## 2024-10-22 ENCOUNTER — ALLIED HEALTH/NURSE VISIT (OUTPATIENT)
Dept: FAMILY MEDICINE | Facility: OTHER | Age: 79
End: 2024-10-22
Payer: COMMERCIAL

## 2024-10-22 DIAGNOSIS — D64.9 ANEMIA: Primary | ICD-10-CM

## 2024-10-22 PROCEDURE — 96372 THER/PROPH/DIAG INJ SC/IM: CPT | Performed by: FAMILY MEDICINE

## 2024-10-22 PROCEDURE — 250N000011 HC RX IP 250 OP 636: Performed by: FAMILY MEDICINE

## 2024-10-22 RX ADMIN — CYANOCOBALAMIN 1000 MCG: 1000 INJECTION, SOLUTION INTRAMUSCULAR; SUBCUTANEOUS at 09:40

## 2024-10-24 DIAGNOSIS — M89.9 DISORDER OF BONE AND CARTILAGE: Primary | ICD-10-CM

## 2024-10-24 DIAGNOSIS — M81.0 AGE-RELATED OSTEOPOROSIS WITHOUT CURRENT PATHOLOGICAL FRACTURE: ICD-10-CM

## 2024-10-24 DIAGNOSIS — M94.9 DISORDER OF BONE AND CARTILAGE: Primary | ICD-10-CM

## 2024-11-05 ENCOUNTER — TELEPHONE (OUTPATIENT)
Dept: INFUSION THERAPY | Facility: OTHER | Age: 79
End: 2024-11-05

## 2024-11-05 ENCOUNTER — ALLIED HEALTH/NURSE VISIT (OUTPATIENT)
Dept: FAMILY MEDICINE | Facility: OTHER | Age: 79
End: 2024-11-05
Attending: INTERNAL MEDICINE
Payer: COMMERCIAL

## 2024-11-05 DIAGNOSIS — E53.8 VITAMIN B12 DEFICIENCY (NON ANEMIC): Primary | ICD-10-CM

## 2024-11-05 RX ADMIN — CYANOCOBALAMIN 1000 MCG: 1000 INJECTION, SOLUTION INTRAMUSCULAR; SUBCUTANEOUS at 10:12

## 2024-11-16 ENCOUNTER — MYC MEDICAL ADVICE (OUTPATIENT)
Dept: FAMILY MEDICINE | Facility: OTHER | Age: 79
End: 2024-11-16

## 2024-11-20 ENCOUNTER — LAB (OUTPATIENT)
Dept: LAB | Facility: OTHER | Age: 79
End: 2024-11-20
Payer: COMMERCIAL

## 2024-11-20 DIAGNOSIS — D47.2 MONOCLONAL GAMMOPATHY: ICD-10-CM

## 2024-11-20 DIAGNOSIS — E83.19 IRON OVERLOAD: ICD-10-CM

## 2024-11-20 DIAGNOSIS — M81.0 AGE-RELATED OSTEOPOROSIS WITHOUT CURRENT PATHOLOGICAL FRACTURE: Primary | ICD-10-CM

## 2024-11-20 LAB
ALBUMIN SERPL BCG-MCNC: 4 G/DL (ref 3.5–5.2)
ALP SERPL-CCNC: 69 U/L (ref 40–150)
ALT SERPL W P-5'-P-CCNC: 24 U/L (ref 0–50)
ANION GAP SERPL CALCULATED.3IONS-SCNC: 11 MMOL/L (ref 7–15)
AST SERPL W P-5'-P-CCNC: 31 U/L (ref 0–45)
BASOPHILS # BLD AUTO: 0.1 10E3/UL (ref 0–0.2)
BASOPHILS NFR BLD AUTO: 1 %
BILIRUB SERPL-MCNC: 0.4 MG/DL
BUN SERPL-MCNC: 19.7 MG/DL (ref 8–23)
CALCIUM SERPL-MCNC: 8.6 MG/DL (ref 8.8–10.4)
CHLORIDE SERPL-SCNC: 97 MMOL/L (ref 98–107)
CREAT SERPL-MCNC: 0.95 MG/DL (ref 0.51–0.95)
EGFRCR SERPLBLD CKD-EPI 2021: 60 ML/MIN/1.73M2
EOSINOPHIL # BLD AUTO: 0.3 10E3/UL (ref 0–0.7)
EOSINOPHIL NFR BLD AUTO: 5 %
ERYTHROCYTE [DISTWIDTH] IN BLOOD BY AUTOMATED COUNT: 12.7 % (ref 10–15)
FERRITIN SERPL-MCNC: 93 NG/ML (ref 11–328)
FOLATE SERPL-MCNC: 11.5 NG/ML (ref 4.6–34.8)
GLUCOSE SERPL-MCNC: 91 MG/DL (ref 70–99)
HCO3 SERPL-SCNC: 21 MMOL/L (ref 22–29)
HCT VFR BLD AUTO: 34.1 % (ref 35–47)
HGB BLD-MCNC: 11.9 G/DL (ref 11.7–15.7)
IMM GRANULOCYTES # BLD: 0 10E3/UL
IMM GRANULOCYTES NFR BLD: 0 %
IRON BINDING CAPACITY (ROCHE): 240 UG/DL (ref 240–430)
IRON SATN MFR SERPL: 42 % (ref 15–46)
IRON SERPL-MCNC: 101 UG/DL (ref 37–145)
LDH SERPL L TO P-CCNC: 228 U/L (ref 0–250)
LYMPHOCYTES # BLD AUTO: 1.1 10E3/UL (ref 0.8–5.3)
LYMPHOCYTES NFR BLD AUTO: 19 %
MCH RBC QN AUTO: 30.4 PG (ref 26.5–33)
MCHC RBC AUTO-ENTMCNC: 34.9 G/DL (ref 31.5–36.5)
MCV RBC AUTO: 87 FL (ref 78–100)
MONOCYTES # BLD AUTO: 0.7 10E3/UL (ref 0–1.3)
MONOCYTES NFR BLD AUTO: 12 %
NEUTROPHILS # BLD AUTO: 3.6 10E3/UL (ref 1.6–8.3)
NEUTROPHILS NFR BLD AUTO: 64 %
NRBC # BLD AUTO: 0 10E3/UL
NRBC BLD AUTO-RTO: 0 /100
PLATELET # BLD AUTO: 215 10E3/UL (ref 150–450)
POTASSIUM SERPL-SCNC: 4.4 MMOL/L (ref 3.4–5.3)
PROT SERPL-MCNC: 6.5 G/DL (ref 6.4–8.3)
RBC # BLD AUTO: 3.91 10E6/UL (ref 3.8–5.2)
SODIUM SERPL-SCNC: 129 MMOL/L (ref 135–145)
TOTAL PROTEIN SERUM FOR ELP: 6.5 G/DL (ref 6.4–8.3)
WBC # BLD AUTO: 5.7 10E3/UL (ref 4–11)

## 2024-11-20 PROCEDURE — 85025 COMPLETE CBC W/AUTO DIFF WBC: CPT | Mod: ZL

## 2024-11-20 PROCEDURE — 83540 ASSAY OF IRON: CPT | Mod: ZL

## 2024-11-20 PROCEDURE — 82040 ASSAY OF SERUM ALBUMIN: CPT | Mod: ZL

## 2024-11-20 PROCEDURE — 82784 ASSAY IGA/IGD/IGG/IGM EACH: CPT | Mod: ZL

## 2024-11-20 PROCEDURE — 86334 IMMUNOFIX E-PHORESIS SERUM: CPT | Mod: 26 | Performed by: PATHOLOGY

## 2024-11-20 PROCEDURE — 82728 ASSAY OF FERRITIN: CPT | Mod: ZL

## 2024-11-20 PROCEDURE — 85004 AUTOMATED DIFF WBC COUNT: CPT | Mod: ZL

## 2024-11-20 PROCEDURE — 85810 BLOOD VISCOSITY EXAMINATION: CPT | Mod: ZL

## 2024-11-20 PROCEDURE — 84155 ASSAY OF PROTEIN SERUM: CPT | Mod: ZL

## 2024-11-20 PROCEDURE — 85041 AUTOMATED RBC COUNT: CPT | Mod: ZL

## 2024-11-20 PROCEDURE — 85014 HEMATOCRIT: CPT | Mod: ZL

## 2024-11-20 PROCEDURE — 82607 VITAMIN B-12: CPT | Mod: ZL

## 2024-11-20 PROCEDURE — 82232 ASSAY OF BETA-2 PROTEIN: CPT | Mod: ZL

## 2024-11-20 PROCEDURE — 82746 ASSAY OF FOLIC ACID SERUM: CPT | Mod: ZL

## 2024-11-20 PROCEDURE — 80053 COMPREHEN METABOLIC PANEL: CPT | Mod: ZL

## 2024-11-20 PROCEDURE — 83615 LACTATE (LD) (LDH) ENZYME: CPT | Mod: ZL

## 2024-11-20 PROCEDURE — 83521 IG LIGHT CHAINS FREE EACH: CPT | Mod: ZL

## 2024-11-20 PROCEDURE — 85048 AUTOMATED LEUKOCYTE COUNT: CPT | Mod: ZL

## 2024-11-20 PROCEDURE — 84165 PROTEIN E-PHORESIS SERUM: CPT | Mod: ZL | Performed by: PATHOLOGY

## 2024-11-20 PROCEDURE — 36415 COLL VENOUS BLD VENIPUNCTURE: CPT | Mod: ZL

## 2024-11-20 PROCEDURE — 83550 IRON BINDING TEST: CPT | Mod: ZL

## 2024-11-20 PROCEDURE — 86334 IMMUNOFIX E-PHORESIS SERUM: CPT | Mod: ZL | Performed by: PATHOLOGY

## 2024-11-20 PROCEDURE — 84165 PROTEIN E-PHORESIS SERUM: CPT | Mod: 26 | Performed by: PATHOLOGY

## 2024-11-20 PROCEDURE — 85018 HEMOGLOBIN: CPT | Mod: ZL

## 2024-11-21 LAB
B2 MICROGLOB TUMOR MARKER SER-MCNC: 2.8 MG/L
IGA SERPL-MCNC: 322 MG/DL (ref 84–499)
IGG SERPL-MCNC: 941 MG/DL (ref 610–1616)
IGM SERPL-MCNC: 99 MG/DL (ref 35–242)
KAPPA LC FREE SER-MCNC: 2.87 MG/DL (ref 0.33–1.94)
KAPPA LC FREE/LAMBDA FREE SER NEPH: 1.75 {RATIO} (ref 0.26–1.65)
LAMBDA LC FREE SERPL-MCNC: 1.64 MG/DL (ref 0.57–2.63)
VISC SER: 1.5 CP (ref 1.4–1.8)
VIT B12 SERPL-MCNC: 805 PG/ML (ref 232–1245)

## 2024-11-21 NOTE — PROGRESS NOTES
Assessment & Plan     Age-related osteoporosis without current pathological fracture  Continue with plan for reclast, tolerated first infusion well.     Hyperlipidemia LDL goal <70  Reviewed medications, risk scores. Cont current dose  - Lipid Profile (Chol, Trig, HDL, LDL calc)  - Comprehensive metabolic panel (BMP + Alb, Alk Phos, ALT, AST, Total. Bili, TP)    Restless legs syndrome (RLS)  Stable    Vitamin B12 deficiency (non anemic)  Getting injections in the clinic. Pt and SO winter elsewhere, no clear dx of malabsorption issue. Dicussed with oncology, ok for trial of oral supplementation. Okay for home injections, if necessary.   - cyanocobalamin (VITAMIN B-12) 1000 MCG tablet  Dispense: 90 tablet; Refill: 3    Hypertension, unspecified type  Controlled  - losartan (COZAAR) 25 MG tablet  Dispense: 90 tablet; Refill: 0    Urge urinary incontinence  Discussed at some length. Have discussed behavioral strategies, changes in diet. Pt is not interested in PT. We reviewed risk/benefits of medications. Looks like mirabegron/gemtesa not covered with her insurance. Pt is concerned about potential side effects of older medications as am I. Pt would be interested in taking medication only prn for long drive, etc. Okay for small fill of santura to try.   - trospium (SANCTURA) 20 MG tablet  Dispense: 10 tablet; Refill: 0    No follow-ups on file.      Meghan Morel is a 79 year old, presenting for the following health issues:    Lipids and Hypertension        11/22/2024     9:19 AM   Additional Questions   Roomed by Ellie Davila     History of Present Illness       Reason for visit:  Semi annual checkup   She is taking medications regularly.       Hyperlipidemia Follow-Up    Are you regularly taking any medication or supplement to lower your cholesterol?   Yes- atorvastatin 10 mg  Are you having muscle aches or other side effects that you think could be caused by your cholesterol lowering medication?   "No    Hypertension Follow-up    Do you check your blood pressure regularly outside of the clinic? Yes   Are you following a low salt diet? No  Are your blood pressures ever more than 140 on the top number (systolic) OR more   than 90 on the bottom number (diastolic), for example 140/90? Yes    Genitourinary - Female  Onset/Duration: chronic  Description:   Painful urination (Dysuria): No           Frequency: YES  Blood in urine (Hematuria): No  Delay in urine (Hesitency): No  Intensity: moderate  Progression of Symptoms:  slowly worsening over years  Accompanying Signs & Symptoms:  Fever/chills: No  Flank pain: No  Nausea and vomiting: No  Vaginal symptoms: none  Abdominal/Pelvic Pain: No  History:   History of frequent UTI s: No  History of kidney stones: No  Sexually Active: N/A  Possibility of pregnancy: No  Precipitating or alleviating factors: None  Therapies tried and outcome:  limiting fluids       Review of Systems  Constitutional, HEENT, cardiovascular, pulmonary, gi and gu systems are negative, except as otherwise noted.      Objective    /66 (BP Location: Left arm, Patient Position: Sitting, Cuff Size: Adult Regular)   Pulse 75   Temp 97.2  F (36.2  C) (Tympanic)   Resp 16   Ht 1.626 m (5' 4\")   Wt 54.9 kg (121 lb)   SpO2 100%   BMI 20.77 kg/m    Body mass index is 20.77 kg/m .    Physical Exam   GENERAL: alert and no distress  NECK: no adenopathy, no asymmetry, masses, or scars  RESP: lungs clear to auscultation - no rales, rhonchi or wheezes  CV: regular rates and rhythm, normal S1 S2, no S3 or S4, no murmur, click or rub, and no peripheral edema  ABDOMEN: soft, nontender, no hepatosplenomegaly, no masses and bowel sounds normal  MS: no gross musculoskeletal defects noted, no edema  SKIN: no suspicious lesions or rashes  NEURO: Normal strength and tone, mentation intact, and cranial nerves 2-12 intact  PSYCH: mentation appears normal, affect normal/bright    Results for orders placed or " performed in visit on 11/22/24   Lipid Profile (Chol, Trig, HDL, LDL calc)     Status: None   Result Value Ref Range    Cholesterol 156 <200 mg/dL    Triglycerides 56 <150 mg/dL    Direct Measure HDL 63 >=50 mg/dL    LDL Cholesterol Calculated 82 <100 mg/dL    Non HDL Cholesterol 93 <130 mg/dL    Patient Fasting > 8hrs? Yes     Narrative    Cholesterol  Desirable: < 200 mg/dL  Borderline High: 200 - 239 mg/dL  High: >= 240 mg/dL    Triglycerides  Normal: < 150 mg/dL  Borderline High: 150 - 199 mg/dL  High: 200-499 mg/dL  Very High: >= 500 mg/dL    Direct Measure HDL  Female: >= 50 mg/dL   Male: >= 40 mg/dL    LDL Cholesterol  Desirable: < 100 mg/dL  Above Desirable: 100 - 129 mg/dL   Borderline High: 130 - 159 mg/dL   High:  160 - 189 mg/dL   Very High: >= 190 mg/dL    Non HDL Cholesterol  Desirable: < 130 mg/dL  Above Desirable: 130 - 159 mg/dL  Borderline High: 160 - 189 mg/dL  High: 190 - 219 mg/dL  Very High: >= 220 mg/dL   Comprehensive metabolic panel (BMP + Alb, Alk Phos, ALT, AST, Total. Bili, TP)     Status: Abnormal   Result Value Ref Range    Sodium 131 (L) 135 - 145 mmol/L    Potassium 4.3 3.4 - 5.3 mmol/L    Carbon Dioxide (CO2) 24 22 - 29 mmol/L    Anion Gap 8 7 - 15 mmol/L    Urea Nitrogen 19.4 8.0 - 23.0 mg/dL    Creatinine 0.91 0.51 - 0.95 mg/dL    GFR Estimate 63 >60 mL/min/1.73m2    Calcium 8.9 8.8 - 10.4 mg/dL    Chloride 99 98 - 107 mmol/L    Glucose 87 70 - 99 mg/dL    Alkaline Phosphatase 67 40 - 150 U/L    AST 33 0 - 45 U/L    ALT 25 0 - 50 U/L    Protein Total 6.9 6.4 - 8.3 g/dL    Albumin 4.2 3.5 - 5.2 g/dL    Bilirubin Total 0.6 <=1.2 mg/dL    Patient Fasting > 8hrs? Yes          Signed Electronically by: Luz Alcala MD    The longitudinal plan of care for the diagnosis(es)/condition(s) as documented were addressed during this visit. Due to the added complexity in care, I will continue to support Maria Teresa in the subsequent management and with ongoing continuity of care.

## 2024-11-22 ENCOUNTER — OFFICE VISIT (OUTPATIENT)
Dept: FAMILY MEDICINE | Facility: OTHER | Age: 79
End: 2024-11-22
Attending: FAMILY MEDICINE
Payer: COMMERCIAL

## 2024-11-22 VITALS
SYSTOLIC BLOOD PRESSURE: 126 MMHG | DIASTOLIC BLOOD PRESSURE: 66 MMHG | TEMPERATURE: 97.2 F | BODY MASS INDEX: 20.66 KG/M2 | HEART RATE: 75 BPM | HEIGHT: 64 IN | OXYGEN SATURATION: 100 % | RESPIRATION RATE: 16 BRPM | WEIGHT: 121 LBS

## 2024-11-22 DIAGNOSIS — I10 HYPERTENSION, UNSPECIFIED TYPE: ICD-10-CM

## 2024-11-22 DIAGNOSIS — N39.41 URGE URINARY INCONTINENCE: ICD-10-CM

## 2024-11-22 DIAGNOSIS — E53.8 VITAMIN B12 DEFICIENCY (NON ANEMIC): ICD-10-CM

## 2024-11-22 DIAGNOSIS — G25.81 RESTLESS LEGS SYNDROME (RLS): ICD-10-CM

## 2024-11-22 DIAGNOSIS — E78.5 HYPERLIPIDEMIA LDL GOAL <70: ICD-10-CM

## 2024-11-22 DIAGNOSIS — M81.0 AGE-RELATED OSTEOPOROSIS WITHOUT CURRENT PATHOLOGICAL FRACTURE: Primary | ICD-10-CM

## 2024-11-22 LAB
ALBUMIN SERPL BCG-MCNC: 4.2 G/DL (ref 3.5–5.2)
ALBUMIN SERPL ELPH-MCNC: 4 G/DL (ref 3.7–5.1)
ALP SERPL-CCNC: 67 U/L (ref 40–150)
ALPHA1 GLOB SERPL ELPH-MCNC: 0.3 G/DL (ref 0.2–0.4)
ALPHA2 GLOB SERPL ELPH-MCNC: 0.6 G/DL (ref 0.5–0.9)
ALT SERPL W P-5'-P-CCNC: 25 U/L (ref 0–50)
ANION GAP SERPL CALCULATED.3IONS-SCNC: 8 MMOL/L (ref 7–15)
AST SERPL W P-5'-P-CCNC: 33 U/L (ref 0–45)
B-GLOBULIN SERPL ELPH-MCNC: 0.7 G/DL (ref 0.6–1)
BILIRUB SERPL-MCNC: 0.6 MG/DL
BUN SERPL-MCNC: 19.4 MG/DL (ref 8–23)
CALCIUM SERPL-MCNC: 8.9 MG/DL (ref 8.8–10.4)
CHLORIDE SERPL-SCNC: 99 MMOL/L (ref 98–107)
CHOLEST SERPL-MCNC: 156 MG/DL
CREAT SERPL-MCNC: 0.91 MG/DL (ref 0.51–0.95)
EGFRCR SERPLBLD CKD-EPI 2021: 63 ML/MIN/1.73M2
FASTING STATUS PATIENT QL REPORTED: YES
FASTING STATUS PATIENT QL REPORTED: YES
GAMMA GLOB SERPL ELPH-MCNC: 1 G/DL (ref 0.7–1.6)
GLUCOSE SERPL-MCNC: 87 MG/DL (ref 70–99)
HCO3 SERPL-SCNC: 24 MMOL/L (ref 22–29)
HDLC SERPL-MCNC: 63 MG/DL
LDLC SERPL CALC-MCNC: 82 MG/DL
LOCATION OF TASK: NORMAL
LOCATION OF TASK: NORMAL
M PROTEIN SERPL ELPH-MCNC: 0 G/DL
NONHDLC SERPL-MCNC: 93 MG/DL
POTASSIUM SERPL-SCNC: 4.3 MMOL/L (ref 3.4–5.3)
PROT PATTERN SERPL ELPH-IMP: NORMAL
PROT PATTERN SERPL IFE-IMP: NORMAL
PROT SERPL-MCNC: 6.9 G/DL (ref 6.4–8.3)
SODIUM SERPL-SCNC: 131 MMOL/L (ref 135–145)
TRIGL SERPL-MCNC: 56 MG/DL

## 2024-11-22 PROCEDURE — 80053 COMPREHEN METABOLIC PANEL: CPT | Mod: ZL | Performed by: FAMILY MEDICINE

## 2024-11-22 PROCEDURE — 99214 OFFICE O/P EST MOD 30 MIN: CPT | Performed by: FAMILY MEDICINE

## 2024-11-22 PROCEDURE — 82040 ASSAY OF SERUM ALBUMIN: CPT | Mod: ZL | Performed by: FAMILY MEDICINE

## 2024-11-22 PROCEDURE — 36415 COLL VENOUS BLD VENIPUNCTURE: CPT | Mod: ZL | Performed by: FAMILY MEDICINE

## 2024-11-22 PROCEDURE — G0463 HOSPITAL OUTPT CLINIC VISIT: HCPCS

## 2024-11-22 PROCEDURE — G2211 COMPLEX E/M VISIT ADD ON: HCPCS | Performed by: FAMILY MEDICINE

## 2024-11-22 PROCEDURE — 80061 LIPID PANEL: CPT | Mod: ZL | Performed by: FAMILY MEDICINE

## 2024-11-22 RX ORDER — TROSPIUM CHLORIDE 20 MG/1
20 TABLET, FILM COATED ORAL
Qty: 10 TABLET | Refills: 0 | Status: SHIPPED | OUTPATIENT
Start: 2024-11-22

## 2024-11-22 RX ORDER — ATORVASTATIN CALCIUM 10 MG/1
10 TABLET, FILM COATED ORAL DAILY
COMMUNITY

## 2024-11-22 RX ORDER — LOSARTAN POTASSIUM 25 MG/1
12.5 TABLET ORAL DAILY
Qty: 90 TABLET | Refills: 0 | Status: SHIPPED | OUTPATIENT
Start: 2024-11-22

## 2024-11-22 RX ORDER — LANOLIN ALCOHOL/MO/W.PET/CERES
1000 CREAM (GRAM) TOPICAL DAILY
Qty: 90 TABLET | Refills: 3 | Status: SHIPPED | OUTPATIENT
Start: 2024-11-22

## 2024-11-22 ASSESSMENT — ANXIETY QUESTIONNAIRES
7. FEELING AFRAID AS IF SOMETHING AWFUL MIGHT HAPPEN: NOT AT ALL
GAD7 TOTAL SCORE: 2
GAD7 TOTAL SCORE: 2
4. TROUBLE RELAXING: SEVERAL DAYS
7. FEELING AFRAID AS IF SOMETHING AWFUL MIGHT HAPPEN: NOT AT ALL
5. BEING SO RESTLESS THAT IT IS HARD TO SIT STILL: SEVERAL DAYS
IF YOU CHECKED OFF ANY PROBLEMS ON THIS QUESTIONNAIRE, HOW DIFFICULT HAVE THESE PROBLEMS MADE IT FOR YOU TO DO YOUR WORK, TAKE CARE OF THINGS AT HOME, OR GET ALONG WITH OTHER PEOPLE: NOT DIFFICULT AT ALL
2. NOT BEING ABLE TO STOP OR CONTROL WORRYING: NOT AT ALL
GAD7 TOTAL SCORE: 2
6. BECOMING EASILY ANNOYED OR IRRITABLE: NOT AT ALL
8. IF YOU CHECKED OFF ANY PROBLEMS, HOW DIFFICULT HAVE THESE MADE IT FOR YOU TO DO YOUR WORK, TAKE CARE OF THINGS AT HOME, OR GET ALONG WITH OTHER PEOPLE?: NOT DIFFICULT AT ALL
1. FEELING NERVOUS, ANXIOUS, OR ON EDGE: NOT AT ALL
3. WORRYING TOO MUCH ABOUT DIFFERENT THINGS: NOT AT ALL

## 2024-11-22 ASSESSMENT — PATIENT HEALTH QUESTIONNAIRE - PHQ9
10. IF YOU CHECKED OFF ANY PROBLEMS, HOW DIFFICULT HAVE THESE PROBLEMS MADE IT FOR YOU TO DO YOUR WORK, TAKE CARE OF THINGS AT HOME, OR GET ALONG WITH OTHER PEOPLE: NOT DIFFICULT AT ALL
SUM OF ALL RESPONSES TO PHQ QUESTIONS 1-9: 3
SUM OF ALL RESPONSES TO PHQ QUESTIONS 1-9: 3

## 2024-11-22 ASSESSMENT — PAIN SCALES - GENERAL: PAINLEVEL_OUTOF10: NO PAIN (0)

## 2024-11-22 NOTE — Clinical Note
I wonder if it would be possible for her to switch to oral/Sl b12 supplement? They leave the area for long periods of time and clinic visits are spendy. She is unsure about the self injection, but we can do teaching if needed.

## 2024-12-04 ENCOUNTER — LAB (OUTPATIENT)
Dept: LAB | Facility: OTHER | Age: 79
End: 2024-12-04
Payer: COMMERCIAL

## 2024-12-04 DIAGNOSIS — M81.0 AGE-RELATED OSTEOPOROSIS WITHOUT CURRENT PATHOLOGICAL FRACTURE: ICD-10-CM

## 2024-12-04 LAB
ALBUMIN SERPL BCG-MCNC: 4.1 G/DL (ref 3.5–5.2)
CALCIUM SERPL-MCNC: 8.9 MG/DL (ref 8.8–10.4)
CREAT SERPL-MCNC: 1.02 MG/DL (ref 0.51–0.95)
EGFRCR SERPLBLD CKD-EPI 2021: 55 ML/MIN/1.73M2

## 2024-12-04 PROCEDURE — 82310 ASSAY OF CALCIUM: CPT | Mod: ZL

## 2024-12-04 PROCEDURE — 82565 ASSAY OF CREATININE: CPT | Mod: ZL

## 2024-12-04 PROCEDURE — 82040 ASSAY OF SERUM ALBUMIN: CPT | Mod: ZL

## 2024-12-04 PROCEDURE — 36415 COLL VENOUS BLD VENIPUNCTURE: CPT | Mod: ZL

## 2024-12-05 ENCOUNTER — MYC MEDICAL ADVICE (OUTPATIENT)
Dept: FAMILY MEDICINE | Facility: OTHER | Age: 79
End: 2024-12-05

## 2024-12-05 ENCOUNTER — OFFICE VISIT (OUTPATIENT)
Dept: FAMILY MEDICINE | Facility: OTHER | Age: 79
End: 2024-12-05
Payer: COMMERCIAL

## 2024-12-05 VITALS
BODY MASS INDEX: 20.93 KG/M2 | TEMPERATURE: 96.6 F | DIASTOLIC BLOOD PRESSURE: 75 MMHG | RESPIRATION RATE: 16 BRPM | SYSTOLIC BLOOD PRESSURE: 128 MMHG | OXYGEN SATURATION: 96 % | WEIGHT: 122.6 LBS | HEIGHT: 64 IN | HEART RATE: 76 BPM

## 2024-12-05 DIAGNOSIS — I10 HYPERTENSION, UNSPECIFIED TYPE: ICD-10-CM

## 2024-12-05 DIAGNOSIS — I10 HYPERTENSION, UNSPECIFIED TYPE: Primary | ICD-10-CM

## 2024-12-05 PROCEDURE — G0463 HOSPITAL OUTPT CLINIC VISIT: HCPCS

## 2024-12-05 RX ORDER — LOSARTAN POTASSIUM 25 MG/1
25 TABLET ORAL DAILY
Qty: 90 TABLET | Refills: 0 | Status: SHIPPED | OUTPATIENT
Start: 2024-12-05

## 2024-12-05 NOTE — PATIENT INSTRUCTIONS
You may try full tablet of losartan    Please monitor for dizziness, lightheaded or the top number under 100.     We will see you in 2 weeks before you leave.

## 2024-12-05 NOTE — PROGRESS NOTES
"  Assessment & Plan     Hypertension, unspecified type  Controlled reading here in clinic. Home readings reviewed and variable with -160's. Did bring cuff to last apt with similar reading. Ok to trial increase to full tablet 25 mg. Discussed to notify us and return to 1/2 tablet with any dizziness, lightheadedness or SBP<100. [place] going [place] on [place], will see her back 12/19 for recheck and repeat BMP.        Return in about 2 weeks (around 12/19/2024).    Subjective   Maria Teresa is a 79 year old, presenting for the following health issues:  Hypertension        12/5/2024     8:31 AM   Additional Questions   Roomed by lala hatfield   Accompanied by spouse         12/5/2024     8:31 AM   Patient Reported Additional Medications   Patient reports taking the following new medications none     HPI     Hypertension Follow-up    Do you check your blood pressure regularly outside of the clinic? Yes   Are you following a low salt diet? No  Are your blood pressures ever more than 140 on the top number (systolic) OR more   than 90 on the bottom number (diastolic), for example 140/90? Yes    - Denies headaches, chest pain, shortness of breath or lower extremity swelling  - Taking 1/2 tablet of losartan - started about 2 weeks ago.   - Morning readings elevated-  -160's.   - Brought cuff into last apt and similar reading to clinic reading.      Review of Systems  Constitutional, neuro, ENT, endocrine, pulmonary, cardiac, gastrointestinal, genitourinary, musculoskeletal, integument and psychiatric systems are negative, except as otherwise noted.      Objective    /75 (BP Location: Left arm, Patient Position: Sitting, Cuff Size: Adult Regular)   Pulse 76   Temp (!) 96.6  F (35.9  C) (Tympanic)   Resp 16   Ht 1.626 m (5' 4\")   Wt 55.6 kg (122 lb 9.6 oz)   SpO2 96%   BMI 21.04 kg/m    Body mass index is 21.04 kg/m .    Physical Exam   GENERAL: alert and no distress  NECK: no adenopathy, no " normal appearance, without tenderness upon palpation, trachea midline, no cervical lymphadenopathy asymmetry, masses, or scars  RESP: lungs clear to auscultation - no rales, rhonchi or wheezes  CV: regular rate and rhythm, normal S1 S2, no S3 or S4, no murmur, click or rub, no peripheral edema  MS: no gross musculoskeletal defects noted, no edema  NEURO: Normal strength and tone, mentation intact and speech normal  PSYCH: mentation appears normal, affect normal/bright        Signed Electronically by: ROXANN Byrd CNP

## 2024-12-05 NOTE — PROGRESS NOTES
Please see [employer] message below.  Losartan filled 11/22 #90, 0 R  Stephany Carnes, YUE  Care Coordination

## 2024-12-05 NOTE — TELEPHONE ENCOUNTER
Thanks for update. I sent for 90 tablets of the 25 mg dose.     ROXANN Byrd CNP on 12/5/2024 at 3:48 PM

## 2024-12-09 ENCOUNTER — MYC MEDICAL ADVICE (OUTPATIENT)
Dept: FAMILY MEDICINE | Facility: OTHER | Age: 79
End: 2024-12-09

## 2024-12-09 ENCOUNTER — ONCOLOGY VISIT (OUTPATIENT)
Dept: ONCOLOGY | Facility: OTHER | Age: 79
End: 2024-12-09
Attending: INTERNAL MEDICINE
Payer: COMMERCIAL

## 2024-12-09 VITALS
SYSTOLIC BLOOD PRESSURE: 120 MMHG | TEMPERATURE: 98.3 F | HEART RATE: 76 BPM | BODY MASS INDEX: 21 KG/M2 | DIASTOLIC BLOOD PRESSURE: 60 MMHG | WEIGHT: 123.02 LBS | OXYGEN SATURATION: 99 % | HEIGHT: 64 IN | RESPIRATION RATE: 20 BRPM

## 2024-12-09 DIAGNOSIS — G62.9 NEUROPATHY: Primary | ICD-10-CM

## 2024-12-09 DIAGNOSIS — R79.9 ABNORMAL FINDING OF BLOOD CHEMISTRY, UNSPECIFIED: ICD-10-CM

## 2024-12-09 PROCEDURE — 99417 PROLNG OP E/M EACH 15 MIN: CPT | Performed by: INTERNAL MEDICINE

## 2024-12-09 PROCEDURE — G0463 HOSPITAL OUTPT CLINIC VISIT: HCPCS

## 2024-12-09 PROCEDURE — G2211 COMPLEX E/M VISIT ADD ON: HCPCS | Performed by: INTERNAL MEDICINE

## 2024-12-09 PROCEDURE — 99215 OFFICE O/P EST HI 40 MIN: CPT | Performed by: INTERNAL MEDICINE

## 2024-12-09 RX ORDER — PYRIDOXINE HCL (VITAMIN B6) 100 MG
100 TABLET ORAL DAILY
Qty: 90 TABLET | Refills: 3 | Status: SHIPPED | OUTPATIENT
Start: 2024-12-09

## 2024-12-09 ASSESSMENT — ENCOUNTER SYMPTOMS
HEMATOLOGIC/LYMPHATIC NEGATIVE: 1
EYES NEGATIVE: 1
MUSCULOSKELETAL NEGATIVE: 1
ENDOCRINE NEGATIVE: 1
PSYCHIATRIC NEGATIVE: 1
RESPIRATORY NEGATIVE: 1
FATIGUE: 1
CARDIOVASCULAR NEGATIVE: 1
DIZZINESS: 1
ALLERGIC/IMMUNOLOGIC NEGATIVE: 1
GASTROINTESTINAL NEGATIVE: 1

## 2024-12-09 ASSESSMENT — PAIN SCALES - GENERAL: PAINLEVEL_OUTOF10: NO PAIN (0)

## 2024-12-09 NOTE — PROGRESS NOTES
[facility] Hematology and Oncology Progress Note    Patient: Maria Teresa Aburto  MRN: [id number]  Date of Service: Dec 9, 2024         Reason for Visit    Chief Complaint   Patient presents with    Oncology Clinic Visit     Follow up MGUS        ECOG Performance Status: 0      Encounter Diagnoses: Hemochromatosis/hepatic iron overload  MGUS      History of Present Illness:    Ms. Maria Teresa Aburto returns for follow-up of hemochromatosis/iron overload as well as MGUS..We saw the patient in consultation at the request of Dr. Luz Alcala on 09/26/2022.  At that time, she was a 77-year-old white female with history of coronary artery disease, Bowden esophagus, hypertension who we were asked to evaluate concerning diagnosis of anemia in the setting of elevated ferritin.  The patient had been evaluated in the emergency room for hypertension.  The patient had been seen by Dr. Luz Alcala, who increased her losartan dose.  As part of the workup, labs were drawn and CBC revealed white count 6.7, H and H was 10.3 and 39.6, MCV 91, platelet count was 206,000.  Peripheral blood smear was ordered and revealed the patient likely had anemia of chronic disease.  Iron studies were obtai ined.  Ferritin was elevated at 440.  TSH was 2.51.  Vitamin B12 was 800.  Sed rate was elevated at 34.  SERAFIN was negative.  Her major complaint was related to the fact she had increased fatigue throughout the day.  She also had dyspnea on exertion.  She did have occasional reflux symptoms.  Omeprazole was not helping.  She was diagnosed with Bowden esophagus without dysplasia.  EGD was performed by Dr. Panfilo Arcos approximately a year prior.  There is no family history of hematologic malignancy.  She was a nonsmoker, nondrinker.  She also described lightheadedness when she stood up and at times, she would have to lie down to alleviate her symptoms.  She said she had COVID-19 back in 06/2021.  She had a history of basal cell carcinoma as  well as squamous cell carcinoma of skin.  She had a Mohs procedure done for basal cell carcinoma of the temple.  She was on Efudex cream.  There was evidence of iron overload.  We wanted to rule hemochromatosis by obtaining hemochromatosis gene mutation.  The patient was found to be heterozygous for the S65C mutation, but had normal C282Y and H63D.  The S65C mutation is usually not associated with iron overload or hemochromatosis unless there is a concomitant mutation.  Given her elevated ferritin, wanted to rule out occult malignancy by obtaining CT neck, which was read as a multinodular thyroid measuring less than 1.5 cm.  There was infrahilar right segmental focus measuring 11 mm x 6.5 mm.  CT chest, abdomen and pelvis revealed lung nodules bilaterally, largest being 7 mm in the right upper lobe.  She also had an MRI of the brain, which was read as normal brain for age.  We also wanted to rule out underlying monoclonal paraproteinemia and this came back positive.  The patient had an elevated kappa free light chain.  Kappa lambda ratio was 2.0, which was elevated.  Beta 2 microglobulin was elevated at 2.5.  The patient was having ongoing fatigue, especially after climbing stairs, for which she developed spastic neck pain after going up stairs with fatigue being associated.  She also had been seen by Sleep Medicine to rule out sleep apnea.  She denied tobacco exposure.  She may have been exposed to asb bestos as a child.  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  There were no reports of bright red blood per rectum, hematemesis, or heartburn.  When we saw the patient, we felt the patient had anemia with elevated ferritin.  No obvious evidence of iron overload.  Given her monoclonal gammopathy with elevated kappa free light chains, we wanted to rule out myeloma or amyloid.  We proceeded with bone marrow aspiration biopsy, which was performed on 10/31/2022.  This was found to be a normocellular bone  marrow with maturing trilineage hematopoiesis.  There was no morphologic evidence of plasma cell neoplasm, plasma cell dyscrasia or myelodysplastic syndrome.  There was increased storage iron.  [place] red stain was performed.  There was no amyloid deposition noted.  PET scan revealed the patient had a right thyroid nodule measuring 1.5 cm in dimension with an SUV of 25.8, consistent with possible neoplasm.  Otherwise, no o  her evidence of hypermetabolism in neck, chest, abdomen and pelvis.  She did have a thyroid ultrasound on 10/20/2022.  It was read as a 1.3 cm right lower lobe thyroid nodule.  Given these findings, we elected to proceed with FNA of the thyr roid nodule that was performed 12/02/2022 at [facility] by Dr. Osman Camilo.  Apparently, this nodule was difficult to biopsy.  It was heavily calcified.  According to Dr. Camilo, the area adjacent to the nodule was biopsied.  Apparently, the patient was frustrated that the biopsy had not been performed adequately and was willing to see ENT to assess the thyroid nodule.  We referred the patient to Dr. Russo, who felt this was probably difficult to biopsy and the only option would be a hemithyroidectomy.  Plan was to obtain an MRI of the neck when she returned from [place] in May and then consider biopsy at that time. absolutely rule out hemochromatosis by obtaining a MRI of the abdomen with attention to the liver and this was read as diffusely decreased signal in the liver, compatible with diffuse iron deposition consistent with hemochromatosis.  Given these findings, the patient likely had hemochromatosis and likely in addition to the S65C mutation.  She may have another mutation that was not readily examable on the current lab testing for hemochromatosis daily mutation testing.  We therefore elected to proceed with phlebotomy to maintain ferritin greater than or equal to 100 and hemoglobin greater than or equal to 10.5.  She  apparently moved down to [place] during the winter months and was seen by a hematologist there by the name of Neto Melo M.D. who continued with phlebotomies.  She had at least 2 phlebotomies with 350 mL of blood taken off.  Her ferritin apparently dropped.  Her initial phlebotomy done here had caused hypotension after removal of 600 mL of blood.  Therefore, it was decided to phlebotomize 350 mL of blood.  Apparently Dr. Melo raised the possibility of a cardiac MRI to evaluate for iron deposition, so therefore she was scheduled to see a cardiologist at the [facility] as well as endocrinologist as she was concerned about her thyroid nodule.  She said that the phlebotomies did not necessarily help her fatigue.  She still had chronic fatigue.  She gets not necessarily short of breath, but just wiped out when she goes up stairs or does any activities that is more than walking.   ClearWhen we saw the patient last, the plan was to follow up with the [facility] for further evaluation of her hemochromatosis.  She recently had seen a hepatologist at the [facility] by the name of Ky Schmidt MD at who felt the patient's recent ferritin level was down and switched the phlebotomies toprn, therefore, the patient did not require phlebotomies.  He ordered further genetic testing that was drawn.  This testing was causing [employer] gene panel looking for other genetic causes for iron overload syndrome.  Also MRI of the liver was scheduled for 2 months.  She will follow up with Dr. Xu Valdes.  She also saw an endocrinologist by the name of Woody Lebron.  FNA was perfformed of the thyroid nodule that came back benign.  She also saw Dr. Schmidt of Cardiology who recommended she stop the losartan.  Of note, the patient has been on atorvastatin and Lipitor, which certainly can cause fatigue or brain fog, which she admits to.  According to the cardiologist, her lipid profile was excellent.  This was drawn on 06/01/2023 and her  total cholesterol was 131.  Her LDL cholesterol was 61.  She is scheduled to have a B12 injection that was ordered by Dr. Alcala. She was seen by hepatology at [facility] in September 6 of this year.  Noted her last phlebotomy was performed in April 2023 and her ferritin level has been relatively normal since then.  He noted that the [employer] gene panel was negative for HFE genes.  He repeated the MRI of the abdomen and he noted that the hepatic iron content was mildly increased and improved compared to last MRI result the patient likely had mild hepatic iron overload.  Noted the patient did not have any evidence of iron overload at this time as well as her ferritin levels are relatively normal..  He recommended monitoring ferritin levels every 6 months if they are significantly elevated then will consider phlebotomy only if she was not anemic.  Patient returns today she is doing relatively well.  She is planning to drive to [place] in the coming weeks for the winter.  Denies any fevers, night sweats or weight loss denies any bone pain, jaundice.  She denies any shortness of breath cough or hemoptysis fatigue seems to have improved she continued with monthly B12 injections ordered by he did have a recent EGD performed with Dr. Arcos for evaluation of Bowden's esophagus and this came back negative for dysplasia after biopsy of the EG junction. It was noted that the patient had a hiatal hernia but she is asymptomatic on chronic omeprazole therapy.  She she returned in May after wintering in [place].  Her last phlebotomy was on May 30, 2024 and she has not had any since.  More recently in August we elected to decrease her phlebotomy requirements by performing phlebotomies every 2 months for ferritin greater than equal to 100, while maintaining hemoglobin greater than 11.  Has had a history of hyponatremia which was felt to be secondary to hydration status.  She plans to return to [place] on December 20.   She recently saw Dr. Alcala who switched her parenteral B12 to oral B12 as there was no significant evidence of malabsorption.  Patient continues on Mirapex for restless leg syndrome.  She was recently seen by the nurse practitioner in December 5 with readings of her blood pressure with elevated SBP of 1 50-1 60.  We decided to increase her losartan to 25 mg p.o. daily.  She was told to decrease to half tablet if she becomes dizzy or her SBP is less than 100.She recently received Reclast for osteopenia under direction of Dr. Alcala.  Recently was seen at the [facility] for evaluation of thyroid nodules.  Thyroid ultrasound was performed on October 14 and the findings were that there was stable findings and was recommended to repeat thyroid ultrasound in 2 years.  Her sodium was low on 129.  It was rechecked on November 22 and came back improved at 131.  Patient admits that sometimes she is not attentive with her fluid intake but did increase her fluid intake prior to November 22..  She states she continues to feel fatigued at the end of the day.  She still gets this nondescript dizzy spell comes and goes not associated with sitting or standing.  Otherwise she is doing really well.  She denies shortness of breath, cough or hemoptysis.  She denies abdominal pain or change in bowel habits.  She denies bright red blood per rectum, hematemesis or melena.  Denies significant headaches or change in mental status.  Her ferritin has been remaining below 100.  Her hemoglobin has been stable.  She will also has mild neuropathic symptoms involving the soles of her feet.  She feels like the Mirapex is not helping her restless leg syndrome and plans to follow-up.      ______________________________________________________________________________    Past History    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    Bowden's esophagus 10/11/2011    Constipation 10/09/2012    GERD (gastroesophageal reflux  disease)[530.81] 05/11/2003    Hypertension     NSTEMI (non-ST elevated myocardial infarction) (H) 09/28/2020    With Takotsubo cardiomyopathy Cardiac cath, small lesion of diagonal not requiring stenting Dr Crystal Sandoval Vakil    MARISSA (obstructive sleep apnea) 12/8/2022    Osteopenia 10/11/2011    dexa scans odd years starting in 2003    Precordial pain 04/28/2003    negative stress test, negative pulmonary evaluatio    Restless legs syndrome (RLS) 10/11/2011    Squamous Cell Carcinoma 10/11/2011    left leg x2    Takotsubo cardiomyopathy 09/22/2020    Episode of high BP noted on exam with no other symptoms Elevated troponin Cardiac cath, small lesion of  small diagonal, not requiring stenting Crystal Sandoval, cardiology    Urge incontinence 10/09/2012       Past Surgical History:   Procedure Laterality Date    BONE MARROW BIOPSY, BONE SPECIMEN, NEEDLE/TROCAR N/A 10/31/2022    Procedure: BIOPSY, BONE MARROW WITH ASPIRATION;  Surgeon: Carlos Recinos DO;  Location: [facility] OR    Breast Biospy  11/11/2000    LEFT > Fibrocystic Disease > Outcome: Fibroadenoma    COLONOSCOPY  2000    COLONOSCOPY  8-7-2009    Normal, Repeat 2015    DEXA Scan  2009    EGD      EGD  2008    EGD  2003    ENDOSCOPY UPPER, COLONOSCOPY, COMBINED N/A 9/6/2018    Procedure: COMBINED ENDOSCOPY UPPER, COLONOSCOPY;  UPPER ENDOSCOPY WITH BIOPSIES AND COLONOSCOPY WITH BIOPSIES;  Surgeon: Minh Interiano DO;  Location: [facility] OR    ESOPHAGOSCOPY, GASTROSCOPY, DUODENOSCOPY (EGD), COMBINED N/A 10/29/2021    Procedure: Upper endoscopy with biopsies;  Surgeon: Panfilo Arcos MD;  Location: [facility] OR    ESOPHAGOSCOPY, GASTROSCOPY, DUODENOSCOPY (EGD), COMBINED N/A 12/14/2023    Procedure: ESOPHAGOGASTRODUODENOSCOPY with biopsy;  Surgeon: Panfilo Arcos MD;  Location: [facility] OR    Knee Replacement  2012    Oophorectomy      Ectopic Pregnancy    ROTATOR CUFF REPAIR RT/LT Right     with acromioplasty for complete rotator cuff tear, Dr Cherry  "   TONSILLECTOMY         Review of Systems   Constitutional:  Positive for fatigue.   HENT: Negative.     Eyes: Negative.    Respiratory: Negative.     Cardiovascular: Negative.    Gastrointestinal: Negative.    Endocrine: Negative.    Genitourinary: Negative.    Musculoskeletal: Negative.    Skin: Negative.    Allergic/Immunologic: Negative.    Neurological:  Positive for dizziness.   Hematological: Negative.    Psychiatric/Behavioral: Negative.     All other systems reviewed and are negative.         Physical Exam    /60   Pulse 76   Temp 98.3  F (36.8  C) (Tympanic)   Resp 20   Ht 1.626 m (5' 4\")   Wt 55.8 kg (123 lb 0.3 oz)   SpO2 99%   BMI 21.12 kg/m      Physical Exam  Vitals and nursing note reviewed.   Constitutional:       Appearance: Normal appearance. She is normal weight.   HENT:      Head: Normocephalic and atraumatic.      Nose: Nose normal.      Mouth/Throat:      Mouth: Mucous membranes are dry.      Pharynx: Oropharynx is clear.   Eyes:      Extraocular Movements: Extraocular movements intact.      Conjunctiva/sclera: Conjunctivae normal.      Pupils: Pupils are equal, round, and reactive to light.   Cardiovascular:      Rate and Rhythm: Normal rate and regular rhythm.      Pulses: Normal pulses.      Heart sounds: Normal heart sounds. No murmur heard.  Pulmonary:      Effort: Pulmonary effort is normal.      Breath sounds: Normal breath sounds.   Abdominal:      General: Bowel sounds are normal. There is no distension.      Palpations: Abdomen is soft. There is no mass.      Tenderness: There is no abdominal tenderness.   Musculoskeletal:         General: Normal range of motion.      Cervical back: Normal range of motion and neck supple.      Comments: No ankle edema.   Lymphadenopathy:      Cervical: No cervical adenopathy.   Skin:     General: Skin is warm and dry.   Neurological:      General: No focal deficit present.      Mental Status: She is alert and oriented to person, place, " and time.   Psychiatric:         Mood and Affect: Mood normal.         Behavior: Behavior normal.          Lab Results  Component      Latest Ref Rng 11/20/2024  10:06 AM   WBC      4.0 - 11.0 10e3/uL 5.7    RBC Count      3.80 - 5.20 10e6/uL 3.91    Hemoglobin      11.7 - 15.7 g/dL 11.9    Hematocrit      35.0 - 47.0 % 34.1 (L)    MCV      78 - 100 fL 87    MCH      26.5 - 33.0 pg 30.4    MCHC      31.5 - 36.5 g/dL 34.9    RDW      10.0 - 15.0 % 12.7    Platelet Count      150 - 450 10e3/uL 215    % Neutrophils      % 64    % Lymphocytes      % 19    % Monocytes      % 12    % Eosinophils      % 5    % Basophils      % 1    % Immature Granulocytes      % 0    NRBCs per 100 WBC      <1 /100 0    Absolute Neutrophils      1.6 - 8.3 10e3/uL 3.6    Absolute Lymphocytes      0.8 - 5.3 10e3/uL 1.1    Absolute Monocytes      0.0 - 1.3 10e3/uL 0.7    Absolute Eosinophils      0.0 - 0.7 10e3/uL 0.3    Absolute Basophils      0.0 - 0.2 10e3/uL 0.1    Absolute Immature Granulocytes      <=0.4 10e3/uL 0.0    Absolute NRBCs      10e3/uL 0.0    Sodium      135 - 145 mmol/L 129 (L)    Potassium      3.4 - 5.3 mmol/L 4.4    Carbon Dioxide (CO2)      22 - 29 mmol/L 21 (L)    Anion Gap      7 - 15 mmol/L 11    Urea Nitrogen      8.0 - 23.0 mg/dL 19.7    Creatinine      0.51 - 0.95 mg/dL 0.95    GFR Estimate      >60 mL/min/1.73m2 60 (L)    Calcium      8.8 - 10.4 mg/dL 8.6 (L)    Chloride      98 - 107 mmol/L 97 (L)    Glucose      70 - 99 mg/dL 91    Alkaline Phosphatase      40 - 150 U/L 69    AST      0 - 45 U/L 31    ALT      0 - 50 U/L 24    Protein Total      6.4 - 8.3 g/dL 6.5    Albumin      3.5 - 5.2 g/dL 4.0    Bilirubin Total      <=1.2 mg/dL 0.4    Patient Fasting?    Albumin Fraction      3.7 - 5.1 g/dL 4.0    Alpha 1 Fraction      0.2 - 0.4 g/dL 0.3    Alpha 2 Fraction      0.5 - 0.9 g/dL 0.6    Beta Fraction      0.6 - 1.0 g/dL 0.7    Gamma Fraction      0.7 - 1.6 g/dL 1.0    Monoclonal Peak      <=0.0 g/dL 0.0    ELP  Interpretation: Essentially normal electrophoretic pattern. No obvious monoclonal proteins seen. Pathologic significance requires clinical correlation. Santiago Greene M.D., Ph.D., Pathologist.    Signout Location if Remote BTH1    Signout Location if Remote BTH1    IGG      610 - 1,616 mg/dL 941    IGA      84 - 499 mg/dL 322    IGM      35 - 242 mg/dL 99    Iron      37 - 145 ug/dL 101    Iron Binding Capacity      240 - 430 ug/dL 240    Iron Sat Index      15 - 46 % 42    [place] Free Lt Chain      0.33 - 1.94 mg/dL 2.87 (H)    Lambda Free Lt Chain      0.57 - 2.63 mg/dL 1.64    Kappa Lambda Ratio      0.26 - 1.65  1.75 (H)    Immunofixation ELP No monoclonal protein seen on immunofixation. Pathologic significance requires clinical correlation. Santiago Greene M.D., Ph.D., Pathologist    Beta-2-Microglobulin      <2.3 mg/L 2.8 (H)    Ferritin      11 - 328 ng/mL 93    Folate      4.6 - 34.8 ng/mL 11.5    Lactate Dehydrogenase      0 - 250 U/L 228    Viscosity Index      1.4 - 1.8  1.5    Vitamin B12      232 - 1,245 pg/mL 805    Total Protein Serum for ELP      6.4 - 8.3 g/dL 6.5      Component      Latest Ref [facility] 11/22/2024  10:45 AM   WBC      4.0 - 11.0 10e3/uL    RBC Count      3.80 - 5.20 10e6/uL    Hemoglobin      11.7 - 15.7 g/dL    Hematocrit      35.0 - 47.0 %    MCV      78 - 100 fL    MCH      26.5 - 33.0 pg    MCHC      31.5 - 36.5 g/dL    RDW      10.0 - 15.0 %    Platelet Count      150 - 450 10e3/uL    % Neutrophils      %    % Lymphocytes      %    % Monocytes      %    % Eosinophils      %    % Basophils      %    % Immature Granulocytes      %    NRBCs per 100 WBC      <1 /100    Absolute Neutrophils      1.6 - 8.3 10e3/uL    Absolute Lymphocytes      0.8 - 5.3 10e3/uL    Absolute Monocytes      0.0 - 1.3 10e3/uL    Absolute Eosinophils      0.0 - 0.7 10e3/uL    Absolute Basophils      0.0 - 0.2 10e3/uL    Absolute Immature Granulocytes      <=0.4 10e3/uL    Absolute NRBCs      10e3/uL     Sodium      135 - 145 mmol/L 131 (L)    Potassium      3.4 - 5.3 mmol/L 4.3    Carbon Dioxide (CO2)      22 - 29 mmol/L 24    Anion Gap      7 - 15 mmol/L 8    Urea Nitrogen      8.0 - 23.0 mg/dL 19.4    Creatinine      0.51 - 0.95 mg/dL 0.91    GFR Estimate      >60 mL/min/1.73m2 63    Calcium      8.8 - 10.4 mg/dL 8.9    Chloride      98 - 107 mmol/L 99    Glucose      70 - 99 mg/dL 87    Alkaline Phosphatase      40 - 150 U/L 67    AST      0 - 45 U/L 33    ALT      0 - 50 U/L 25    Protein Total      6.4 - 8.3 g/dL 6.9    Albumin      3.5 - 5.2 g/dL 4.2    Bilirubin Total      <=1.2 mg/dL 0.6    Patient Fasting? Yes    Albumin Fraction      3.7 - 5.1 g/dL    Alpha 1 Fraction      0.2 - 0.4 g/dL    Alpha 2 Fraction      0.5 - 0.9 g/dL    Beta Fraction      0.6 - 1.0 g/dL    Gamma Fraction      0.7 - 1.6 g/dL    Monoclonal Peak      <=0.0 g/dL    ELP Interpretation:    Signout Location if Remote    IGG      610 - 1,616 mg/dL    IGA      84 - 499 mg/dL    IGM      35 - 242 mg/dL    Iron      37 - 145 ug/dL    Iron Binding Capacity      240 - 430 ug/dL    Iron Sat Index      15 - 46 %    Kappa Free Lt Chain      0.33 - 1.94 mg/dL    Lambda Free Lt Chain      0.57 - 2.63 mg/dL    Kappa Lambda Ratio      0.26 - 1.65     Immunofixation ELP    Beta-2-Microglobulin      <2.3 mg/L    Ferritin      11 - 328 ng/mL    Folate      4.6 - 34.8 ng/mL    Lactate Dehydrogenase      0 - 250 U/L    Viscosity Index      1.4 - 1.8     Vitamin B12      232 - 1,245 pg/mL    Total Protein Serum for ELP      6.4 - 8.3 g/dL       Legend:  (L) Low  (H) High    Recent Results (from the past 240 hours)   Calcium    Collection Time: 12/04/24  7:36 AM   Result Value Ref Range    Calcium 8.9 8.8 - 10.4 mg/dL   Creatinine    Collection Time: 12/04/24  7:36 AM   Result Value Ref Range    Creatinine 1.02 (H) 0.51 - 0.95 mg/dL    GFR Estimate 55 (L) >60 mL/min/1.73m2   Albumin level    Collection Time: 12/04/24  7:36 AM   Result Value Ref Range     Albumin 4.1 3.5 - 5.2 g/dL       Imaging    No results found.    Assessment and Plan: #1 :.  Hemochromatosis: Diagnosed on the basis of hepatic iron overload as well as elevated ferritin in the setting of a heterozygous S65C mutation : status post monthly phlebotomies for ferritin greater than equal to 100 while maintaining hemoglobin greater than 11.  Her last phlebotomy was on May 30 and we elected to decrease phlebotomies to every 2 months for ferritin greater than equal to 100 while maintaining hemoglobin greater than 11.  She is not a candidate for phlebotomy today but otherwise she plans to return to [place] and continue phlebotomies there :we would recommend phlebotomy of 350 cc if ferritin is greater than 100 while maintaining hemoglobin of 11 or greater.  2.  :.  MGUS: There is no evidence of endorgan damage.  There is improved kappa free light chains in the setting of a slightly increased kappa lambda ratio which is more or less stable when compared to prior laboratory values.  Plan at this point is to repeat myeloma labs when she returns in April/May 2025.  3.  :.  History of hyponatremia: Recent sodium improved to 131 ,suspect related to hydration status.  4.  :.  History of B12 deficiency: Patient was switched from parenteral B12 to oral B12 1000 mcg p.o. daily.  Will continue to monitor.  5.:.  Grade 1 neuropathy of the feet: We will empirically treat with vitamin B6 100 mg p.o. daily  The plan is to see the patient when she returns in April/May and obtain CBC, CMP, LDH, iron/TIBC, ferritin, B12 folate,Myeloma labs,/Free T4, TSH.  Time spent: 102 minutes was spent on this patient visit : We spent enormous amount of time reviewing multiple lab results, reviewing multiple physician/provider notes including PCP notes, nurse practitioner notes [facility] notes, we discussed lab results with the patient, we performed a history/physical, with documented history/physical and ordered ongoing phlebotomies as  well as follow-up labs and appointments.  The longitudinal plan of care for the diagnosis(es)/condition(s) as documented were addressed during this visit. Due to the added complexity in care, I will continue to support Maria Teresa in the subsequent management and with ongoing continuity of care.      Cancer Staging   No matching staging information was found for the patient.        Signed by: Sheldon Silverman MD    CC: Luz Alcala MD

## 2024-12-09 NOTE — NURSING NOTE
"Oncology Rooming Note    December 9, 2024 3:42 PM   Maria Teresa Aburto is a 79 year old female who presents for:    Chief Complaint   Patient presents with    Oncology Clinic Visit     Follow up MGUS      Initial Vitals: /60   Pulse 76   Temp 98.3  F (36.8  C) (Tympanic)   Resp 20   Ht 1.626 m (5' 4\")   Wt 55.8 kg (123 lb 0.3 oz)   SpO2 99%   BMI 21.12 kg/m   Estimated body mass index is 21.12 kg/m  as calculated from the following:    Height as of this encounter: 1.626 m (5' 4\").    Weight as of this encounter: 55.8 kg (123 lb 0.3 oz). Body surface area is 1.59 meters squared.  No Pain (0) Comment: Data Unavailable   No LMP recorded. Patient is postmenopausal.  Allergies reviewed: Yes  Medications reviewed: Yes    Medications: Medication refills not needed today.  Pharmacy name entered into EPIC:    [id number] - [place], [place] - [id number] - [facility] [address]    Frailty Screening:   Is the patient here for a new oncology consult visit in cancer care? 2. No            Gege Magaña LPN             "

## 2024-12-09 NOTE — TELEPHONE ENCOUNTER
She may continue to try 2 tablets (0.5 mg total)    I have an appointment with her on 12/19, we can discuss alternative medication if the 2 pills is not effective.    Is this ok with her?    ROXANN Byrd CNP on 12/9/2024 at 11:13 AM

## 2024-12-12 DIAGNOSIS — E83.119 HEMOCHROMATOSIS: Primary | ICD-10-CM

## 2024-12-19 ENCOUNTER — OFFICE VISIT (OUTPATIENT)
Dept: FAMILY MEDICINE | Facility: OTHER | Age: 79
End: 2024-12-19
Payer: COMMERCIAL

## 2024-12-19 VITALS
HEART RATE: 72 BPM | BODY MASS INDEX: 20.84 KG/M2 | RESPIRATION RATE: 16 BRPM | WEIGHT: 121.4 LBS | TEMPERATURE: 97.5 F | SYSTOLIC BLOOD PRESSURE: 115 MMHG | DIASTOLIC BLOOD PRESSURE: 58 MMHG | OXYGEN SATURATION: 100 %

## 2024-12-19 DIAGNOSIS — I10 HYPERTENSION, UNSPECIFIED TYPE: Primary | ICD-10-CM

## 2024-12-19 LAB
ANION GAP SERPL CALCULATED.3IONS-SCNC: 9 MMOL/L (ref 7–15)
BUN SERPL-MCNC: 17 MG/DL (ref 8–23)
CALCIUM SERPL-MCNC: 8.6 MG/DL (ref 8.8–10.4)
CHLORIDE SERPL-SCNC: 99 MMOL/L (ref 98–107)
CREAT SERPL-MCNC: 0.93 MG/DL (ref 0.51–0.95)
EGFRCR SERPLBLD CKD-EPI 2021: 62 ML/MIN/1.73M2
GLUCOSE SERPL-MCNC: 91 MG/DL (ref 70–99)
HCO3 SERPL-SCNC: 23 MMOL/L (ref 22–29)
POTASSIUM SERPL-SCNC: 4.8 MMOL/L (ref 3.4–5.3)
SODIUM SERPL-SCNC: 131 MMOL/L (ref 135–145)

## 2024-12-19 PROCEDURE — 36415 COLL VENOUS BLD VENIPUNCTURE: CPT | Mod: ZL

## 2024-12-19 PROCEDURE — 80048 BASIC METABOLIC PNL TOTAL CA: CPT | Mod: ZL

## 2024-12-19 PROCEDURE — 82435 ASSAY OF BLOOD CHLORIDE: CPT | Mod: ZL

## 2024-12-19 PROCEDURE — G0463 HOSPITAL OUTPT CLINIC VISIT: HCPCS

## 2024-12-19 RX ORDER — LOSARTAN POTASSIUM 25 MG/1
25 TABLET ORAL DAILY
Qty: 90 TABLET | Refills: 0 | Status: SHIPPED | OUTPATIENT
Start: 2024-12-19

## 2024-12-19 ASSESSMENT — PAIN SCALES - GENERAL: PAINLEVEL_OUTOF10: NO PAIN (0)

## 2024-12-19 NOTE — PROGRESS NOTES
Assessment & Plan     Hypertension, unspecified type  Controlled reading here in clinic today. Patient forgot home reading record at home, however sounds like readings are improving from previous. Occasional high reading of 140-150's and one low reading- otherwise 120-130's. Continue losartan at 25 mg for now. Patient is leaving to go to [place] for the winter tomorrow morning. Discussed that with SBP <100, dizziness or lightheadedness- would recommend decreasing to half tablet again. She will update us via [email or web address]. Repeat BMP today. We will follow-up when patient returns to [place] in the spring.  - Basic metabolic panel  - losartan (COZAAR) 25 MG tablet  Dispense: 90 tablet; Refill: 0      No follow-ups on file.    Subjective   Maria Teresa is a 79 year old, presenting for the following health issues:  Hypertension        12/19/2024     8:20 AM   Additional Questions   Roomed by Carlito Lo lpn   Accompanied by spouse         12/19/2024     8:23 AM   Patient Reported Additional Medications   Patient reports taking the following new medications B-6 added by Dr Silverman, 1 tablet a day     HPI     Hypertension Follow-up    Do you check your blood pressure regularly outside of the clinic? Yes   Are you following a low salt diet? No  Are your blood pressures ever more than 140 on the top number (systolic) OR more   than 90 on the bottom number (diastolic), for example 140/90? Yes  Patient forgot machine and bp numbers at home will bring by later today    - Denies headaches, chest pain, shortness of breath, or lower extremity swelling  - Forgot home readings  - Occasionally 's in the evening  - One low reading with SBP 90.     Review of Systems  Constitutional, neuro, ENT, endocrine, pulmonary, cardiac, gastrointestinal, genitourinary, musculoskeletal, integument and psychiatric systems are negative, except as otherwise noted.      Objective    /58 (BP Location: Left arm, Patient Position: Sitting, Cuff  Size: Adult Regular)   Pulse 72   Temp 97.5  F (36.4  C) (Tympanic)   Resp 16   Wt 55.1 kg (121 lb 6.4 oz)   SpO2 100%   BMI 20.84 kg/m    Body mass index is 20.84 kg/m .    Physical Exam   GENERAL: alert and no distress  RESP: lungs clear to auscultation - no rales, rhonchi or wheezes  CV: regular rate and rhythm, normal S1 S2, no S3 or S4, no murmur, click or rub, no peripheral edema  MS: no gross musculoskeletal defects noted, no edema  NEURO: Normal strength and tone, mentation intact and speech normal  PSYCH: mentation appears normal, affect normal/bright      Signed Electronically by: ROXANN Byrd CNP

## 2025-01-04 ENCOUNTER — HEALTH MAINTENANCE LETTER (OUTPATIENT)
Age: 80
End: 2025-01-04

## 2025-01-30 ENCOUNTER — TELEPHONE (OUTPATIENT)
Dept: FAMILY MEDICINE | Facility: OTHER | Age: 80
End: 2025-01-30

## 2025-01-30 DIAGNOSIS — Z12.83 SCREENING EXAM FOR SKIN CANCER: Primary | ICD-10-CM

## 2025-01-30 NOTE — TELEPHONE ENCOUNTER
11:05 AM    Reason for Call: Phone Call    Description: pt called and would like a referral to see Dr. Oseguera in derm. Please call pt     Was an appointment offered for this call? No  If yes : Appointment type              Date    Preferred method for responding to this message: Telephone Call  What is your phone number ? [phone]     If we cannot reach you directly, may we leave a detailed response at the number you provided? No    Can this message wait until your PCP/provider returns, if available today? Radha Colon

## 2025-02-03 NOTE — TELEPHONE ENCOUNTER
Spoke with patient regarding phone call. Patient and patient's  would like referral to see Dr. Oseguera for skin checks. Previous history of skin cancer.

## 2025-02-13 ENCOUNTER — MYC MEDICAL ADVICE (OUTPATIENT)
Dept: FAMILY MEDICINE | Facility: OTHER | Age: 80
End: 2025-02-13

## 2025-02-17 DIAGNOSIS — I10 HYPERTENSION, UNSPECIFIED TYPE: ICD-10-CM

## 2025-02-17 RX ORDER — LOSARTAN POTASSIUM 25 MG/1
25 TABLET ORAL DAILY
Qty: 90 TABLET | Refills: 3 | Status: SHIPPED | OUTPATIENT
Start: 2025-02-17

## 2025-02-17 NOTE — TELEPHONE ENCOUNTER
Losartan  Last Written Prescription Date: 12/19/24  Last Fill Quantity: 90 # of Refills: 0  Last Office Visit: 12/19/24

## 2025-02-26 ENCOUNTER — MYC MEDICAL ADVICE (OUTPATIENT)
Dept: FAMILY MEDICINE | Facility: OTHER | Age: 80
End: 2025-02-26

## 2025-02-27 NOTE — TELEPHONE ENCOUNTER
She may decrease to 500 mcg.     A high B12 isn't typically a  major  cause of concern as the excess is excreted in the urine.     ROXANN Byrd CNP on 2/27/2025 at 12:28 PM

## 2025-04-23 DIAGNOSIS — K22.70 BARRETT'S ESOPHAGUS WITHOUT DYSPLASIA: ICD-10-CM

## 2025-04-23 RX ORDER — OMEPRAZOLE 40 MG/1
40 CAPSULE, DELAYED RELEASE ORAL DAILY
Qty: 90 CAPSULE | Refills: 3 | Status: SHIPPED | OUTPATIENT
Start: 2025-04-23

## 2025-04-23 NOTE — TELEPHONE ENCOUNTER
Omeprazole      Last Written Prescription Date:  5/21/24  Last Fill Quantity: 90,   # refills: 3  Last Office Visit: 12/19/24  Future Office visit:    Next 5 appointments (look out 90 days)      May 02, 2025 9:30 AM  (Arrive by 9:15 AM)  Provider Visit with Luz Alcala MD  [facility] ([facility] ) [address] [address]  [phone]     May 19, 2025 3:00 PM  (Arrive by 2:45 PM)  Return Visit with Sheldon Silverman MD  [facility] ([facility] ) [address] [address]  [phone]             Routing refill request to provider for review/approval because:

## 2025-05-02 ENCOUNTER — ANCILLARY PROCEDURE (OUTPATIENT)
Dept: GENERAL RADIOLOGY | Facility: OTHER | Age: 80
End: 2025-05-02
Attending: FAMILY MEDICINE
Payer: COMMERCIAL

## 2025-05-02 DIAGNOSIS — M25.532 LEFT WRIST PAIN: ICD-10-CM

## 2025-05-02 PROCEDURE — 73110 X-RAY EXAM OF WRIST: CPT | Mod: 26 | Performed by: RADIOLOGY

## 2025-05-02 PROCEDURE — 73110 X-RAY EXAM OF WRIST: CPT | Mod: TC,LT

## 2025-05-26 DIAGNOSIS — K22.70 BARRETT'S ESOPHAGUS WITHOUT DYSPLASIA: ICD-10-CM

## 2025-05-27 DIAGNOSIS — G25.81 RESTLESS LEGS SYNDROME (RLS): ICD-10-CM

## 2025-05-28 ENCOUNTER — RESULTS FOLLOW-UP (OUTPATIENT)
Dept: FAMILY MEDICINE | Facility: OTHER | Age: 80
End: 2025-05-28

## 2025-05-28 ENCOUNTER — HOSPITAL ENCOUNTER (OUTPATIENT)
Dept: BONE DENSITY | Facility: HOSPITAL | Age: 80
Discharge: HOME | End: 2025-05-28
Attending: FAMILY MEDICINE
Payer: COMMERCIAL

## 2025-05-28 DIAGNOSIS — M81.0 AGE-RELATED OSTEOPOROSIS WITHOUT CURRENT PATHOLOGICAL FRACTURE: ICD-10-CM

## 2025-05-28 PROCEDURE — 77080 DXA BONE DENSITY AXIAL: CPT

## 2025-05-28 PROCEDURE — 77080 DXA BONE DENSITY AXIAL: CPT | Mod: 26 | Performed by: RADIOLOGY

## 2025-05-28 RX ORDER — OMEPRAZOLE 40 MG/1
40 CAPSULE, DELAYED RELEASE ORAL DAILY
Qty: 90 CAPSULE | Refills: 3 | Status: SHIPPED | OUTPATIENT
Start: 2025-05-28

## 2025-05-28 RX ORDER — PRAMIPEXOLE DIHYDROCHLORIDE 0.25 MG/1
.25-.5 TABLET ORAL AT BEDTIME
Qty: 180 TABLET | Refills: 3 | Status: SHIPPED | OUTPATIENT
Start: 2025-05-28

## 2025-06-03 ENCOUNTER — OFFICE VISIT (OUTPATIENT)
Dept: DERMATOLOGY | Facility: OTHER | Age: 80
End: 2025-06-03
Attending: DERMATOLOGY
Payer: COMMERCIAL

## 2025-06-03 VITALS
RESPIRATION RATE: 16 BRPM | SYSTOLIC BLOOD PRESSURE: 137 MMHG | HEART RATE: 76 BPM | DIASTOLIC BLOOD PRESSURE: 59 MMHG | OXYGEN SATURATION: 100 %

## 2025-06-03 DIAGNOSIS — Z12.83 SCREENING EXAM FOR SKIN CANCER: ICD-10-CM

## 2025-06-03 PROCEDURE — G0463 HOSPITAL OUTPT CLINIC VISIT: HCPCS

## 2025-06-03 ASSESSMENT — PAIN SCALES - GENERAL: PAINLEVEL_OUTOF10: NO PAIN (0)

## 2025-06-03 NOTE — LETTER
6/3/2025       RE: Maria Teresa Aburto  [address]  [place] [address]     Dear Colleague,    Thank you for referring your patient, Maria Teresa Aburto, to the [facility]. Please see a copy of my visit note below.    S: Persistent for Maria Teresa who comes in with her  for a general skin check.  She has a history of actinic keratoses otherwise apparently no skin cancers.  She would like a general examination.  She is also bothered by a small mole on her forehead that she would like to have removed if possible.    O: We examined her face neck chest back arms legs hands feet and found simply numerous lentigines or freckles and a few scattered nevi here and there.  A few seborrheic keratoses here and there no evidence of any skin cancers or precancers.  Lesion on her forehead is a brown domed white harmless looking intradermal nevus.    A: No worrisome lesions nevus forehead numerous freckles very healthy skin.    P reassured.  Shave removed lesion on her forehead as a courtesy aftercare discussed.  No specimen sent.  Return as needed 1 year    Total time spent preparing to see the patient, review of the chart and reviewing recent visits of the patient to other practitioners, obtaining pertinent history, examining the patient, discussing the diagnosis, reviewing treatment options, education, and documenting the above in Epic/EMR was 20 minutes. All time involved was spent on the day of the service for the patient.      Again, thank you for allowing me to participate in the care of your patient.      Sincerely,    SHELBY Oseguera MD

## 2025-06-03 NOTE — PROGRESS NOTES
S: Persistent for Maria Teresa who comes in with her  for a general skin check.  She has a history of actinic keratoses otherwise apparently no skin cancers.  She would like a general examination.  She is also bothered by a small mole on her forehead that she would like to have removed if possible.    O: We examined her face neck chest back arms legs hands feet and found simply numerous lentigines or freckles and a few scattered nevi here and there.  A few seborrheic keratoses here and there no evidence of any skin cancers or precancers.  Lesion on her forehead is a brown domed white harmless looking intradermal nevus.    A: No worrisome lesions nevus forehead numerous freckles very healthy skin.    P reassured.  Shave removed lesion on her forehead as a courtesy aftercare discussed.  No specimen sent.  Return as needed 1 year    Total time spent preparing to see the patient, review of the chart and reviewing recent visits of the patient to other practitioners, obtaining pertinent history, examining the patient, discussing the diagnosis, reviewing treatment options, education, and documenting the above in Epic/EMR was 20 minutes. All time involved was spent on the day of the service for the patient.

## 2025-06-18 ENCOUNTER — PATIENT OUTREACH (OUTPATIENT)
Dept: CARE COORDINATION | Facility: CLINIC | Age: 80
End: 2025-06-18

## 2025-06-18 ENCOUNTER — ONCOLOGY VISIT (OUTPATIENT)
Dept: ONCOLOGY | Facility: OTHER | Age: 80
End: 2025-06-18
Attending: INTERNAL MEDICINE
Payer: COMMERCIAL

## 2025-06-18 VITALS
HEART RATE: 78 BPM | BODY MASS INDEX: 20.66 KG/M2 | TEMPERATURE: 97.8 F | DIASTOLIC BLOOD PRESSURE: 70 MMHG | SYSTOLIC BLOOD PRESSURE: 132 MMHG | WEIGHT: 121.03 LBS | HEIGHT: 64 IN | OXYGEN SATURATION: 99 %

## 2025-06-18 DIAGNOSIS — R79.89 ELEVATED FERRITIN: Primary | ICD-10-CM

## 2025-06-18 PROCEDURE — G0463 HOSPITAL OUTPT CLINIC VISIT: HCPCS

## 2025-06-18 ASSESSMENT — PAIN SCALES - GENERAL: PAINLEVEL_OUTOF10: NO PAIN (0)

## 2025-06-18 NOTE — PROGRESS NOTES
HEMATOLOGY FOLLOW UP NOTE  Jun 18, 2025    Reason for follow up: Elevated ferritin    HISTORY OF PRESENT ILLNESS  Maria Teresa Aburto is a 79 year old female with PMH as stated below who is seen in the hematology clinic for elevated ferritin.    Her history in short is as follows:    Initially presented with elevated ferritin.  Was found to have a ferritin of 440 on 8/19/2022.  Iron saturation checked on 11/14/2022 is 33.    10/5/2022: Hemochromatosis mutation: Patient is heterozygous for the S65C mutation    10/31/2022: Bone marrow biopsy: Normocellular bone marrow 25% with  normally maturing trilineage hematopoiesis.  Morphological or immunophenotypic evidence of a B-cell neoplasm, plasma cell dyscrasia or myelodysplastic syndrome.  Increased storage iron.  No amyloid detection detected with a Congo red stain.    12/14/2022-MRI abdomen with without contrast:Diffusely decreased signal in the liver on in phase imaging compatible  with iron overload. No focal abnormalities of the liver.    She started phlebotomy in December 2022.  During her first phlebotomy 600 mL was taken and therefore she had a syncopal episode.  She is continuing with phlebotomy for ferritin greater than 100 with 350 cc of blood being taken.  Referred to cardiology and also saw GI at [place]    6/5/2023: MR cardiac with and without contrast:1.  Normal biventricular chamber size, wall motion and global systolic function.   2.  No evidence of myocardial iron overload   3.  Normal myocardial delayed enhancement. No evidence of myocardial ischemic scar, inflammation or   infiltration.     9/6/2023: MRI abdomen with and without contrast: Hepatic iron content is mildly increased with an R2*of 75 1/s (normal is less than 60 1/s   at 1.5 Rahel). Liver morphology is noncirrhotic. No focal liver lesion. The hepatic veins and portal   veins are widely patent. No biliary ductal dilation.     Doing well.  Denies any abdominal pain.  Denies any weight or appetite  "loss.    REVIEW OF SYSTEMS  A 12-point ROS negative except as in HPI    Current Outpatient Medications   Medication Sig Dispense Refill    Aspirin 81 MG CAPS Take 1 Capful by mouth daily 90 capsule 3    atorvastatin (LIPITOR) 10 MG tablet Take 10 mg by mouth daily.      Calcium Carbonate-Vitamin D (CALCIUM + D PO)       cyanocobalamin (VITAMIN B-12) 1000 MCG tablet Take 1 tablet (1,000 mcg) by mouth daily. 90 tablet 3    losartan (COZAAR) 25 MG tablet TAKE 1 TABLET (25 MG) BY MOUTH DAILY. 90 tablet 3    omeprazole (PRILOSEC) 40 MG DR capsule TAKE 1 CAPSULE (40 MG) BY MOUTH DAILY 90 capsule 3    pramipexole (MIRAPEX) 0.25 MG tablet TAKE 1-2 TABLETS (0.25-0.5 MG) BY MOUTH AT BEDTIME 180 tablet 3    Pyridoxine HCl (B-6) 100 MG TABS Take 100 mg by mouth daily. 90 tablet 3    Vitamin D, Cholecalciferol, 25 MCG (1000 UT) TABS Take 1 tablet by mouth every other day.         Allergies   Allergen Reactions    Nitrofurantoin Other (See Comments) and Nausea     Macrobid  \"Chills and Fevers\"    Nitrofurantoin Macrocrystal Other (See Comments) and Nausea     Macrobid  \"Chills and Fevers\"       Immunization History   Administered Date(s) Administered    COVID-19 12+ (MODERNA) 12/15/2023    COVID-19 12+ (Pfizer) 12/15/2024    COVID-19 Bivalent 18+ (Moderna) 12/06/2022    COVID-19 Monovalent 18+ (Moderna) 01/14/2021, 02/11/2021, 11/02/2021, 06/08/2022    Influenza (High Dose) Trivalent,PF (Fluzone) 10/05/2012, 10/10/2013, 10/23/2014, 11/16/2015, 10/13/2016, 10/06/2017, 10/22/2018, 10/23/2019, 12/02/2024    Influenza (IIV3) PF 12/11/2008    Influenza (prior to 2024) 10/24/2011    Influenza Vaccine 65+ (Fluzone HD) 09/30/2020, 10/25/2021, 11/18/2022, 11/15/2023    Pneumo Conj 13-V (2010&after) 05/23/2017    Pneumococcal 23 valent 10/10/2013    TDAP (Adacel,Boostrix) 10/11/2011    TDAP Vaccine (Adacel) 06/22/2006    Td (Adult), Adsorbed 11/08/2021    Zoster recombinant adjuvanted (Shingrix) 11/03/2020, 05/04/2021    Zoster vaccine, " live 12/12/2013       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    Bowden's esophagus 10/11/2011    Constipation 10/09/2012    GERD (gastroesophageal reflux disease)[530.81] 05/11/2003    Hypertension     NSTEMI (non-ST elevated myocardial infarction) (H) 09/28/2020    With Takotsubo cardiomyopathy Cardiac cath, small lesion of diagonal not requiring stenting Dr Crystal Sandoval Vakil    MARISSA (obstructive sleep apnea) 12/8/2022    Osteopenia 10/11/2011    dexa scans odd years starting in 2003    Precordial pain 04/28/2003    negative stress test, negative pulmonary evaluatio    Restless legs syndrome (RLS) 10/11/2011    Squamous Cell Carcinoma 10/11/2011    left leg x2    Takotsubo cardiomyopathy 09/22/2020    Episode of high BP noted on exam with no other symptoms Elevated troponin Cardiac cath, small lesion of  small diagonal, not requiring stenting Crystal Sandoval, cardiology    Urge incontinence 10/09/2012       Past Surgical History:   Procedure Laterality Date    BONE MARROW BIOPSY, BONE SPECIMEN, NEEDLE/TROCAR N/A 10/31/2022    Procedure: BIOPSY, BONE MARROW WITH ASPIRATION;  Surgeon: Carlos Recinos DO;  Location: [facility] OR    Breast Biospy  11/11/2000    LEFT > Fibrocystic Disease > Outcome: Fibroadenoma    COLONOSCOPY  2000    COLONOSCOPY  8-7-2009    Normal, Repeat 2015    DEXA Scan  2009    EGD      EGD  2008    EGD  2003    ENDOSCOPY UPPER, COLONOSCOPY, COMBINED N/A 9/6/2018    Procedure: COMBINED ENDOSCOPY UPPER, COLONOSCOPY;  UPPER ENDOSCOPY WITH BIOPSIES AND COLONOSCOPY WITH BIOPSIES;  Surgeon: Minh Interiano DO;  Location: [facility] OR    ESOPHAGOSCOPY, GASTROSCOPY, DUODENOSCOPY (EGD), COMBINED N/A 10/29/2021    Procedure: Upper endoscopy with biopsies;  Surgeon: Panfilo Arcos MD;  Location: [facility]    ESOPHAGOSCOPY, GASTROSCOPY, DUODENOSCOPY (EGD), COMBINED N/A 12/14/2023    Procedure: ESOPHAGOGASTRODUODENOSCOPY with biopsy;  Surgeon: Panfilo Arcos,  "MD;  Location: [facility] OR    Knee Replacement  2012    Oophorectomy      Ectopic Pregnancy    ROTATOR CUFF REPAIR RT/LT Right     with acromioplasty for complete rotator cuff tear, Dr Cherry    TONSILLECTOMY         SOCIAL HISTORY  History   Smoking Status    Former    Types: Cigarettes   Smokeless Tobacco    Never    Social History    Substance and Sexual Activity      Alcohol use: Yes     History   Drug Use Unknown       FAMILY HISTORY  Family History   Problem Relation Age of Onset    Uterine Cancer Mother     Lung Cancer Mother     Osteoarthritis Mother     Thyroid Disease Father     Coronary Artery Disease Father     Thyroid Disease Brother     Hyperlipidemia Son     Coronary Artery Disease Son     Heart Surgery Son     Thyroid Disease Son     Thyroid Disease Daughter     Endometrial Cancer Other         Family Hx    Osteoporosis Other         Family Hx    Parkinsonism Other         Family Hx    Multiple Sclerosis Maternal Aunt     Anuerysm Paternal Aunt     Unknown/Adopted No family hx of     Hypertension No family hx of     Breast Cancer No family hx of     Cerebrovascular Disease No family hx of     Colon Cancer No family hx of     Prostate Cancer No family hx of     Anesthesia Reaction No family hx of     Asthma No family hx of     Genetic Disorder No family hx of        PHYSICAL EXAMINATION  /70 (BP Location: Right arm, Patient Position: Sitting, Cuff Size: Adult Regular)   Pulse 78   Temp 97.8  F (36.6  C) (Tympanic)   Ht 1.626 m (5' 4\")   Wt 54.9 kg (121 lb 0.5 oz)   SpO2 99%   BMI 20.78 kg/m    Wt Readings from Last 2 Encounters:   06/18/25 54.9 kg (121 lb 0.5 oz)   05/02/25 55.3 kg (122 lb)     Physical Exam  Constitutional:       Appearance: Normal appearance.   Pulmonary:      Effort: Pulmonary effort is normal.   Neurological:      General: No focal deficit present.      Mental Status: She is alert.   Psychiatric:         Mood and Affect: Mood normal.   Laboratory and imaging:     Latest " Reference Range & Units 05/02/25 11:02   WBC 4.0 - 11.0 10e3/uL 5.1   Hemoglobin 11.7 - 15.7 g/dL 12.2   Hematocrit 35.0 - 47.0 % 34.0 (L)   Platelet Count 150 - 450 10e3/uL 239   (L): Data is abnormally low      ASSESSMENT AND PLAN    1.  Elevated ferritin:  2.  Heterozygous for S65C mutation:    Initially presented with elevated ferritin which was seen on labs in August 2022.  At the time iron saturation was normal.  Testing for hemochromatosis mutation showed heterozygous for S65C mutation.  She had MRI abdomen done December 2022 which showed diffuse decreased signal in the liver concerning for iron.  She was started on phlebotomy in December 2022.  Initially 600 mL was taken before she had a syncopal episode.  Also had MRI cardiac which did not show any iron deposition.  She had a repeat MRI abdomen in May 2023 which showed mild iron overload.  At this point she had had a few phlebotomies.  A bone marrow biopsy did not show any MDS.  CBCD's have been within normal limits.    Discussed it would be unusual for the heterozygous S65C mutation to cause iron overload.  Dr. Silverman had mentioned that this was possibly an anomalous mutation presenting phenotypically as iron overload however that would be unusual.  On the other hand there are not any clear reasons for her high ferritin.  There does not seem to be any ongoing inflammation.  She does not have any concern for MDS or ineffective erythropoiesis.  She has been on phlebotomies since December 2022.    She is currently getting labs done every 2 months and gets phlebotomized if she has a ferritin of 100.  Given her age and as her cause of elevated ferritin is not entirely clear I would recommend a higher ferritin level as a cutoff for phlebotomy.  I think using 150 at this time would be a good option.  Again do not want to risk anemia or iron deficiency at her age as that would make her more fatigued.    She spends her perdomo in [place].  We will see her back in  clinic in early December before she travels.    Total time spent on the patient on day of encounter was 75 minutes doing chart review, review of test results, interpretation of results, patient visit and documentation.       Elizabeth Rodriguez MD

## 2025-06-18 NOTE — NURSING NOTE
"Oncology Rooming Note    June 18, 2025 8:29 AM   Maria Teresa Aburto is a 79 year old female who presents for:    Chief Complaint   Patient presents with    Oncology Clinic Visit     Follow up. MGUS     Initial Vitals: /70 (BP Location: Right arm, Patient Position: Sitting, Cuff Size: Adult Regular)   Pulse 78   Temp 97.8  F (36.6  C) (Tympanic)   Ht 1.626 m (5' 4\")   Wt 54.9 kg (121 lb 0.5 oz)   SpO2 99%   BMI 20.78 kg/m   Estimated body mass index is 20.78 kg/m  as calculated from the following:    Height as of this encounter: 1.626 m (5' 4\").    Weight as of this encounter: 54.9 kg (121 lb 0.5 oz). Body surface area is 1.57 meters squared.  No Pain (0) Comment: Data Unavailable   No LMP recorded. Patient is postmenopausal.  Allergies reviewed: Yes  Medications reviewed: Yes    Medications: Medication refills not needed today.  Pharmacy name entered into Open English: [id number] - [place], [place] - [address]    Frailty Screening:   Is the patient here for a new oncology consult visit in cancer care? 2. No    PHQ9:  Did this patient require a PHQ9?: No      Clinical concerns: none       Kelly Alonso LPN              "

## 2025-07-03 ENCOUNTER — TELEPHONE (OUTPATIENT)
Dept: INFUSION THERAPY | Facility: OTHER | Age: 80
End: 2025-07-03

## 2025-07-03 NOTE — NURSING NOTE
Message from PAC asking after how many days before phlebotomy she can schedule labs, as patient wants 1-2 days prior because last time she was here she was charged for chair time surrounding labs.     On review, do not see a hx of phlebotomies with us, but of Reclast infusions. Advised PAC that typically 1 day prior or about 1 hour prior is the standard. Alerted if patient wants more than that, provider will need to be consulted.

## 2025-07-08 ENCOUNTER — MYC MEDICAL ADVICE (OUTPATIENT)
Dept: FAMILY MEDICINE | Facility: OTHER | Age: 80
End: 2025-07-08

## 2025-07-08 DIAGNOSIS — M54.2 NECK PAIN: Primary | ICD-10-CM

## 2025-07-08 NOTE — PROGRESS NOTES
Pended chiropractic referral if you approve please review and sign.  Please see Zahroof Valvest message.  Thank you  YUE Hammond CC

## 2025-07-10 ENCOUNTER — LAB (OUTPATIENT)
Dept: LAB | Facility: OTHER | Age: 80
End: 2025-07-10
Payer: COMMERCIAL

## 2025-07-10 DIAGNOSIS — R79.89 ELEVATED FERRITIN: ICD-10-CM

## 2025-07-10 LAB
BASOPHILS # BLD AUTO: 0.1 10E3/UL (ref 0–0.2)
BASOPHILS NFR BLD AUTO: 1 %
EOSINOPHIL # BLD AUTO: 0.4 10E3/UL (ref 0–0.7)
EOSINOPHIL NFR BLD AUTO: 8 %
ERYTHROCYTE [DISTWIDTH] IN BLOOD BY AUTOMATED COUNT: 12.3 % (ref 10–15)
FERRITIN SERPL-MCNC: 78 NG/ML (ref 11–328)
HCT VFR BLD AUTO: 32.7 % (ref 35–47)
HGB BLD-MCNC: 11.7 G/DL (ref 11.7–15.7)
IMM GRANULOCYTES # BLD: 0 10E3/UL
IMM GRANULOCYTES NFR BLD: 0 %
LYMPHOCYTES # BLD AUTO: 0.7 10E3/UL (ref 0.8–5.3)
LYMPHOCYTES NFR BLD AUTO: 15 %
MCH RBC QN AUTO: 31.3 PG (ref 26.5–33)
MCHC RBC AUTO-ENTMCNC: 35.8 G/DL (ref 31.5–36.5)
MCV RBC AUTO: 87 FL (ref 78–100)
MONOCYTES # BLD AUTO: 0.7 10E3/UL (ref 0–1.3)
MONOCYTES NFR BLD AUTO: 15 %
NEUTROPHILS # BLD AUTO: 2.9 10E3/UL (ref 1.6–8.3)
NEUTROPHILS NFR BLD AUTO: 61 %
NRBC # BLD AUTO: 0 10E3/UL
NRBC BLD AUTO-RTO: 0 /100
PLATELET # BLD AUTO: 206 10E3/UL (ref 150–450)
RBC # BLD AUTO: 3.74 10E6/UL (ref 3.8–5.2)
WBC # BLD AUTO: 4.7 10E3/UL (ref 4–11)

## 2025-07-10 PROCEDURE — 36415 COLL VENOUS BLD VENIPUNCTURE: CPT | Mod: ZL

## 2025-07-10 PROCEDURE — 82728 ASSAY OF FERRITIN: CPT | Mod: ZL

## 2025-07-10 PROCEDURE — 85004 AUTOMATED DIFF WBC COUNT: CPT | Mod: ZL

## 2025-08-06 ENCOUNTER — OFFICE VISIT (OUTPATIENT)
Dept: FAMILY MEDICINE | Facility: OTHER | Age: 80
End: 2025-08-06
Attending: FAMILY MEDICINE
Payer: COMMERCIAL

## 2025-08-06 VITALS
TEMPERATURE: 97.4 F | BODY MASS INDEX: 20.84 KG/M2 | OXYGEN SATURATION: 99 % | RESPIRATION RATE: 16 BRPM | HEART RATE: 77 BPM | WEIGHT: 121.4 LBS | DIASTOLIC BLOOD PRESSURE: 73 MMHG | SYSTOLIC BLOOD PRESSURE: 173 MMHG

## 2025-08-06 DIAGNOSIS — R14.0 ABDOMINAL BLOATING: ICD-10-CM

## 2025-08-06 DIAGNOSIS — R06.09 DOE (DYSPNEA ON EXERTION): Primary | ICD-10-CM

## 2025-08-06 DIAGNOSIS — R53.82 CHRONIC FATIGUE: ICD-10-CM

## 2025-08-06 DIAGNOSIS — M54.2 NECK PAIN: ICD-10-CM

## 2025-08-06 LAB
ALBUMIN SERPL BCG-MCNC: 4.3 G/DL (ref 3.5–5.2)
ALP SERPL-CCNC: 75 U/L (ref 40–150)
ALT SERPL W P-5'-P-CCNC: 32 U/L (ref 0–50)
ANION GAP SERPL CALCULATED.3IONS-SCNC: 12 MMOL/L (ref 7–15)
AST SERPL W P-5'-P-CCNC: 36 U/L (ref 0–45)
BILIRUB SERPL-MCNC: 0.3 MG/DL
BUN SERPL-MCNC: 21.8 MG/DL (ref 8–23)
CALCIUM SERPL-MCNC: 9.1 MG/DL (ref 8.8–10.4)
CHLORIDE SERPL-SCNC: 96 MMOL/L (ref 98–107)
CREAT SERPL-MCNC: 0.89 MG/DL (ref 0.51–0.95)
CRP SERPL-MCNC: <3 MG/L
EGFRCR SERPLBLD CKD-EPI 2021: 65 ML/MIN/1.73M2
ERYTHROCYTE [SEDIMENTATION RATE] IN BLOOD BY WESTERGREN METHOD: 14 MM/HR (ref 0–30)
GLUCOSE SERPL-MCNC: 91 MG/DL (ref 70–99)
HCO3 SERPL-SCNC: 20 MMOL/L (ref 22–29)
NT-PROBNP SERPL-MCNC: 190 PG/ML (ref 0–624)
POTASSIUM SERPL-SCNC: 4.4 MMOL/L (ref 3.4–5.3)
PROT SERPL-MCNC: 7.2 G/DL (ref 6.4–8.3)
SODIUM SERPL-SCNC: 128 MMOL/L (ref 135–145)
TSH SERPL DL<=0.005 MIU/L-ACNC: 2.25 UIU/ML (ref 0.3–4.2)

## 2025-08-06 PROCEDURE — G0463 HOSPITAL OUTPT CLINIC VISIT: HCPCS

## 2025-08-06 PROCEDURE — 85652 RBC SED RATE AUTOMATED: CPT | Mod: ZL | Performed by: FAMILY MEDICINE

## 2025-08-06 PROCEDURE — 84443 ASSAY THYROID STIM HORMONE: CPT | Mod: ZL | Performed by: FAMILY MEDICINE

## 2025-08-06 PROCEDURE — 84155 ASSAY OF PROTEIN SERUM: CPT | Mod: ZL | Performed by: FAMILY MEDICINE

## 2025-08-06 PROCEDURE — 83880 ASSAY OF NATRIURETIC PEPTIDE: CPT | Mod: ZL | Performed by: FAMILY MEDICINE

## 2025-08-06 PROCEDURE — 86140 C-REACTIVE PROTEIN: CPT | Mod: ZL | Performed by: FAMILY MEDICINE

## 2025-08-06 PROCEDURE — 36415 COLL VENOUS BLD VENIPUNCTURE: CPT | Mod: ZL | Performed by: FAMILY MEDICINE

## 2025-08-06 ASSESSMENT — PAIN SCALES - GENERAL: PAINLEVEL_OUTOF10: NO PAIN (0)

## 2025-08-08 ENCOUNTER — HOSPITAL ENCOUNTER (OUTPATIENT)
Dept: ULTRASOUND IMAGING | Facility: HOSPITAL | Age: 80
Discharge: HOME | End: 2025-08-08
Attending: FAMILY MEDICINE | Admitting: RADIOLOGY
Payer: COMMERCIAL

## 2025-08-08 DIAGNOSIS — R14.0 ABDOMINAL BLOATING: ICD-10-CM

## 2025-08-08 PROCEDURE — 76705 ECHO EXAM OF ABDOMEN: CPT

## 2025-08-08 PROCEDURE — 76705 ECHO EXAM OF ABDOMEN: CPT | Mod: 26 | Performed by: RADIOLOGY

## 2025-08-13 ENCOUNTER — HOSPITAL ENCOUNTER (OUTPATIENT)
Dept: CARDIOLOGY | Facility: HOSPITAL | Age: 80
Discharge: HOME | End: 2025-08-13
Attending: FAMILY MEDICINE
Payer: COMMERCIAL

## 2025-08-13 DIAGNOSIS — R06.09 DOE (DYSPNEA ON EXERTION): ICD-10-CM

## 2025-08-13 LAB — LVEF ECHO: NORMAL

## 2025-08-13 PROCEDURE — 93306 TTE W/DOPPLER COMPLETE: CPT

## 2025-08-27 ENCOUNTER — ANCILLARY PROCEDURE (OUTPATIENT)
Dept: MAMMOGRAPHY | Facility: OTHER | Age: 80
End: 2025-08-27
Attending: FAMILY MEDICINE
Payer: COMMERCIAL

## 2025-09-02 ENCOUNTER — TELEPHONE (OUTPATIENT)
Dept: INTERVENTIONAL RADIOLOGY/VASCULAR | Facility: HOSPITAL | Age: 80
End: 2025-09-02

## 2025-09-03 ENCOUNTER — HOSPITAL ENCOUNTER (OUTPATIENT)
Dept: MRI IMAGING | Facility: HOSPITAL | Age: 80
Discharge: HOME | End: 2025-09-03
Attending: FAMILY MEDICINE
Payer: COMMERCIAL

## 2025-09-03 DIAGNOSIS — M54.2 NECK PAIN: ICD-10-CM

## 2025-09-03 PROCEDURE — 72141 MRI NECK SPINE W/O DYE: CPT

## 2025-09-03 PROCEDURE — 72141 MRI NECK SPINE W/O DYE: CPT | Mod: 26 | Performed by: RADIOLOGY

## 2025-09-04 ENCOUNTER — HOSPITAL ENCOUNTER (OUTPATIENT)
Dept: NUCLEAR MEDICINE | Facility: HOSPITAL | Age: 80
Setting detail: NUCLEAR MEDICINE
End: 2025-09-04
Attending: FAMILY MEDICINE
Payer: COMMERCIAL

## 2025-09-04 DIAGNOSIS — R06.09 DOE (DYSPNEA ON EXERTION): ICD-10-CM

## 2025-09-04 LAB
CV BLOOD PRESSURE: 75 MMHG
CV STRESS CURRENT BP HE: NORMAL
CV STRESS CURRENT HR HE: 68
CV STRESS CURRENT HR HE: 69
CV STRESS CURRENT HR HE: 91
CV STRESS CURRENT HR HE: 93
CV STRESS CURRENT HR HE: 95
CV STRESS CURRENT HR HE: 95
CV STRESS CURRENT HR HE: 99
CV STRESS CURRENT HR HE: 99
CV STRESS DEVIATION TIME HE: NORMAL
CV STRESS ECHO PERCENT HR HE: NORMAL
CV STRESS EXERCISE STAGE HE: NORMAL
CV STRESS FINAL RESTING BP HE: NORMAL
CV STRESS FINAL RESTING HR HE: 91
CV STRESS MAX HR HE: 99
CV STRESS MAX TREADMILL GRADE HE: 0
CV STRESS MAX TREADMILL SPEED HE: 0
CV STRESS PEAK DIA BP HE: NORMAL
CV STRESS PEAK SYS BP HE: NORMAL
CV STRESS PHASE HE: NORMAL
CV STRESS PROTOCOL HE: NORMAL
CV STRESS RESTING PT POSITION HE: NORMAL
CV STRESS ST DEVIATION AMOUNT HE: NORMAL
CV STRESS ST DEVIATION ELEVATION HE: NORMAL
CV STRESS ST EVELATION AMOUNT HE: NORMAL
CV STRESS TEST TYPE HE: NORMAL
CV STRESS TOTAL STAGE TIME MIN 1 HE: NORMAL
NUC STRESS EJECTION FRACTION: 74 %
RATE PRESSURE PRODUCT: NORMAL
STRESS ECHO BASELINE DIASTOLIC HE: 62
STRESS ECHO BASELINE HR: 71
STRESS ECHO BASELINE SYSTOLIC BP: 148
STRESS ECHO CALCULATED PERCENT HR: 71 %
STRESS ECHO LAST STRESS DIASTOLIC BP: 64
STRESS ECHO LAST STRESS HR: 91
STRESS ECHO LAST STRESS SYSTOLIC BP: 154
STRESS ECHO TARGET HR: 139

## 2025-09-04 PROCEDURE — A9500 TC99M SESTAMIBI: HCPCS | Performed by: STUDENT IN AN ORGANIZED HEALTH CARE EDUCATION/TRAINING PROGRAM

## 2025-09-04 PROCEDURE — 93017 CV STRESS TEST TRACING ONLY: CPT

## 2025-09-04 PROCEDURE — 78452 HT MUSCLE IMAGE SPECT MULT: CPT

## 2025-09-04 PROCEDURE — 343N000001 HC RX 343 MED OP 636: Performed by: STUDENT IN AN ORGANIZED HEALTH CARE EDUCATION/TRAINING PROGRAM

## 2025-09-04 RX ADMIN — TECHNETIUM TC 99M SESTAMIBI 10.2 MILLICURIE: 1 INJECTION INTRAVENOUS at 07:47

## (undated) DEVICE — TUBING SUCTION 20FT N620A

## (undated) DEVICE — GLV-8.0 ORTHO PROTEXIS PI LF/PF

## (undated) DEVICE — IRRIGATION-H2O 1000ML

## (undated) DEVICE — TUBING-SUCTION 20FT

## (undated) DEVICE — TAPE-MICROFOAM 2 INCH

## (undated) DEVICE — SOL WATER IRRIG 1000ML BOTTLE 2F7114

## (undated) DEVICE — FORCEP-COLON BIOPSY LARGE W/NEEDLE 240CM

## (undated) DEVICE — SENSOR-OXISENSOR II ADULT

## (undated) DEVICE — LUBRICANT JELLY 2OZ. TUBE

## (undated) DEVICE — SYR 30ML SLIP TIP W/O NDL 302833

## (undated) DEVICE — CONNECTOR-ERBEFLO 2 PORT

## (undated) DEVICE — TRAY-BIOPSY BONE W/JAMSHIDI NEEDLE

## (undated) DEVICE — SYRINGE-30CC SLIP TIP

## (undated) DEVICE — JELLY LUBRICATING SURGILUBE 2OZ TUBE

## (undated) DEVICE — FORCEP COLON BIOPSY STD W/NEEDLE 160CM M00513390

## (undated) DEVICE — CANISTER SUCTION MEDI-VAC GUARDIAN 2000ML 90D 65651-220

## (undated) DEVICE — FORCEP-COLON BIOPSY STD W/NEEDLE 160CM

## (undated) DEVICE — CONNECTOR ERBEFLO 2 PORT 20325-215

## (undated) DEVICE — CANISTER-SUCTION 2000CC

## (undated) DEVICE — MOUTHPIECE W/GUARD FOR ENDOSCOPY

## (undated) DEVICE — ENDO BITE BLOCK ADULT OLYMPUS LATEX FREE MAJ-1632

## (undated) DEVICE — DRSG-SPONGE STERILE 4 X 4

## (undated) RX ORDER — CYANOCOBALAMIN 1000 UG/ML
INJECTION, SOLUTION INTRAMUSCULAR; SUBCUTANEOUS
Status: DISPENSED
Start: 2024-11-05

## (undated) RX ORDER — CYANOCOBALAMIN 1000 UG/ML
INJECTION, SOLUTION INTRAMUSCULAR; SUBCUTANEOUS
Status: DISPENSED
Start: 2023-12-15

## (undated) RX ORDER — CYANOCOBALAMIN 1000 UG/ML
INJECTION, SOLUTION INTRAMUSCULAR; SUBCUTANEOUS
Status: DISPENSED
Start: 2024-10-22

## (undated) RX ORDER — PROPOFOL 10 MG/ML
INJECTION, EMULSION INTRAVENOUS
Status: DISPENSED
Start: 2022-10-31

## (undated) RX ORDER — CYANOCOBALAMIN 1000 UG/ML
INJECTION, SOLUTION INTRAMUSCULAR; SUBCUTANEOUS
Status: DISPENSED
Start: 2024-10-08

## (undated) RX ORDER — CYANOCOBALAMIN 1000 UG/ML
INJECTION, SOLUTION INTRAMUSCULAR; SUBCUTANEOUS
Status: DISPENSED
Start: 2024-08-12

## (undated) RX ORDER — CYANOCOBALAMIN 1000 UG/ML
INJECTION, SOLUTION INTRAMUSCULAR; SUBCUTANEOUS
Status: DISPENSED
Start: 2023-09-18

## (undated) RX ORDER — CYANOCOBALAMIN 1000 UG/ML
INJECTION, SOLUTION INTRAMUSCULAR; SUBCUTANEOUS
Status: DISPENSED
Start: 2024-07-01

## (undated) RX ORDER — CYANOCOBALAMIN 1000 UG/ML
INJECTION, SOLUTION INTRAMUSCULAR; SUBCUTANEOUS
Status: DISPENSED
Start: 2023-11-15

## (undated) RX ORDER — PROPOFOL 10 MG/ML
INJECTION, EMULSION INTRAVENOUS
Status: DISPENSED
Start: 2018-09-06

## (undated) RX ORDER — CYANOCOBALAMIN 1000 UG/ML
INJECTION, SOLUTION INTRAMUSCULAR; SUBCUTANEOUS
Status: DISPENSED
Start: 2023-08-10

## (undated) RX ORDER — LIDOCAINE HYDROCHLORIDE 20 MG/ML
INJECTION, SOLUTION EPIDURAL; INFILTRATION; INTRACAUDAL; PERINEURAL
Status: DISPENSED
Start: 2022-10-31

## (undated) RX ORDER — CYANOCOBALAMIN 1000 UG/ML
INJECTION, SOLUTION INTRAMUSCULAR; SUBCUTANEOUS
Status: DISPENSED
Start: 2024-08-27

## (undated) RX ORDER — FENTANYL CITRATE 50 UG/ML
INJECTION, SOLUTION INTRAMUSCULAR; INTRAVENOUS
Status: DISPENSED
Start: 2018-09-06

## (undated) RX ORDER — CYANOCOBALAMIN 1000 UG/ML
INJECTION, SOLUTION INTRAMUSCULAR; SUBCUTANEOUS
Status: DISPENSED
Start: 2023-10-18

## (undated) RX ORDER — MEDROXYPROGESTERONE ACETATE 150 MG/ML
INJECTION, SUSPENSION INTRAMUSCULAR
Status: DISPENSED
Start: 2023-10-18

## (undated) RX ORDER — CYANOCOBALAMIN 1000 UG/ML
INJECTION, SOLUTION INTRAMUSCULAR; SUBCUTANEOUS
Status: DISPENSED
Start: 2024-09-23

## (undated) RX ORDER — CYANOCOBALAMIN 1000 UG/ML
INJECTION, SOLUTION INTRAMUSCULAR; SUBCUTANEOUS
Status: DISPENSED
Start: 2024-09-10